# Patient Record
Sex: FEMALE | Race: WHITE | NOT HISPANIC OR LATINO | Employment: FULL TIME | URBAN - METROPOLITAN AREA
[De-identification: names, ages, dates, MRNs, and addresses within clinical notes are randomized per-mention and may not be internally consistent; named-entity substitution may affect disease eponyms.]

---

## 2017-01-19 ENCOUNTER — HOSPITAL ENCOUNTER (OUTPATIENT)
Dept: GENERAL RADIOLOGY | Facility: HOSPITAL | Age: 27
Discharge: HOME | End: 2017-01-19
Admitting: PHYSICIAN ASSISTANT

## 2017-01-19 ENCOUNTER — OFFICE VISIT (OUTPATIENT)
Dept: INTERNAL MEDICINE | Facility: CLINIC | Age: 27
End: 2017-01-19

## 2017-01-19 VITALS
WEIGHT: 293 LBS | HEIGHT: 66 IN | BODY MASS INDEX: 47.09 KG/M2 | SYSTOLIC BLOOD PRESSURE: 120 MMHG | DIASTOLIC BLOOD PRESSURE: 78 MMHG | HEART RATE: 81 BPM | OXYGEN SATURATION: 99 %

## 2017-01-19 DIAGNOSIS — M79.601 RIGHT ARM PAIN: ICD-10-CM

## 2017-01-19 DIAGNOSIS — S46.911A RIGHT SHOULDER STRAIN, INITIAL ENCOUNTER: ICD-10-CM
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
## 2017-01-19 DIAGNOSIS — S46.911A RIGHT SHOULDER STRAIN, INITIAL ENCOUNTER: Primary | ICD-10-CM

## 2017-01-19 PROCEDURE — 96372 THER/PROPH/DIAG INJ SC/IM: CPT | Performed by: PHYSICIAN ASSISTANT

## 2017-01-19 PROCEDURE — 99213 OFFICE O/P EST LOW 20 MIN: CPT | Performed by: PHYSICIAN ASSISTANT

## 2017-01-19 PROCEDURE — 73110 X-RAY EXAM OF WRIST: CPT

## 2017-01-19 PROCEDURE — 73090 X-RAY EXAM OF FOREARM: CPT

## 2017-01-19 PROCEDURE — 73060 X-RAY EXAM OF HUMERUS: CPT

## 2017-01-19 PROCEDURE — 73030 X-RAY EXAM OF SHOULDER: CPT

## 2017-01-19 PROCEDURE — 73070 X-RAY EXAM OF ELBOW: CPT

## 2017-01-19 PROCEDURE — 72050 X-RAY EXAM NECK SPINE 4/5VWS: CPT

## 2017-01-19 RX ORDER — KETOROLAC TROMETHAMINE 30 MG/ML
60 INJECTION, SOLUTION INTRAMUSCULAR; INTRAVENOUS ONCE
Status: COMPLETED | OUTPATIENT
Start: 2017-01-19 | End: 2017-01-19

## 2017-01-19 RX ORDER — CYCLOBENZAPRINE HCL 10 MG
10 TABLET ORAL 3 TIMES DAILY PRN
Qty: 30 TABLET | Refills: 0 | Status: SHIPPED | OUTPATIENT
Start: 2017-01-19 | End: 2017-02-08

## 2017-01-19 RX ADMIN — KETOROLAC TROMETHAMINE 60 MG: 30 INJECTION, SOLUTION INTRAMUSCULAR; INTRAVENOUS at 08:43
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
## 2017-02-08 ENCOUNTER — OFFICE VISIT (OUTPATIENT)
Dept: INTERNAL MEDICINE | Facility: CLINIC | Age: 27
End: 2017-02-08

## 2017-02-08 VITALS
BODY MASS INDEX: 47.09 KG/M2 | WEIGHT: 293 LBS | OXYGEN SATURATION: 100 % | DIASTOLIC BLOOD PRESSURE: 74 MMHG | HEIGHT: 66 IN | SYSTOLIC BLOOD PRESSURE: 124 MMHG | HEART RATE: 105 BPM

## 2017-02-08 DIAGNOSIS — A08.4 VIRAL GASTROENTERITIS: ICD-10-CM

## 2017-02-08 DIAGNOSIS — R11.0 NAUSEA: Primary | ICD-10-CM

## 2017-02-08 PROCEDURE — 99213 OFFICE O/P EST LOW 20 MIN: CPT | Performed by: HOSPITALIST

## 2017-02-08 PROCEDURE — 96372 THER/PROPH/DIAG INJ SC/IM: CPT | Performed by: HOSPITALIST

## 2017-02-08 RX ORDER — PROMETHAZINE HYDROCHLORIDE 25 MG/1
25 TABLET ORAL EVERY 6 HOURS PRN
Qty: 30 TABLET | Refills: 1 | Status: SHIPPED | OUTPATIENT
Start: 2017-02-08 | End: 2018-01-22 | Stop reason: SDUPTHER

## 2017-02-08 RX ORDER — LOPERAMIDE HYDROCHLORIDE 2 MG/1
2 CAPSULE ORAL 4 TIMES DAILY PRN
COMMUNITY
End: 2017-02-27

## 2017-02-08 RX ORDER — PROMETHAZINE HYDROCHLORIDE 25 MG/ML
12.5 INJECTION, SOLUTION INTRAMUSCULAR; INTRAVENOUS ONCE
Status: COMPLETED | OUTPATIENT
Start: 2017-02-08 | End: 2017-02-08

## 2017-02-08 RX ADMIN — PROMETHAZINE HYDROCHLORIDE 12.5 MG: 25 INJECTION, SOLUTION INTRAMUSCULAR; INTRAVENOUS at 12:11

## 2017-02-08 NOTE — PROGRESS NOTES
Subjective   Eliz Eisenberg is a 26 y.o. female. Diarrhea (started yesterday afternoon); Abdominal Pain; Cyanosis (lips); and Vomiting         HPI Comments: Has had 4 bm's this morning in 3 hours. She has lower abdominal pain. No blood. Has had no sick contacts.     Diarrhea    This is a new problem. The current episode started yesterday. The problem occurs 5 to 10 times per day. Associated symptoms include abdominal pain, chills, a fever and vomiting.   Abdominal Pain   Associated symptoms include diarrhea, a fever and vomiting.   Cyanosis   Associated symptoms include abdominal pain, chills, a fever and vomiting.   Vomiting    Associated symptoms include abdominal pain, chills, diarrhea and a fever.       The following portions of the patient's history were reviewed and updated as appropriate: allergies, current medications, past family history, past medical history, past social history, past surgical history and problem list.    Review of Systems   Constitutional: Positive for chills and fever.   Gastrointestinal: Positive for abdominal pain, diarrhea and vomiting.       Objective   Vitals:    02/08/17 1106   BP: 124/74   Pulse: 105   SpO2: 100%       Physical Exam   Constitutional: She is oriented to person, place, and time. She appears well-developed and well-nourished.   HENT:   Head: Normocephalic and atraumatic.   Eyes: EOM are normal. Pupils are equal, round, and reactive to light.   Neck: Normal range of motion. Neck supple.   Cardiovascular: Normal rate and regular rhythm.    Pulmonary/Chest: Effort normal and breath sounds normal.   Abdominal: Soft. Bowel sounds are normal. She exhibits no distension. There is tenderness.   Neurological: She is alert and oriented to person, place, and time.   Skin: She is diaphoretic.       Assessment/Plan   Eliz was seen today for diarrhea, abdominal pain, cyanosis and vomiting.    Diagnoses and all orders for this visit:    Nausea  -     promethazine (PHENERGAN)  25 MG tablet; Take 1 tablet by mouth Every 6 (Six) Hours As Needed for nausea or vomiting.  -     promethazine (PHENERGAN) injection 12.5 mg; Inject 0.5 mL into the shoulder, thigh, or buttocks 1 (One) Time.        Results for orders placed or performed in visit on 07/08/16   Hemoglobin A1c   Result Value Ref Range    Hemoglobin A1C 5.00 4.00 - 6.00 %   Insulin, random   Result Value Ref Range    Insulin 9.8 uIU/mL   Glucose, fasting   Result Value Ref Range    Glucose, Fasting 75 65 - 99 mg/dL

## 2017-02-08 NOTE — PROGRESS NOTES
Subjective   Eliz Eisenberg is a 26 y.o. female. Diarrhea (started yesterday afternoon); Abdominal Pain; Cyanosis (lips); and Vomiting         History of Present Illness    {Common H&P Review Areas:86411}    Review of Systems    Objective   Vitals:    02/08/17 1106   BP: 124/74   Pulse: 105   SpO2: 100%       Physical Exam    Assessment/Plan   {Assess/PlanSmartLinks:11521}    Results for orders placed or performed in visit on 07/08/16   Hemoglobin A1c   Result Value Ref Range    Hemoglobin A1C 5.00 4.00 - 6.00 %   Insulin, random   Result Value Ref Range    Insulin 9.8 uIU/mL   Glucose, fasting   Result Value Ref Range    Glucose, Fasting 75 65 - 99 mg/dL

## 2017-02-09 ENCOUNTER — TELEPHONE (OUTPATIENT)
Dept: INTERNAL MEDICINE | Facility: CLINIC | Age: 27
End: 2017-02-09

## 2017-02-09 ENCOUNTER — HOSPITAL ENCOUNTER (EMERGENCY)
Facility: HOSPITAL | Age: 27
Discharge: HOME | End: 2017-02-10
Attending: EMERGENCY MEDICINE | Admitting: EMERGENCY MEDICINE

## 2017-02-09 DIAGNOSIS — E86.0 DEHYDRATION: Primary | ICD-10-CM

## 2017-02-09 PROBLEM — A08.4 VIRAL GASTROENTERITIS: Status: ACTIVE | Noted: 2017-02-09

## 2017-02-09 PROBLEM — R11.0 NAUSEA: Status: ACTIVE | Noted: 2017-02-09

## 2017-02-09 LAB
ALBUMIN SERPL-MCNC: 4.2 G/DL (ref 3.2–4.8)
ALBUMIN/GLOB SERPL: 1.2 G/DL (ref 1.5–2.5)
ALP SERPL-CCNC: 84 U/L (ref 25–100)
ALT SERPL W P-5'-P-CCNC: 34 U/L (ref 7–40)
AMORPH URATE CRY URNS QL MICRO: ABNORMAL /HPF
ANION GAP SERPL CALCULATED.3IONS-SCNC: 6 MMOL/L (ref 3–11)
AST SERPL-CCNC: 31 U/L (ref 0–33)
B-HCG UR QL: NEGATIVE
BACTERIA UR QL AUTO: ABNORMAL /HPF
BASOPHILS # BLD AUTO: 0.01 10*3/MM3 (ref 0–0.2)
BASOPHILS NFR BLD AUTO: 0.1 % (ref 0–1)
BILIRUB SERPL-MCNC: 0.8 MG/DL (ref 0.3–1.2)
BILIRUB UR QL STRIP: ABNORMAL
BUN BLD-MCNC: 8 MG/DL (ref 9–23)
BUN/CREAT SERPL: 11.4 (ref 7–25)
CALCIUM SPEC-SCNC: 9.6 MG/DL (ref 8.7–10.4)
CHLORIDE SERPL-SCNC: 109 MMOL/L (ref 99–109)
CLARITY UR: ABNORMAL
CO2 SERPL-SCNC: 24 MMOL/L (ref 20–31)
COLOR UR: ABNORMAL
CREAT BLD-MCNC: 0.7 MG/DL (ref 0.6–1.3)
DEPRECATED RDW RBC AUTO: 43 FL (ref 37–54)
EOSINOPHIL # BLD AUTO: 0.19 10*3/MM3 (ref 0.1–0.3)
EOSINOPHIL NFR BLD AUTO: 1.8 % (ref 0–3)
ERYTHROCYTE [DISTWIDTH] IN BLOOD BY AUTOMATED COUNT: 14 % (ref 11.3–14.5)
GFR SERPL CREATININE-BSD FRML MDRD: 101 ML/MIN/1.73
GLOBULIN UR ELPH-MCNC: 3.5 GM/DL
GLUCOSE BLD-MCNC: 79 MG/DL (ref 70–100)
GLUCOSE UR STRIP-MCNC: NEGATIVE MG/DL
HCT VFR BLD AUTO: 38.8 % (ref 34.5–44)
HGB BLD-MCNC: 12.9 G/DL (ref 11.5–15.5)
HGB UR QL STRIP.AUTO: ABNORMAL
HYALINE CASTS UR QL AUTO: ABNORMAL /LPF
IMM GRANULOCYTES # BLD: 0.02 10*3/MM3 (ref 0–0.03)
IMM GRANULOCYTES NFR BLD: 0.2 % (ref 0–0.6)
INTERNAL NEGATIVE CONTROL: NEGATIVE
INTERNAL POSITIVE CONTROL: NORMAL
KETONES UR QL STRIP: ABNORMAL
LEUKOCYTE ESTERASE UR QL STRIP.AUTO: ABNORMAL
LIPASE SERPL-CCNC: 25 U/L (ref 6–51)
LYMPHOCYTES # BLD AUTO: 2.2 10*3/MM3 (ref 0.6–4.8)
LYMPHOCYTES NFR BLD AUTO: 20.7 % (ref 24–44)
Lab: NORMAL
MCH RBC QN AUTO: 28.1 PG (ref 27–31)
MCHC RBC AUTO-ENTMCNC: 33.2 G/DL (ref 32–36)
MCV RBC AUTO: 84.5 FL (ref 80–99)
MONOCYTES # BLD AUTO: 0.75 10*3/MM3 (ref 0–1)
MONOCYTES NFR BLD AUTO: 7 % (ref 0–12)
MUCOUS THREADS URNS QL MICRO: ABNORMAL /HPF
NEUTROPHILS # BLD AUTO: 7.48 10*3/MM3 (ref 1.5–8.3)
NEUTROPHILS NFR BLD AUTO: 70.2 % (ref 41–71)
NITRITE UR QL STRIP: NEGATIVE
PH UR STRIP.AUTO: 6 [PH] (ref 5–8)
PLATELET # BLD AUTO: 306 10*3/MM3 (ref 150–450)
PMV BLD AUTO: 9.6 FL (ref 6–12)
POTASSIUM BLD-SCNC: 3.8 MMOL/L (ref 3.5–5.5)
PROT SERPL-MCNC: 7.7 G/DL (ref 5.7–8.2)
PROT UR QL STRIP: ABNORMAL
RBC # BLD AUTO: 4.59 10*6/MM3 (ref 3.89–5.14)
RBC # UR: ABNORMAL /HPF
REF LAB TEST METHOD: ABNORMAL
SODIUM BLD-SCNC: 139 MMOL/L (ref 132–146)
SP GR UR STRIP: 1.03 (ref 1–1.03)
SQUAMOUS #/AREA URNS HPF: ABNORMAL /HPF
UROBILINOGEN UR QL STRIP: ABNORMAL
WBC NRBC COR # BLD: 10.65 10*3/MM3 (ref 3.5–10.8)
WBC UR QL AUTO: ABNORMAL /HPF

## 2017-02-09 PROCEDURE — 81001 URINALYSIS AUTO W/SCOPE: CPT | Performed by: EMERGENCY MEDICINE

## 2017-02-09 PROCEDURE — 87086 URINE CULTURE/COLONY COUNT: CPT | Performed by: EMERGENCY MEDICINE

## 2017-02-09 PROCEDURE — 99284 EMERGENCY DEPT VISIT MOD MDM: CPT

## 2017-02-09 PROCEDURE — 80053 COMPREHEN METABOLIC PANEL: CPT | Performed by: EMERGENCY MEDICINE

## 2017-02-09 PROCEDURE — 85025 COMPLETE CBC W/AUTO DIFF WBC: CPT | Performed by: EMERGENCY MEDICINE

## 2017-02-09 PROCEDURE — 83690 ASSAY OF LIPASE: CPT | Performed by: EMERGENCY MEDICINE

## 2017-02-09 RX ORDER — SODIUM CHLORIDE 0.9 % (FLUSH) 0.9 %
10 SYRINGE (ML) INJECTION AS NEEDED
Status: DISCONTINUED | OUTPATIENT
Start: 2017-02-09 | End: 2017-02-10 | Stop reason: HOSPADM

## 2017-02-09 RX ORDER — ONDANSETRON 2 MG/ML
4 INJECTION INTRAMUSCULAR; INTRAVENOUS ONCE
Status: COMPLETED | OUTPATIENT
Start: 2017-02-09 | End: 2017-02-10

## 2017-02-09 RX ORDER — MORPHINE SULFATE 4 MG/ML
4 INJECTION, SOLUTION INTRAMUSCULAR; INTRAVENOUS ONCE
Status: COMPLETED | OUTPATIENT
Start: 2017-02-09 | End: 2017-02-10

## 2017-02-09 NOTE — TELEPHONE ENCOUNTER
2/9/17    Called pt this morning to check to see how she was doing since her OV yesterday. Pt continues to c/o vomiting (cannot hold crackers down), nausea, fever (102.3 this morning), and chills.       After reviewing, Dr. Arguello called the pt and advised that she go to the ED today.

## 2017-02-10 VITALS
WEIGHT: 293 LBS | BODY MASS INDEX: 45.99 KG/M2 | DIASTOLIC BLOOD PRESSURE: 63 MMHG | HEART RATE: 93 BPM | HEIGHT: 67 IN | OXYGEN SATURATION: 99 % | TEMPERATURE: 98.2 F | RESPIRATION RATE: 16 BRPM | SYSTOLIC BLOOD PRESSURE: 110 MMHG

## 2017-02-10 LAB
BACTERIA UR QL AUTO: ABNORMAL /HPF
BILIRUB UR QL STRIP: ABNORMAL
CLARITY UR: CLEAR
COLOR UR: YELLOW
GLUCOSE UR STRIP-MCNC: NEGATIVE MG/DL
HGB UR QL STRIP.AUTO: ABNORMAL
HOLD SPECIMEN: NORMAL
HOLD SPECIMEN: NORMAL
KETONES UR QL STRIP: ABNORMAL
LEUKOCYTE ESTERASE UR QL STRIP.AUTO: NEGATIVE
NITRITE UR QL STRIP: NEGATIVE
PH UR STRIP.AUTO: 6 [PH] (ref 5–8)
PROT UR QL STRIP: ABNORMAL
RBC # UR: ABNORMAL /HPF
REF LAB TEST METHOD: ABNORMAL
SP GR UR STRIP: >=1.03 (ref 1–1.03)
SQUAMOUS #/AREA URNS HPF: ABNORMAL /HPF
UROBILINOGEN UR QL STRIP: ABNORMAL
WBC UR QL AUTO: ABNORMAL /HPF
WHOLE BLOOD HOLD SPECIMEN: NORMAL
WHOLE BLOOD HOLD SPECIMEN: NORMAL

## 2017-02-10 PROCEDURE — 25010000002 ONDANSETRON PER 1 MG: Performed by: EMERGENCY MEDICINE

## 2017-02-10 PROCEDURE — 96374 THER/PROPH/DIAG INJ IV PUSH: CPT

## 2017-02-10 PROCEDURE — 81001 URINALYSIS AUTO W/SCOPE: CPT | Performed by: EMERGENCY MEDICINE

## 2017-02-10 PROCEDURE — 96375 TX/PRO/DX INJ NEW DRUG ADDON: CPT

## 2017-02-10 PROCEDURE — 96361 HYDRATE IV INFUSION ADD-ON: CPT

## 2017-02-10 PROCEDURE — 25010000002 MORPHINE PER 10 MG: Performed by: EMERGENCY MEDICINE

## 2017-02-10 RX ORDER — ONDANSETRON 4 MG/1
4 TABLET, ORALLY DISINTEGRATING ORAL EVERY 6 HOURS PRN
Qty: 10 TABLET | Refills: 0 | Status: SHIPPED | OUTPATIENT
Start: 2017-02-10 | End: 2017-02-24

## 2017-02-10 RX ADMIN — ONDANSETRON 4 MG: 2 INJECTION INTRAMUSCULAR; INTRAVENOUS at 00:37

## 2017-02-10 RX ADMIN — MORPHINE SULFATE 4 MG: 4 INJECTION, SOLUTION INTRAMUSCULAR; INTRAVENOUS at 00:37

## 2017-02-10 RX ADMIN — SODIUM CHLORIDE 1000 ML: 9 INJECTION, SOLUTION INTRAVENOUS at 00:36

## 2017-02-10 NOTE — ED PROVIDER NOTES
Subjective   HPI Comments: Eliz Eisenberg is a 26 y.o.female who presents to the ED with c/o lower abdominal pain. The patient reports two days ago she began experiencing episodes of vomiting and diarrhea. She was seen by Dr. Arguello yesterday and treated with phenergan with no relief. Dr. Arguello referred her to the ED tonight for further evaluation. Additionally, she c/o nausea and unable to keep food or fluids down, but denies vaginal bleeding or any other complaints at this time.     Patient is a 26 y.o. female presenting with abdominal pain.   History provided by:  Patient  Abdominal Pain   Pain location:  Periumbilical  Pain radiates to:  Does not radiate  Pain severity:  Moderate  Onset quality:  Sudden  Timing:  Constant  Progression:  Unchanged  Chronicity:  New  Relieved by:  Nothing  Worsened by:  Nothing  Ineffective treatments: phenergen.  Associated symptoms: diarrhea, nausea and vomiting    Associated symptoms: no chest pain, no chills, no cough, no dysuria, no shortness of breath, no sore throat, no vaginal bleeding and no vaginal discharge        Review of Systems   Constitutional: Negative for chills.   HENT: Negative for sore throat.    Respiratory: Negative for cough and shortness of breath.    Cardiovascular: Negative for chest pain.   Gastrointestinal: Positive for abdominal pain, diarrhea, nausea and vomiting.   Genitourinary: Negative for dysuria, vaginal bleeding and vaginal discharge.   All other systems reviewed and are negative.      Past Medical History   Diagnosis Date   • Abdominal pain    • Acute sinusitis    • Ankle sprain    • Atopic dermatitis    • Cat bite       resolved   • Cough    • Dysfunctional uterine bleeding    • Foot pain    • Gout    • Influenza    • Metabolic syndrome    • Muscular aches    • Obesity    • Pharyngitis    • URI (upper respiratory infection)        No Known Allergies    History reviewed. No pertinent past surgical history.    Family History   Problem  Relation Age of Onset   • Hypertension Father    • Obesity Father    • Multiple sclerosis Mother    • Heart attack Paternal Grandfather    • Colon cancer Paternal Grandmother        Social History     Social History   • Marital status:      Spouse name: N/A   • Number of children: N/A   • Years of education: N/A     Social History Main Topics   • Smoking status: Former Smoker   • Smokeless tobacco: Never Used   • Alcohol use No   • Drug use: No   • Sexual activity: No     Other Topics Concern   • None     Social History Narrative         Objective   Physical Exam   Constitutional: She is oriented to person, place, and time. She appears well-developed and well-nourished.  Non-toxic appearance. No distress.   HENT:   Head: Normocephalic and atraumatic.   Right Ear: External ear normal.   Left Ear: External ear normal.   Nose: Nose normal.   Eyes: Conjunctivae, EOM and lids are normal. Pupils are equal, round, and reactive to light. No scleral icterus.   Neck: Normal range of motion. Neck supple. No tracheal deviation present.   Cardiovascular: Normal rate, regular rhythm and normal heart sounds.  Exam reveals no gallop, no friction rub and no decreased pulses.    No murmur heard.  Pulmonary/Chest: Effort normal and breath sounds normal. No respiratory distress. She has no decreased breath sounds. She has no wheezes. She has no rhonchi. She has no rales.   Abdominal: Soft. Normal appearance and bowel sounds are normal. There is tenderness. There is no rebound and no guarding.   Generalized abdominal tenderness.  No peritoneal signs.   Musculoskeletal: Normal range of motion. She exhibits no edema or deformity.   Lymphadenopathy:     She has no cervical adenopathy.   Neurological: She is alert and oriented to person, place, and time. She has normal strength. No cranial nerve deficit or sensory deficit.   Skin: Skin is warm and dry. No rash noted. She is not diaphoretic.   Psychiatric: She has a normal mood and  affect. Her speech is normal and behavior is normal. Judgment and thought content normal. Cognition and memory are normal.   Nursing note and vitals reviewed.      Procedures         ED Course  ED Course     Recent Results (from the past 24 hour(s))   Urinalysis With / Culture If Indicated    Collection Time: 02/09/17  6:39 PM   Result Value Ref Range    Color, UA Dark Yellow (A) Yellow, Straw    Appearance, UA Turbid (A) Clear    pH, UA 6.0 5.0 - 8.0    Specific Gravity, UA 1.027 1.001 - 1.030    Glucose, UA Negative Negative    Ketones, UA 40 mg/dL (2+) (A) Negative    Bilirubin, UA Small (1+) (A) Negative    Blood, UA Trace (A) Negative    Protein, UA 30 mg/dL (1+) (A) Negative    Leuk Esterase, UA Large (3+) (A) Negative    Nitrite, UA Negative Negative    Urobilinogen, UA 1.0 E.U./dL 0.2 - 1.0 E.U./dL   POCT pregnancy, urine    Collection Time: 02/09/17  6:39 PM   Result Value Ref Range    HCG, Urine, QL Negative Negative    Lot Number [id number]     Internal Positive Control Presumptive Positive     Internal Negative Control Negative    Urinalysis, Microscopic Only    Collection Time: 02/09/17  6:39 PM   Result Value Ref Range    RBC, UA 7-12 (A) None Seen, 0-2 /HPF    WBC, UA Too Numerous to Count (A) None Seen /HPF    Bacteria, UA 4+ (A) None Seen, Trace /HPF    Squamous Epithelial Cells, UA 13-20 (A) None Seen, 0-2 /HPF    Hyaline Casts, UA 0-6 0 - 6 /LPF    Amorphous Crystals, UA Large/3+ None Seen /HPF    Mucus, UA Moderate/2+ (A) None Seen, Trace /HPF    Methodology Manual Light Microscopy    Comprehensive Metabolic Panel    Collection Time: 02/09/17  7:34 PM   Result Value Ref Range    Glucose 79 70 - 100 mg/dL    BUN 8 (L) 9 - 23 mg/dL    Creatinine 0.70 0.60 - 1.30 mg/dL    Sodium 139 132 - 146 mmol/L    Potassium 3.8 3.5 - 5.5 mmol/L    Chloride 109 99 - 109 mmol/L    CO2 24.0 20.0 - 31.0 mmol/L    Calcium 9.6 8.7 - 10.4 mg/dL    Total Protein 7.7 5.7 - 8.2 g/dL    Albumin 4.20 3.20 - 4.80 g/dL     ALT (SGPT) 34 7 - 40 U/L    AST (SGOT) 31 0 - 33 U/L    Alkaline Phosphatase 84 25 - 100 U/L    Total Bilirubin 0.8 0.3 - 1.2 mg/dL    eGFR Non African Amer 101 >60 mL/min/1.73    Globulin 3.5 gm/dL    A/G Ratio 1.2 (L) 1.5 - 2.5 g/dL    BUN/Creatinine Ratio 11.4 7.0 - 25.0    Anion Gap 6.0 3.0 - 11.0 mmol/L   Lipase    Collection Time: 02/09/17  7:34 PM   Result Value Ref Range    Lipase 25 6 - 51 U/L   Light Blue Top    Collection Time: 02/09/17  7:34 PM   Result Value Ref Range    Extra Tube hold for add-on    Green Top (Gel)    Collection Time: 02/09/17  7:34 PM   Result Value Ref Range    Extra Tube Hold for add-ons.    Lavender Top    Collection Time: 02/09/17  7:34 PM   Result Value Ref Range    Extra Tube hold for add-on    Gold Top - SST    Collection Time: 02/09/17  7:34 PM   Result Value Ref Range    Extra Tube Hold for add-ons.    CBC Auto Differential    Collection Time: 02/09/17  7:34 PM   Result Value Ref Range    WBC 10.65 3.50 - 10.80 10*3/mm3    RBC 4.59 3.89 - 5.14 10*6/mm3    Hemoglobin 12.9 11.5 - 15.5 g/dL    Hematocrit 38.8 34.5 - 44.0 %    MCV 84.5 80.0 - 99.0 fL    MCH 28.1 27.0 - 31.0 pg    MCHC 33.2 32.0 - 36.0 g/dL    RDW 14.0 11.3 - 14.5 %    RDW-SD 43.0 37.0 - 54.0 fl    MPV 9.6 6.0 - 12.0 fL    Platelets 306 150 - 450 10*3/mm3    Neutrophil % 70.2 41.0 - 71.0 %    Lymphocyte % 20.7 (L) 24.0 - 44.0 %    Monocyte % 7.0 0.0 - 12.0 %    Eosinophil % 1.8 0.0 - 3.0 %    Basophil % 0.1 0.0 - 1.0 %    Immature Grans % 0.2 0.0 - 0.6 %    Neutrophils, Absolute 7.48 1.50 - 8.30 10*3/mm3    Lymphocytes, Absolute 2.20 0.60 - 4.80 10*3/mm3    Monocytes, Absolute 0.75 0.00 - 1.00 10*3/mm3    Eosinophils, Absolute 0.19 0.10 - 0.30 10*3/mm3    Basophils, Absolute 0.01 0.00 - 0.20 10*3/mm3    Immature Grans, Absolute 0.02 0.00 - 0.03 10*3/mm3   Urinalysis With / Culture If Indicated    Collection Time: 02/10/17 12:21 AM   Result Value Ref Range    Color, UA Yellow Yellow, Straw    Appearance, UA Clear  "Clear    pH, UA 6.0 5.0 - 8.0    Specific Gravity, UA >=1.030 1.005 - 1.030    Glucose, UA Negative Negative    Ketones, UA 40 mg/dL (2+) (A) Negative    Bilirubin, UA Moderate (2+) (A) Negative    Blood, UA Trace (A) Negative    Protein, UA 30 mg/dL (1+) (A) Negative    Leuk Esterase, UA Negative Negative    Nitrite, UA Negative Negative    Urobilinogen, UA 1.0 E.U./dL 0.2 - 1.0 E.U./dL   Urinalysis, Microscopic Only    Collection Time: 02/10/17 12:21 AM   Result Value Ref Range    RBC, UA 3-6 (A) None Seen, 0-2 /HPF    WBC, UA 3-5 (A) None Seen /HPF    Bacteria, UA Trace None Seen, Trace /HPF    Squamous Epithelial Cells, UA  None Seen, 0-2 /HPF    Methodology Automated Microscopy      Note: In addition to lab results from this visit, the labs listed above may include labs taken at another facility or during a different encounter within the last 24 hours. Please correlate lab times with ED admission and discharge times for further clarification of the services performed during this visit.    No orders to display     Vitals:    02/09/17 1820 02/09/17 2208   BP: 150/80 139/84   BP Location: Left arm Left arm   Patient Position: Sitting Sitting   Pulse: 96 93   Resp: 16 16   Temp: 98.2 °F (36.8 °C)    TempSrc: Oral    SpO2: 97% 97%   Weight: (!) 302 lb (137 kg)    Height: 67\" (170.2 cm)      Medications   sodium chloride 0.9 % flush 10 mL (not administered)   sodium chloride 0.9 % flush 10 mL (not administered)   sodium chloride 0.9 % bolus 1,000 mL (1,000 mL Intravenous New Bag 2/10/17 0036)   ondansetron (ZOFRAN) injection 4 mg (4 mg Intravenous Given 2/10/17 0037)   Morphine sulfate (PF) injection 4 mg (4 mg Intravenous Given 2/10/17 0037)     ECG/EMG Results (last 24 hours)     ** No results found for the last 24 hours. **                          MDM  Number of Diagnoses or Management Options  Dehydration: new and requires workup  Diagnosis management comments: Labs appear normal.     Exam consistent with viral " gastroenteritis, with dehydration.     Patient given IV fluids and nausea medication in form of zofran, and reports feeling much improved.     Will DC with zofran for nausea, and advise good oral fluid intake at home.        Amount and/or Complexity of Data Reviewed  Clinical lab tests: ordered and reviewed  Tests in the radiology section of CPT®: ordered and reviewed  Obtain history from someone other than the patient: yes  Independent visualization of images, tracings, or specimens: yes    Patient Progress  Patient progress: stable      Final diagnoses:   None       Documentation assistance provided by kosta Nunez.  Information recorded by the scribe was done at my direction and has been verified and validated by me.     Delta Nunez  02/10/17 0002       Delta Nunez  02/10/17 0043       Delta Nunez  02/10/17 0047       Delta Nunez  02/10/17 0116       Noel Cuellar MD  02/10/17 0220

## 2017-02-10 NOTE — DISCHARGE INSTRUCTIONS

## 2017-02-11 LAB — BACTERIA SPEC AEROBE CULT: NORMAL

## 2017-02-17 ENCOUNTER — TELEPHONE (OUTPATIENT)
Dept: ENDOCRINOLOGY | Facility: CLINIC | Age: 27
End: 2017-02-17

## 2017-02-17 ENCOUNTER — HOSPITAL ENCOUNTER (EMERGENCY)
Facility: HOSPITAL | Age: 27
Discharge: HOME | End: 2017-02-18
Attending: EMERGENCY MEDICINE | Admitting: EMERGENCY MEDICINE

## 2017-02-17 ENCOUNTER — APPOINTMENT (OUTPATIENT)
Dept: CT IMAGING | Facility: HOSPITAL | Age: 27
End: 2017-02-17

## 2017-02-17 DIAGNOSIS — R10.11 RIGHT UPPER QUADRANT ABDOMINAL PAIN: Primary | ICD-10-CM

## 2017-02-17 DIAGNOSIS — R31.9 HEMATURIA: ICD-10-CM

## 2017-02-17 DIAGNOSIS — N30.01 ACUTE CYSTITIS WITH HEMATURIA: ICD-10-CM

## 2017-02-17 DIAGNOSIS — R10.9 RIGHT FLANK PAIN: ICD-10-CM

## 2017-02-17 LAB
ALBUMIN SERPL-MCNC: 4 G/DL (ref 3.2–4.8)
ALBUMIN/GLOB SERPL: 1.2 G/DL (ref 1.5–2.5)
ALP SERPL-CCNC: 81 U/L (ref 25–100)
ALT SERPL W P-5'-P-CCNC: 36 U/L (ref 7–40)
ANION GAP SERPL CALCULATED.3IONS-SCNC: 8 MMOL/L (ref 3–11)
AST SERPL-CCNC: 29 U/L (ref 0–33)
B-HCG UR QL: NEGATIVE
BACTERIA UR QL AUTO: ABNORMAL /HPF
BASOPHILS # BLD AUTO: 0.01 10*3/MM3 (ref 0–0.2)
BASOPHILS NFR BLD AUTO: 0.1 % (ref 0–1)
BILIRUB SERPL-MCNC: 0.9 MG/DL (ref 0.3–1.2)
BILIRUB UR QL STRIP: ABNORMAL
BUN BLD-MCNC: 6 MG/DL (ref 9–23)
BUN/CREAT SERPL: 10 (ref 7–25)
CALCIUM SPEC-SCNC: 9.2 MG/DL (ref 8.7–10.4)
CHLORIDE SERPL-SCNC: 109 MMOL/L (ref 99–109)
CLARITY UR: ABNORMAL
CO2 SERPL-SCNC: 21 MMOL/L (ref 20–31)
COLOR UR: ABNORMAL
CREAT BLD-MCNC: 0.6 MG/DL (ref 0.6–1.3)
DEPRECATED RDW RBC AUTO: 43.8 FL (ref 37–54)
EOSINOPHIL # BLD AUTO: 0.13 10*3/MM3 (ref 0.1–0.3)
EOSINOPHIL NFR BLD AUTO: 1.2 % (ref 0–3)
ERYTHROCYTE [DISTWIDTH] IN BLOOD BY AUTOMATED COUNT: 14.1 % (ref 11.3–14.5)
GFR SERPL CREATININE-BSD FRML MDRD: 121 ML/MIN/1.73
GLOBULIN UR ELPH-MCNC: 3.3 GM/DL
GLUCOSE BLD-MCNC: 92 MG/DL (ref 70–100)
GLUCOSE UR STRIP-MCNC: NEGATIVE MG/DL
HCT VFR BLD AUTO: 39.3 % (ref 34.5–44)
HGB BLD-MCNC: 13.1 G/DL (ref 11.5–15.5)
HGB UR QL STRIP.AUTO: NEGATIVE
HYALINE CASTS UR QL AUTO: ABNORMAL /LPF
IMM GRANULOCYTES # BLD: 0.04 10*3/MM3 (ref 0–0.03)
IMM GRANULOCYTES NFR BLD: 0.4 % (ref 0–0.6)
INTERNAL NEGATIVE CONTROL: NEGATIVE
INTERNAL POSITIVE CONTROL: POSITIVE
KETONES UR QL STRIP: ABNORMAL
LEUKOCYTE ESTERASE UR QL STRIP.AUTO: ABNORMAL
LIPASE SERPL-CCNC: 24 U/L (ref 6–51)
LYMPHOCYTES # BLD AUTO: 1.89 10*3/MM3 (ref 0.6–4.8)
LYMPHOCYTES NFR BLD AUTO: 17.6 % (ref 24–44)
Lab: NORMAL
MCH RBC QN AUTO: 28.3 PG (ref 27–31)
MCHC RBC AUTO-ENTMCNC: 33.3 G/DL (ref 32–36)
MCV RBC AUTO: 84.9 FL (ref 80–99)
MONOCYTES # BLD AUTO: 0.77 10*3/MM3 (ref 0–1)
MONOCYTES NFR BLD AUTO: 7.2 % (ref 0–12)
NEUTROPHILS # BLD AUTO: 7.92 10*3/MM3 (ref 1.5–8.3)
NEUTROPHILS NFR BLD AUTO: 73.5 % (ref 41–71)
NITRITE UR QL STRIP: NEGATIVE
PH UR STRIP.AUTO: 6 [PH] (ref 5–8)
PLATELET # BLD AUTO: 288 10*3/MM3 (ref 150–450)
PMV BLD AUTO: 9.6 FL (ref 6–12)
POTASSIUM BLD-SCNC: 3.7 MMOL/L (ref 3.5–5.5)
PROT SERPL-MCNC: 7.3 G/DL (ref 5.7–8.2)
PROT UR QL STRIP: ABNORMAL
RBC # BLD AUTO: 4.63 10*6/MM3 (ref 3.89–5.14)
RBC # UR: ABNORMAL /HPF
REF LAB TEST METHOD: ABNORMAL
SODIUM BLD-SCNC: 138 MMOL/L (ref 132–146)
SP GR UR STRIP: 1.03 (ref 1–1.03)
SQUAMOUS #/AREA URNS HPF: ABNORMAL /HPF
UROBILINOGEN UR QL STRIP: ABNORMAL
WBC NRBC COR # BLD: 10.76 10*3/MM3 (ref 3.5–10.8)
WBC UR QL AUTO: ABNORMAL /HPF

## 2017-02-17 PROCEDURE — 36415 COLL VENOUS BLD VENIPUNCTURE: CPT

## 2017-02-17 PROCEDURE — 74177 CT ABD & PELVIS W/CONTRAST: CPT

## 2017-02-17 PROCEDURE — 80053 COMPREHEN METABOLIC PANEL: CPT | Performed by: EMERGENCY MEDICINE

## 2017-02-17 PROCEDURE — 85025 COMPLETE CBC W/AUTO DIFF WBC: CPT | Performed by: EMERGENCY MEDICINE

## 2017-02-17 PROCEDURE — 83690 ASSAY OF LIPASE: CPT | Performed by: EMERGENCY MEDICINE

## 2017-02-17 PROCEDURE — 81001 URINALYSIS AUTO W/SCOPE: CPT | Performed by: EMERGENCY MEDICINE

## 2017-02-17 PROCEDURE — 87086 URINE CULTURE/COLONY COUNT: CPT | Performed by: EMERGENCY MEDICINE

## 2017-02-17 PROCEDURE — 25510000001 DIATRIZOATE MEGLUMINE & SODIUM PER 1 ML: Performed by: EMERGENCY MEDICINE

## 2017-02-17 PROCEDURE — 0 IOPAMIDOL 61 % SOLUTION: Performed by: EMERGENCY MEDICINE

## 2017-02-17 PROCEDURE — 99283 EMERGENCY DEPT VISIT LOW MDM: CPT

## 2017-02-17 RX ORDER — SODIUM CHLORIDE 0.9 % (FLUSH) 0.9 %
10 SYRINGE (ML) INJECTION AS NEEDED
Status: DISCONTINUED | OUTPATIENT
Start: 2017-02-17 | End: 2017-02-18 | Stop reason: HOSPADM

## 2017-02-17 RX ADMIN — IOPAMIDOL 100 ML: 612 INJECTION, SOLUTION INTRAVENOUS at 23:20

## 2017-02-17 RX ADMIN — DIATRIZOATE MEGLUMINE AND DIATRIZOATE SODIUM 15 ML: 660; 100 LIQUID ORAL; RECTAL at 22:09

## 2017-02-18 VITALS
HEART RATE: 86 BPM | OXYGEN SATURATION: 98 % | WEIGHT: 293 LBS | TEMPERATURE: 98.1 F | RESPIRATION RATE: 20 BRPM | SYSTOLIC BLOOD PRESSURE: 103 MMHG | BODY MASS INDEX: 47.09 KG/M2 | HEIGHT: 66 IN | DIASTOLIC BLOOD PRESSURE: 68 MMHG

## 2017-02-18 LAB
HOLD SPECIMEN: NORMAL
HOLD SPECIMEN: NORMAL
WHOLE BLOOD HOLD SPECIMEN: NORMAL
WHOLE BLOOD HOLD SPECIMEN: NORMAL

## 2017-02-18 RX ORDER — CEFDINIR 300 MG/1
300 CAPSULE ORAL 2 TIMES DAILY
Qty: 14 CAPSULE | Refills: 0 | Status: SHIPPED | OUTPATIENT
Start: 2017-02-18 | End: 2017-02-25

## 2017-02-18 RX ORDER — PHENAZOPYRIDINE HYDROCHLORIDE 100 MG/1
100 TABLET, FILM COATED ORAL 3 TIMES DAILY PRN
Qty: 12 TABLET | Refills: 0 | Status: SHIPPED | OUTPATIENT
Start: 2017-02-18 | End: 2017-02-27

## 2017-02-18 NOTE — TELEPHONE ENCOUNTER
Patient has called and reported the onset of abdominal pain, diarrhea and nausea. She had similar sx last week and went to the ER. Labs were normal and she was discharged home. She improved and this morning sx reoccured.   She reported severe diarrhea and  Nausea with vomiting.   I advised her to go to the ER

## 2017-02-18 NOTE — ED PROVIDER NOTES
Subjective   HPI Comments: 26 y.o. female presents to the ED w/ c/o N/V/D starting 3 weeks ago. Pt has had several episodes of vomiting and diarrhea for the last 3 weeks. She has had pain to her right abdomen radiating to the flank and into the back. She was seen by her PCP last week and prescribed Phenergan, which did not help with the nausea. She was seen here in the ED 8 days ago and Rx'ed Zofran. Did not have any imaging done at that time. She states the Zofran helped with the nausea for a few days, but she is now having recurrent vomiting and feels dehydrated again.    Patient is a 26 y.o. female presenting with vomiting.   History provided by:  Patient  Vomiting   The primary symptoms include abdominal pain, nausea, vomiting and diarrhea. Primary symptoms do not include fever. Episode onset: 3 weeks. The onset was gradual.   Pain location: right abdomen.   The illness does not include chills. Associated medical issues do not include inflammatory bowel disease, gallstones, liver disease, alcohol abuse, gastric bypass, bowel resection, irritable bowel syndrome or diverticulitis.       Review of Systems   Constitutional: Negative for chills and fever.   Gastrointestinal: Positive for abdominal pain, diarrhea, nausea and vomiting.   All other systems reviewed and are negative.      Past Medical History   Diagnosis Date   • Abdominal pain    • Acute sinusitis    • Ankle sprain    • Atopic dermatitis    • Cat bite       resolved   • Cough    • Dysfunctional uterine bleeding    • Foot pain    • Gout    • Influenza    • Metabolic syndrome    • Muscular aches    • Obesity    • Pharyngitis    • URI (upper respiratory infection)        No Known Allergies    History reviewed. No pertinent past surgical history.    Family History   Problem Relation Age of Onset   • Hypertension Father    • Obesity Father    • Multiple sclerosis Mother    • Heart attack Paternal Grandfather    • Colon cancer Paternal Grandmother         Social History     Social History   • Marital status:      Spouse name: N/A   • Number of children: N/A   • Years of education: N/A     Social History Main Topics   • Smoking status: Former Smoker   • Smokeless tobacco: Never Used   • Alcohol use No   • Drug use: No   • Sexual activity: Defer     Other Topics Concern   • None     Social History Narrative   • None         Objective   Physical Exam   Constitutional: She is oriented to person, place, and time. She appears well-developed and well-nourished. No distress.   HENT:   Head: Normocephalic and atraumatic.   Eyes: Conjunctivae are normal. Pupils are equal, round, and reactive to light.   Neck: Neck supple.   Cardiovascular: Normal rate, regular rhythm and normal heart sounds.    Pulmonary/Chest: Effort normal and breath sounds normal. No respiratory distress. She exhibits no tenderness.   Abdominal: Soft. Bowel sounds are normal. There is tenderness (mild RUQ and RLQ tenderness). There is no rebound and no guarding.   Musculoskeletal: Normal range of motion. She exhibits no edema.   Neurological: She is alert and oriented to person, place, and time.   Skin: Skin is warm and dry.   Psychiatric: She has a normal mood and affect. Her behavior is normal.   Nursing note and vitals reviewed.      Procedures         ED Course  ED Course       Recent Results (from the past 24 hour(s))   Comprehensive Metabolic Panel    Collection Time: 02/17/17  8:40 PM   Result Value Ref Range    Glucose 92 70 - 100 mg/dL    BUN 6 (L) 9 - 23 mg/dL    Creatinine 0.60 0.60 - 1.30 mg/dL    Sodium 138 132 - 146 mmol/L    Potassium 3.7 3.5 - 5.5 mmol/L    Chloride 109 99 - 109 mmol/L    CO2 21.0 20.0 - 31.0 mmol/L    Calcium 9.2 8.7 - 10.4 mg/dL    Total Protein 7.3 5.7 - 8.2 g/dL    Albumin 4.00 3.20 - 4.80 g/dL    ALT (SGPT) 36 7 - 40 U/L    AST (SGOT) 29 0 - 33 U/L    Alkaline Phosphatase 81 25 - 100 U/L    Total Bilirubin 0.9 0.3 - 1.2 mg/dL    eGFR Non African Amer 121  >60 mL/min/1.73    Globulin 3.3 gm/dL    A/G Ratio 1.2 (L) 1.5 - 2.5 g/dL    BUN/Creatinine Ratio 10.0 7.0 - 25.0    Anion Gap 8.0 3.0 - 11.0 mmol/L   Lipase    Collection Time: 02/17/17  8:40 PM   Result Value Ref Range    Lipase 24 6 - 51 U/L   Light Blue Top    Collection Time: 02/17/17  8:40 PM   Result Value Ref Range    Extra Tube hold for add-on    Green Top (Gel)    Collection Time: 02/17/17  8:40 PM   Result Value Ref Range    Extra Tube Hold for add-ons.    Lavender Top    Collection Time: 02/17/17  8:40 PM   Result Value Ref Range    Extra Tube hold for add-on    Gold Top - SST    Collection Time: 02/17/17  8:40 PM   Result Value Ref Range    Extra Tube Hold for add-ons.    CBC Auto Differential    Collection Time: 02/17/17  8:40 PM   Result Value Ref Range    WBC 10.76 3.50 - 10.80 10*3/mm3    RBC 4.63 3.89 - 5.14 10*6/mm3    Hemoglobin 13.1 11.5 - 15.5 g/dL    Hematocrit 39.3 34.5 - 44.0 %    MCV 84.9 80.0 - 99.0 fL    MCH 28.3 27.0 - 31.0 pg    MCHC 33.3 32.0 - 36.0 g/dL    RDW 14.1 11.3 - 14.5 %    RDW-SD 43.8 37.0 - 54.0 fl    MPV 9.6 6.0 - 12.0 fL    Platelets 288 150 - 450 10*3/mm3    Neutrophil % 73.5 (H) 41.0 - 71.0 %    Lymphocyte % 17.6 (L) 24.0 - 44.0 %    Monocyte % 7.2 0.0 - 12.0 %    Eosinophil % 1.2 0.0 - 3.0 %    Basophil % 0.1 0.0 - 1.0 %    Immature Grans % 0.4 0.0 - 0.6 %    Neutrophils, Absolute 7.92 1.50 - 8.30 10*3/mm3    Lymphocytes, Absolute 1.89 0.60 - 4.80 10*3/mm3    Monocytes, Absolute 0.77 0.00 - 1.00 10*3/mm3    Eosinophils, Absolute 0.13 0.10 - 0.30 10*3/mm3    Basophils, Absolute 0.01 0.00 - 0.20 10*3/mm3    Immature Grans, Absolute 0.04 (H) 0.00 - 0.03 10*3/mm3   Urinalysis With / Culture If Indicated    Collection Time: 02/17/17 10:10 PM   Result Value Ref Range    Color, UA Dark Yellow (A) Yellow, Straw    Appearance, UA Cloudy (A) Clear    pH, UA 6.0 5.0 - 8.0    Specific Gravity, UA 1.029 1.001 - 1.030    Glucose, UA Negative Negative    Ketones, UA Trace (A) Negative  "   Bilirubin, UA Small (1+) (A) Negative    Blood, UA Negative Negative    Protein, UA 30 mg/dL (1+) (A) Negative    Leuk Esterase, UA Small (1+) (A) Negative    Nitrite, UA Negative Negative    Urobilinogen, UA 0.2 E.U./dL 0.2 - 1.0 E.U./dL   Urinalysis, Microscopic Only    Collection Time: 02/17/17 10:10 PM   Result Value Ref Range    RBC, UA Unable to determine due to loaded field (A) None Seen, 0-2 /HPF    WBC, UA 31-50 (A) None Seen /HPF    Bacteria, UA 3+ (A) None Seen, Trace /HPF    Squamous Epithelial Cells, UA 3-6 (A) None Seen, 0-2 /HPF    Hyaline Casts, UA Unable to determine due to loaded field 0 - 6 /LPF    Methodology Manual Light Microscopy    POCT pregnancy, urine    Collection Time: 02/17/17 10:21 PM   Result Value Ref Range    HCG, Urine, QL Negative Negative    Lot Number [id number]     Internal Positive Control Positive     Internal Negative Control Negative      Note: In addition to lab results from this visit, the labs listed above may include labs taken at another facility or during a different encounter within the last 24 hours. Please correlate lab times with ED admission and discharge times for further clarification of the services performed during this visit.    CT Abdomen Pelvis With Contrast   Final Result   Abnormal   1. No acute intra-abdominal/intrapelvic process identified.      THIS DOCUMENT HAS BEEN ELECTRONICALLY SIGNED BY EWELINA BARLOW MD        Vitals:    02/17/17 2034 02/18/17 0039   BP: 137/76 103/68   BP Location: Left arm Right arm   Patient Position: Sitting Lying   Pulse: 103 86   Resp: 20 20   Temp: 98.1 °F (36.7 °C)    TempSrc: Oral    SpO2: 99% 98%   Weight: (!) 302 lb (137 kg)    Height: 66\" (167.6 cm)      Medications   diatrizoate meglumine-sodium (GASTROGRAFIN) 66-10 % solution 15 mL (15 mL Oral Given 2/17/17 2209)   iopamidol (ISOVUE-300) 61 % injection 100 mL (100 mL Intravenous Given 2/17/17 2320)     ECG/EMG Results (last 24 hours)     ** No results found for the " last 24 hours. **                      [facility]    Final diagnoses:   Right upper quadrant abdominal pain   Right flank pain   Acute cystitis with hematuria   Hematuria       Documentation assistance provided by kosta Rios.  Information recorded by the scribe was done at my direction and has been verified and validated by me.     Sujata Rios  02/17/17 0854       Celestine Adames DO  02/18/17 2038

## 2017-02-20 DIAGNOSIS — N12 PYELONEPHRITIS: Primary | ICD-10-CM

## 2017-02-20 LAB — BACTERIA SPEC AEROBE CULT: NORMAL

## 2017-02-20 RX ORDER — LEVOFLOXACIN 750 MG/1
750 TABLET ORAL DAILY
Qty: 7 TABLET | Refills: 0 | Status: SHIPPED | OUTPATIENT
Start: 2017-02-20 | End: 2017-09-27

## 2017-02-20 NOTE — PROGRESS NOTES
Contacted patient today. She went back to the ER and was found to have a kidney infection. She was having flank pain, NV, Diarrhea, fever again. CT of the abdomen with contrast was not read as seeing acute pyelonephritis but her ua was extremely positive. Urine culture has not grown anything. She states she has not had a fever or thrown up in the last 12 hrs but all the other symptoms are still present. She denies any allergies.

## 2017-02-24 ENCOUNTER — HOSPITAL ENCOUNTER (EMERGENCY)
Facility: HOSPITAL | Age: 27
Discharge: HOME | End: 2017-02-24
Attending: EMERGENCY MEDICINE | Admitting: EMERGENCY MEDICINE

## 2017-02-24 ENCOUNTER — APPOINTMENT (OUTPATIENT)
Dept: ULTRASOUND IMAGING | Facility: HOSPITAL | Age: 27
End: 2017-02-24

## 2017-02-24 ENCOUNTER — TELEPHONE (OUTPATIENT)
Dept: INTERNAL MEDICINE | Facility: CLINIC | Age: 27
End: 2017-02-24

## 2017-02-24 VITALS
HEIGHT: 66 IN | TEMPERATURE: 98.1 F | SYSTOLIC BLOOD PRESSURE: 114 MMHG | RESPIRATION RATE: 18 BRPM | OXYGEN SATURATION: 99 % | HEART RATE: 84 BPM | DIASTOLIC BLOOD PRESSURE: 80 MMHG | BODY MASS INDEX: 47.09 KG/M2 | WEIGHT: 293 LBS

## 2017-02-24 DIAGNOSIS — R19.7 DIARRHEA, UNSPECIFIED TYPE: ICD-10-CM

## 2017-02-24 DIAGNOSIS — R10.84 GENERALIZED ABDOMINAL PAIN: Primary | ICD-10-CM

## 2017-02-24 DIAGNOSIS — R11.2 NON-INTRACTABLE VOMITING WITH NAUSEA, UNSPECIFIED VOMITING TYPE: ICD-10-CM

## 2017-02-24 LAB
ALBUMIN SERPL-MCNC: 4.2 G/DL (ref 3.2–4.8)
ALBUMIN/GLOB SERPL: 1.2 G/DL (ref 1.5–2.5)
ALP SERPL-CCNC: 90 U/L (ref 25–100)
ALT SERPL W P-5'-P-CCNC: 50 U/L (ref 7–40)
ANION GAP SERPL CALCULATED.3IONS-SCNC: 8 MMOL/L (ref 3–11)
AST SERPL-CCNC: 37 U/L (ref 0–33)
B-HCG UR QL: NEGATIVE
BACTERIA UR QL AUTO: ABNORMAL /HPF
BASOPHILS # BLD AUTO: 0.02 10*3/MM3 (ref 0–0.2)
BASOPHILS NFR BLD AUTO: 0.3 % (ref 0–1)
BILIRUB SERPL-MCNC: 0.8 MG/DL (ref 0.3–1.2)
BILIRUB UR QL STRIP: ABNORMAL
BUN BLD-MCNC: 8 MG/DL (ref 9–23)
BUN/CREAT SERPL: 13.3 (ref 7–25)
CALCIUM SPEC-SCNC: 9.6 MG/DL (ref 8.7–10.4)
CHLORIDE SERPL-SCNC: 110 MMOL/L (ref 99–109)
CLARITY UR: ABNORMAL
CO2 SERPL-SCNC: 23 MMOL/L (ref 20–31)
COLOR UR: ABNORMAL
CREAT BLD-MCNC: 0.6 MG/DL (ref 0.6–1.3)
DEPRECATED RDW RBC AUTO: 42.9 FL (ref 37–54)
EOSINOPHIL # BLD AUTO: 0.11 10*3/MM3 (ref 0.1–0.3)
EOSINOPHIL NFR BLD AUTO: 1.4 % (ref 0–3)
ERYTHROCYTE [DISTWIDTH] IN BLOOD BY AUTOMATED COUNT: 13.9 % (ref 11.3–14.5)
FLUAV AG NPH QL: NEGATIVE
FLUBV AG NPH QL IA: NEGATIVE
GFR SERPL CREATININE-BSD FRML MDRD: 121 ML/MIN/1.73
GLOBULIN UR ELPH-MCNC: 3.4 GM/DL
GLUCOSE BLD-MCNC: 93 MG/DL (ref 70–100)
GLUCOSE UR STRIP-MCNC: NEGATIVE MG/DL
HCT VFR BLD AUTO: 39.5 % (ref 34.5–44)
HGB BLD-MCNC: 13 G/DL (ref 11.5–15.5)
HGB UR QL STRIP.AUTO: NEGATIVE
HOLD SPECIMEN: NORMAL
HOLD SPECIMEN: NORMAL
HYALINE CASTS UR QL AUTO: ABNORMAL /LPF
IMM GRANULOCYTES # BLD: 0.01 10*3/MM3 (ref 0–0.03)
IMM GRANULOCYTES NFR BLD: 0.1 % (ref 0–0.6)
INTERNAL NEGATIVE CONTROL: NEGATIVE
INTERNAL POSITIVE CONTROL: POSITIVE
KETONES UR QL STRIP: ABNORMAL
LEUKOCYTE ESTERASE UR QL STRIP.AUTO: ABNORMAL
LIPASE SERPL-CCNC: 27 U/L (ref 6–51)
LYMPHOCYTES # BLD AUTO: 2.01 10*3/MM3 (ref 0.6–4.8)
LYMPHOCYTES NFR BLD AUTO: 25.2 % (ref 24–44)
Lab: NORMAL
MCH RBC QN AUTO: 27.7 PG (ref 27–31)
MCHC RBC AUTO-ENTMCNC: 32.9 G/DL (ref 32–36)
MCV RBC AUTO: 84 FL (ref 80–99)
MONOCYTES # BLD AUTO: 0.61 10*3/MM3 (ref 0–1)
MONOCYTES NFR BLD AUTO: 7.6 % (ref 0–12)
MUCOUS THREADS URNS QL MICRO: ABNORMAL /HPF
NEUTROPHILS # BLD AUTO: 5.23 10*3/MM3 (ref 1.5–8.3)
NEUTROPHILS NFR BLD AUTO: 65.4 % (ref 41–71)
NITRITE UR QL STRIP: NEGATIVE
PH UR STRIP.AUTO: 7 [PH] (ref 5–8)
PLATELET # BLD AUTO: 309 10*3/MM3 (ref 150–450)
PMV BLD AUTO: 9.7 FL (ref 6–12)
POTASSIUM BLD-SCNC: 4 MMOL/L (ref 3.5–5.5)
PROT SERPL-MCNC: 7.6 G/DL (ref 5.7–8.2)
PROT UR QL STRIP: ABNORMAL
RBC # BLD AUTO: 4.7 10*6/MM3 (ref 3.89–5.14)
RBC # UR: ABNORMAL /HPF
REF LAB TEST METHOD: ABNORMAL
SODIUM BLD-SCNC: 141 MMOL/L (ref 132–146)
SP GR UR STRIP: 1.03 (ref 1–1.03)
SQUAMOUS #/AREA URNS HPF: ABNORMAL /HPF
UROBILINOGEN UR QL STRIP: ABNORMAL
WBC NRBC COR # BLD: 7.99 10*3/MM3 (ref 3.5–10.8)
WBC UR QL AUTO: ABNORMAL /HPF
WHOLE BLOOD HOLD SPECIMEN: NORMAL
WHOLE BLOOD HOLD SPECIMEN: NORMAL

## 2017-02-24 PROCEDURE — 76705 ECHO EXAM OF ABDOMEN: CPT

## 2017-02-24 PROCEDURE — 25010000002 ONDANSETRON PER 1 MG: Performed by: PHYSICIAN ASSISTANT

## 2017-02-24 PROCEDURE — 36415 COLL VENOUS BLD VENIPUNCTURE: CPT

## 2017-02-24 PROCEDURE — 99284 EMERGENCY DEPT VISIT MOD MDM: CPT

## 2017-02-24 PROCEDURE — 85025 COMPLETE CBC W/AUTO DIFF WBC: CPT | Performed by: EMERGENCY MEDICINE

## 2017-02-24 PROCEDURE — 80053 COMPREHEN METABOLIC PANEL: CPT | Performed by: EMERGENCY MEDICINE

## 2017-02-24 PROCEDURE — 81001 URINALYSIS AUTO W/SCOPE: CPT | Performed by: PHYSICIAN ASSISTANT

## 2017-02-24 PROCEDURE — 83690 ASSAY OF LIPASE: CPT | Performed by: EMERGENCY MEDICINE

## 2017-02-24 PROCEDURE — 96375 TX/PRO/DX INJ NEW DRUG ADDON: CPT

## 2017-02-24 PROCEDURE — P9612 CATHETERIZE FOR URINE SPEC: HCPCS

## 2017-02-24 PROCEDURE — 25010000002 KETOROLAC TROMETHAMINE PER 15 MG: Performed by: PHYSICIAN ASSISTANT

## 2017-02-24 PROCEDURE — 87086 URINE CULTURE/COLONY COUNT: CPT | Performed by: PHYSICIAN ASSISTANT

## 2017-02-24 PROCEDURE — 87804 INFLUENZA ASSAY W/OPTIC: CPT | Performed by: PHYSICIAN ASSISTANT

## 2017-02-24 PROCEDURE — 96361 HYDRATE IV INFUSION ADD-ON: CPT

## 2017-02-24 PROCEDURE — 96374 THER/PROPH/DIAG INJ IV PUSH: CPT

## 2017-02-24 RX ORDER — DICYCLOMINE HCL 20 MG
20 TABLET ORAL EVERY 6 HOURS PRN
Qty: 20 TABLET | Refills: 0 | Status: SHIPPED | OUTPATIENT
Start: 2017-02-24 | End: 2017-02-27

## 2017-02-24 RX ORDER — SODIUM CHLORIDE 0.9 % (FLUSH) 0.9 %
10 SYRINGE (ML) INJECTION AS NEEDED
Status: DISCONTINUED | OUTPATIENT
Start: 2017-02-24 | End: 2017-02-24 | Stop reason: HOSPADM

## 2017-02-24 RX ORDER — ONDANSETRON 4 MG/1
4 TABLET, ORALLY DISINTEGRATING ORAL EVERY 6 HOURS PRN
Qty: 10 TABLET | Refills: 0 | Status: SHIPPED | OUTPATIENT
Start: 2017-02-24 | End: 2017-02-27

## 2017-02-24 RX ORDER — KETOROLAC TROMETHAMINE 30 MG/ML
30 INJECTION, SOLUTION INTRAMUSCULAR; INTRAVENOUS ONCE
Status: COMPLETED | OUTPATIENT
Start: 2017-02-24 | End: 2017-02-24

## 2017-02-24 RX ORDER — PANTOPRAZOLE SODIUM 40 MG/10ML
40 INJECTION, POWDER, LYOPHILIZED, FOR SOLUTION INTRAVENOUS ONCE
Status: COMPLETED | OUTPATIENT
Start: 2017-02-24 | End: 2017-02-24

## 2017-02-24 RX ORDER — ONDANSETRON 2 MG/ML
4 INJECTION INTRAMUSCULAR; INTRAVENOUS ONCE
Status: COMPLETED | OUTPATIENT
Start: 2017-02-24 | End: 2017-02-24

## 2017-02-24 RX ORDER — PANTOPRAZOLE SODIUM 20 MG/1
20 TABLET, DELAYED RELEASE ORAL DAILY
Qty: 14 TABLET | Refills: 0 | Status: SHIPPED | OUTPATIENT
Start: 2017-02-24 | End: 2017-02-27

## 2017-02-24 RX ADMIN — ONDANSETRON 4 MG: 2 INJECTION INTRAMUSCULAR; INTRAVENOUS at 15:24

## 2017-02-24 RX ADMIN — KETOROLAC TROMETHAMINE 30 MG: 30 INJECTION, SOLUTION INTRAMUSCULAR at 15:23

## 2017-02-24 RX ADMIN — PANTOPRAZOLE SODIUM 40 MG: 40 INJECTION, POWDER, FOR SOLUTION INTRAVENOUS at 15:23

## 2017-02-24 RX ADMIN — SODIUM CHLORIDE 1000 ML: 9 INJECTION, SOLUTION INTRAVENOUS at 14:59

## 2017-02-24 NOTE — TELEPHONE ENCOUNTER
----- Message from Anna Marie Rodriguez sent at 2/24/2017  9:46 AM EST -----  Contact: PATIENT   PATIENT CALLED TODAY STATING SHE DOES NOT FEEL ANY BETTER. I ADVISED PATIENT THAT DR LO IS NOT IN THE OFFICE TODAY AND THAT I WOULD CHECK WITH YOU TO FIND OUT WHAT WE SHOULD HAVE HER DO.

## 2017-02-24 NOTE — TELEPHONE ENCOUNTER
----- Message from Anna Marie Rodriguez sent at 2/23/2017  3:33 PM EST -----  Contact: [facility]   PATIENT WOULD LIKE TO SPEAK WITH DR LO. SHE STATES SHE CURRENTLY HAS A YEAST INFECTION AND 'NEEDS SOME RELIEF'. SHE ALSO STATES THAT SHE RAN A FEVER LAST NIGHT AND THIS MORNING. THIS MORNING, IT .7 BEFORE TAKING  TYLENOL.    CALL BACK #[phone]

## 2017-02-24 NOTE — TELEPHONE ENCOUNTER
2/24/17    Called pt back and left v/m asking to return my call. Wanting to check to see exactly want symptoms she is having right now.

## 2017-02-24 NOTE — TELEPHONE ENCOUNTER
2/24/17    Pt called back and stated that she has had a fever since yesterday, she is very shaky, she has not been able to hold anything down, she has pain going across her abdomen, bilateral flank pain, painful urination, and she believes she has a yeast infection.    I did inform the pt that her provider is not in the office today and if she is having this much pain, then she needs to go to the hospital to see if she can be admitted.    I also informed her that I will forward this msg to her provider so she will know what is going on, pt understood and said she is going to go to the hospital now.

## 2017-02-26 LAB — BACTERIA SPEC AEROBE CULT: NORMAL

## 2017-02-27 ENCOUNTER — OFFICE VISIT (OUTPATIENT)
Dept: INTERNAL MEDICINE | Facility: CLINIC | Age: 27
End: 2017-02-27

## 2017-02-27 VITALS
HEIGHT: 66 IN | DIASTOLIC BLOOD PRESSURE: 76 MMHG | OXYGEN SATURATION: 98 % | WEIGHT: 293 LBS | HEART RATE: 72 BPM | SYSTOLIC BLOOD PRESSURE: 120 MMHG | BODY MASS INDEX: 47.09 KG/M2

## 2017-02-27 DIAGNOSIS — R19.7 DIARRHEA, UNSPECIFIED TYPE: Primary | ICD-10-CM

## 2017-02-27 DIAGNOSIS — R10.84 GENERALIZED ABDOMINAL PAIN: ICD-10-CM

## 2017-02-27 PROCEDURE — 87046 STOOL CULTR AEROBIC BACT EA: CPT | Performed by: PHYSICIAN ASSISTANT

## 2017-02-27 PROCEDURE — 99213 OFFICE O/P EST LOW 20 MIN: CPT | Performed by: PHYSICIAN ASSISTANT

## 2017-02-27 PROCEDURE — 87209 SMEAR COMPLEX STAIN: CPT | Performed by: PHYSICIAN ASSISTANT

## 2017-02-27 PROCEDURE — 87045 FECES CULTURE AEROBIC BACT: CPT | Performed by: PHYSICIAN ASSISTANT

## 2017-02-27 PROCEDURE — 87493 C DIFF AMPLIFIED PROBE: CPT | Performed by: PHYSICIAN ASSISTANT

## 2017-02-27 PROCEDURE — 87177 OVA AND PARASITES SMEARS: CPT | Performed by: PHYSICIAN ASSISTANT

## 2017-02-27 RX ORDER — METRONIDAZOLE 500 MG/1
500 TABLET ORAL 3 TIMES DAILY
Qty: 21 TABLET | Refills: 0 | Status: SHIPPED | OUTPATIENT
Start: 2017-02-27 | End: 2017-09-27

## 2017-02-27 NOTE — PROGRESS NOTES
Chief Complaint   Patient presents with   • Nausea     C/o  constant nausea/vomiting/diarrhea x 5 weeks       Subjective   Eliz Eisenberg is a 26 y.o. female.       History of Present Illness     Pt started feeling bad about 5 weeks ago. Woke up in the middle of the night with nausea and vomiting. Went to work the next day and had syncopal episode- thought to be dehydrated. Came here and prescribed phenergan. Had generalized abdominal pain, nausea, vomiting and diarrhea that continued for the next week. Went to ER and had labs, switched to zofran. Returned to ER the next week with continued pain, nausea and diarrhea. Had normal CT of abdomen/pelvis and was told she had UTI. Started on ceftin and then switched to Levaquin, completed 7 day course of Levaquin. Last week progressed to fever, tmax 102.9. Went back to ER last Friday and had normal gallbladder US. Following ER visits, she does get better for about 12 hrs and then worsens again.    Currently she still has generalized lower abdominal pain with watery diarrhea, worse with eating. No bloody stools. No formed stools. No diarrhea when she does not eat. Has been doing bland, stool bulking diet without improvement. No other sick contacts.      Current Outpatient Prescriptions:   •  levoFLOXacin (LEVAQUIN) 750 MG tablet, Take 1 tablet by mouth Daily., Disp: 7 tablet, Rfl: 0  •  metFORMIN (GLUCOPHAGE) 500 MG tablet, Take 1 tablet by mouth 2 (two) times a day with meals., Disp: 60 tablet, Rfl: 12  •  metroNIDAZOLE (FLAGYL) 500 MG tablet, Take 1 tablet by mouth 3 (Three) Times a Day., Disp: 21 tablet, Rfl: 0  •  norgestrel-ethinyl estradiol (CRYSELLE-28) 0.3-30 MG-MCG per tablet, Take 1 tablet by mouth daily., Disp: 28 tablet, Rfl: 12  •  promethazine (PHENERGAN) 25 MG tablet, Take 1 tablet by mouth Every 6 (Six) Hours As Needed for nausea or vomiting., Disp: 30 tablet, Rfl: 1     PMFSH  The following portions of the patient's history were reviewed and updated as  "appropriate: allergies, current medications, past family history, past medical history, past social history, past surgical history and problem list.    Review of Systems   Constitutional: Positive for activity change, appetite change and fever. Negative for diaphoresis, fatigue and unexpected weight change.   HENT: Negative.    Respiratory: Negative for chest tightness, shortness of breath and wheezing.    Cardiovascular: Negative for chest pain, palpitations and leg swelling.   Gastrointestinal: Positive for abdominal pain, diarrhea, nausea and vomiting. Negative for anal bleeding, blood in stool, constipation and rectal pain.   Genitourinary: Negative for dysuria, flank pain, pelvic pain and urgency.   Musculoskeletal: Positive for back pain.   Neurological: Negative for dizziness, weakness and light-headedness.   Hematological: Negative.    Psychiatric/Behavioral: Negative.        Objective   Visit Vitals   • /76   • Pulse 72   • Ht 66\" (167.6 cm)   • Wt 299 lb 9.6 oz (136 kg)   • SpO2 98%   • BMI 48.36 kg/m2       Physical Exam   Constitutional: She appears well-developed and well-nourished.   HENT:   Head: Normocephalic.   Right Ear: Hearing, tympanic membrane, external ear and ear canal normal.   Left Ear: Hearing, tympanic membrane, external ear and ear canal normal.   Nose: Nose normal.   Mouth/Throat: Oropharynx is clear and moist.   Eyes: Conjunctivae are normal. Pupils are equal, round, and reactive to light.   Neck: Normal range of motion.   Cardiovascular: Normal rate, regular rhythm and normal heart sounds.    Pulmonary/Chest: Effort normal and breath sounds normal. She has no decreased breath sounds. She has no wheezes. She has no rhonchi. She has no rales.   Abdominal: Soft. Normal appearance and bowel sounds are normal. There is no hepatosplenomegaly. There is generalized tenderness. There is no rigidity, no rebound, no guarding, no CVA tenderness, no tenderness at McBurney's point and " negative Hackett's sign. No hernia.   Musculoskeletal: Normal range of motion.   Neurological: She is alert.   Skin: Skin is warm and dry.   Psychiatric: She has a normal mood and affect. Her behavior is normal.   Nursing note and vitals reviewed.      Results for orders placed or performed during the hospital encounter of 02/24/17   Influenza Antigen   Result Value Ref Range    Influenza A Ag, EIA Negative Negative    Influenza B Ag, EIA Negative Negative   Urine Culture   Result Value Ref Range    Urine Culture No growth at 2 days    Comprehensive Metabolic Panel   Result Value Ref Range    Glucose 93 70 - 100 mg/dL    BUN 8 (L) 9 - 23 mg/dL    Creatinine 0.60 0.60 - 1.30 mg/dL    Sodium 141 132 - 146 mmol/L    Potassium 4.0 3.5 - 5.5 mmol/L    Chloride 110 (H) 99 - 109 mmol/L    CO2 23.0 20.0 - 31.0 mmol/L    Calcium 9.6 8.7 - 10.4 mg/dL    Total Protein 7.6 5.7 - 8.2 g/dL    Albumin 4.20 3.20 - 4.80 g/dL    ALT (SGPT) 50 (H) 7 - 40 U/L    AST (SGOT) 37 (H) 0 - 33 U/L    Alkaline Phosphatase 90 25 - 100 U/L    Total Bilirubin 0.8 0.3 - 1.2 mg/dL    eGFR Non African Amer 121 >60 mL/min/1.73    Globulin 3.4 gm/dL    A/G Ratio 1.2 (L) 1.5 - 2.5 g/dL    BUN/Creatinine Ratio 13.3 7.0 - 25.0    Anion Gap 8.0 3.0 - 11.0 mmol/L   Lipase   Result Value Ref Range    Lipase 27 6 - 51 U/L   CBC Auto Differential   Result Value Ref Range    WBC 7.99 3.50 - 10.80 10*3/mm3    RBC 4.70 3.89 - 5.14 10*6/mm3    Hemoglobin 13.0 11.5 - 15.5 g/dL    Hematocrit 39.5 34.5 - 44.0 %    MCV 84.0 80.0 - 99.0 fL    MCH 27.7 27.0 - 31.0 pg    MCHC 32.9 32.0 - 36.0 g/dL    RDW 13.9 11.3 - 14.5 %    RDW-SD 42.9 37.0 - 54.0 fl    MPV 9.7 6.0 - 12.0 fL    Platelets 309 150 - 450 10*3/mm3    Neutrophil % 65.4 41.0 - 71.0 %    Lymphocyte % 25.2 24.0 - 44.0 %    Monocyte % 7.6 0.0 - 12.0 %    Eosinophil % 1.4 0.0 - 3.0 %    Basophil % 0.3 0.0 - 1.0 %    Immature Grans % 0.1 0.0 - 0.6 %    Neutrophils, Absolute 5.23 1.50 - 8.30 10*3/mm3     Lymphocytes, Absolute 2.01 0.60 - 4.80 10*3/mm3    Monocytes, Absolute 0.61 0.00 - 1.00 10*3/mm3    Eosinophils, Absolute 0.11 0.10 - 0.30 10*3/mm3    Basophils, Absolute 0.02 0.00 - 0.20 10*3/mm3    Immature Grans, Absolute 0.01 0.00 - 0.03 10*3/mm3   Urinalysis With / Culture If Indicated   Result Value Ref Range    Color, UA Dark Yellow (A) Yellow, Straw    Appearance, UA Turbid (A) Clear    pH, UA 7.0 5.0 - 8.0    Specific Gravity, UA 1.027 1.001 - 1.030    Glucose, UA Negative Negative    Ketones, UA 40 mg/dL (2+) (A) Negative    Bilirubin, UA Small (1+) (A) Negative    Blood, UA Negative Negative    Protein, UA 30 mg/dL (1+) (A) Negative    Leuk Esterase, UA Trace (A) Negative    Nitrite, UA Negative Negative    Urobilinogen, UA 1.0 E.U./dL 0.2 - 1.0 E.U./dL   Urinalysis, Microscopic Only   Result Value Ref Range    RBC, UA 3-6 (A) None Seen, 0-2 /HPF    WBC, UA 3-5 (A) None Seen /HPF    Bacteria, UA Trace None Seen, Trace /HPF    Squamous Epithelial Cells, UA 7-12 (A) None Seen, 0-2 /HPF    Hyaline Casts, UA 0-6 0 - 6 /LPF    Mucus, UA Moderate/2+ (A) None Seen, Trace /HPF    Methodology Manual Light Microscopy    POCT pregnancy, urine   Result Value Ref Range    HCG, Urine, QL Negative Negative    Lot Number [id number]     Internal Positive Control Positive     Internal Negative Control Negative    Light Blue Top   Result Value Ref Range    Extra Tube hold for add-on    Green Top (Gel)   Result Value Ref Range    Extra Tube Hold for add-ons.    Lavender Top   Result Value Ref Range    Extra Tube hold for add-on    Gold Top - SST   Result Value Ref Range    Extra Tube Hold for add-ons.         ASSESSMENT/PLAN    Problem List Items Addressed This Visit        Digestive    Diarrhea - Primary     Check stool studies. Discussed with Dr Arguello. Will treat empirically for CDiff with flagyl. If stool studies are negative, proceed with colonoscopy.         Relevant Medications    metroNIDAZOLE (FLAGYL) 500 MG tablet     Other Relevant Orders    Clostridium Difficile Toxin, PCR    Stool Culture With Yersinia    Ova & Parasite Examination      Other Visit Diagnoses     Generalized abdominal pain        Relevant Orders    Clostridium Difficile Toxin, PCR    Stool Culture With Yersinia    Ova & Parasite Examination               Return if symptoms worsen or fail to improve.

## 2017-02-27 NOTE — ASSESSMENT & PLAN NOTE
Check stool studies. Discussed with Dr Arguello. Will treat empirically for CDiff with flagyl. If stool studies are negative, proceed with colonoscopy.

## 2017-02-28 ENCOUNTER — TELEPHONE (OUTPATIENT)
Dept: INTERNAL MEDICINE | Facility: CLINIC | Age: 27
End: 2017-02-28

## 2017-02-28 ENCOUNTER — LAB REQUISITION (OUTPATIENT)
Dept: LAB | Facility: HOSPITAL | Age: 27
End: 2017-02-28

## 2017-02-28 DIAGNOSIS — R19.7 DIARRHEA, UNSPECIFIED TYPE: Primary | ICD-10-CM

## 2017-02-28 DIAGNOSIS — R19.7 DIARRHEA: ICD-10-CM

## 2017-02-28 LAB — C DIFF TOX GENS STL QL NAA+PROBE: NOT DETECTED

## 2017-02-28 NOTE — TELEPHONE ENCOUNTER
Results are not back yet. She can check in again tomorrow morning to see if they are back. Usually takes about 24 hrs if it is positive.

## 2017-02-28 NOTE — TELEPHONE ENCOUNTER
----- Message from Damaris OLMSTEAD MA sent at 2/28/2017  2:47 PM EST -----  Contact: PATIENT  Allison deena almazan, doesn't seem like we got results back just yet...    ----- Message -----     From: Leanna Crawford     Sent: 2/28/2017   2:40 PM       To: donovan [facility]    PATIENT HAS QUESTIONS ABOUT THE CDIFF TEST SHE TOOK AND DROPPED OFF THIS MORNING. HER BOSS WILL NOT LET PATIENT COME BACK TO WORK UNLESS THE CDIFF IS NEGATIVE. SHE WANTS TO KNOW IF WE HAVE THE RESULTS BACK AND IF YOU CAN CONTACT HER ABOUT HER CONCERNS. YOU CAN REACH PATIENT  BACK -[phone]

## 2017-03-01 NOTE — PROGRESS NOTES
Please let her know that her CDiff test was negative. She can stop the Flagyl that we started her on. The other stool studies are still pending but I am going to go ahead and put in the referral for the colonoscopy.

## 2017-03-02 LAB — BACTERIA STL CULT: NORMAL

## 2017-03-03 LAB
O+P SPEC MICRO: NORMAL
O+P STL TRI STN: NORMAL

## 2017-03-09 ENCOUNTER — LAB REQUISITION (OUTPATIENT)
Dept: LAB | Facility: HOSPITAL | Age: 27
End: 2017-03-09

## 2017-03-09 ENCOUNTER — OUTSIDE FACILITY SERVICE (OUTPATIENT)
Dept: GASTROENTEROLOGY | Facility: CLINIC | Age: 27
End: 2017-03-09

## 2017-03-09 DIAGNOSIS — R50.9 FEVER: ICD-10-CM

## 2017-03-09 DIAGNOSIS — R11.2 NAUSEA WITH VOMITING: ICD-10-CM

## 2017-03-09 DIAGNOSIS — R19.7 DIARRHEA: ICD-10-CM

## 2017-03-09 PROCEDURE — 88305 TISSUE EXAM BY PATHOLOGIST: CPT | Performed by: INTERNAL MEDICINE

## 2017-03-09 PROCEDURE — 45380 COLONOSCOPY AND BIOPSY: CPT | Performed by: INTERNAL MEDICINE

## 2017-03-10 LAB
CYTO UR: NORMAL
LAB AP CASE REPORT: NORMAL
LAB AP CLINICAL INFORMATION: NORMAL
Lab: NORMAL
PATH REPORT.FINAL DX SPEC: NORMAL
PATH REPORT.GROSS SPEC: NORMAL

## 2017-05-18 ENCOUNTER — TELEPHONE (OUTPATIENT)
Dept: OBSTETRICS AND GYNECOLOGY | Facility: CLINIC | Age: 27
End: 2017-05-18

## 2017-05-18 RX ORDER — FLUCONAZOLE 150 MG/1
150 TABLET ORAL EVERY OTHER DAY
Qty: 3 TABLET | Refills: 0 | Status: SHIPPED | OUTPATIENT
Start: 2017-05-18 | End: 2017-05-23

## 2017-07-13 ENCOUNTER — APPOINTMENT (OUTPATIENT)
Dept: LAB | Facility: HOSPITAL | Age: 27
End: 2017-07-13

## 2017-07-13 ENCOUNTER — OFFICE VISIT (OUTPATIENT)
Dept: OBSTETRICS AND GYNECOLOGY | Facility: CLINIC | Age: 27
End: 2017-07-13

## 2017-07-13 VITALS
BODY MASS INDEX: 47.09 KG/M2 | HEIGHT: 66 IN | WEIGHT: 293 LBS | DIASTOLIC BLOOD PRESSURE: 84 MMHG | SYSTOLIC BLOOD PRESSURE: 122 MMHG

## 2017-07-13 DIAGNOSIS — R10.11 RUQ ABDOMINAL PAIN: ICD-10-CM

## 2017-07-13 DIAGNOSIS — Z01.411 ENCOUNTER FOR GYNECOLOGICAL EXAMINATION WITH ABNORMAL FINDING: ICD-10-CM

## 2017-07-13 DIAGNOSIS — E66.09 NON MORBID OBESITY DUE TO EXCESS CALORIES: ICD-10-CM

## 2017-07-13 DIAGNOSIS — E88.81 METABOLIC SYNDROME: Primary | ICD-10-CM

## 2017-07-13 LAB — HBA1C MFR BLD: 5.4 % (ref 4.8–5.6)

## 2017-07-13 PROCEDURE — 99395 PREV VISIT EST AGE 18-39: CPT | Performed by: OBSTETRICS & GYNECOLOGY

## 2017-07-13 PROCEDURE — 83525 ASSAY OF INSULIN: CPT | Performed by: OBSTETRICS & GYNECOLOGY

## 2017-07-13 PROCEDURE — 36415 COLL VENOUS BLD VENIPUNCTURE: CPT | Performed by: OBSTETRICS & GYNECOLOGY

## 2017-07-13 PROCEDURE — 83036 HEMOGLOBIN GLYCOSYLATED A1C: CPT | Performed by: OBSTETRICS & GYNECOLOGY

## 2017-07-13 RX ORDER — ONDANSETRON HYDROCHLORIDE 8 MG/1
8 TABLET, FILM COATED ORAL EVERY 8 HOURS PRN
COMMUNITY
End: 2017-09-27

## 2017-07-13 NOTE — PROGRESS NOTES
"   Chief Complaint   Patient presents with   • Gynecologic Exam   • Nausea       Eliz Eisenberg is a 27 y.o. year old  presenting to be seen for her annual exam.This patient has a history of metabolic syndrome with obesity and initially oligomenorrhea.  She then developed menorrhagia and has been managed adequately with Cryselle oral contraceptives.  She has cyclic menses without intermenstrual bleeding.  Her metabolic syndrome is managed with metformin, 500 mg twice a day and she has no side effects.  In 2017 she developed right upper quadrant pain associated with nausea and diarrhea.  She has had 2 CT scans which were reportedly normal.  She has had a colonoscopy and an upper endoscopy which were normal.  He continues to be symptomatic.    SCREENING TESTS    Year 2012   Age                         PAP     Neg.                    HPV high risk                         Mammogram                         DENICE score                         Breast MRI                         Lipids                         Vitamin D                         Colonoscopy                         DEXA  Frax (hip/any)                         Ovarian Screen                           Enter the month test was performed.  If month not known, enter \"X'  · Black numbers = normal results  · Red numbers = abnormal results  · Black X = patient reported normal  · Red X - patient reported abnormal      Referred by:    Profession:    Other info:        She exercises regularly: no.  She wears her seat belt: yes.  She has concerns about domestic violence: no.  She has noticed changes in height: no    GYN screening history:  · Last pap: was done on approximately  and the result was: normal PAP..    No Additional Complaints Reported    The following portions of the patient's history were reviewed and updated as appropriate:vital signs " "and   She  does not have any pertinent problems on file.  She  has a past surgical history that includes [place] tooth extraction.  Her family history includes Colon cancer in her paternal grandmother; Heart attack in her paternal grandfather; Hypertension in her father; Multiple sclerosis in her mother; Obesity in her father.  She  reports that she has quit smoking. She has never used smokeless tobacco. She reports that she does not drink alcohol or use illicit drugs.  Current Outpatient Prescriptions   Medication Sig Dispense Refill   • metFORMIN (GLUCOPHAGE) 500 MG tablet Take 1 tablet by mouth 2 (Two) Times a Day With Meals. 60 tablet 12   • norgestrel-ethinyl estradiol (CRYSELLE-28) 0.3-30 MG-MCG per tablet Take 1 tablet by mouth Daily. 28 tablet 12   • ondansetron (ZOFRAN) 8 MG tablet Take 8 mg by mouth Every 8 (Eight) Hours As Needed for Nausea or Vomiting.     • promethazine (PHENERGAN) 25 MG tablet Take 1 tablet by mouth Every 6 (Six) Hours As Needed for nausea or vomiting. 30 tablet 1   • levoFLOXacin (LEVAQUIN) 750 MG tablet Take 1 tablet by mouth Daily. 7 tablet 0   • metroNIDAZOLE (FLAGYL) 500 MG tablet Take 1 tablet by mouth 3 (Three) Times a Day. 21 tablet 0     No current facility-administered medications for this visit.      She has No Known Allergies..    Review of Systems  A comprehensive review of systems was taken.  Constitutional: positive for nausea, vomiting, fatigue  Respiratory: negative  Cardiovascular: negative  Gastrointestinal: positive for abdominal pain  Genitourinary:negative  Musculoskeletal:negative  Behavioral/Psych: negative       /84  Ht 66\" (167.6 cm)  Wt (!) 314 lb 12.8 oz (143 kg)  LMP 06/16/2017 (Approximate)  BMI 50.81 kg/m2    Physical Exam    General:  alert; cooperative; well developed; well nourished  obese - Body mass index is 50.81 kg/(m^2).   Skin:  No suspicious lesions seen   Thyroid: normal to inspection and palpation   Lungs:  clear to auscultation " bilaterally   Heart:  regular rate and rhythm, S1, S2 normal, no murmur, click, rub or gallop   Breasts:  Examined in supine position  Symmetric without masses or skin dimpling  Nipples normal without inversion, lesions or discharge  There are no palpable axillary nodes   Abdomen: no umbilical or inginual hernias are present  no hepato-splenomegaly  tender in the right upper quadrant   Pelvis: Clinical staff was present for exam  External genitalia:  normal appearance of the external genitalia including Bartholin's and [place]'s glands.  Vaginal:  normal pink mucosa without prolapse or lesions.  Cervix:  normal appearance.  Uterus:  normal size, shape and consistency. anteverted;  Adnexa:  normal bimanual exam of the adnexa.  Rectal:  anus visually normal appearing. recto-vaginal exam unremarkable and confirms findings;     Lab Review   CBC results and CMP results    Imaging  CT of abdomen/pelvis results         ASSESSMENT  Problems Addressed this Visit        Digestive    Obesity       Other    Metabolic syndrome - Primary    Relevant Medications    metFORMIN (GLUCOPHAGE) 500 MG tablet    norgestrel-ethinyl estradiol (CRYSELLE-28) 0.3-30 MG-MCG per tablet    Other Relevant Orders    Hemoglobin A1c    Insulin, Random      Other Visit Diagnoses     Encounter for gynecological examination with abnormal finding        Relevant Orders    Liquid-based Pap Smear, Screening    RUQ abdominal pain              PLAN    Medications prescribed this encounter:    New Medications Ordered This Visit   Medications   • ondansetron (ZOFRAN) 8 MG tablet     Sig: Take 8 mg by mouth Every 8 (Eight) Hours As Needed for Nausea or Vomiting.   • metFORMIN (GLUCOPHAGE) 500 MG tablet     Sig: Take 1 tablet by mouth 2 (Two) Times a Day With Meals.     Dispense:  60 tablet     Refill:  12   • norgestrel-ethinyl estradiol (CRYSELLE-28) 0.3-30 MG-MCG per tablet     Sig: Take 1 tablet by mouth Daily.     Dispense:  28 tablet     Refill:  12   · I  have suggested that the patient see general surgery about the possibility of a dysfunctional gallbladder.  · Follow up: 12 month(s)  *Please note that portions of this documentation may have been completed with a voice recognition program.  Efforts were made to edit this dictation, but occasional words may have been mistranscribed.       This note was electronically signed.    SIMEON Ayers MD  July 13, 2017  2:54 PM

## 2017-07-15 LAB — INSULIN SERPL-ACNC: 65 UIU/ML

## 2017-07-17 NOTE — TELEPHONE ENCOUNTER
Patient's serum insulin level was elevated (65).  Per Dr. Ayers, need to increase metformin to 1000 mg am and 500 mg pm.

## 2017-09-27 ENCOUNTER — OFFICE VISIT (OUTPATIENT)
Dept: INTERNAL MEDICINE | Facility: CLINIC | Age: 27
End: 2017-09-27

## 2017-09-27 VITALS
DIASTOLIC BLOOD PRESSURE: 86 MMHG | WEIGHT: 293 LBS | BODY MASS INDEX: 50.02 KG/M2 | TEMPERATURE: 99.6 F | SYSTOLIC BLOOD PRESSURE: 118 MMHG | HEART RATE: 78 BPM | OXYGEN SATURATION: 98 %

## 2017-09-27 DIAGNOSIS — J45.21 MILD INTERMITTENT ASTHMA WITH ACUTE EXACERBATION: ICD-10-CM

## 2017-09-27 DIAGNOSIS — H66.001 ACUTE SUPPURATIVE OTITIS MEDIA OF RIGHT EAR WITHOUT SPONTANEOUS RUPTURE OF TYMPANIC MEMBRANE, RECURRENCE NOT SPECIFIED: ICD-10-CM

## 2017-09-27 DIAGNOSIS — J01.00 ACUTE NON-RECURRENT MAXILLARY SINUSITIS: Primary | ICD-10-CM

## 2017-09-27 PROCEDURE — 99214 OFFICE O/P EST MOD 30 MIN: CPT | Performed by: NURSE PRACTITIONER

## 2017-09-27 RX ORDER — AMOXICILLIN AND CLAVULANATE POTASSIUM 875; 125 MG/1; MG/1
1 TABLET, FILM COATED ORAL 2 TIMES DAILY
Qty: 20 TABLET | Refills: 7 | Status: SHIPPED | OUTPATIENT
Start: 2017-09-27 | End: 2018-01-22

## 2017-09-27 RX ORDER — FLUCONAZOLE 150 MG/1
TABLET ORAL
Qty: 2 TABLET | Refills: 0 | Status: SHIPPED | OUTPATIENT
Start: 2017-09-27 | End: 2018-01-22

## 2017-09-27 RX ORDER — MONTELUKAST SODIUM 10 MG/1
10 TABLET ORAL NIGHTLY
Qty: 30 TABLET | Refills: 0 | Status: SHIPPED | OUTPATIENT
Start: 2017-09-27 | End: 2018-01-22

## 2017-09-27 RX ORDER — ALBUTEROL SULFATE 90 UG/1
2 AEROSOL, METERED RESPIRATORY (INHALATION) EVERY 4 HOURS PRN
Qty: 1 INHALER | Refills: 5 | Status: SHIPPED | OUTPATIENT
Start: 2017-09-27 | End: 2018-01-22 | Stop reason: SDUPTHER

## 2017-09-27 RX ORDER — ALBUTEROL SULFATE 90 UG/1
2 AEROSOL, METERED RESPIRATORY (INHALATION) EVERY 4 HOURS PRN
COMMUNITY
End: 2017-09-27 | Stop reason: SDUPTHER

## 2017-09-27 RX ORDER — FLUTICASONE PROPIONATE 50 MCG
1 SPRAY, SUSPENSION (ML) NASAL 2 TIMES DAILY
Qty: 1 BOTTLE | Refills: 2 | Status: SHIPPED | OUTPATIENT
Start: 2017-09-27 | End: 2018-07-17

## 2017-09-27 RX ORDER — PREDNISONE 10 MG/1
TABLET ORAL
Qty: 21 TABLET | Refills: 0 | Status: SHIPPED | OUTPATIENT
Start: 2017-09-27 | End: 2018-01-22

## 2017-10-15 ENCOUNTER — HOSPITAL ENCOUNTER (EMERGENCY)
Facility: HOSPITAL | Age: 27
Discharge: HOME | End: 2017-10-15
Attending: EMERGENCY MEDICINE | Admitting: EMERGENCY MEDICINE

## 2017-10-15 ENCOUNTER — APPOINTMENT (OUTPATIENT)
Dept: CT IMAGING | Facility: HOSPITAL | Age: 27
End: 2017-10-15

## 2017-10-15 ENCOUNTER — APPOINTMENT (OUTPATIENT)
Dept: ULTRASOUND IMAGING | Facility: HOSPITAL | Age: 27
End: 2017-10-15

## 2017-10-15 VITALS
WEIGHT: 293 LBS | BODY MASS INDEX: 47.09 KG/M2 | RESPIRATION RATE: 16 BRPM | DIASTOLIC BLOOD PRESSURE: 68 MMHG | TEMPERATURE: 99 F | OXYGEN SATURATION: 99 % | HEIGHT: 66 IN | SYSTOLIC BLOOD PRESSURE: 114 MMHG | HEART RATE: 69 BPM

## 2017-10-15 DIAGNOSIS — K80.50 BILIARY COLIC: ICD-10-CM

## 2017-10-15 DIAGNOSIS — N30.01 ACUTE CYSTITIS WITH HEMATURIA: Primary | ICD-10-CM

## 2017-10-15 LAB
ALBUMIN SERPL-MCNC: 4.2 G/DL (ref 3.2–4.8)
ALBUMIN/GLOB SERPL: 1.3 G/DL (ref 1.5–2.5)
ALP SERPL-CCNC: 83 U/L (ref 25–100)
ALT SERPL W P-5'-P-CCNC: 25 U/L (ref 7–40)
ANION GAP SERPL CALCULATED.3IONS-SCNC: 4 MMOL/L (ref 3–11)
AST SERPL-CCNC: 37 U/L (ref 0–33)
B-HCG UR QL: NEGATIVE
BACTERIA UR QL AUTO: ABNORMAL /HPF
BASOPHILS # BLD AUTO: 0.01 10*3/MM3 (ref 0–0.2)
BASOPHILS NFR BLD AUTO: 0.1 % (ref 0–1)
BILIRUB SERPL-MCNC: 0.8 MG/DL (ref 0.3–1.2)
BILIRUB UR QL STRIP: ABNORMAL
BUN BLD-MCNC: 7 MG/DL (ref 9–23)
BUN/CREAT SERPL: 7.8 (ref 7–25)
CALCIUM SPEC-SCNC: 8.8 MG/DL (ref 8.7–10.4)
CHLORIDE SERPL-SCNC: 109 MMOL/L (ref 99–109)
CLARITY UR: ABNORMAL
CO2 SERPL-SCNC: 21 MMOL/L (ref 20–31)
COLOR UR: ABNORMAL
CREAT BLD-MCNC: 0.9 MG/DL (ref 0.6–1.3)
DEPRECATED RDW RBC AUTO: 43.6 FL (ref 37–54)
EOSINOPHIL # BLD AUTO: 0.08 10*3/MM3 (ref 0–0.3)
EOSINOPHIL NFR BLD AUTO: 0.6 % (ref 0–3)
ERYTHROCYTE [DISTWIDTH] IN BLOOD BY AUTOMATED COUNT: 14.1 % (ref 11.3–14.5)
GFR SERPL CREATININE-BSD FRML MDRD: 75 ML/MIN/1.73
GLOBULIN UR ELPH-MCNC: 3.2 GM/DL
GLUCOSE BLD-MCNC: 91 MG/DL (ref 70–100)
GLUCOSE UR STRIP-MCNC: NEGATIVE MG/DL
HCT VFR BLD AUTO: 41.2 % (ref 34.5–44)
HGB BLD-MCNC: 13 G/DL (ref 11.5–15.5)
HGB UR QL STRIP.AUTO: ABNORMAL
HOLD SPECIMEN: NORMAL
HOLD SPECIMEN: NORMAL
HYALINE CASTS UR QL AUTO: ABNORMAL /LPF
IMM GRANULOCYTES # BLD: 0.03 10*3/MM3 (ref 0–0.03)
IMM GRANULOCYTES NFR BLD: 0.2 % (ref 0–0.6)
INTERNAL NEGATIVE CONTROL: NORMAL
INTERNAL POSITIVE CONTROL: NORMAL
KETONES UR QL STRIP: NEGATIVE
LEUKOCYTE ESTERASE UR QL STRIP.AUTO: ABNORMAL
LIPASE SERPL-CCNC: 27 U/L (ref 6–51)
LYMPHOCYTES # BLD AUTO: 1.31 10*3/MM3 (ref 0.6–4.8)
LYMPHOCYTES NFR BLD AUTO: 10.1 % (ref 24–44)
Lab: NORMAL
MCH RBC QN AUTO: 26.8 PG (ref 27–31)
MCHC RBC AUTO-ENTMCNC: 31.6 G/DL (ref 32–36)
MCV RBC AUTO: 84.9 FL (ref 80–99)
MONOCYTES # BLD AUTO: 0.81 10*3/MM3 (ref 0–1)
MONOCYTES NFR BLD AUTO: 6.3 % (ref 0–12)
MUCOUS THREADS URNS QL MICRO: ABNORMAL /HPF
NEUTROPHILS # BLD AUTO: 10.71 10*3/MM3 (ref 1.5–8.3)
NEUTROPHILS NFR BLD AUTO: 82.7 % (ref 41–71)
NITRITE UR QL STRIP: NEGATIVE
PH UR STRIP.AUTO: 6 [PH] (ref 5–8)
PLATELET # BLD AUTO: 267 10*3/MM3 (ref 150–450)
PMV BLD AUTO: 9.8 FL (ref 6–12)
POTASSIUM BLD-SCNC: 5.2 MMOL/L (ref 3.5–5.5)
PROT SERPL-MCNC: 7.4 G/DL (ref 5.7–8.2)
PROT UR QL STRIP: ABNORMAL
RBC # BLD AUTO: 4.85 10*6/MM3 (ref 3.89–5.14)
RBC # UR: ABNORMAL /HPF
REF LAB TEST METHOD: ABNORMAL
SODIUM BLD-SCNC: 134 MMOL/L (ref 132–146)
SP GR UR STRIP: 1.03 (ref 1–1.03)
SQUAMOUS #/AREA URNS HPF: ABNORMAL /HPF
UROBILINOGEN UR QL STRIP: ABNORMAL
WBC NRBC COR # BLD: 12.95 10*3/MM3 (ref 3.5–10.8)
WBC UR QL AUTO: ABNORMAL /HPF
WHOLE BLOOD HOLD SPECIMEN: NORMAL
WHOLE BLOOD HOLD SPECIMEN: NORMAL

## 2017-10-15 PROCEDURE — 96374 THER/PROPH/DIAG INJ IV PUSH: CPT

## 2017-10-15 PROCEDURE — 80053 COMPREHEN METABOLIC PANEL: CPT | Performed by: EMERGENCY MEDICINE

## 2017-10-15 PROCEDURE — 81001 URINALYSIS AUTO W/SCOPE: CPT | Performed by: EMERGENCY MEDICINE

## 2017-10-15 PROCEDURE — 96376 TX/PRO/DX INJ SAME DRUG ADON: CPT

## 2017-10-15 PROCEDURE — 99283 EMERGENCY DEPT VISIT LOW MDM: CPT

## 2017-10-15 PROCEDURE — 85025 COMPLETE CBC W/AUTO DIFF WBC: CPT | Performed by: EMERGENCY MEDICINE

## 2017-10-15 PROCEDURE — 96375 TX/PRO/DX INJ NEW DRUG ADDON: CPT

## 2017-10-15 PROCEDURE — 83690 ASSAY OF LIPASE: CPT | Performed by: EMERGENCY MEDICINE

## 2017-10-15 PROCEDURE — 96361 HYDRATE IV INFUSION ADD-ON: CPT

## 2017-10-15 PROCEDURE — 87086 URINE CULTURE/COLONY COUNT: CPT | Performed by: EMERGENCY MEDICINE

## 2017-10-15 PROCEDURE — 25010000002 HYDROMORPHONE PER 4 MG: Performed by: EMERGENCY MEDICINE

## 2017-10-15 PROCEDURE — 74176 CT ABD & PELVIS W/O CONTRAST: CPT

## 2017-10-15 PROCEDURE — 76705 ECHO EXAM OF ABDOMEN: CPT

## 2017-10-15 PROCEDURE — 25010000002 ONDANSETRON PER 1 MG: Performed by: EMERGENCY MEDICINE

## 2017-10-15 RX ORDER — HYDROMORPHONE HYDROCHLORIDE 1 MG/ML
0.5 INJECTION, SOLUTION INTRAMUSCULAR; INTRAVENOUS; SUBCUTANEOUS ONCE
Status: COMPLETED | OUTPATIENT
Start: 2017-10-15 | End: 2017-10-15

## 2017-10-15 RX ORDER — SODIUM CHLORIDE 0.9 % (FLUSH) 0.9 %
10 SYRINGE (ML) INJECTION AS NEEDED
Status: DISCONTINUED | OUTPATIENT
Start: 2017-10-15 | End: 2017-10-15 | Stop reason: HOSPADM

## 2017-10-15 RX ORDER — ONDANSETRON 2 MG/ML
4 INJECTION INTRAMUSCULAR; INTRAVENOUS ONCE
Status: COMPLETED | OUTPATIENT
Start: 2017-10-15 | End: 2017-10-15

## 2017-10-15 RX ORDER — HYDROCODONE BITARTRATE AND ACETAMINOPHEN 7.5; 325 MG/1; MG/1
1 TABLET ORAL EVERY 6 HOURS PRN
Qty: 12 TABLET | Refills: 0 | Status: SHIPPED | OUTPATIENT
Start: 2017-10-15 | End: 2018-01-22

## 2017-10-15 RX ORDER — CEFDINIR 300 MG/1
300 CAPSULE ORAL 2 TIMES DAILY
Qty: 14 CAPSULE | Refills: 0 | Status: SHIPPED | OUTPATIENT
Start: 2017-10-15 | End: 2017-10-22

## 2017-10-15 RX ORDER — ONDANSETRON 8 MG/1
8 TABLET, ORALLY DISINTEGRATING ORAL EVERY 8 HOURS PRN
Qty: 10 TABLET | Refills: 0 | Status: SHIPPED | OUTPATIENT
Start: 2017-10-15 | End: 2018-07-17

## 2017-10-15 RX ADMIN — HYDROMORPHONE HYDROCHLORIDE 0.5 MG: 1 INJECTION, SOLUTION INTRAMUSCULAR; INTRAVENOUS; SUBCUTANEOUS at 20:43

## 2017-10-15 RX ADMIN — SODIUM CHLORIDE 1000 ML: 9 INJECTION, SOLUTION INTRAVENOUS at 16:00

## 2017-10-15 RX ADMIN — ONDANSETRON 4 MG: 2 INJECTION INTRAMUSCULAR; INTRAVENOUS at 16:20

## 2017-10-15 RX ADMIN — HYDROMORPHONE HYDROCHLORIDE 0.5 MG: 1 INJECTION, SOLUTION INTRAMUSCULAR; INTRAVENOUS; SUBCUTANEOUS at 16:20

## 2017-10-15 NOTE — ED PROVIDER NOTES
Subjective   HPI Comments: Patient is a 27 year old female who complains of new onset right upper and lower quadrant pain.     Patient states the pain began last night after she ate a hamburger and fried potatoes. Patient states she has 10/10 stabbing pain in her right upper and lower quadrants which radiates to her back. Patient states she had symptoms like this in February and at that time had a abdominal CT, ultrasound, and colonoscopy which were all negative. Patient states that she experienced some nausea without vomiting as well as numerous episodes of diarrhea, and a temperature of 99.8 last night as well. Patient has no other complaints a this time.         Patient is a 27 y.o. female presenting with abdominal pain.   History provided by:  Patient and significant other   used: No    Abdominal Pain   Pain location:  RUQ, RLQ and epigastric  Pain quality: sharp and stabbing    Pain radiates to:  Back  Pain severity:  Severe  Onset quality:  Sudden  Duration:  1 day  Timing:  Constant  Progression:  Unchanged  Chronicity:  Recurrent  Context: eating    Context: not sick contacts    Relieved by:  Nothing  Worsened by:  Nothing  Ineffective treatments:  None tried  Associated symptoms: diarrhea, fever and nausea    Risk factors: obesity        Review of Systems   Constitutional: Positive for fever.   Gastrointestinal: Positive for abdominal pain, diarrhea and nausea.   All other systems reviewed and are negative.      Past Medical History:   Diagnosis Date   • Abdominal pain    • Acute sinusitis    • Ankle sprain    • Atopic dermatitis    • Cat bite      resolved   • Cough    • Dysfunctional uterine bleeding    • Foot pain    • Gout    • Influenza    • Metabolic syndrome    • Muscular aches    • Obesity    • Pharyngitis    • URI (upper respiratory infection)        No Known Allergies    Past Surgical History:   Procedure Laterality Date   • WISDOM TOOTH EXTRACTION         Family History    Problem Relation Age of Onset   • Hypertension Father    • Obesity Father    • Multiple sclerosis Mother    • Heart attack Paternal Grandfather    • Colon cancer Paternal Grandmother        Social History     Social History   • Marital status:      Spouse name: N/A   • Number of children: N/A   • Years of education: N/A     Social History Main Topics   • Smoking status: Former Smoker   • Smokeless tobacco: Former User   • Alcohol use No   • Drug use: No   • Sexual activity: Yes     Partners: Male     Birth control/ protection: OCP     Other Topics Concern   • None     Social History Narrative           Objective   Physical Exam   Constitutional: She is oriented to person, place, and time. She appears well-developed and well-nourished.   HENT:   Head: Normocephalic and atraumatic.   Eyes: EOM are normal. Pupils are equal, round, and reactive to light.   Neck: Normal range of motion. Neck supple.   Cardiovascular: Normal rate, regular rhythm, normal heart sounds and intact distal pulses.    Pulmonary/Chest: Effort normal and breath sounds normal.   Abdominal: Soft. Bowel sounds are normal. She exhibits no distension. There is tenderness.   Musculoskeletal: Normal range of motion.   Neurological: She is alert and oriented to person, place, and time.   Skin: Skin is warm and dry.   Psychiatric: She has a normal mood and affect. Her behavior is normal. Judgment and thought content normal.       Procedures         ED Course  ED Course   Comment By Time   Pt with no additional complaints and likely has biliary colic. Pt needs hida scan. VINNIE Ho 10/15 2059          Recent Results (from the past 24 hour(s))   Urinalysis With / Culture If Indicated - Urine, Clean Catch    Collection Time: 10/15/17  2:34 PM   Result Value Ref Range    Color, UA Dark Yellow (A) Yellow, Straw    Appearance, UA Cloudy (A) Clear    pH, UA 6.0 5.0 - 8.0    Specific Gravity, UA 1.029 1.001 - 1.030    Glucose, UA Negative Negative     Ketones, UA Negative Negative    Bilirubin, UA Small (1+) (A) Negative    Blood, UA Large (3+) (A) Negative    Protein, UA Trace (A) Negative    Leuk Esterase, UA Trace (A) Negative    Nitrite, UA Negative Negative    Urobilinogen, UA 0.2 E.U./dL 0.2 - 1.0 E.U./dL   Urinalysis, Microscopic Only - Urine, Clean Catch    Collection Time: 10/15/17  2:34 PM   Result Value Ref Range    RBC, UA Too Numerous to Count (A) None Seen, 0-2 /HPF    WBC, UA 13-20 (A) None Seen /HPF    Bacteria, UA 3+ (A) None Seen, Trace /HPF    Squamous Epithelial Cells, UA 13-20 (A) None Seen, 0-2 /HPF    Hyaline Casts, UA 0-6 0 - 6 /LPF    Mucus, UA Large/3+ (A) None Seen, Trace /HPF    Methodology Manual Light Microscopy    POCT pregnancy, urine    Collection Time: 10/15/17  2:39 PM   Result Value Ref Range    HCG, Urine, QL Negative Negative    Lot Number [id number]     Internal Positive Control Presumptive Positive     Internal Negative Control Presumptive Negative    Comprehensive Metabolic Panel    Collection Time: 10/15/17  3:01 PM   Result Value Ref Range    Glucose 91 70 - 100 mg/dL    BUN 7 (L) 9 - 23 mg/dL    Creatinine 0.90 0.60 - 1.30 mg/dL    Sodium 134 132 - 146 mmol/L    Potassium 5.2 3.5 - 5.5 mmol/L    Chloride 109 99 - 109 mmol/L    CO2 21.0 20.0 - 31.0 mmol/L    Calcium 8.8 8.7 - 10.4 mg/dL    Total Protein 7.4 5.7 - 8.2 g/dL    Albumin 4.20 3.20 - 4.80 g/dL    ALT (SGPT) 25 7 - 40 U/L    AST (SGOT) 37 (H) 0 - 33 U/L    Alkaline Phosphatase 83 25 - 100 U/L    Total Bilirubin 0.8 0.3 - 1.2 mg/dL    eGFR Non African Amer 75 >60 mL/min/1.73    Globulin 3.2 gm/dL    A/G Ratio 1.3 (L) 1.5 - 2.5 g/dL    BUN/Creatinine Ratio 7.8 7.0 - 25.0    Anion Gap 4.0 3.0 - 11.0 mmol/L   Lipase    Collection Time: 10/15/17  3:01 PM   Result Value Ref Range    Lipase 27 6 - 51 U/L   Light Blue Top    Collection Time: 10/15/17  3:01 PM   Result Value Ref Range    Extra Tube hold for add-on    Green Top (Gel)    Collection Time: 10/15/17  3:01 PM    Result Value Ref Range    Extra Tube Hold for add-ons.    Lavender Top    Collection Time: 10/15/17  3:01 PM   Result Value Ref Range    Extra Tube hold for add-on    Gold Top - SST    Collection Time: 10/15/17  3:01 PM   Result Value Ref Range    Extra Tube Hold for add-ons.    CBC Auto Differential    Collection Time: 10/15/17  3:01 PM   Result Value Ref Range    WBC 12.95 (H) 3.50 - 10.80 10*3/mm3    RBC 4.85 3.89 - 5.14 10*6/mm3    Hemoglobin 13.0 11.5 - 15.5 g/dL    Hematocrit 41.2 34.5 - 44.0 %    MCV 84.9 80.0 - 99.0 fL    MCH 26.8 (L) 27.0 - 31.0 pg    MCHC 31.6 (L) 32.0 - 36.0 g/dL    RDW 14.1 11.3 - 14.5 %    RDW-SD 43.6 37.0 - 54.0 fl    MPV 9.8 6.0 - 12.0 fL    Platelets 267 150 - 450 10*3/mm3    Neutrophil % 82.7 (H) 41.0 - 71.0 %    Lymphocyte % 10.1 (L) 24.0 - 44.0 %    Monocyte % 6.3 0.0 - 12.0 %    Eosinophil % 0.6 0.0 - 3.0 %    Basophil % 0.1 0.0 - 1.0 %    Immature Grans % 0.2 0.0 - 0.6 %    Neutrophils, Absolute 10.71 (H) 1.50 - 8.30 10*3/mm3    Lymphocytes, Absolute 1.31 0.60 - 4.80 10*3/mm3    Monocytes, Absolute 0.81 0.00 - 1.00 10*3/mm3    Eosinophils, Absolute 0.08 0.00 - 0.30 10*3/mm3    Basophils, Absolute 0.01 0.00 - 0.20 10*3/mm3    Immature Grans, Absolute 0.03 0.00 - 0.03 10*3/mm3     Note: In addition to lab results from this visit, the labs listed above may include labs taken at another facility or during a different encounter within the last 24 hours. Please correlate lab times with ED admission and discharge times for further clarification of the services performed during this visit.    CT Abdomen Pelvis Without Contrast   Final Result      1. No acute findings.      2. Non-acute findings are described above.      THIS DOCUMENT HAS BEEN ELECTRONICALLY SIGNED BY MIR WOODS MD      US Gallbladder   Final Result       Negative upper abdominal and right upper quadrant ultrasound.       Acute focal abnormality is not identified and no gallstones are seen.       Findings are not  changed from the previous negative exam of 02/24/2017,   8 months ago.       DICTATED:     10/15/2017   EDITED:          10/15/2017           This report was finalized on 10/15/2017 5:56 PM by Dr. Reji Gilliam MD.            Vitals:    10/15/17 1530 10/15/17 1700 10/15/17 1701 10/15/17 2000   BP: 133/74 117/73  121/79   BP Location:       Patient Position:       Pulse:       Resp:       Temp:       TempSrc:       SpO2: 95%  98% 99%   Weight:       Height:         Medications   sodium chloride 0.9 % flush 10 mL (not administered)   sodium chloride 0.9 % bolus 1,000 mL (0 mL Intravenous Stopped 10/15/17 2003)   HYDROmorphone (DILAUDID) injection 0.5 mg (0.5 mg Intravenous Given 10/15/17 1620)   ondansetron (ZOFRAN) injection 4 mg (4 mg Intravenous Given 10/15/17 1620)   HYDROmorphone (DILAUDID) injection 0.5 mg (0.5 mg Intravenous Given 10/15/17 2043)     ECG/EMG Results (last 24 hours)     ** No results found for the last 24 hours. **            Recent Results (from the past 24 hour(s))   Urinalysis With / Culture If Indicated - Urine, Clean Catch    Collection Time: 10/15/17  2:34 PM   Result Value Ref Range    Color, UA Dark Yellow (A) Yellow, Straw    Appearance, UA Cloudy (A) Clear    pH, UA 6.0 5.0 - 8.0    Specific Gravity, UA 1.029 1.001 - 1.030    Glucose, UA Negative Negative    Ketones, UA Negative Negative    Bilirubin, UA Small (1+) (A) Negative    Blood, UA Large (3+) (A) Negative    Protein, UA Trace (A) Negative    Leuk Esterase, UA Trace (A) Negative    Nitrite, UA Negative Negative    Urobilinogen, UA 0.2 E.U./dL 0.2 - 1.0 E.U./dL   Urinalysis, Microscopic Only - Urine, Clean Catch    Collection Time: 10/15/17  2:34 PM   Result Value Ref Range    RBC, UA Too Numerous to Count (A) None Seen, 0-2 /HPF    WBC, UA 13-20 (A) None Seen /HPF    Bacteria, UA 3+ (A) None Seen, Trace /HPF    Squamous Epithelial Cells, UA 13-20 (A) None Seen, 0-2 /HPF    Hyaline Casts, UA 0-6 0 - 6 /LPF    Mucus, UA  Large/3+ (A) None Seen, Trace /HPF    Methodology Manual Light Microscopy    POCT pregnancy, urine    Collection Time: 10/15/17  2:39 PM   Result Value Ref Range    HCG, Urine, QL Negative Negative    Lot Number [id number]     Internal Positive Control Presumptive Positive     Internal Negative Control Presumptive Negative    Comprehensive Metabolic Panel    Collection Time: 10/15/17  3:01 PM   Result Value Ref Range    Glucose 91 70 - 100 mg/dL    BUN 7 (L) 9 - 23 mg/dL    Creatinine 0.90 0.60 - 1.30 mg/dL    Sodium 134 132 - 146 mmol/L    Potassium 5.2 3.5 - 5.5 mmol/L    Chloride 109 99 - 109 mmol/L    CO2 21.0 20.0 - 31.0 mmol/L    Calcium 8.8 8.7 - 10.4 mg/dL    Total Protein 7.4 5.7 - 8.2 g/dL    Albumin 4.20 3.20 - 4.80 g/dL    ALT (SGPT) 25 7 - 40 U/L    AST (SGOT) 37 (H) 0 - 33 U/L    Alkaline Phosphatase 83 25 - 100 U/L    Total Bilirubin 0.8 0.3 - 1.2 mg/dL    eGFR Non African Amer 75 >60 mL/min/1.73    Globulin 3.2 gm/dL    A/G Ratio 1.3 (L) 1.5 - 2.5 g/dL    BUN/Creatinine Ratio 7.8 7.0 - 25.0    Anion Gap 4.0 3.0 - 11.0 mmol/L   Lipase    Collection Time: 10/15/17  3:01 PM   Result Value Ref Range    Lipase 27 6 - 51 U/L   Light Blue Top    Collection Time: 10/15/17  3:01 PM   Result Value Ref Range    Extra Tube hold for add-on    Green Top (Gel)    Collection Time: 10/15/17  3:01 PM   Result Value Ref Range    Extra Tube Hold for add-ons.    Lavender Top    Collection Time: 10/15/17  3:01 PM   Result Value Ref Range    Extra Tube hold for add-on    Gold Top - SST    Collection Time: 10/15/17  3:01 PM   Result Value Ref Range    Extra Tube Hold for add-ons.    CBC Auto Differential    Collection Time: 10/15/17  3:01 PM   Result Value Ref Range    WBC 12.95 (H) 3.50 - 10.80 10*3/mm3    RBC 4.85 3.89 - 5.14 10*6/mm3    Hemoglobin 13.0 11.5 - 15.5 g/dL    Hematocrit 41.2 34.5 - 44.0 %    MCV 84.9 80.0 - 99.0 fL    MCH 26.8 (L) 27.0 - 31.0 pg    MCHC 31.6 (L) 32.0 - 36.0 g/dL    RDW 14.1 11.3 - 14.5 %     RDW-SD 43.6 37.0 - 54.0 fl    MPV 9.8 6.0 - 12.0 fL    Platelets 267 150 - 450 10*3/mm3    Neutrophil % 82.7 (H) 41.0 - 71.0 %    Lymphocyte % 10.1 (L) 24.0 - 44.0 %    Monocyte % 6.3 0.0 - 12.0 %    Eosinophil % 0.6 0.0 - 3.0 %    Basophil % 0.1 0.0 - 1.0 %    Immature Grans % 0.2 0.0 - 0.6 %    Neutrophils, Absolute 10.71 (H) 1.50 - 8.30 10*3/mm3    Lymphocytes, Absolute 1.31 0.60 - 4.80 10*3/mm3    Monocytes, Absolute 0.81 0.00 - 1.00 10*3/mm3    Eosinophils, Absolute 0.08 0.00 - 0.30 10*3/mm3    Basophils, Absolute 0.01 0.00 - 0.20 10*3/mm3    Immature Grans, Absolute 0.03 0.00 - 0.03 10*3/mm3     Note: In addition to lab results from this visit, the labs listed above may include labs taken at another facility or during a different encounter within the last 24 hours. Please correlate lab times with ED admission and discharge times for further clarification of the services performed during this visit.    CT Abdomen Pelvis Without Contrast   Final Result      1. No acute findings.      2. Non-acute findings are described above.      THIS DOCUMENT HAS BEEN ELECTRONICALLY SIGNED BY MIR WOODS MD      US Gallbladder   Final Result       Negative upper abdominal and right upper quadrant ultrasound.       Acute focal abnormality is not identified and no gallstones are seen.       Findings are not changed from the previous negative exam of 02/24/2017,   8 months ago.       DICTATED:     10/15/2017   EDITED:          10/15/2017           This report was finalized on 10/15/2017 5:56 PM by Dr. Reji Gilliam MD.            Vitals:    10/15/17 1530 10/15/17 1700 10/15/17 1701 10/15/17 2000   BP: 133/74 117/73  121/79   BP Location:       Patient Position:       Pulse:       Resp:       Temp:       TempSrc:       SpO2: 95%  98% 99%   Weight:       Height:         Medications   sodium chloride 0.9 % flush 10 mL (not administered)   sodium chloride 0.9 % bolus 1,000 mL (0 mL Intravenous Stopped 10/15/17 2003)    HYDROmorphone (DILAUDID) injection 0.5 mg (0.5 mg Intravenous Given 10/15/17 1620)   ondansetron (ZOFRAN) injection 4 mg (4 mg Intravenous Given 10/15/17 1620)   HYDROmorphone (DILAUDID) injection 0.5 mg (0.5 mg Intravenous Given 10/15/17 2043)     ECG/EMG Results (last 24 hours)     ** No results found for the last 24 hours. **        [facility]    Final diagnoses:   Acute cystitis with hematuria   Biliary colic            VINNIE Ho  10/15/17 2467

## 2017-10-17 LAB
BACTERIA SPEC AEROBE CULT: NORMAL
BACTERIA SPEC AEROBE CULT: NORMAL

## 2017-10-24 ENCOUNTER — TRANSCRIBE ORDERS (OUTPATIENT)
Dept: ADMINISTRATIVE | Facility: HOSPITAL | Age: 27
End: 2017-10-24

## 2017-10-24 DIAGNOSIS — R10.11 ABDOMINAL PAIN, RIGHT UPPER QUADRANT: Primary | ICD-10-CM

## 2017-11-08 ENCOUNTER — HOSPITAL ENCOUNTER (OUTPATIENT)
Dept: NUCLEAR MEDICINE | Facility: HOSPITAL | Age: 27
Discharge: HOME | End: 2017-11-08
Attending: SURGERY

## 2017-11-08 DIAGNOSIS — R10.11 ABDOMINAL PAIN, RIGHT UPPER QUADRANT: ICD-10-CM

## 2017-11-08 PROCEDURE — A9537 TC99M MEBROFENIN: HCPCS | Performed by: SURGERY

## 2017-11-08 PROCEDURE — 78227 HEPATOBIL SYST IMAGE W/DRUG: CPT

## 2017-11-08 PROCEDURE — 0 TECHNETIUM TC 99M MEBROFENIN KIT: Performed by: SURGERY

## 2017-11-08 PROCEDURE — 25010000002 SINCALIDE PER 5 MCG: Performed by: SURGERY

## 2017-11-08 RX ORDER — KIT FOR THE PREPARATION OF TECHNETIUM TC 99M MEBROFENIN 45 MG/10ML
1 INJECTION, POWDER, LYOPHILIZED, FOR SOLUTION INTRAVENOUS
Status: COMPLETED | OUTPATIENT
Start: 2017-11-08 | End: 2017-11-08

## 2017-11-08 RX ADMIN — SINCALIDE 2.8 MCG: 5 INJECTION, POWDER, LYOPHILIZED, FOR SOLUTION INTRAVENOUS at 11:51

## 2017-11-08 RX ADMIN — MEBROFENIN 1 DOSE: 45 INJECTION, POWDER, LYOPHILIZED, FOR SOLUTION INTRAVENOUS at 10:50

## 2017-12-07 ENCOUNTER — LAB REQUISITION (OUTPATIENT)
Dept: LAB | Facility: HOSPITAL | Age: 27
End: 2017-12-07

## 2017-12-07 DIAGNOSIS — R10.11 RIGHT UPPER QUADRANT PAIN: ICD-10-CM

## 2017-12-07 PROCEDURE — 88304 TISSUE EXAM BY PATHOLOGIST: CPT | Performed by: SURGERY

## 2018-01-22 ENCOUNTER — OFFICE VISIT (OUTPATIENT)
Dept: INTERNAL MEDICINE | Facility: CLINIC | Age: 28
End: 2018-01-22

## 2018-01-22 VITALS
BODY MASS INDEX: 48.92 KG/M2 | OXYGEN SATURATION: 100 % | TEMPERATURE: 98 F | SYSTOLIC BLOOD PRESSURE: 130 MMHG | DIASTOLIC BLOOD PRESSURE: 82 MMHG | HEART RATE: 102 BPM | WEIGHT: 293 LBS

## 2018-01-22 DIAGNOSIS — J01.00 ACUTE NON-RECURRENT MAXILLARY SINUSITIS: ICD-10-CM

## 2018-01-22 DIAGNOSIS — J45.21 MILD INTERMITTENT ASTHMA WITH ACUTE EXACERBATION: Primary | ICD-10-CM

## 2018-01-22 PROCEDURE — 99213 OFFICE O/P EST LOW 20 MIN: CPT | Performed by: NURSE PRACTITIONER

## 2018-01-22 RX ORDER — ALBUTEROL SULFATE 90 UG/1
2 AEROSOL, METERED RESPIRATORY (INHALATION) EVERY 4 HOURS PRN
Qty: 1 INHALER | Refills: 5 | Status: SHIPPED | OUTPATIENT
Start: 2018-01-22 | End: 2022-11-29 | Stop reason: SDUPTHER

## 2018-01-22 RX ORDER — MONTELUKAST SODIUM 10 MG/1
10 TABLET ORAL NIGHTLY
Qty: 30 TABLET | Refills: 0 | Status: SHIPPED | OUTPATIENT
Start: 2018-01-22 | End: 2018-02-18 | Stop reason: SDUPTHER

## 2018-01-22 RX ORDER — FLUCONAZOLE 150 MG/1
TABLET ORAL
Qty: 2 TABLET | Refills: 0 | Status: SHIPPED | OUTPATIENT
Start: 2018-01-22 | End: 2018-03-29

## 2018-01-22 RX ORDER — OSELTAMIVIR PHOSPHATE 75 MG/1
CAPSULE ORAL
Refills: 0 | COMMUNITY
Start: 2018-01-18 | End: 2018-01-22

## 2018-01-22 RX ORDER — AZITHROMYCIN 250 MG/1
TABLET, FILM COATED ORAL
Qty: 6 TABLET | Refills: 0 | Status: SHIPPED | OUTPATIENT
Start: 2018-01-22 | End: 2018-05-09

## 2018-01-22 RX ORDER — BROMPHENIRAMINE MALEATE, PSEUDOEPHEDRINE HYDROCHLORIDE, AND DEXTROMETHORPHAN HYDROBROMIDE 2; 30; 10 MG/5ML; MG/5ML; MG/5ML
SYRUP ORAL
Refills: 0 | COMMUNITY
Start: 2018-01-18 | End: 2018-05-09

## 2018-01-22 RX ORDER — PREDNISONE 20 MG/1
TABLET ORAL
Qty: 30 TABLET | Refills: 0 | Status: SHIPPED | OUTPATIENT
Start: 2018-01-22 | End: 2018-05-09

## 2018-01-22 RX ORDER — ALBUTEROL SULFATE 2.5 MG/3ML
2.5 SOLUTION RESPIRATORY (INHALATION)
Qty: 48 ML | Refills: 0 | Status: SHIPPED | OUTPATIENT
Start: 2018-01-22 | End: 2018-01-26

## 2018-01-25 NOTE — PROGRESS NOTES
Chief Complaint   Patient presents with   • Same Day     FLU last week not feeling any better, labored breathing       HPI  Eliz Eisenberg is a 27 y.o. female who presents with sudden onset flu sympotms of cough, shortness of breath,       PMSFH  The following portions of the patient's history were reviewed and updated as appropriate: allergies, current medications, past family history, past medical history, past social history, past surgical history and problem list.      Review of Systems        Objective   Blood pressure 130/82, pulse 102, temperature 98 °F (36.7 °C), weight (!) 137 kg (303 lb 1.6 oz), SpO2 100 %.  Body mass index is 48.92 kg/(m^2).      Physical Exam          ASSESSMENT/PLAN  Eliz was seen today for same day.    Diagnoses and all orders for this visit:    Mild intermittent asthma with acute exacerbation  -     montelukast (SINGULAIR) 10 MG tablet; Take 1 tablet by mouth Every Night.  -     albuterol (PROVENTIL HFA;VENTOLIN HFA) 108 (90 Base) MCG/ACT inhaler; Inhale 2 puffs Every 4 (Four) Hours As Needed for Wheezing.  -     predniSONE (DELTASONE) 20 MG tablet; Day 1-5 take 1 tab three times a day, day 6-10 take 1 tab two times a day, day 11-15 one tab by mouth.  -     albuterol (PROVENTIL) (2.5 MG/3ML) 0.083% nebulizer solution; Take 2.5 mg by nebulization 4 (Four) Times a Day for 4 days.  -     Fluticasone Furoate-Vilanterol 100-25 MCG/INH aerosol powder ; Inhale 1 puff Daily.    Acute non-recurrent maxillary sinusitis  -     fluconazole (DIFLUCAN) 150 MG tablet; Take 1st now, take 2nd in 72 hours  -     azithromycin (ZITHROMAX Z-KANIKA) 250 MG tablet; Take 2 tablets the first day, then 1 tablet daily for 4 days.               Plan of care reviewed with patient at the conclusion of today's visit. Education was provided in regards to diagnosis, management and any prescribed or recommended OTC medications.  Patient verbalizes Understanding of and agreement with management  plan.      FOLLOW-UP  No Follow-up on file.      Future Appointments  Date Time Provider Department Center   7/17/2018 2:00 PM Kg Ayers MD [facility] None         Electronically signed by:  COURTNEY Prater  01/22/2018

## 2018-01-25 NOTE — PROGRESS NOTES
Chief Complaint   Patient presents with   • Same Day     FLU last week not feeling any better, labored breathing       HPI  Eliz Eisenberg is a 27 y.o. female who presents with  Influenza   This is a new problem. The current episode started in the past 7 days. The problem has been gradually worsening. Associated symptoms include congestion, coughing (shorteness of breath) and headaches. Pertinent negatives include no arthralgias, chest pain, chills, fatigue, fever, myalgias, nausea, rash, sore throat, vomiting or weakness. The symptoms are aggravated by coughing. Treatments tried: asthma inhalers, she is out of her nebulizer.  The treatment provided mild relief.   Sinusitis   The current episode started in the past 7 days. The problem has been gradually worsening since onset. There has been no fever (since 3-4 days). Associated symptoms include congestion, coughing (shorteness of breath), headaches, shortness of breath and sinus pressure. Pertinent negatives include no chills, ear pain or sore throat. Past treatments include nothing.        [facility]  The following portions of the patient's history were reviewed and updated as appropriate: allergies, current medications, past family history, past medical history, past social history, past surgical history and problem list.      Review of Systems   Constitutional: Negative for chills, fatigue and fever.   HENT: Positive for congestion, sinus pain and sinus pressure. Negative for ear pain, rhinorrhea and sore throat.    Respiratory: Positive for cough (shorteness of breath), chest tightness and shortness of breath.    Cardiovascular: Negative for chest pain.   Gastrointestinal: Negative for diarrhea, nausea and vomiting.   Musculoskeletal: Negative for arthralgias and myalgias.   Skin: Negative for color change and rash.   Neurological: Positive for headaches. Negative for dizziness and weakness.   Hematological: Negative for adenopathy.   All other systems reviewed  and are negative.          Objective   Blood pressure 130/82, pulse 102, temperature 98 °F (36.7 °C), weight (!) 137 kg (303 lb 1.6 oz), SpO2 100 %.  Body mass index is 48.92 kg/(m^2).      Physical Exam   Constitutional: She is oriented to person, place, and time. She appears well-developed and well-nourished. No distress.   HENT:   Head: Normocephalic and atraumatic.   Right Ear: Hearing, tympanic membrane, external ear and ear canal normal.   Left Ear: Hearing, tympanic membrane, external ear and ear canal normal.   Nose: Nose normal.   Mouth/Throat: Uvula is midline, oropharynx is clear and moist and mucous membranes are normal.   Eyes: Conjunctivae and EOM are normal. Pupils are equal, round, and reactive to light.   Neck: Normal range of motion. Neck supple.   Without crepitus, pain, or contractures noted.   Cardiovascular: Normal rate, regular rhythm and normal heart sounds.    Pulmonary/Chest: Effort normal. No respiratory distress. She has no decreased breath sounds. She has wheezes in the right middle field and the left middle field. She exhibits tenderness.   Abdominal: There is no hepatosplenomegaly.   Lymphadenopathy:        Head (left side): No occipital adenopathy present.     She has no cervical adenopathy.        Right: No supraclavicular adenopathy present.        Left: No supraclavicular adenopathy present.   Neurological: She is alert and oriented to person, place, and time.   Skin: Skin is warm and dry.   Psychiatric: She has a normal mood and affect.   Nursing note and vitals reviewed.            ASSESSMENT/PLAN  Eliz was seen today for same day.    Diagnoses and all orders for this visit:    Mild intermittent asthma with acute exacerbation  -     montelukast (SINGULAIR) 10 MG tablet; Take 1 tablet by mouth Every Night.  -     albuterol (PROVENTIL HFA;VENTOLIN HFA) 108 (90 Base) MCG/ACT inhaler; Inhale 2 puffs Every 4 (Four) Hours As Needed for Wheezing.  -     predniSONE (DELTASONE) 20 MG  tablet; Day 1-5 take 1 tab three times a day, day 6-10 take 1 tab two times a day, day 11-15 one tab by mouth.  -     albuterol (PROVENTIL) (2.5 MG/3ML) 0.083% nebulizer solution; Take 2.5 mg by nebulization 4 (Four) Times a Day for 4 days.  -     Fluticasone Furoate-Vilanterol 100-25 MCG/INH aerosol powder ; Inhale 1 puff Daily.    Acute non-recurrent maxillary sinusitis  -     fluconazole (DIFLUCAN) 150 MG tablet; Take 1st now, take 2nd in 72 hours  -     azithromycin (ZITHROMAX Z-KANIKA) 250 MG tablet; Take 2 tablets the first day, then 1 tablet daily for 4 days.               Plan of care reviewed with patient at the conclusion of today's visit. Education was provided in regards to diagnosis, management and any prescribed or recommended OTC medications.  Patient verbalizes Understanding of and agreement with management plan.      FOLLOW-UP  Return if symptoms worsen or fail to improve.      Future Appointments  Date Time Provider Department Center   7/17/2018 2:00 PM Kg Aeyrs MD [facility] None         Electronically signed by:  COURTNEY Prater  01/22/2018

## 2018-02-18 DIAGNOSIS — J45.21 MILD INTERMITTENT ASTHMA WITH ACUTE EXACERBATION: ICD-10-CM

## 2018-02-18 RX ORDER — MONTELUKAST SODIUM 10 MG/1
TABLET ORAL
Qty: 30 TABLET | Refills: 1 | Status: SHIPPED | OUTPATIENT
Start: 2018-02-18 | End: 2018-05-09 | Stop reason: SDUPTHER

## 2018-03-29 ENCOUNTER — TELEPHONE (OUTPATIENT)
Dept: OBSTETRICS AND GYNECOLOGY | Facility: CLINIC | Age: 28
End: 2018-03-29

## 2018-03-29 RX ORDER — FLUCONAZOLE 150 MG/1
150 TABLET ORAL EVERY OTHER DAY
Qty: 3 TABLET | Refills: 0 | Status: SHIPPED | OUTPATIENT
Start: 2018-03-29 | End: 2018-05-09

## 2018-03-29 NOTE — TELEPHONE ENCOUNTER
Pt calling with complaints of yeast infection. Says she has noticed white vaginal discharge and vaginal burning and requests Diflucan. Says she was on antibiotics a few months ago and also took diflucan at that time. Explained that we can send in RX want her to call back for an appt if symptoms not relieved.

## 2018-04-07 DIAGNOSIS — E88.81 METABOLIC SYNDROME: ICD-10-CM

## 2018-04-09 RX ORDER — NORGESTREL-ETHINYL ESTRADIOL 0.3-0.03MG
TABLET ORAL
Qty: 28 TABLET | Refills: 3 | Status: SHIPPED | OUTPATIENT
Start: 2018-04-09 | End: 2018-07-17 | Stop reason: SDUPTHER

## 2018-04-24 DIAGNOSIS — J45.21 MILD INTERMITTENT ASTHMA WITH ACUTE EXACERBATION: ICD-10-CM

## 2018-04-24 RX ORDER — MONTELUKAST SODIUM 10 MG/1
TABLET ORAL
Qty: 30 TABLET | Refills: 1 | OUTPATIENT
Start: 2018-04-24

## 2018-04-25 DIAGNOSIS — J45.21 MILD INTERMITTENT ASTHMA WITH ACUTE EXACERBATION: ICD-10-CM

## 2018-04-25 RX ORDER — MONTELUKAST SODIUM 10 MG/1
TABLET ORAL
Qty: 30 TABLET | Refills: 1 | OUTPATIENT
Start: 2018-04-25

## 2018-04-28 DIAGNOSIS — J45.21 MILD INTERMITTENT ASTHMA WITH ACUTE EXACERBATION: ICD-10-CM

## 2018-04-28 RX ORDER — MONTELUKAST SODIUM 10 MG/1
TABLET ORAL
Qty: 30 TABLET | Refills: 1 | OUTPATIENT
Start: 2018-04-28

## 2018-05-09 ENCOUNTER — APPOINTMENT (OUTPATIENT)
Dept: LAB | Facility: HOSPITAL | Age: 28
End: 2018-05-09

## 2018-05-09 ENCOUNTER — OFFICE VISIT (OUTPATIENT)
Dept: GASTROENTEROLOGY | Facility: CLINIC | Age: 28
End: 2018-05-09

## 2018-05-09 VITALS
RESPIRATION RATE: 16 BRPM | HEIGHT: 66 IN | DIASTOLIC BLOOD PRESSURE: 80 MMHG | SYSTOLIC BLOOD PRESSURE: 112 MMHG | WEIGHT: 293 LBS | HEART RATE: 98 BPM | BODY MASS INDEX: 47.09 KG/M2 | TEMPERATURE: 97.6 F | OXYGEN SATURATION: 98 %

## 2018-05-09 DIAGNOSIS — J45.21 MILD INTERMITTENT ASTHMA WITH ACUTE EXACERBATION: ICD-10-CM

## 2018-05-09 DIAGNOSIS — R19.7 DIARRHEA, UNSPECIFIED TYPE: Primary | ICD-10-CM

## 2018-05-09 DIAGNOSIS — K90.89 BILE SALT-INDUCED DIARRHEA: ICD-10-CM

## 2018-05-09 LAB
ALBUMIN SERPL-MCNC: 4.1 G/DL (ref 3.2–4.8)
ALBUMIN/GLOB SERPL: 1.4 G/DL (ref 1.5–2.5)
ALP SERPL-CCNC: 117 U/L (ref 25–100)
ALT SERPL W P-5'-P-CCNC: 65 U/L (ref 7–40)
ANION GAP SERPL CALCULATED.3IONS-SCNC: 9 MMOL/L (ref 3–11)
AST SERPL-CCNC: 33 U/L (ref 0–33)
BASOPHILS # BLD AUTO: 0.04 10*3/MM3 (ref 0–0.2)
BASOPHILS NFR BLD AUTO: 0.3 % (ref 0–1)
BILIRUB SERPL-MCNC: 0.5 MG/DL (ref 0.3–1.2)
BUN BLD-MCNC: 13 MG/DL (ref 9–23)
BUN/CREAT SERPL: 21.7 (ref 7–25)
CALCIUM SPEC-SCNC: 9.1 MG/DL (ref 8.7–10.4)
CHLORIDE SERPL-SCNC: 102 MMOL/L (ref 99–109)
CO2 SERPL-SCNC: 27 MMOL/L (ref 20–31)
CREAT BLD-MCNC: 0.6 MG/DL (ref 0.6–1.3)
CRP SERPL-MCNC: 2.17 MG/DL (ref 0–1)
DEPRECATED RDW RBC AUTO: 43.3 FL (ref 37–54)
EOSINOPHIL # BLD AUTO: 0.13 10*3/MM3 (ref 0–0.3)
EOSINOPHIL NFR BLD AUTO: 1.1 % (ref 0–3)
ERYTHROCYTE [DISTWIDTH] IN BLOOD BY AUTOMATED COUNT: 14.1 % (ref 11.3–14.5)
GFR SERPL CREATININE-BSD FRML MDRD: 120 ML/MIN/1.73
GLOBULIN UR ELPH-MCNC: 2.9 GM/DL
GLUCOSE BLD-MCNC: 85 MG/DL (ref 70–100)
HCT VFR BLD AUTO: 39.3 % (ref 34.5–44)
HGB BLD-MCNC: 12.5 G/DL (ref 11.5–15.5)
IMM GRANULOCYTES # BLD: 0.04 10*3/MM3 (ref 0–0.03)
IMM GRANULOCYTES NFR BLD: 0.3 % (ref 0–0.6)
LYMPHOCYTES # BLD AUTO: 3.02 10*3/MM3 (ref 0.6–4.8)
LYMPHOCYTES NFR BLD AUTO: 24.4 % (ref 24–44)
MCH RBC QN AUTO: 26.7 PG (ref 27–31)
MCHC RBC AUTO-ENTMCNC: 31.8 G/DL (ref 32–36)
MCV RBC AUTO: 83.8 FL (ref 80–99)
MONOCYTES # BLD AUTO: 0.72 10*3/MM3 (ref 0–1)
MONOCYTES NFR BLD AUTO: 5.8 % (ref 0–12)
NEUTROPHILS # BLD AUTO: 8.47 10*3/MM3 (ref 1.5–8.3)
NEUTROPHILS NFR BLD AUTO: 68.4 % (ref 41–71)
PLATELET # BLD AUTO: 323 10*3/MM3 (ref 150–450)
PMV BLD AUTO: 10.1 FL (ref 6–12)
POTASSIUM BLD-SCNC: 3.9 MMOL/L (ref 3.5–5.5)
PROT SERPL-MCNC: 7 G/DL (ref 5.7–8.2)
RBC # BLD AUTO: 4.69 10*6/MM3 (ref 3.89–5.14)
SODIUM BLD-SCNC: 138 MMOL/L (ref 132–146)
WBC NRBC COR # BLD: 12.38 10*3/MM3 (ref 3.5–10.8)

## 2018-05-09 PROCEDURE — 85025 COMPLETE CBC W/AUTO DIFF WBC: CPT | Performed by: INTERNAL MEDICINE

## 2018-05-09 PROCEDURE — 80053 COMPREHEN METABOLIC PANEL: CPT | Performed by: INTERNAL MEDICINE

## 2018-05-09 PROCEDURE — 99214 OFFICE O/P EST MOD 30 MIN: CPT | Performed by: INTERNAL MEDICINE

## 2018-05-09 PROCEDURE — 36415 COLL VENOUS BLD VENIPUNCTURE: CPT | Performed by: INTERNAL MEDICINE

## 2018-05-09 PROCEDURE — 86140 C-REACTIVE PROTEIN: CPT | Performed by: INTERNAL MEDICINE

## 2018-05-09 RX ORDER — SUCRALFATE 1 G/1
1 TABLET ORAL 4 TIMES DAILY
Qty: 120 TABLET | Refills: 11 | Status: SHIPPED | OUTPATIENT
Start: 2018-05-09 | End: 2019-03-26

## 2018-05-09 NOTE — PROGRESS NOTES
[facility]   History & Physical    Chief Complaint   Patient presents with   • Follow-up     Cholecystectomy, symptoms have not subsided         Subjective CC: post-cibal diarrhea      HPI  Ever since her cholecystectomy in Dec 2017, she has had diarrhea of yellow liquid first thing in Am and with all meals.  Also, has some occasional sharp,crampy pain that is short -lived from time to time.  No severe pain in abdomen or nausea and vomiting like she had before GB removed.  No wt loss, no fever or chills, and no blood in stools. She is not taking a probiotic.         Past Medical History:   Diagnosis Date   • Abdominal pain    • Acute sinusitis    • Ankle sprain    • Atopic dermatitis    • Cat bite      resolved   • Cough    • Dysfunctional uterine bleeding    • Foot pain    • Gout    • Influenza    • Metabolic syndrome    • Muscular aches    • Obesity    • Pharyngitis    • URI (upper respiratory infection)          Family History   Problem Relation Age of Onset   • Hypertension Father    • Obesity Father    • Multiple sclerosis Mother    • Heart attack Paternal Grandfather    • Colon cancer Paternal Grandmother           reports that she has quit smoking. She has quit using smokeless tobacco. She reports that she does not drink alcohol or use drugs.        Current Outpatient Prescriptions:   •  albuterol (PROVENTIL HFA;VENTOLIN HFA) 108 (90 Base) MCG/ACT inhaler, Inhale 2 puffs Every 4 (Four) Hours As Needed for Wheezing., Disp: 1 inhaler, Rfl: 5  •  BREO ELLIPTA 100-25 MCG/INH aerosol powder , INHALE 1 PUFF ONCE DAILY, Disp: 60 each, Rfl: 1  •  CRYSELLE-28 0.3-30 MG-MCG per tablet, TAKE 1 TABLET BY MOUTH ONCE DAILY, Disp: 28 tablet, Rfl: 3  •  fluticasone (FLONASE ALLERGY RELIEF) 50 MCG/ACT nasal spray, 1 spray into each nostril 2 (Two) Times a Day., Disp: 1 bottle, Rfl: 2  •  metFORMIN (GLUCOPHAGE) 500 MG tablet, Take 2 tablets by oral route in the morning, and 1 tablet by oral  "route in the evening daily., Disp: 90 tablet, Rfl: 11  •  montelukast (SINGULAIR) 10 MG tablet, TAKE 1 TABLET BY MOUTH EVERY NIGHT., Disp: 30 tablet, Rfl: 1  •  ondansetron ODT (ZOFRAN-ODT) 8 MG disintegrating tablet, Take 1 tablet by mouth Every 8 (Eight) Hours As Needed for Vomiting., Disp: 10 tablet, Rfl: 0  •  promethazine (PHENERGAN) 25 MG tablet, Take 1 tablet by mouth Every 4 (Four) Hours As Needed for nausea., Disp: 10 tablet, Rfl: 0  •  sucralfate (CARAFATE) 1 g tablet, Take 1 tablet by mouth 4 (Four) Times a Day. Take an hour before meals and at bedtime, Disp: 120 tablet, Rfl: 11    Allergies:  Review of patient's allergies indicates no known allergies.    ROS:    ROS    Objective     Blood pressure 112/80, pulse 98, temperature 97.6 °F (36.4 °C), temperature source Temporal Artery , resp. rate 16, height 167.6 cm (65.98\"), weight (!) 141 kg (310 lb), SpO2 98 %.    Physical Exam   Constitutional: Pt is oriented to person, place, and time and well-developed, well-nourished, and in no distress.   HENT:   Mouth/Throat: Oropharynx is clear and moist.   Neck: Normal range of motion. Neck supple.   Cardiovascular: Normal rate, regular rhythm and normal heart sounds.    Pulmonary/Chest: Effort normal and breath sounds normal. No respiratory distress. No  wheezes.   Abdominal: Soft. Bowel sounds are normal.  Soft, non-tender, normal bowel sounds; no bruits, organomegaly or masses.  Skin: Skin is warm and dry.   Psychiatric: Mood, memory, affect and judgment normal.     Assessment/Plan I will check routine labs and have prescribed sucralfate 1 gm before meals and at bedtime for presumed bile salt diarrhea post cholecystectomy.  She will call in her response in 1 week and if has good response will reduce meds to 1 tablet before breakfast and at bedtime.      Diagnosis:  Eliz was seen today for follow-up.    Diagnoses and all orders for this visit:    Diarrhea, unspecified type  -     CBC & Differential  -     " Comprehensive Metabolic Panel  -     C-reactive Protein  -     sucralfate (CARAFATE) 1 g tablet; Take 1 tablet by mouth 4 (Four) Times a Day. Take an hour before meals and at bedtime    Bile salt-induced diarrhea        Anticipated Surgical Procedure:    The risks, benefits, and alternatives of this procedure have been discussed with the patient or the responsible party- the patient understands and agrees to proceed.

## 2018-05-09 NOTE — PROGRESS NOTES
History of Present Illness  Inflammatory Bowel Disease  Patient here for evaluation of {dx list:47821}. Diagnosis was made by {diagnostic test:12956}. Onset was {0-10:01480} {unit:11} ago. Disease has involved {location gi:42864}. The patient has had {ibd sur} for treatment of their IBD. The patient is currently having {0-10:13512} bowel movements per day which are {diarrhea characteristics:61104}.  {abd pain:35093}.  The patient {has/not:58529} fever.  The patient {has/not:67809} weight loss.  The patient {has/not:89775} extraintestinal symptoms ***.  Last colonoscopy was {time; misc:58997}        Eliz Eisenberg is a 27 y.o. y.o. female {Specialty - why patient here?:[id number]}      {Common H&P Review Areas:62969}    Review of Systems   Constitution: Positive for decreased appetite (Does not stay hungry, cannot eat much).   Eyes: Negative.    Cardiovascular: Negative.    Respiratory: Negative.    Endocrine: Negative.    Hematologic/Lymphatic: Negative.    Skin: Negative.    Musculoskeletal: Negative.    Gastrointestinal: Positive for bloating, abdominal pain (Starts as a cramp, very painful wraps all the way around to back), diarrhea, heartburn and nausea.        Frequency, Bowel movements directly after eating   Genitourinary: Negative.    Neurological: Positive for headaches.   Psychiatric/Behavioral: Negative.    Allergic/Immunologic: Negative.            Physical Exam          There are no diagnoses linked to this encounter.

## 2018-05-10 DIAGNOSIS — R19.7 DIARRHEA, UNSPECIFIED TYPE: ICD-10-CM

## 2018-05-10 DIAGNOSIS — R74.8 ABNORMAL LIVER ENZYMES: ICD-10-CM

## 2018-05-10 DIAGNOSIS — R10.10 PAIN OF UPPER ABDOMEN: Primary | ICD-10-CM

## 2018-05-10 RX ORDER — MONTELUKAST SODIUM 10 MG/1
TABLET ORAL
Qty: 30 TABLET | Refills: 1 | Status: SHIPPED | OUTPATIENT
Start: 2018-05-10 | End: 2018-08-12 | Stop reason: SDUPTHER

## 2018-05-14 ENCOUNTER — HOSPITAL ENCOUNTER (OUTPATIENT)
Dept: CT IMAGING | Facility: HOSPITAL | Age: 28
Discharge: HOME | End: 2018-05-14
Attending: INTERNAL MEDICINE | Admitting: INTERNAL MEDICINE

## 2018-05-14 DIAGNOSIS — R19.7 DIARRHEA, UNSPECIFIED TYPE: ICD-10-CM

## 2018-05-14 DIAGNOSIS — R10.10 PAIN OF UPPER ABDOMEN: ICD-10-CM

## 2018-05-14 DIAGNOSIS — R74.8 ABNORMAL LIVER ENZYMES: ICD-10-CM

## 2018-05-14 PROCEDURE — 25010000002 IOPAMIDOL 61 % SOLUTION: Performed by: INTERNAL MEDICINE

## 2018-05-14 PROCEDURE — 74178 CT ABD&PLV WO CNTR FLWD CNTR: CPT

## 2018-05-14 RX ADMIN — BARIUM SULFATE 450 ML: 21 SUSPENSION ORAL at 14:05

## 2018-05-14 RX ADMIN — IOPAMIDOL 99 ML: 612 INJECTION, SOLUTION INTRAVENOUS at 15:12

## 2018-05-21 RX ORDER — FLUCONAZOLE 150 MG/1
150 TABLET ORAL EVERY OTHER DAY
Qty: 3 TABLET | Refills: 0 | Status: SHIPPED | OUTPATIENT
Start: 2018-05-21 | End: 2018-07-17

## 2018-05-24 DIAGNOSIS — J45.21 MILD INTERMITTENT ASTHMA WITH ACUTE EXACERBATION: ICD-10-CM

## 2018-07-17 ENCOUNTER — TELEPHONE (OUTPATIENT)
Dept: INTERNAL MEDICINE | Facility: CLINIC | Age: 28
End: 2018-07-17

## 2018-07-17 ENCOUNTER — OFFICE VISIT (OUTPATIENT)
Dept: INTERNAL MEDICINE | Facility: CLINIC | Age: 28
End: 2018-07-17

## 2018-07-17 ENCOUNTER — OFFICE VISIT (OUTPATIENT)
Dept: OBSTETRICS AND GYNECOLOGY | Facility: CLINIC | Age: 28
End: 2018-07-17

## 2018-07-17 VITALS
HEART RATE: 94 BPM | HEIGHT: 66 IN | DIASTOLIC BLOOD PRESSURE: 76 MMHG | BODY MASS INDEX: 47.09 KG/M2 | SYSTOLIC BLOOD PRESSURE: 118 MMHG | OXYGEN SATURATION: 98 % | WEIGHT: 293 LBS

## 2018-07-17 VITALS
BODY MASS INDEX: 47.09 KG/M2 | SYSTOLIC BLOOD PRESSURE: 118 MMHG | DIASTOLIC BLOOD PRESSURE: 80 MMHG | HEIGHT: 66 IN | WEIGHT: 293 LBS

## 2018-07-17 DIAGNOSIS — E88.81 METABOLIC SYNDROME: Primary | ICD-10-CM

## 2018-07-17 DIAGNOSIS — Z01.419 ENCOUNTER FOR GYNECOLOGICAL EXAMINATION WITHOUT ABNORMAL FINDING: ICD-10-CM

## 2018-07-17 DIAGNOSIS — R59.9 ADENOPATHY: ICD-10-CM

## 2018-07-17 DIAGNOSIS — Z30.41 SURVEILLANCE OF PREVIOUSLY PRESCRIBED CONTRACEPTIVE PILL: ICD-10-CM

## 2018-07-17 DIAGNOSIS — H65.03 BILATERAL ACUTE SEROUS OTITIS MEDIA, RECURRENCE NOT SPECIFIED: Primary | ICD-10-CM

## 2018-07-17 PROCEDURE — 99213 OFFICE O/P EST LOW 20 MIN: CPT | Performed by: NURSE PRACTITIONER

## 2018-07-17 PROCEDURE — 99395 PREV VISIT EST AGE 18-39: CPT | Performed by: OBSTETRICS & GYNECOLOGY

## 2018-07-17 RX ORDER — TRIAMCINOLONE ACETONIDE 55 UG/1
2 SPRAY, METERED NASAL DAILY
Qty: 16.5 G | Refills: 0 | COMMUNITY
Start: 2018-07-17 | End: 2019-01-24

## 2018-07-17 RX ORDER — METHYLPREDNISOLONE 4 MG/1
TABLET ORAL
Qty: 21 TABLET | Refills: 0 | Status: SHIPPED | OUTPATIENT
Start: 2018-07-17 | End: 2018-11-16

## 2018-07-17 RX ORDER — CETIRIZINE HYDROCHLORIDE 5 MG/1
1 TABLET ORAL DAILY
COMMUNITY
Start: 2018-06-01

## 2018-07-17 NOTE — PROGRESS NOTES
Chief Complaint   Patient presents with   • Gynecologic Exam   • Med Refill       Eliz Eisenberg is a 28 y.o. year old  presenting to be seen for her annual exam.This patient has been diagnosed with metabolic syndrome with an abnormal glucose/insulin ratio.  She was treated with metformin however she discontinued that medication.  Of time.  She is currently taking metformin, 1000 mg in the morning and 500 mg in the evening.  She is attempting to lose weight.  She takes Cryselle oral contraceptives to decrease menorrhagia.  She has no side effects on the oral contraceptives.  She may desire to conceive in the future.  She denies bowel or urinary symptoms.    SCREENING TESTS    Year 2012   Age                         PAP      Neg.                   HPV high risk                         Mammogram                         DENICE score                         Breast MRI                         Lipids                         Vitamin D                         Colonoscopy                         DEXA  Frax (hip/any)                         Ovarian Screen                             She exercises regularly: no.  She wears her seat belt: yes.  She has concerns about domestic violence: no.  She has noticed changes in height: no    GYN screening history:  · Last pap: was done on approximately 2017 and the result was: normal PAP..    No Additional Complaints Reported    The following portions of the patient's history were reviewed and updated as appropriate:vital signs and   She  has a past medical history of Abdominal pain; Acute sinusitis; Ankle sprain; Atopic dermatitis; Biliary dyskinesia; Cat bite; Cough; Dysfunctional uterine bleeding; Foot pain; Gout; Influenza; Metabolic syndrome; Muscular aches; Obesity; Pharyngitis; and URI (upper respiratory infection).  She  does not have any pertinent problems on  file.  She  has a past surgical history that includes [place] tooth extraction; Cholecystectomy; and Hernia repair.  Her family history includes Colon cancer in her paternal grandmother; Heart attack in her paternal grandfather; Hypertension in her father; Multiple sclerosis in her mother; Obesity in her father.  She  reports that she has quit smoking. She has quit using smokeless tobacco. She reports that she does not drink alcohol or use drugs.  Current Outpatient Prescriptions   Medication Sig Dispense Refill   • cetirizine (zyrTEC) 5 MG tablet Take 1 tablet by mouth Daily.     • albuterol (PROVENTIL HFA;VENTOLIN HFA) 108 (90 Base) MCG/ACT inhaler Inhale 2 puffs Every 4 (Four) Hours As Needed for Wheezing. 1 inhaler 5   • BREO ELLIPTA 100-25 MCG/INH aerosol powder  INHALE 1 PUFF ONCE DAILY 60 each 0   • fluticasone (FLONASE ALLERGY RELIEF) 50 MCG/ACT nasal spray 1 spray into each nostril 2 (Two) Times a Day. 1 bottle 2   • metFORMIN (GLUCOPHAGE) 500 MG tablet Take 2 tablets by oral route in the morning, and 1 tablet by oral route in the evening daily. 270 tablet 3   • montelukast (SINGULAIR) 10 MG tablet TAKE 1 TABLET BY MOUTH EVERY NIGHT. 30 tablet 1   • norgestrel-ethinyl estradiol (CRYSELLE-28) 0.3-30 MG-MCG per tablet Take 1 tablet by mouth Daily. 84 tablet 3   • promethazine (PHENERGAN) 25 MG tablet Take 1 tablet by mouth Every 4 (Four) Hours As Needed for nausea. 10 tablet 0   • sucralfate (CARAFATE) 1 g tablet Take 1 tablet by mouth 4 (Four) Times a Day. Take an hour before meals and at bedtime 120 tablet 11     No current facility-administered medications for this visit.      She is allergic to latex..    Review of Systems  A comprehensive review of systems was taken.  Constitutional: negative for fever, chills, activity change, appetite change, fatigue and unexpected weight change.  Respiratory: negative  Cardiovascular: negative  Gastrointestinal:  "negative  Genitourinary:negative  Musculoskeletal:negative  Behavioral/Psych: negative       /80   Ht 167.6 cm (66\")   Wt (!) 143 kg (315 lb 6.4 oz)   LMP 07/11/2018 (Exact Date)   BMI 50.91 kg/m²     Physical Exam    General:  alert; cooperative; well developed; well nourished  obese - Body mass index is 50.91 kg/m².   Skin:  No suspicious lesions seen   Thyroid: normal to inspection and palpation   Lungs:  clear to auscultation bilaterally   Heart:  regular rate and rhythm, S1, S2 normal, no murmur, click, rub or gallop   Breasts:  Examined in supine position  Symmetric without masses or skin dimpling  Nipples normal without inversion, lesions or discharge  There are no palpable axillary nodes   Abdomen: soft, non-tender; no masses  no umbilical or inginual hernias are present  no hepato-splenomegaly   Pelvis: Clinical staff was present for exam  External genitalia:  normal appearance of the external genitalia including Bartholin's and [place]'s glands.  Vaginal:  normal pink mucosa without prolapse or lesions.  Cervix:  normal appearance.  Uterus:  normal size, shape and consistency. anteverted;  Adnexa:  normal bimanual exam of the adnexa.  Rectal:  anus visually normal appearing. recto-vaginal exam unremarkable and confirms findings;     Lab Review   No data reviewed    Imaging  No data reviewed         ASSESSMENT  Problems Addressed this Visit        Other    Metabolic syndrome - Primary    Relevant Medications    metFORMIN (GLUCOPHAGE) 500 MG tablet      Other Visit Diagnoses     Surveillance of previously prescribed contraceptive pill        Relevant Medications    norgestrel-ethinyl estradiol (CRYSELLE-28) 0.3-30 MG-MCG per tablet    Encounter for gynecological examination without abnormal finding        Relevant Orders    Liquid-based Pap Smear, Screening          PLAN    Medications prescribed this encounter:    New Medications Ordered This Visit   Medications   • norgestrel-ethinyl estradiol " (CRYSELLE-Deven) 0.3-30 MG-MCG per tablet     Sig: Take 1 tablet by mouth Daily.     Dispense:  84 tablet     Refill:  3   • metFORMIN (GLUCOPHAGE) 500 MG tablet     Sig: Take 2 tablets by oral route in the morning, and 1 tablet by oral route in the evening daily.     Dispense:  270 tablet     Refill:  3   · Monthly self breast assessment  · The patient will have a fasting glucose, insulin, and hemoglobin A1c drawn  ·  Regular weight-bearing exercise, and a low carbohydrate diet  · She may discontinue oral contraceptives in the near future  · Follow up: 12 month(s)  *Please note that portions of this documentation may have been completed with a voice recognition program.  Efforts were made to edit this dictation, but occasional words may have been mistranscribed.       This note was electronically signed.    SIMEON Ayers MD  July 17, 2018  2:53 PM

## 2018-07-18 ENCOUNTER — LAB (OUTPATIENT)
Dept: LAB | Facility: HOSPITAL | Age: 28
End: 2018-07-18

## 2018-07-18 DIAGNOSIS — E88.81 METABOLIC SYNDROME: ICD-10-CM

## 2018-07-18 LAB
GLUCOSE P FAST SERPL-MCNC: 88 MG/DL (ref 65–99)
HBA1C MFR BLD: 5.5 % (ref 4.8–5.6)

## 2018-07-18 PROCEDURE — 83525 ASSAY OF INSULIN: CPT

## 2018-07-18 PROCEDURE — 36415 COLL VENOUS BLD VENIPUNCTURE: CPT

## 2018-07-18 PROCEDURE — 82947 ASSAY GLUCOSE BLOOD QUANT: CPT

## 2018-07-18 PROCEDURE — 83036 HEMOGLOBIN GLYCOSYLATED A1C: CPT

## 2018-07-20 LAB — INSULIN SERPL-ACNC: 15 UIU/ML

## 2018-08-07 ENCOUNTER — TELEPHONE (OUTPATIENT)
Dept: INTERNAL MEDICINE | Facility: CLINIC | Age: 28
End: 2018-08-07

## 2018-08-07 NOTE — TELEPHONE ENCOUNTER
Spoke with patient personally; she has been off BC x 8 months, periods have been pretty regular since being off; neg preg test at home; has not missed period yet--due to start period next Tuesday--instructed her to wait until next week, if she does not start period when she thinks she should, ok to come in to see me and/or urine hcg.

## 2018-08-12 DIAGNOSIS — J45.21 MILD INTERMITTENT ASTHMA WITH ACUTE EXACERBATION: ICD-10-CM

## 2018-08-12 RX ORDER — MONTELUKAST SODIUM 10 MG/1
TABLET ORAL
Qty: 30 TABLET | Refills: 1 | Status: SHIPPED | OUTPATIENT
Start: 2018-08-12 | End: 2018-10-22 | Stop reason: SDUPTHER

## 2018-08-22 ENCOUNTER — LAB (OUTPATIENT)
Dept: INTERNAL MEDICINE | Facility: CLINIC | Age: 28
End: 2018-08-22

## 2018-08-22 DIAGNOSIS — Z32.02 URINE PREGNANCY TEST NEGATIVE: ICD-10-CM

## 2018-08-22 DIAGNOSIS — N91.2 AMENORRHEA: ICD-10-CM

## 2018-08-22 DIAGNOSIS — N91.2 AMENORRHEA: Primary | ICD-10-CM

## 2018-08-22 LAB — HCG INTACT+B SERPL-ACNC: <5 MIU/ML

## 2018-08-22 PROCEDURE — 84702 CHORIONIC GONADOTROPIN TEST: CPT | Performed by: NURSE PRACTITIONER

## 2018-08-27 ENCOUNTER — TELEPHONE (OUTPATIENT)
Dept: OBSTETRICS AND GYNECOLOGY | Facility: CLINIC | Age: 28
End: 2018-08-27

## 2018-08-27 NOTE — TELEPHONE ENCOUNTER
"Eliz called the office on Friday because she was 23 days late for a period.  She has been off of birth control pills for 2 months.  She is having some nausea and headaches, but states that she is having \"stomach issues\".  She does desire to conceive in the near future.  She has had 2 negative pregnancy tests.  She is taking metformin 1000 mg in the am and 500 mg in the pm.    She was instructed to start taking folic acid or an over the counter prenatal vitamin since she desires pregnancy.      Per Dr. Ayers, repeat HCG this week.  If negative, induce menses with Provera 5 mg BID X 5 days.  May also increase metformin to 1000 mg am and pm.  She will call for results and recommendations.  "

## 2018-08-28 ENCOUNTER — LAB (OUTPATIENT)
Dept: LAB | Facility: HOSPITAL | Age: 28
End: 2018-08-28

## 2018-08-28 DIAGNOSIS — N91.0 DELAYED MENSES: Primary | ICD-10-CM

## 2018-08-28 DIAGNOSIS — N91.0 DELAYED MENSES: ICD-10-CM

## 2018-08-28 LAB — HCG INTACT+B SERPL-ACNC: <5 MIU/ML

## 2018-08-28 PROCEDURE — 36415 COLL VENOUS BLD VENIPUNCTURE: CPT

## 2018-08-28 PROCEDURE — 84702 CHORIONIC GONADOTROPIN TEST: CPT

## 2018-08-29 ENCOUNTER — TELEPHONE (OUTPATIENT)
Dept: OBSTETRICS AND GYNECOLOGY | Facility: CLINIC | Age: 28
End: 2018-08-29

## 2018-08-29 RX ORDER — MEDROXYPROGESTERONE ACETATE 5 MG/1
5 TABLET ORAL 2 TIMES DAILY
Qty: 10 TABLET | Refills: 0 | Status: SHIPPED | OUTPATIENT
Start: 2018-08-29 | End: 2018-09-03

## 2018-08-29 NOTE — TELEPHONE ENCOUNTER
Patient's pregnancy test was negative.  Her menses will be stimulated with Provera 5 mg BID X 5 days.  She will also increase her metformin dose to 1000 mg BID per Dr. Ayers.

## 2018-09-12 ENCOUNTER — TELEPHONE (OUTPATIENT)
Dept: OBSTETRICS AND GYNECOLOGY | Facility: CLINIC | Age: 28
End: 2018-09-12

## 2018-09-12 DIAGNOSIS — N91.2 AMENORRHEA: Primary | ICD-10-CM

## 2018-09-12 NOTE — TELEPHONE ENCOUNTER
Patient called to let Dr. Ayers know that she did not have withdrawal bleeding after taking Provera.  Today is day 10 after the last dose of Provera.    Per Dr. Ayers, order HCG and FSH.  If HCG is negative she will be given estradiol 2 mg X 28 days, and Provera 10 mg days 16-25 in an attempt to induce menses.

## 2018-09-13 ENCOUNTER — LAB (OUTPATIENT)
Dept: LAB | Facility: HOSPITAL | Age: 28
End: 2018-09-13

## 2018-09-13 DIAGNOSIS — N91.2 AMENORRHEA: ICD-10-CM

## 2018-09-13 LAB
FSH SERPL-ACNC: 5.2 MIU/ML
HCG INTACT+B SERPL-ACNC: <5 MIU/ML

## 2018-09-13 PROCEDURE — 84702 CHORIONIC GONADOTROPIN TEST: CPT

## 2018-09-13 PROCEDURE — 83001 ASSAY OF GONADOTROPIN (FSH): CPT

## 2018-09-13 PROCEDURE — 36415 COLL VENOUS BLD VENIPUNCTURE: CPT

## 2018-09-26 RX ORDER — FLUCONAZOLE 150 MG/1
150 TABLET ORAL EVERY OTHER DAY
Qty: 3 TABLET | Refills: 0 | Status: SHIPPED | OUTPATIENT
Start: 2018-09-26 | End: 2018-11-16 | Stop reason: SDUPTHER

## 2018-10-22 DIAGNOSIS — J45.21 MILD INTERMITTENT ASTHMA WITH ACUTE EXACERBATION: ICD-10-CM

## 2018-10-22 RX ORDER — MONTELUKAST SODIUM 10 MG/1
TABLET ORAL
Qty: 30 TABLET | Refills: 1 | Status: SHIPPED | OUTPATIENT
Start: 2018-10-22 | End: 2018-12-13 | Stop reason: SDUPTHER

## 2018-11-16 ENCOUNTER — OFFICE VISIT (OUTPATIENT)
Dept: INTERNAL MEDICINE | Facility: CLINIC | Age: 28
End: 2018-11-16

## 2018-11-16 VITALS
DIASTOLIC BLOOD PRESSURE: 72 MMHG | HEIGHT: 66 IN | SYSTOLIC BLOOD PRESSURE: 118 MMHG | BODY MASS INDEX: 47.09 KG/M2 | OXYGEN SATURATION: 98 % | HEART RATE: 77 BPM | WEIGHT: 293 LBS

## 2018-11-16 DIAGNOSIS — M25.512 ACUTE PAIN OF LEFT SHOULDER: Primary | ICD-10-CM

## 2018-11-16 PROCEDURE — 96372 THER/PROPH/DIAG INJ SC/IM: CPT | Performed by: PHYSICIAN ASSISTANT

## 2018-11-16 PROCEDURE — 99213 OFFICE O/P EST LOW 20 MIN: CPT | Performed by: PHYSICIAN ASSISTANT

## 2018-11-16 RX ORDER — FLUCONAZOLE 150 MG/1
150 TABLET ORAL EVERY OTHER DAY
Qty: 3 TABLET | Refills: 0 | Status: SHIPPED | OUTPATIENT
Start: 2018-11-16 | End: 2019-01-07

## 2018-11-16 RX ORDER — PREDNISONE 10 MG/1
TABLET ORAL
Qty: 30 TABLET | Refills: 0 | Status: SHIPPED | OUTPATIENT
Start: 2018-11-16 | End: 2019-01-24

## 2018-11-16 RX ORDER — METHYLPREDNISOLONE ACETATE 40 MG/ML
40 INJECTION, SUSPENSION INTRA-ARTICULAR; INTRALESIONAL; INTRAMUSCULAR; SOFT TISSUE ONCE
Status: COMPLETED | OUTPATIENT
Start: 2018-11-16 | End: 2018-11-16

## 2018-11-16 RX ADMIN — METHYLPREDNISOLONE ACETATE 40 MG: 40 INJECTION, SUSPENSION INTRA-ARTICULAR; INTRALESIONAL; INTRAMUSCULAR; SOFT TISSUE at 08:33

## 2018-11-16 NOTE — PROGRESS NOTES
Chief Complaint   Patient presents with   • Shoulder Pain   • Arm Pain       Subjective   Eliz Eisenberg is a 28 y.o. female.       History of Present Illness     Pt has had left arm and shoulder pain for the past 2 weeks. Does not remember whether she woke up with the pain or started later in the day. Starts in the back of her shoulder and goes down her arm. Hurts to movement. 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 She had tried ibuprofen and tried some muscle relaxer, neither seemed to help.        Current Outpatient Medications:   •  albuterol (PROVENTIL HFA;VENTOLIN HFA) 108 (90 Base) MCG/ACT inhaler, Inhale 2 puffs Every 4 (Four) Hours As Needed for Wheezing., Disp: 1 inhaler, Rfl: 5  •  BREO ELLIPTA 100-25 MCG/INH aerosol powder , INHALE 1 PUFF ONCE DAILY, Disp: 60 each, Rfl: 0  •  cetirizine (zyrTEC) 5 MG tablet, Take 1 tablet by mouth Daily., Disp: , Rfl:   •  fluconazole (DIFLUCAN) 150 MG tablet, Take 1 tablet by mouth Every Other Day., Disp: 3 tablet, Rfl: 0  •  metFORMIN (GLUCOPHAGE) 500 MG tablet, Take 2 tablets by mouth 2 (Two) Times a Day With Meals., Disp: 360 tablet, Rfl: 2  •  montelukast (SINGULAIR) 10 MG tablet, TAKE 1 TABLET BY MOUTH EVERY DAY IN THE EVENING, Disp: 30 tablet, Rfl: 1  •  promethazine (PHENERGAN) 25 MG tablet, Take 1 tablet by mouth Every 4 (Four) Hours As Needed for nausea., Disp: 10 tablet, Rfl: 0  •  sucralfate (CARAFATE) 1 g tablet, Take 1 tablet by mouth 4 (Four) Times a Day. Take an hour before meals and at bedtime, Disp: 120 tablet, Rfl: 11  •  Triamcinolone Acetonide (NASACORT ALLERGY 24HR) 55 MCG/ACT nasal inhaler, 2 sprays into each nostril Daily., Disp: 16.5 g, Rfl: 0  •  predniSONE (DELTASONE) 10 MG tablet, Take 4 tablets for 3 days, then 3 tablets for 3 days, then 2 tablets for 3 days, then 1 tablet for 3 days, Disp: 30 tablet, Rfl: 0     PMFSH  The following portions of the patient's history were reviewed and updated as  "appropriate: allergies, current medications, past family history, past medical history, past social history, past surgical history and problem list.    Review of Systems   Constitutional: Negative for fever and unexpected weight change.   HENT: Negative.    Eyes: Negative.    Respiratory: Negative for shortness of breath and wheezing.    Cardiovascular: Negative for chest pain and palpitations.   Gastrointestinal: Negative for abdominal pain and blood in stool.   Endocrine: Negative.    Genitourinary: Negative.    Musculoskeletal: Negative for joint swelling.   Skin: Negative.    Allergic/Immunologic: Negative.    Neurological: Negative for seizures and syncope.   Hematological: Negative for adenopathy.   Psychiatric/Behavioral: Negative for agitation and confusion.       Objective   /72   Pulse 77   Ht 167.6 cm (66\")   Wt (!) 144 kg (318 lb)   SpO2 98%   BMI 51.33 kg/m²     Physical Exam   Constitutional: She is oriented to person, place, and time. She appears well-developed and well-nourished.   HENT:   Head: Normocephalic and atraumatic.   Right Ear: External ear normal.   Left Ear: External ear normal.   Eyes: Pupils are equal, round, and reactive to light.   Cardiovascular: Normal rate and regular rhythm.   Pulmonary/Chest: Effort normal.   Musculoskeletal:        Left shoulder: She exhibits decreased range of motion, tenderness and pain. She exhibits no bony tenderness, no swelling and normal strength.   Neurological: She is alert and oriented to person, place, and time.         ASSESSMENT/PLAN    Problem List Items Addressed This Visit     None      Visit Diagnoses     Acute pain of left shoulder    -  Primary    Depomedrol 40 mg IM in office. Start prednisone taper as directed and refer for PT. Check XR of shoulder.     Relevant Medications    methylPREDNISolone acetate (DEPO-medrol) injection 40 mg (Completed)    predniSONE (DELTASONE) 10 MG tablet    fluconazole (DIFLUCAN) 150 MG tablet    Other " Relevant Orders    Ambulatory Referral to Physical Therapy    XR Shoulder 2+ View Right               Return if symptoms worsen or fail to improve.

## 2018-12-13 DIAGNOSIS — J45.21 MILD INTERMITTENT ASTHMA WITH ACUTE EXACERBATION: ICD-10-CM

## 2018-12-13 RX ORDER — MONTELUKAST SODIUM 10 MG/1
TABLET ORAL
Qty: 30 TABLET | Refills: 1 | Status: SHIPPED | OUTPATIENT
Start: 2018-12-13 | End: 2019-03-11 | Stop reason: SDUPTHER

## 2018-12-17 ENCOUNTER — HOSPITAL ENCOUNTER (OUTPATIENT)
Dept: PHYSICAL THERAPY | Facility: HOSPITAL | Age: 28
Setting detail: THERAPIES SERIES
Discharge: HOME | End: 2018-12-17

## 2018-12-17 DIAGNOSIS — M25.512 LEFT SHOULDER PAIN, UNSPECIFIED CHRONICITY: Primary | ICD-10-CM

## 2018-12-17 PROCEDURE — 97161 PT EVAL LOW COMPLEX 20 MIN: CPT | Performed by: PHYSICAL THERAPIST

## 2018-12-17 PROCEDURE — 97110 THERAPEUTIC EXERCISES: CPT | Performed by: PHYSICAL THERAPIST

## 2018-12-17 NOTE — THERAPY EVALUATION
Outpatient Physical Therapy Ortho Initial Evaluation  [facility]     Patient Name: Eliz Eisenberg  : 1990  MRN: [id number]  Today's Date: 2018      Visit Date: 2018    Patient Active Problem List   Diagnosis   • Obesity   • Metabolic syndrome   • Gout   • Viral gastroenteritis   • Nausea   • Diarrhea   • Biliary dyskinesia   • Amenorrhea        Past Medical History:   Diagnosis Date   • Abdominal pain    • Acute sinusitis    • Ankle sprain    • Atopic dermatitis    • Biliary dyskinesia    • Cat bite      resolved   • Cough    • Dysfunctional uterine bleeding    • Foot pain    • Gout    • Influenza    • Metabolic syndrome    • Muscular aches    • Obesity    • Pharyngitis    • URI (upper respiratory infection)         Past Surgical History:   Procedure Laterality Date   • CHOLECYSTECTOMY     • HERNIA REPAIR     • WISDOM TOOTH EXTRACTION         Visit Dx:     ICD-10-CM ICD-9-CM   1. Left shoulder pain, unspecified chronicity M25.512 719.41       Patient History     Row Name 18 0700             History    Chief Complaint  Pain  -ESTEFANIA      Type of Pain  Shoulder pain  -ESTEFANIA      Date Current Problem(s) Began  11/15/18 about a week before   -ESTEFANIA      Brief Description of Current Complaint  Patient reports the week before  she had insidious onset of left shoulder pain, pain radiates from the posterior elft neck and into the trap and posterior shoulder.  No radiation distal to the GHJ.  Increased pain with genrealized left shoulder use, raising the arm, lifting.  No cervical motion exacerbation of symptoms.    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  hobbies: sewing/quilting/cooking.  -ESTEFANIA      Results of Clinical Tests  no imaging  -ESTEFANIA         Pain     Pain Location  Shoulder;Neck  -ESTEFANIA      Pain at Present  5  -ESTEFANIA       Pain at Best  5  -ESTEFANIA      Pain at Worst  8  -ESTEFANIA      Pain Frequency  Constant/continuous  -ESTEFANIA      Pain Description  Shooting;Sharp;Tingling  -ESTEFANIA      What Performance Factors Make the Current Problem(s) WORSE?  shoudler elevation, generalized LUE use.  -ESTEFANIA      What Performance Factors Make the Current Problem(s) BETTER?  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  UE dressing, heavier chores  -ESTEFANIA      Difficulties with recreational activities?  LUE with quilting  -ESTEFANIA         Fall Risk Assessment    Any falls in the past year:  No  -ESTEFANIA         Daily Activities    Primary Language  English  -ESTEFANIA      Barriers to learning  Visual  -ESTEFANIA      Action taken for identified issues  wears glasses  -ESTEFANIA      Pt Participated in POC and Goals  Yes  -ESTEFANIA         Safety    Are you being hurt, hit, or frightened by anyone at home or in your life?  No  -ESTEFANIA        User Key  (r) = Recorded By, (t) = Taken By, (c) = Cosigned By    Initials Name Provider Type    ESTEFANIA Erasto Lopez, PT Physical Therapist          PT Ortho     Row Name 12/17/18 0700       Posture/Observations    Posture/Observations Comments  OW female with resting forward shoulder posture, mildly kyphotic.  Generalized unwillingness to move LUE and guarding is noted.  -ESTEFANIA       Special Tests/Palpation    Special Tests/Palpation  Shoulder  -ESTEFANIA       Shoulder Impingement/Rotator Cuff Special Tests    Zaman-Narinder Test (RC Lesion vs. Bursitis)  Left:;Positive  -ESTEFANIA    Full Can Test (RC Lesion)  Left: strong and painful  -ESTEFANIA       Shoulder Girdle Palpation    Shoulder Girdle Palpation?  Yes  -ESTEFANIA    Subacromial Space  Bilateral:;Tender mild  -ESTEFANIA    AC Joint  Bilateral:;Tender;Crepitus  -ESTEFANIA    Long Head of Biceps  Bilateral:;Guarded/taut;Right:;Tender mild  -ESTEFANIA    Deltoid  Bilateral:;Guarded/taut  -ESTEFANIA    Upper Trap  Right:;Guarded/taut;Tender  -ESTEFANIA    Levator Scapula   Right:;Guarded/taut;Tender  -ESTEFANIA       General ROM    GENERAL ROM COMMENTS  shoulder ROM is grossly = and functional, but left UE motion is generally guarded and slow  -ESTEFANIA       MMT (Manual Muscle Testing)    General MMT Comments  grossly 5/5 strength LUE but pain is noted with nearly all resistance along the left upper trap  -ESTEFANIA       Gait/Stairs Assessment/Training    Comment (Gait/Stairs)  decreased LUE arm swing, rests along abdomen  -ESTEFANIA      User Key  (r) = Recorded By, (t) = Taken By, (c) = Cosigned By    Initials Name Provider Type    Erasto Saul, PT Physical Therapist                      Therapy Education  Education Details: initiated HEP per exercise flowsheet  Given: HEP  Program: New  How Provided: Verbal, Demonstration, Written  Provided to: Patient  Level of Understanding: Verbalized, Demonstrated     PT OP Goals     Row Name 12/17/18 0700          PT Short Term Goals    STG Date to Achieve  12/31/18  -ESTEFANIA     STG 1  Patient to be compliant with initial HEP  -ESTEFANIA     STG 1 Progress  New  -ESTEFANIA     STG 2  Patient to report resting pain </= 1/10  -ESTEFANIA     STG 2 Progress  New  -ESTEFANIA        Long Term Goals    LTG Date to Achieve  01/14/19  -ESTEFANIA     LTG 1  Patient to tolerate resisted testing without an increase in pain or symptoms  -ESTEFANIA     LTG 1 Progress  New  -ESTEFANIA     LTG 2  Patient to improve QD score to at least 40%  -ESTEFANIA     LTG 2 Progress  New  -ESTEFANIA     LTG 3  Patient to be independent with final HEP  -ESTEFANIA     LTG 3 Progress  New  -ESTEFANIA     LTG 4  Patient to demonstrate minimal tenderness  -ESTEFANIA     LTG 4 Progress  New  -ESTEFANIA        Time Calculation    PT Goal Re-Cert Due Date  03/17/19  -ESTEFANIA       User Key  (r) = Recorded By, (t) = Taken By, (c) = Cosigned By    Initials Name Provider Type    Earsto Saul, PT Physical Therapist          PT Assessment/Plan     Row Name 12/17/18 1100          PT Assessment    Functional Limitations  Performance in work activities;Performance in self-care ADL;Performance in  leisure activities;Limitation in home management  -ESTEFANIA     Impairments  Range of motion;Posture;Poor body mechanics;Pain  -ESTEFANIA     Assessment Comments  Patient presents with signs of a left upper trap strain.  Pain is recreated along the superior shoulder with resistance to the LUE all planes.  She is generally tender along the trap with increased guarding and the pain does not refer distal to the GHJ.  No signs of RTC dysfunction is noted at this time.  Patient should be a good candidate for PT to improve her symptoms  -ESTEFANIA     Please refer to paper survey for additional self-reported information  Yes  -ESTEFANIA     Rehab Potential  Good  -ESTEFANIA     Patient/caregiver participated in establishment of treatment plan and goals  Yes  -ESTEFANIA     Patient would benefit from skilled therapy intervention  Yes  -ESTEFANIA        PT Plan    PT Frequency  1x/week;2x/week  -ESTEFANIA     Predicted Duration of Therapy Intervention (Therapy Eval)  up to 8 visits  -ESTEFANIA     Planned CPT's?  PT EVAL LOW COMPLEXITY: 75891;PT THER PROC EA 15 MIN: 92399;PT MANUAL THERAPY EA 15 MIN: 43253;PT ELECTRICAL STIM UNATTEND: ;PT ULTRASOUND EA 15 MIN: 47724;PT HOT/COLD PACK WC NONMCARE: 06353  -ESTEFANIA     PT Plan Comments  postural exercises, shoulder girdle stretching, manual and modalities as indicated based on pain including possible DN  -ESTEFANIA       User Key  (r) = Recorded By, (t) = Taken By, (c) = Cosigned By    Initials Name Provider Type    Erasto Saul, PT Physical Therapist            Exercises     Row Name 12/17/18 0700             Total Minutes    67953 - PT Therapeutic Exercise Minutes  8  -ESTEFANIA         Exercise 2    Exercise Name 2  upper trap stretch  -ESTEFANIA      Sets 2  3  -ESTEFANIA      Time 2  30 s  -ESTEFANIA         Exercise 3    Exercise Name 3  levator stretch  -ESTEFANIA      Sets 3  3  -ESTEFANIA      Time 3  30 s  -ESTEFANIA        User Key  (r) = Recorded By, (t) = Taken By, (c) = Cosigned By    Initials Name Provider Type    Erasto Saul, PT Physical Therapist                         Outcome Measure Options: Quick DASH  Quick DASH  Open a tight or new jar.: Moderate Difficulty  Do heavy household chores (e.g., wash walls, wash floors): Severe Difficulty  Carry a shopping bag or briefcase: Severe Difficulty  Wash your back: Severe Difficulty  Use a knife to cut food: Mild Difficulty  Recreational activities in which you take some force or impact through your arm, should or hand (e.g. golf, hammering, tennis, etc.): Severe Difficulty  During the past week, to what extent has your arm, shoulder, or hand problem interfered with your normal social activites with family, friends, neighbors or groups?: Quite a bit  During the past week, were you limited in your work or other regular daily activities as a result of your arm, shoulder or hand problem?: Very limited  Arm, Shoulder, or hand pain: Severe  Tingling (pins and needles) in your arm, shoulder, or hand: Severe  During the past week, how much difficulty have you had sleeping because of the pain in your arm, shoulder or hand?: Severe Difficulty  Number of Questions Answered: 11  Quick DASH Score: 68.18         Time Calculation:     Therapy Suggested Charges     Code   Minutes Charges    76650 (CPT®) Hc Pt Neuromusc Re Education Ea 15 Min      09073 (CPT®) Hc Pt Ther Proc Ea 15 Min 8 1    47170 (CPT®) Hc Gait Training Ea 15 Min      39447 (CPT®) Hc Pt Therapeutic Act Ea 15 Min      77099 (CPT®) Hc Pt Manual Therapy Ea 15 Min      14177 (CPT®) Hc Pt Ther Massage- Per 15 Min      92441 (CPT®) Hc Pt Iontophoresis Ea 15 Min      04242 (CPT®) Hc Pt Elec Stim Ea-Per 15 Min      85825 (CPT®) Hc Pt Ultrasound Ea 15 Min      21053 (CPT®) Hc Pt Self Care/Mgmt/Train Ea 15 Min      67965 (CPT®) Hc Pt Prosthetic (S) Train Initial Encounter, Each 15 Min      93632 (CPT®) Hc Orthotic(S) Mgmt/Train Initial Encounter, Each 15min      17820 (CPT®) Hc Pt Aquatic Therapy Ea 15 Min      35075 (CPT®) Hc Pt Orthotic(S)/Prosthetic(S) Encounter, Each 15 Min        (CPT®) Hc Pt Electrical Stim Unattended      Total  8 1          Start Time: 0740     Therapy Charges for Today     Code Description Service Date Service Provider Modifiers Qty    75419753018 HC PT THER PROC EA 15 MIN 12/17/2018 Erasto Lopez, PT GP 1    49069383484 HC PT EVAL LOW COMPLEXITY 3 12/17/2018 Erasto Lopez, PT GP 1          PT G-Codes  Outcome Measure Options: Quick DASH  Quick DASH Score: 68.18         Erasto Lopez, PT  12/17/2018

## 2018-12-21 ENCOUNTER — APPOINTMENT (OUTPATIENT)
Dept: PHYSICAL THERAPY | Facility: HOSPITAL | Age: 28
End: 2018-12-21

## 2019-01-03 ENCOUNTER — DOCUMENTATION (OUTPATIENT)
Dept: PHYSICAL THERAPY | Facility: HOSPITAL | Age: 29
End: 2019-01-03

## 2019-01-03 DIAGNOSIS — M25.512 LEFT SHOULDER PAIN, UNSPECIFIED CHRONICITY: Primary | ICD-10-CM

## 2019-01-03 NOTE — THERAPY DISCHARGE NOTE
Outpatient Physical Therapy Discharge Summary         Patient Name: Eliz Eisenberg  : 1990  MRN: [id number]    Today's Date: 1/3/2019    Visit Dx:    ICD-10-CM ICD-9-CM   1. Left shoulder pain, unspecified chronicity M25.512 719.41       PT OP Goals     Row Name 19 0900          PT Short Term Goals    STG Date to Achieve  18  -ESTEFANIA     STG 1  Patient to be compliant with initial HEP  -ESTEFANIA     STG 1 Progress  Not Met  -ESTEFANIA     STG 2  Patient to report resting pain </= 1/10  -ESTEFANIA     STG 2 Progress  Not Met  -ESTEFANIA        Long Term Goals    LTG Date to Achieve  19  -ESTEFANIA     LTG 1  Patient to tolerate resisted testing without an increase in pain or symptoms  -ESTEFANIA     LTG 1 Progress  Not Met  -ESTEFANIA     LTG 2  Patient to improve QD score to at least 40%  -ESTEFANIA     LTG 2 Progress  Not Met  -ESTEFANIA     LTG 3  Patient to be independent with final HEP  -ESTEFANIA     LTG 3 Progress  Not Met  -ESTEFANIA     LTG 4  Patient to demonstrate minimal tenderness  -ESTEFANIA     LTG 4 Progress  Not Met  -ESTEFANIA       User Key  (r) = Recorded By, (t) = Taken By, (c) = Cosigned By    Initials Name Provider Type    Erasto Saul, PT Physical Therapist          OP PT Discharge Summary  Date of Discharge: 19  Reason for Discharge: Non-compliant, Patient/Caregiver request  Discharge Destination: Home with home program, Unknown  Discharge Instructions/Additional Comments: Patient called and cancelled all future appointments without attending a visit following evaluation      Time Calculation:        Therapy Suggested Charges     Code   Minutes Charges    None                       Erasto Lopez, PT  1/3/2019

## 2019-01-07 ENCOUNTER — OFFICE VISIT (OUTPATIENT)
Dept: INTERNAL MEDICINE | Facility: CLINIC | Age: 29
End: 2019-01-07

## 2019-01-07 VITALS
WEIGHT: 293 LBS | BODY MASS INDEX: 51.36 KG/M2 | OXYGEN SATURATION: 100 % | HEART RATE: 93 BPM | SYSTOLIC BLOOD PRESSURE: 124 MMHG | DIASTOLIC BLOOD PRESSURE: 78 MMHG

## 2019-01-07 DIAGNOSIS — J01.00 ACUTE NON-RECURRENT MAXILLARY SINUSITIS: Primary | ICD-10-CM

## 2019-01-07 LAB
EXPIRATION DATE: NORMAL
FLUAV AG NPH QL: NEGATIVE
FLUBV AG NPH QL: NEGATIVE
INTERNAL CONTROL: NORMAL
Lab: NORMAL

## 2019-01-07 PROCEDURE — 99213 OFFICE O/P EST LOW 20 MIN: CPT | Performed by: PHYSICIAN ASSISTANT

## 2019-01-07 PROCEDURE — 87804 INFLUENZA ASSAY W/OPTIC: CPT | Performed by: PHYSICIAN ASSISTANT

## 2019-01-07 PROCEDURE — 96372 THER/PROPH/DIAG INJ SC/IM: CPT | Performed by: PHYSICIAN ASSISTANT

## 2019-01-07 RX ORDER — ONDANSETRON 4 MG/1
TABLET, FILM COATED ORAL
COMMUNITY
Start: 2018-12-05 | End: 2021-01-28

## 2019-01-07 RX ORDER — METHYLPREDNISOLONE ACETATE 40 MG/ML
40 INJECTION, SUSPENSION INTRA-ARTICULAR; INTRALESIONAL; INTRAMUSCULAR; SOFT TISSUE ONCE
Status: COMPLETED | OUTPATIENT
Start: 2019-01-07 | End: 2019-01-07

## 2019-01-07 RX ORDER — HEPATITIS A VACCINE 1440 [IU]/ML
INJECTION, SUSPENSION INTRAMUSCULAR
COMMUNITY
Start: 2018-11-23 | End: 2019-03-26

## 2019-01-07 RX ORDER — BENZONATATE 100 MG/1
CAPSULE ORAL
Refills: 0 | COMMUNITY
Start: 2018-12-26 | End: 2019-01-24

## 2019-01-07 RX ORDER — DOXYCYCLINE 100 MG/1
100 CAPSULE ORAL 2 TIMES DAILY
Qty: 20 CAPSULE | Refills: 0 | Status: SHIPPED | OUTPATIENT
Start: 2019-01-07 | End: 2019-01-24

## 2019-01-07 RX ORDER — OSELTAMIVIR PHOSPHATE 75 MG/1
CAPSULE ORAL
Refills: 0 | COMMUNITY
Start: 2018-12-26 | End: 2019-01-24

## 2019-01-07 RX ORDER — INFLUENZA A VIRUS A/MICHIGAN/45/2015 X-275 (H1N1) ANTIGEN (FORMALDEHYDE INACTIVATED), INFLUENZA A VIRUS A/SINGAPORE/INFIMH-16-0019/2016 IVR-186 (H3N2) ANTIGEN (FORMALDEHYDE INACTIVATED), INFLUENZA B VIRUS B/PHUKET/3073/2013 ANTIGEN (FORMALDEHYDE INACTIVATED), AND INFLUENZA B VIRUS B/MARYLAND/15/2016 BX-69A ANTIGEN (FORMALDEHYDE INACTIVATED) 15; 15; 15; 15 UG/.5ML; UG/.5ML; UG/.5ML; UG/.5ML
INJECTION, SUSPENSION INTRAMUSCULAR
COMMUNITY
Start: 2018-11-23 | End: 2019-03-26

## 2019-01-07 RX ADMIN — METHYLPREDNISOLONE ACETATE 40 MG: 40 INJECTION, SUSPENSION INTRA-ARTICULAR; INTRALESIONAL; INTRAMUSCULAR; SOFT TISSUE at 14:12

## 2019-01-07 NOTE — PROGRESS NOTES
Chief Complaint   Patient presents with   • Influenza     Tested poistive for the flu boom weekend and took tamiflu but is still having the same sx and not getting any better        Subjective   Eliz Eisenberg is a 28 y.o. female.       History of Present Illness     Pt developed flu symptoms on 12/23 and went to [facility] and diagnosed with influenza A. She was treated with tamiflu and tessalon perles. Used tylenol and ibuprofen for fever. Fever resolved but nasal and sinus congestion has remained. Has had itching and draining eyes, used some eye drops she had remaining from infection last year. Slight pain in her sinuses. Cough is less often.         Current Outpatient Medications:   •  Omeprazole 20 MG Tablet Delayed Release Dispersible, , Disp: , Rfl:   •  albuterol (PROVENTIL HFA;VENTOLIN HFA) 108 (90 Base) MCG/ACT inhaler, Inhale 2 puffs Every 4 (Four) Hours As Needed for Wheezing., Disp: 1 inhaler, Rfl: 5  •  benzonatate (TESSALON) 100 MG capsule, TAKE 1 CAPSULE BY MOUTH EVERY 8 HOURS AS NEEDED FOR COUGH FOR 7 DAYS, Disp: , Rfl: 0  •  BREO ELLIPTA 100-25 MCG/INH aerosol powder , INHALE 1 PUFF ONCE DAILY, Disp: 60 each, Rfl: 0  •  cetirizine (zyrTEC) 5 MG tablet, Take 1 tablet by mouth Daily., Disp: , Rfl:   •  doxycycline (MONODOX) 100 MG capsule, Take 1 capsule by mouth 2 (Two) Times a Day., Disp: 20 capsule, Rfl: 0  •  FLUZONE QUADRIVALENT 0.5 ML suspension prefilled syringe injection, , Disp: , Rfl:   •  HAVRIX 1440 EL U/ML vaccine, , Disp: , Rfl:   •  metFORMIN (GLUCOPHAGE) 500 MG tablet, Take 2 tablets by mouth 2 (Two) Times a Day With Meals., Disp: 360 tablet, Rfl: 2  •  montelukast (SINGULAIR) 10 MG tablet, TAKE 1 TABLET BY MOUTH EVERY DAY IN THE EVENING, Disp: 30 tablet, Rfl: 1  •  ondansetron (ZOFRAN) 4 MG tablet, , Disp: , Rfl:   •  oseltamivir (TAMIFLU) 75 MG capsule, TAKE 1 CAPSULE BY MOUTH EVERY 12 HOURS FOR 5 DAYS, Disp: , Rfl: 0  •  predniSONE (DELTASONE) 10 MG tablet, Take 4 tablets for 3  days, then 3 tablets for 3 days, then 2 tablets for 3 days, then 1 tablet for 3 days, Disp: 30 tablet, Rfl: 0  •  promethazine (PHENERGAN) 25 MG tablet, Take 1 tablet by mouth Every 4 (Four) Hours As Needed for nausea., Disp: 10 tablet, Rfl: 0  •  sucralfate (CARAFATE) 1 g tablet, Take 1 tablet by mouth 4 (Four) Times a Day. Take an hour before meals and at bedtime, Disp: 120 tablet, Rfl: 11  •  Triamcinolone Acetonide (NASACORT ALLERGY 24HR) 55 MCG/ACT nasal inhaler, 2 sprays into each nostril Daily., Disp: 16.5 g, Rfl: 0     PMFSH  The following portions of the patient's history were reviewed and updated as appropriate: allergies, current medications, past family history, past medical history, past social history, past surgical history and problem list.    Review of Systems   Constitutional: Positive for fatigue. Negative for activity change and unexpected weight change.   HENT: Positive for congestion, postnasal drip, sinus pressure and sore throat. Negative for ear pain.    Eyes: Negative for pain and discharge.   Respiratory: Positive for cough. Negative for chest tightness, shortness of breath and wheezing.    Cardiovascular: Negative for chest pain and palpitations.   Gastrointestinal: Negative for abdominal pain, diarrhea and vomiting.   Endocrine: Negative.    Genitourinary: Negative.    Musculoskeletal: Negative for joint swelling.   Skin: Negative for color change, rash and wound.   Allergic/Immunologic: Negative.    Neurological: Negative for seizures and syncope.   Psychiatric/Behavioral: Negative.        Objective   /78   Pulse 93   Wt (!) 144 kg (318 lb 3.2 oz)   SpO2 100%   BMI 51.36 kg/m²     Physical Exam   Constitutional: She is oriented to person, place, and time. She appears well-developed and well-nourished.  Non-toxic appearance. No distress.   HENT:   Head: Normocephalic and atraumatic. Hair is normal.   Right Ear: External ear normal. No drainage, swelling or tenderness. Tympanic  membrane is retracted.   Left Ear: External ear normal. No drainage, swelling or tenderness. Tympanic membrane is retracted.   Nose: Mucosal edema present. No epistaxis.   Mouth/Throat: Uvula is midline and mucous membranes are normal. No oral lesions. No uvula swelling. Posterior oropharyngeal erythema present. No oropharyngeal exudate.   Eyes: Conjunctivae and EOM are normal. Pupils are equal, round, and reactive to light. Right eye exhibits no discharge. Left eye exhibits no discharge. No scleral icterus.   Neck: Normal range of motion. Neck supple.   Cardiovascular: Normal rate, regular rhythm and normal heart sounds. Exam reveals no gallop.   No murmur heard.  Pulmonary/Chest: Breath sounds normal. No stridor. No respiratory distress. She has no wheezes. She has no rales. She exhibits no tenderness.   Abdominal: Soft. There is no tenderness.   Lymphadenopathy:     She has cervical adenopathy.   Neurological: She is alert and oriented to person, place, and time. She exhibits normal muscle tone.   Skin: Skin is warm and dry. No rash noted. She is not diaphoretic.   Psychiatric: She has a normal mood and affect. Her behavior is normal. Judgment and thought content normal.   Nursing note and vitals reviewed.      Results for orders placed or performed in visit on 01/07/19   POCT Influenza A/B   Result Value Ref Range    Rapid Influenza A Ag Negative Negative    Rapid Influenza B Ag Negative Negative    Internal Control Passed Passed    Lot Number [id number]     Expiration Date 04/12/2021         ASSESSMENT/PLAN    Problem List Items Addressed This Visit     None      Visit Diagnoses     Acute non-recurrent maxillary sinusitis    -  Primary    Depomedrol 40 mg IM in office. Start doxycyline as directed. Use symptomatic care prn, increase rest and fluids.    Relevant Medications    doxycycline (MONODOX) 100 MG capsule    methylPREDNISolone acetate (DEPO-medrol) injection 40 mg (Completed)    Other Relevant Orders     POCT Influenza A/B (Completed)               Return if symptoms worsen or fail to improve.

## 2019-01-24 ENCOUNTER — OFFICE VISIT (OUTPATIENT)
Dept: INTERNAL MEDICINE | Facility: CLINIC | Age: 29
End: 2019-01-24

## 2019-01-24 VITALS
HEIGHT: 66 IN | BODY MASS INDEX: 47.09 KG/M2 | OXYGEN SATURATION: 98 % | SYSTOLIC BLOOD PRESSURE: 138 MMHG | DIASTOLIC BLOOD PRESSURE: 74 MMHG | WEIGHT: 293 LBS | HEART RATE: 96 BPM

## 2019-01-24 DIAGNOSIS — R53.83 OTHER FATIGUE: ICD-10-CM

## 2019-01-24 DIAGNOSIS — R19.7 DIARRHEA, UNSPECIFIED TYPE: Primary | ICD-10-CM

## 2019-01-24 DIAGNOSIS — Z00.00 HEALTH CARE MAINTENANCE: ICD-10-CM

## 2019-01-24 DIAGNOSIS — N92.6 LATE PERIOD: ICD-10-CM

## 2019-01-24 LAB
25(OH)D3 SERPL-MCNC: 16.2 NG/ML
ALBUMIN SERPL-MCNC: 4.35 G/DL (ref 3.2–4.8)
ALBUMIN/GLOB SERPL: 1.6 G/DL (ref 1.5–2.5)
ALP SERPL-CCNC: 126 U/L (ref 25–100)
ALT SERPL W P-5'-P-CCNC: 37 U/L (ref 7–40)
ANION GAP SERPL CALCULATED.3IONS-SCNC: 7 MMOL/L (ref 3–11)
ARTICHOKE IGE QN: 154 MG/DL (ref 0–130)
AST SERPL-CCNC: 24 U/L (ref 0–33)
BASOPHILS # BLD AUTO: 0.05 10*3/MM3 (ref 0–0.2)
BASOPHILS NFR BLD AUTO: 0.4 % (ref 0–1)
BILIRUB SERPL-MCNC: 0.7 MG/DL (ref 0.3–1.2)
BUN BLD-MCNC: 12 MG/DL (ref 9–23)
BUN/CREAT SERPL: 19.7 (ref 7–25)
CALCIUM SPEC-SCNC: 9.2 MG/DL (ref 8.7–10.4)
CHLORIDE SERPL-SCNC: 102 MMOL/L (ref 99–109)
CHOLEST SERPL-MCNC: 186 MG/DL (ref 0–200)
CO2 SERPL-SCNC: 28 MMOL/L (ref 20–31)
CREAT BLD-MCNC: 0.61 MG/DL (ref 0.6–1.3)
DEPRECATED RDW RBC AUTO: 48.7 FL (ref 37–54)
EOSINOPHIL # BLD AUTO: 0.21 10*3/MM3 (ref 0–0.3)
EOSINOPHIL NFR BLD AUTO: 1.7 % (ref 0–3)
ERYTHROCYTE [DISTWIDTH] IN BLOOD BY AUTOMATED COUNT: 15.2 % (ref 11.3–14.5)
GFR SERPL CREATININE-BSD FRML MDRD: 117 ML/MIN/1.73
GLOBULIN UR ELPH-MCNC: 2.7 GM/DL
GLUCOSE BLD-MCNC: 84 MG/DL (ref 70–100)
HCG INTACT+B SERPL-ACNC: <5 MIU/ML
HCT VFR BLD AUTO: 39.8 % (ref 34.5–44)
HDLC SERPL-MCNC: 40 MG/DL (ref 40–60)
HGB BLD-MCNC: 12.6 G/DL (ref 11.5–15.5)
IMM GRANULOCYTES # BLD AUTO: 0.05 10*3/MM3 (ref 0–0.03)
IMM GRANULOCYTES NFR BLD AUTO: 0.4 % (ref 0–0.6)
LYMPHOCYTES # BLD AUTO: 2.73 10*3/MM3 (ref 0.6–4.8)
LYMPHOCYTES NFR BLD AUTO: 21.7 % (ref 24–44)
MCH RBC QN AUTO: 27.8 PG (ref 27–31)
MCHC RBC AUTO-ENTMCNC: 31.7 G/DL (ref 32–36)
MCV RBC AUTO: 87.9 FL (ref 80–99)
MONOCYTES # BLD AUTO: 0.93 10*3/MM3 (ref 0–1)
MONOCYTES NFR BLD AUTO: 7.4 % (ref 0–12)
NEUTROPHILS # BLD AUTO: 8.65 10*3/MM3 (ref 1.5–8.3)
NEUTROPHILS NFR BLD AUTO: 68.8 % (ref 41–71)
PLATELET # BLD AUTO: 348 10*3/MM3 (ref 150–450)
PMV BLD AUTO: 10.1 FL (ref 6–12)
POTASSIUM BLD-SCNC: 4.3 MMOL/L (ref 3.5–5.5)
PROT SERPL-MCNC: 7 G/DL (ref 5.7–8.2)
RBC # BLD AUTO: 4.53 10*6/MM3 (ref 3.89–5.14)
SODIUM BLD-SCNC: 137 MMOL/L (ref 132–146)
TRIGL SERPL-MCNC: 122 MG/DL (ref 0–150)
TSH SERPL DL<=0.05 MIU/L-ACNC: 2.58 MIU/ML (ref 0.35–5.35)
WBC NRBC COR # BLD: 12.57 10*3/MM3 (ref 3.5–10.8)

## 2019-01-24 PROCEDURE — 80053 COMPREHEN METABOLIC PANEL: CPT | Performed by: PHYSICIAN ASSISTANT

## 2019-01-24 PROCEDURE — 90715 TDAP VACCINE 7 YRS/> IM: CPT | Performed by: PHYSICIAN ASSISTANT

## 2019-01-24 PROCEDURE — 84443 ASSAY THYROID STIM HORMONE: CPT | Performed by: PHYSICIAN ASSISTANT

## 2019-01-24 PROCEDURE — 82306 VITAMIN D 25 HYDROXY: CPT | Performed by: PHYSICIAN ASSISTANT

## 2019-01-24 PROCEDURE — 99395 PREV VISIT EST AGE 18-39: CPT | Performed by: PHYSICIAN ASSISTANT

## 2019-01-24 PROCEDURE — 84702 CHORIONIC GONADOTROPIN TEST: CPT | Performed by: PHYSICIAN ASSISTANT

## 2019-01-24 PROCEDURE — 80061 LIPID PANEL: CPT | Performed by: PHYSICIAN ASSISTANT

## 2019-01-24 PROCEDURE — 90471 IMMUNIZATION ADMIN: CPT | Performed by: PHYSICIAN ASSISTANT

## 2019-01-24 PROCEDURE — 85025 COMPLETE CBC W/AUTO DIFF WBC: CPT | Performed by: PHYSICIAN ASSISTANT

## 2019-01-24 RX ORDER — MONTELUKAST SODIUM 4 MG/1
1 TABLET, CHEWABLE ORAL 2 TIMES DAILY
Qty: 60 TABLET | Refills: 1 | Status: SHIPPED | OUTPATIENT
Start: 2019-01-24 | End: 2019-09-16

## 2019-01-24 NOTE — PROGRESS NOTES
"Chief Complaint   Patient presents with   • Annual Exam       Subjective   Eliz Eisenberg is a 28 y.o. female.       History of Present Illness     The patient is being seen for a health maintenance evaluation.  The last health maintenance was 1 year(s) ago.    Social History  Eliz  does not smoke cigarettes.   She drinks no alcohol.  She does not use illicit drugs.    General History  Eliz  does have regular dental visits.  She does complain of vision problems. Wears glasses. Last eye exam was 11/2018.  Immunizations are not up to date. The patient needs the following immunizations: TDaP needed; initial hepatitis A done 11/2018    Lifestyle  Eliz  consumes in general, a \"healthy\" diet  .  She exercises weekly.    Reproductive Health  Eliz  is premenopausal.  She reports periods are irregular.  She is sexually active. Her contraceptive plan is no method. Currently trying to get pregnant.    Screening  Last pap was 7/2018 by Dr Ayers. History of abnormal pap smear or family history of gyn cancer: no abnormal paps  Last mammogram was  never. Personal or family history of abnormal mammograms or breast cancer: maternal GM with breast cancer in her 70s.  Last colonoscopy was 2017 by Jose A, normal, repeat at normal age range. Family history of colon cancer: Paternal GM with colon cancer at age 63.  Last DEXA was never.       Pt is 5 days late for her period, trying to get pregnant. Does have irregular periods.    She notes that she gets adequate sleep but wakes up fatigued. Takes melatonin to help her sleep and it works to get her to sleep.               Current Outpatient Medications:   •  albuterol (PROVENTIL HFA;VENTOLIN HFA) 108 (90 Base) MCG/ACT inhaler, Inhale 2 puffs Every 4 (Four) Hours As Needed for Wheezing., Disp: 1 inhaler, Rfl: 5  •  BREO ELLIPTA 100-25 MCG/INH aerosol powder , INHALE 1 PUFF ONCE DAILY, Disp: 60 each, Rfl: 0  •  cetirizine (zyrTEC) 5 MG tablet, Take 1 tablet by mouth Daily., Disp: " , Rfl:   •  FLUZONE QUADRIVALENT 0.5 ML suspension prefilled syringe injection, , Disp: , Rfl:   •  HAVRIX 1440 EL U/ML vaccine, , Disp: , Rfl:   •  metFORMIN (GLUCOPHAGE) 500 MG tablet, Take 2 tablets by mouth 2 (Two) Times a Day With Meals., Disp: 360 tablet, Rfl: 2  •  montelukast (SINGULAIR) 10 MG tablet, TAKE 1 TABLET BY MOUTH EVERY DAY IN THE EVENING, Disp: 30 tablet, Rfl: 1  •  Multiple Vitamins-Minerals (MULTIVITAMIN PO), Take  by mouth., Disp: , Rfl:   •  Omeprazole 20 MG Tablet Delayed Release Dispersible, , Disp: , Rfl:   •  ondansetron (ZOFRAN) 4 MG tablet, , Disp: , Rfl:   •  Oxymetazoline HCl (NASAL SPRAY NA), into the nostril(s) as directed by provider., Disp: , Rfl:   •  Prenatal Vit-Fe Fumarate-FA (PRENATAL PO), Take  by mouth., Disp: , Rfl:   •  promethazine (PHENERGAN) 25 MG tablet, Take 1 tablet by mouth Every 4 (Four) Hours As Needed for nausea., Disp: 10 tablet, Rfl: 0  •  sucralfate (CARAFATE) 1 g tablet, Take 1 tablet by mouth 4 (Four) Times a Day. Take an hour before meals and at bedtime, Disp: 120 tablet, Rfl: 11  •  colestipol (COLESTID) 1 g tablet, Take 1 tablet by mouth 2 (Two) Times a Day., Disp: 60 tablet, Rfl: 1     PMFSH  The following portions of the patient's history were reviewed and updated as appropriate: allergies, current medications, past family history, past medical history, past social history, past surgical history and problem list.    Review of Systems   Constitutional: Negative for appetite change, fever and unexpected weight change.   HENT: Negative for ear pain, facial swelling and sore throat.    Eyes: Negative for pain and visual disturbance.   Respiratory: Negative for chest tightness, shortness of breath and wheezing.    Cardiovascular: Negative for chest pain and palpitations.   Gastrointestinal: Negative for abdominal pain and blood in stool.   Endocrine: Negative.    Genitourinary: Negative for difficulty urinating and hematuria.   Musculoskeletal: Negative for  "joint swelling.   Neurological: Negative for tremors, seizures and syncope.   Hematological: Negative for adenopathy.   Psychiatric/Behavioral: Negative.        Objective   /74   Pulse 96   Ht 167.6 cm (66\")   Wt (!) 144 kg (317 lb)   SpO2 98%   BMI 51.17 kg/m²     Physical Exam   Constitutional: She is oriented to person, place, and time. She appears well-developed and well-nourished. No distress.   HENT:   Head: Normocephalic and atraumatic. Hair is normal.   Right Ear: Hearing, tympanic membrane, external ear and ear canal normal. No drainage. No decreased hearing is noted.   Left Ear: Hearing, tympanic membrane, external ear and ear canal normal. No decreased hearing is noted.   Nose: No nasal deformity.   Mouth/Throat: Oropharynx is clear and moist.   Eyes: Conjunctivae, EOM and lids are normal. Pupils are equal, round, and reactive to light. Lids are everted and swept, no foreign bodies found. Right eye exhibits no discharge. Left eye exhibits no discharge.   Fundoscopic exam:       The right eye shows red reflex.        The left eye shows red reflex.   Neck: Normal range of motion. Neck supple. No JVD present. No tracheal deviation present. No thyromegaly present.   Cardiovascular: Normal rate, regular rhythm, normal heart sounds, intact distal pulses and normal pulses. Exam reveals no gallop and no friction rub.   No murmur heard.  Pulmonary/Chest: Effort normal and breath sounds normal. No respiratory distress. She has no wheezes. She has no rales. She exhibits no tenderness.   Abdominal: Soft. Bowel sounds are normal. She exhibits no distension and no mass. There is no tenderness. There is no rebound and no guarding. No hernia.   Musculoskeletal: Normal range of motion. She exhibits no edema, tenderness or deformity.   Lymphadenopathy:     She has no cervical adenopathy.        Right: No inguinal adenopathy present.        Left: No inguinal adenopathy present.   Neurological: She is alert and " oriented to person, place, and time. She has normal reflexes. She displays normal reflexes. No cranial nerve deficit. She exhibits normal muscle tone. Coordination normal.   Skin: Skin is warm and dry. No rash noted. She is not diaphoretic. No erythema.   Psychiatric: She has a normal mood and affect. Her behavior is normal. Judgment and thought content normal.   Nursing note and vitals reviewed.           ASSESSMENT/PLAN    Problem List Items Addressed This Visit        Digestive    Diarrhea - Primary     Trial of colestipol.         Relevant Medications    colestipol (COLESTID) 1 g tablet       Other    Health care maintenance     Immunizations: TDaP done today; others up to date  Eye exam: done 2017  Pap Smear: done 2018, Dr Ayers  Mammogram: due age 40  Dexa: due post menopausal  Colonoscopy: done 2017, repeat age 50  Labs: fasting labs ordered    Counseled patient regarding multimodal approach with healthy nutrition, healthy sleep, regular physical activity, social activities, counseling, safety measures and medications.          Relevant Orders    Comprehensive Metabolic Panel (Completed)    CBC & Differential (Completed)    Lipid Panel (Completed)    TSH (Completed)    Vitamin D 25 Hydroxy (Completed)    CBC Auto Differential (Completed)      Other Visit Diagnoses     Late period        Relevant Orders    hCG, Quantitative, Pregnancy (Completed)    Other fatigue        Relevant Orders    Ambulatory Referral to Sleep Medicine               Return in about 1 year (around 1/24/2020) for Annual.

## 2019-01-25 NOTE — ASSESSMENT & PLAN NOTE
Immunizations: TDaP done today; others up to date  Eye exam: done 2017  Pap Smear: done 2018, Dr Ayers  Mammogram: due age 40  Dexa: due post menopausal  Colonoscopy: done 2017, repeat age 50  Labs: fasting labs ordered    Counseled patient regarding multimodal approach with healthy nutrition, healthy sleep, regular physical activity, social activities, counseling, safety measures and medications.

## 2019-01-27 RX ORDER — ERGOCALCIFEROL 1.25 MG/1
50000 CAPSULE ORAL WEEKLY
Qty: 12 CAPSULE | Refills: 3 | OUTPATIENT
Start: 2019-01-27 | End: 2020-07-06

## 2019-01-28 NOTE — PROGRESS NOTES
Your labs show that your HCG was negative and you were not pregnant when we checked.     It also showed that your bad cholesterol (LDL) is elevated. Since you are trying to get pregnant, this is not something we would treat. Cholesterol medications are contraindicated in pregnancy.    Your vitamin D is extremely low. I will send in a high dose of vitamin D for you to start taking once a week to boost your level.    Your white count is elevated but stable since last year. The alkaline phosphatase level is also a little high but can fluctuate.     Everything else looks fine!

## 2019-02-07 ENCOUNTER — HOSPITAL ENCOUNTER (EMERGENCY)
Facility: HOSPITAL | Age: 29
Discharge: HOME | End: 2019-02-08
Attending: EMERGENCY MEDICINE | Admitting: EMERGENCY MEDICINE

## 2019-02-07 ENCOUNTER — OFFICE VISIT (OUTPATIENT)
Dept: INTERNAL MEDICINE | Facility: CLINIC | Age: 29
End: 2019-02-07

## 2019-02-07 ENCOUNTER — APPOINTMENT (OUTPATIENT)
Dept: CT IMAGING | Facility: HOSPITAL | Age: 29
End: 2019-02-07

## 2019-02-07 VITALS
DIASTOLIC BLOOD PRESSURE: 78 MMHG | TEMPERATURE: 98.7 F | HEIGHT: 66 IN | OXYGEN SATURATION: 99 % | WEIGHT: 293 LBS | SYSTOLIC BLOOD PRESSURE: 126 MMHG | BODY MASS INDEX: 47.09 KG/M2 | HEART RATE: 116 BPM

## 2019-02-07 DIAGNOSIS — K52.9 GASTROENTERITIS: Primary | ICD-10-CM

## 2019-02-07 DIAGNOSIS — N39.0 URINARY TRACT INFECTION IN FEMALE: Primary | ICD-10-CM

## 2019-02-07 DIAGNOSIS — R19.7 NAUSEA VOMITING AND DIARRHEA: ICD-10-CM

## 2019-02-07 DIAGNOSIS — R11.2 NAUSEA VOMITING AND DIARRHEA: ICD-10-CM

## 2019-02-07 LAB
ALBUMIN SERPL-MCNC: 4.54 G/DL (ref 3.2–4.8)
ALBUMIN SERPL-MCNC: 4.78 G/DL (ref 3.2–4.8)
ALBUMIN/GLOB SERPL: 1.5 G/DL (ref 1.5–2.5)
ALBUMIN/GLOB SERPL: 1.6 G/DL (ref 1.5–2.5)
ALP SERPL-CCNC: 133 U/L (ref 25–100)
ALP SERPL-CCNC: 133 U/L (ref 25–100)
ALT SERPL W P-5'-P-CCNC: 25 U/L (ref 7–40)
ALT SERPL W P-5'-P-CCNC: 27 U/L (ref 7–40)
ANION GAP SERPL CALCULATED.3IONS-SCNC: 6 MMOL/L (ref 3–11)
ANION GAP SERPL CALCULATED.3IONS-SCNC: 7 MMOL/L (ref 3–11)
AST SERPL-CCNC: 19 U/L (ref 0–33)
AST SERPL-CCNC: 20 U/L (ref 0–33)
B-HCG UR QL: NEGATIVE
BACTERIA UR QL AUTO: ABNORMAL /HPF
BASOPHILS # BLD AUTO: 0.02 10*3/MM3 (ref 0–0.2)
BASOPHILS # BLD AUTO: 0.03 10*3/MM3 (ref 0–0.2)
BASOPHILS NFR BLD AUTO: 0.2 % (ref 0–1)
BASOPHILS NFR BLD AUTO: 0.2 % (ref 0–1)
BILIRUB SERPL-MCNC: 0.8 MG/DL (ref 0.3–1.2)
BILIRUB SERPL-MCNC: 0.9 MG/DL (ref 0.3–1.2)
BILIRUB UR QL STRIP: NEGATIVE
BUN BLD-MCNC: 10 MG/DL (ref 9–23)
BUN BLD-MCNC: 12 MG/DL (ref 9–23)
BUN/CREAT SERPL: 13.7 (ref 7–25)
BUN/CREAT SERPL: 16.4 (ref 7–25)
CALCIUM SPEC-SCNC: 9.1 MG/DL (ref 8.7–10.4)
CALCIUM SPEC-SCNC: 9.6 MG/DL (ref 8.7–10.4)
CHLORIDE SERPL-SCNC: 102 MMOL/L (ref 99–109)
CHLORIDE SERPL-SCNC: 103 MMOL/L (ref 99–109)
CLARITY UR: ABNORMAL
CO2 SERPL-SCNC: 26 MMOL/L (ref 20–31)
CO2 SERPL-SCNC: 26 MMOL/L (ref 20–31)
COLOR UR: YELLOW
CREAT BLD-MCNC: 0.73 MG/DL (ref 0.6–1.3)
CREAT BLD-MCNC: 0.73 MG/DL (ref 0.6–1.3)
D-LACTATE SERPL-SCNC: 1.1 MMOL/L (ref 0.5–2)
DEPRECATED RDW RBC AUTO: 46.5 FL (ref 37–54)
DEPRECATED RDW RBC AUTO: 47 FL (ref 37–54)
EOSINOPHIL # BLD AUTO: 0.01 10*3/MM3 (ref 0–0.3)
EOSINOPHIL # BLD AUTO: 0.01 10*3/MM3 (ref 0–0.3)
EOSINOPHIL NFR BLD AUTO: 0.1 % (ref 0–3)
EOSINOPHIL NFR BLD AUTO: 0.1 % (ref 0–3)
ERYTHROCYTE [DISTWIDTH] IN BLOOD BY AUTOMATED COUNT: 14.7 % (ref 11.3–14.5)
ERYTHROCYTE [DISTWIDTH] IN BLOOD BY AUTOMATED COUNT: 14.9 % (ref 11.3–14.5)
EXPIRATION DATE: NORMAL
FLUAV AG NPH QL: NEGATIVE
FLUBV AG NPH QL: NEGATIVE
GFR SERPL CREATININE-BSD FRML MDRD: 95 ML/MIN/1.73
GFR SERPL CREATININE-BSD FRML MDRD: 95 ML/MIN/1.73
GLOBULIN UR ELPH-MCNC: 2.8 GM/DL
GLOBULIN UR ELPH-MCNC: 3.2 GM/DL
GLUCOSE BLD-MCNC: 77 MG/DL (ref 70–100)
GLUCOSE BLD-MCNC: 86 MG/DL (ref 70–100)
GLUCOSE UR STRIP-MCNC: NEGATIVE MG/DL
HCT VFR BLD AUTO: 41.2 % (ref 34.5–44)
HCT VFR BLD AUTO: 43 % (ref 34.5–44)
HGB BLD-MCNC: 12.9 G/DL (ref 11.5–15.5)
HGB BLD-MCNC: 13.6 G/DL (ref 11.5–15.5)
HGB UR QL STRIP.AUTO: NEGATIVE
HOLD SPECIMEN: NORMAL
HOLD SPECIMEN: NORMAL
HYALINE CASTS UR QL AUTO: ABNORMAL /LPF
IMM GRANULOCYTES # BLD AUTO: 0.04 10*3/MM3 (ref 0–0.03)
IMM GRANULOCYTES # BLD AUTO: 0.05 10*3/MM3 (ref 0–0.03)
IMM GRANULOCYTES NFR BLD AUTO: 0.3 % (ref 0–0.6)
IMM GRANULOCYTES NFR BLD AUTO: 0.3 % (ref 0–0.6)
INTERNAL CONTROL: NORMAL
INTERNAL NEGATIVE CONTROL: NEGATIVE
INTERNAL POSITIVE CONTROL: POSITIVE
KETONES UR QL STRIP: NEGATIVE
LEUKOCYTE ESTERASE UR QL STRIP.AUTO: ABNORMAL
LIPASE SERPL-CCNC: 31 U/L (ref 6–51)
LYMPHOCYTES # BLD AUTO: 1.43 10*3/MM3 (ref 0.6–4.8)
LYMPHOCYTES # BLD AUTO: 1.68 10*3/MM3 (ref 0.6–4.8)
LYMPHOCYTES NFR BLD AUTO: 10.8 % (ref 24–44)
LYMPHOCYTES NFR BLD AUTO: 10.9 % (ref 24–44)
Lab: NORMAL
Lab: NORMAL
MCH RBC QN AUTO: 27.3 PG (ref 27–31)
MCH RBC QN AUTO: 27.6 PG (ref 27–31)
MCHC RBC AUTO-ENTMCNC: 31.3 G/DL (ref 32–36)
MCHC RBC AUTO-ENTMCNC: 31.6 G/DL (ref 32–36)
MCV RBC AUTO: 87.1 FL (ref 80–99)
MCV RBC AUTO: 87.2 FL (ref 80–99)
MONOCYTES # BLD AUTO: 0.92 10*3/MM3 (ref 0–1)
MONOCYTES # BLD AUTO: 1.37 10*3/MM3 (ref 0–1)
MONOCYTES NFR BLD AUTO: 7 % (ref 0–12)
MONOCYTES NFR BLD AUTO: 8.9 % (ref 0–12)
MUCOUS THREADS URNS QL MICRO: ABNORMAL /HPF
NEUTROPHILS # BLD AUTO: 10.83 10*3/MM3 (ref 1.5–8.3)
NEUTROPHILS # BLD AUTO: 12.32 10*3/MM3 (ref 1.5–8.3)
NEUTROPHILS NFR BLD AUTO: 79.9 % (ref 41–71)
NEUTROPHILS NFR BLD AUTO: 81.9 % (ref 41–71)
NITRITE UR QL STRIP: NEGATIVE
PH UR STRIP.AUTO: 5.5 [PH] (ref 5–8)
PLATELET # BLD AUTO: 295 10*3/MM3 (ref 150–450)
PLATELET # BLD AUTO: 327 10*3/MM3 (ref 150–450)
PMV BLD AUTO: 10.3 FL (ref 6–12)
PMV BLD AUTO: 9.9 FL (ref 6–12)
POTASSIUM BLD-SCNC: 4.1 MMOL/L (ref 3.5–5.5)
POTASSIUM BLD-SCNC: 4.6 MMOL/L (ref 3.5–5.5)
PROT SERPL-MCNC: 7.3 G/DL (ref 5.7–8.2)
PROT SERPL-MCNC: 8 G/DL (ref 5.7–8.2)
PROT UR QL STRIP: NEGATIVE
RBC # BLD AUTO: 4.73 10*6/MM3 (ref 3.89–5.14)
RBC # BLD AUTO: 4.93 10*6/MM3 (ref 3.89–5.14)
RBC # UR: ABNORMAL /HPF
REF LAB TEST METHOD: ABNORMAL
SODIUM BLD-SCNC: 135 MMOL/L (ref 132–146)
SODIUM BLD-SCNC: 135 MMOL/L (ref 132–146)
SP GR UR STRIP: 1.02 (ref 1–1.03)
SQUAMOUS #/AREA URNS HPF: ABNORMAL /HPF
UROBILINOGEN UR QL STRIP: ABNORMAL
WBC NRBC COR # BLD: 13.21 10*3/MM3 (ref 3.5–10.8)
WBC NRBC COR # BLD: 15.41 10*3/MM3 (ref 3.5–10.8)
WBC UR QL AUTO: ABNORMAL /HPF
WHOLE BLOOD HOLD SPECIMEN: NORMAL
WHOLE BLOOD HOLD SPECIMEN: NORMAL

## 2019-02-07 PROCEDURE — 87804 INFLUENZA ASSAY W/OPTIC: CPT | Performed by: PHYSICIAN ASSISTANT

## 2019-02-07 PROCEDURE — 83690 ASSAY OF LIPASE: CPT

## 2019-02-07 PROCEDURE — 85025 COMPLETE CBC W/AUTO DIFF WBC: CPT

## 2019-02-07 PROCEDURE — 85025 COMPLETE CBC W/AUTO DIFF WBC: CPT | Performed by: PHYSICIAN ASSISTANT

## 2019-02-07 PROCEDURE — 96361 HYDRATE IV INFUSION ADD-ON: CPT

## 2019-02-07 PROCEDURE — 80053 COMPREHEN METABOLIC PANEL: CPT

## 2019-02-07 PROCEDURE — 81001 URINALYSIS AUTO W/SCOPE: CPT | Performed by: EMERGENCY MEDICINE

## 2019-02-07 PROCEDURE — 99213 OFFICE O/P EST LOW 20 MIN: CPT | Performed by: PHYSICIAN ASSISTANT

## 2019-02-07 PROCEDURE — 99285 EMERGENCY DEPT VISIT HI MDM: CPT

## 2019-02-07 PROCEDURE — 25010000002 IOPAMIDOL 61 % SOLUTION: Performed by: EMERGENCY MEDICINE

## 2019-02-07 PROCEDURE — 83605 ASSAY OF LACTIC ACID: CPT

## 2019-02-07 PROCEDURE — 74177 CT ABD & PELVIS W/CONTRAST: CPT

## 2019-02-07 PROCEDURE — 25010000002 ONDANSETRON PER 1 MG: Performed by: NURSE PRACTITIONER

## 2019-02-07 PROCEDURE — 87040 BLOOD CULTURE FOR BACTERIA: CPT | Performed by: EMERGENCY MEDICINE

## 2019-02-07 PROCEDURE — 81025 URINE PREGNANCY TEST: CPT | Performed by: EMERGENCY MEDICINE

## 2019-02-07 PROCEDURE — 80053 COMPREHEN METABOLIC PANEL: CPT | Performed by: PHYSICIAN ASSISTANT

## 2019-02-07 PROCEDURE — 96375 TX/PRO/DX INJ NEW DRUG ADDON: CPT

## 2019-02-07 RX ORDER — PROMETHAZINE HYDROCHLORIDE 25 MG/1
25 TABLET ORAL EVERY 4 HOURS PRN
Qty: 10 TABLET | Refills: 0 | Status: SHIPPED | OUTPATIENT
Start: 2019-02-07 | End: 2020-03-31 | Stop reason: SDUPTHER

## 2019-02-07 RX ORDER — SODIUM CHLORIDE 0.9 % (FLUSH) 0.9 %
10 SYRINGE (ML) INJECTION AS NEEDED
Status: DISCONTINUED | OUTPATIENT
Start: 2019-02-07 | End: 2019-02-08 | Stop reason: HOSPADM

## 2019-02-07 RX ORDER — ONDANSETRON 2 MG/ML
4 INJECTION INTRAMUSCULAR; INTRAVENOUS ONCE
Status: COMPLETED | OUTPATIENT
Start: 2019-02-07 | End: 2019-02-07

## 2019-02-07 RX ORDER — ACETAMINOPHEN 500 MG
1000 TABLET ORAL ONCE
Status: COMPLETED | OUTPATIENT
Start: 2019-02-07 | End: 2019-02-07

## 2019-02-07 RX ORDER — CEFTRIAXONE SODIUM 1 G/50ML
1 INJECTION, SOLUTION INTRAVENOUS ONCE
Status: COMPLETED | OUTPATIENT
Start: 2019-02-07 | End: 2019-02-08

## 2019-02-07 RX ADMIN — ONDANSETRON 4 MG: 2 INJECTION INTRAMUSCULAR; INTRAVENOUS at 22:51

## 2019-02-07 RX ADMIN — SODIUM CHLORIDE 1000 ML: 9 INJECTION, SOLUTION INTRAVENOUS at 22:53

## 2019-02-07 RX ADMIN — ACETAMINOPHEN 1000 MG: 500 TABLET, FILM COATED ORAL at 22:52

## 2019-02-07 RX ADMIN — IOPAMIDOL 100 ML: 612 INJECTION, SOLUTION INTRAVENOUS at 22:34

## 2019-02-07 NOTE — PROGRESS NOTES
Chief Complaint   Patient presents with   • Diarrhea   • Vomiting       Subjective   Eliz Eisenberg is a 28 y.o. female.       History of Present Illness     Pt started feeling bad 4 nights ago with vomiting and diarrhea, took some phenergan and zofran without much improvement. Developed fever, temp this morning was 101. No other sick contacts. Last vomited last night at 6:30 pm. Diarrhea is about every 15-20 minutes. Prior to onset of symptoms had eaten pulled pork prepared at home. No one else who ate it is sick.      No current facility-administered medications for this visit.     Current Outpatient Medications:   •  albuterol (PROVENTIL HFA;VENTOLIN HFA) 108 (90 Base) MCG/ACT inhaler, Inhale 2 puffs Every 4 (Four) Hours As Needed for Wheezing., Disp: 1 inhaler, Rfl: 5  •  BREO ELLIPTA 100-25 MCG/INH aerosol powder , INHALE 1 PUFF ONCE DAILY, Disp: 60 each, Rfl: 0  •  cetirizine (zyrTEC) 5 MG tablet, Take 1 tablet by mouth Daily., Disp: , Rfl:   •  colestipol (COLESTID) 1 g tablet, Take 1 tablet by mouth 2 (Two) Times a Day., Disp: 60 tablet, Rfl: 1  •  FLUZONE QUADRIVALENT 0.5 ML suspension prefilled syringe injection, , Disp: , Rfl:   •  HAVRIX 1440 EL U/ML vaccine, , Disp: , Rfl:   •  metFORMIN (GLUCOPHAGE) 500 MG tablet, Take 2 tablets by mouth 2 (Two) Times a Day With Meals., Disp: 360 tablet, Rfl: 2  •  montelukast (SINGULAIR) 10 MG tablet, TAKE 1 TABLET BY MOUTH EVERY DAY IN THE EVENING, Disp: 30 tablet, Rfl: 1  •  Multiple Vitamins-Minerals (MULTIVITAMIN PO), Take  by mouth., Disp: , Rfl:   •  Omeprazole 20 MG Tablet Delayed Release Dispersible, , Disp: , Rfl:   •  ondansetron (ZOFRAN) 4 MG tablet, , Disp: , Rfl:   •  Oxymetazoline HCl (NASAL SPRAY NA), into the nostril(s) as directed by provider., Disp: , Rfl:   •  Prenatal Vit-Fe Fumarate-FA (PRENATAL PO), Take  by mouth., Disp: , Rfl:   •  promethazine (PHENERGAN) 25 MG tablet, Take 1 tablet by mouth Every 4 (Four) Hours As Needed for nausea., Disp:  "10 tablet, Rfl: 0  •  sucralfate (CARAFATE) 1 g tablet, Take 1 tablet by mouth 4 (Four) Times a Day. Take an hour before meals and at bedtime, Disp: 120 tablet, Rfl: 11  •  vitamin D (ERGOCALCIFEROL) 84694 units capsule capsule, Take 1 capsule by mouth 1 (One) Time Per Week., Disp: 12 capsule, Rfl: 3    Facility-Administered Medications Ordered in Other Visits:   •  acetaminophen (TYLENOL) tablet 1,000 mg, 1,000 mg, Oral, Once, Jeanne Zaman, APRN  •  ondansetron (ZOFRAN) injection 4 mg, 4 mg, Intravenous, Once, Jeanne Zaman APRN  •  sodium chloride 0.9 % bolus 1,000 mL, 1,000 mL, Intravenous, Once, Jeanne Zaman, APRN  •  sodium chloride 0.9 % flush 10 mL, 10 mL, Intravenous, PRN, Mike Garcia MD  •  Insert peripheral IV, , , Once **AND** sodium chloride 0.9 % flush 10 mL, 10 mL, Intravenous, PRN, Jeanne Zaman, APRN     PMFSH  The following portions of the patient's history were reviewed and updated as appropriate: allergies, current medications, past family history, past medical history, past social history, past surgical history and problem list.    Review of Systems   Constitutional: Negative for activity change, appetite change and fatigue.   HENT: Negative for congestion and rhinorrhea.    Respiratory: Negative for chest tightness and shortness of breath.    Cardiovascular: Negative for chest pain and palpitations.   Gastrointestinal: Positive for diarrhea, nausea and vomiting. Negative for abdominal pain.   Genitourinary: Negative for dysuria.   Musculoskeletal: Negative for arthralgias and myalgias.   Neurological: Negative for dizziness, weakness, light-headedness and headaches.   Psychiatric/Behavioral: Negative for dysphoric mood. The patient is not nervous/anxious.        Objective   /78   Pulse 116   Temp 98.7 °F (37.1 °C)   Ht 167.6 cm (66\")   Wt (!) 142 kg (313 lb)   SpO2 99%   BMI 50.52 kg/m²     Physical Exam   Constitutional: She appears well-developed and " well-nourished.   HENT:   Head: Normocephalic.   Right Ear: Hearing, tympanic membrane, external ear and ear canal normal.   Left Ear: Hearing, tympanic membrane, external ear and ear canal normal.   Nose: Nose normal.   Mouth/Throat: Oropharynx is clear and moist.   Eyes: Conjunctivae are normal. Pupils are equal, round, and reactive to light.   Neck: Normal range of motion.   Cardiovascular: Normal rate, regular rhythm and normal heart sounds.   Pulmonary/Chest: Effort normal and breath sounds normal. She has no decreased breath sounds. She has no wheezes. She has no rhonchi. She has no rales.   Abdominal: Soft. Normal appearance and bowel sounds are normal. There is no hepatosplenomegaly. There is tenderness in the right lower quadrant. There is no rigidity, no rebound, no guarding, no CVA tenderness and negative Hackett's sign.   Musculoskeletal: Normal range of motion.   Neurological: She is alert.   Skin: Skin is warm and dry.   Psychiatric: She has a normal mood and affect. Her behavior is normal.   Nursing note and vitals reviewed.      Results for orders placed or performed in visit on 02/07/19   Comprehensive Metabolic Panel   Result Value Ref Range    Glucose 77 70 - 100 mg/dL    BUN 12 9 - 23 mg/dL    Creatinine 0.73 0.60 - 1.30 mg/dL    Sodium 135 132 - 146 mmol/L    Potassium 4.6 3.5 - 5.5 mmol/L    Chloride 102 99 - 109 mmol/L    CO2 26.0 20.0 - 31.0 mmol/L    Calcium 9.1 8.7 - 10.4 mg/dL    Total Protein 7.3 5.7 - 8.2 g/dL    Albumin 4.54 3.20 - 4.80 g/dL    ALT (SGPT) 27 7 - 40 U/L    AST (SGOT) 20 0 - 33 U/L    Alkaline Phosphatase 133 (H) 25 - 100 U/L    Total Bilirubin 0.8 0.3 - 1.2 mg/dL    eGFR Non African Amer 95 >60 mL/min/1.73    Globulin 2.8 gm/dL    A/G Ratio 1.6 1.5 - 2.5 g/dL    BUN/Creatinine Ratio 16.4 7.0 - 25.0    Anion Gap 7.0 3.0 - 11.0 mmol/L   CBC Auto Differential   Result Value Ref Range    WBC 13.21 (H) 3.50 - 10.80 10*3/mm3    RBC 4.73 3.89 - 5.14 10*6/mm3    Hemoglobin 12.9  11.5 - 15.5 g/dL    Hematocrit 41.2 34.5 - 44.0 %    MCV 87.1 80.0 - 99.0 fL    MCH 27.3 27.0 - 31.0 pg    MCHC 31.3 (L) 32.0 - 36.0 g/dL    RDW 14.7 (H) 11.3 - 14.5 %    RDW-SD 46.5 37.0 - 54.0 fl    MPV 10.3 6.0 - 12.0 fL    Platelets 327 150 - 450 10*3/mm3    Neutrophil % 81.9 (H) 41.0 - 71.0 %    Lymphocyte % 10.8 (L) 24.0 - 44.0 %    Monocyte % 7.0 0.0 - 12.0 %    Eosinophil % 0.1 0.0 - 3.0 %    Basophil % 0.2 0.0 - 1.0 %    Immature Grans % 0.3 0.0 - 0.6 %    Neutrophils, Absolute 10.83 (H) 1.50 - 8.30 10*3/mm3    Lymphocytes, Absolute 1.43 0.60 - 4.80 10*3/mm3    Monocytes, Absolute 0.92 0.00 - 1.00 10*3/mm3    Eosinophils, Absolute 0.01 0.00 - 0.30 10*3/mm3    Basophils, Absolute 0.02 0.00 - 0.20 10*3/mm3    Immature Grans, Absolute 0.04 (H) 0.00 - 0.03 10*3/mm3   POCT Influenza A/B   Result Value Ref Range    Rapid Influenza A Ag Negative Negative    Rapid Influenza B Ag Negative Negative    Internal Control Passed Passed    Lot Number [id number]     Expiration Date 11/08/2020         ASSESSMENT/PLAN    Problem List Items Addressed This Visit     None      Visit Diagnoses     Gastroenteritis    -  Primary    Continue to maintain oral fluids, if unable to do so and feeling near syncopal- to ER for IV fluids. Check labs as ordered.    Relevant Medications    promethazine (PHENERGAN) 25 MG tablet    Other Relevant Orders    POCT Influenza A/B (Completed)    Comprehensive Metabolic Panel (Completed)    CBC & Differential (Completed)    CBC Auto Differential (Completed)               Return if symptoms worsen or fail to improve.

## 2019-02-08 VITALS
HEART RATE: 104 BPM | TEMPERATURE: 98.7 F | OXYGEN SATURATION: 92 % | DIASTOLIC BLOOD PRESSURE: 78 MMHG | WEIGHT: 112 LBS | SYSTOLIC BLOOD PRESSURE: 124 MMHG | RESPIRATION RATE: 16 BRPM | HEIGHT: 66 IN | BODY MASS INDEX: 18 KG/M2

## 2019-02-08 PROCEDURE — 25010000002 KETOROLAC TROMETHAMINE PER 15 MG: Performed by: NURSE PRACTITIONER

## 2019-02-08 PROCEDURE — 25010000002 PROMETHAZINE PER 50 MG: Performed by: NURSE PRACTITIONER

## 2019-02-08 PROCEDURE — 96365 THER/PROPH/DIAG IV INF INIT: CPT

## 2019-02-08 PROCEDURE — 25010000002 CEFTRIAXONE PER 250 MG: Performed by: NURSE PRACTITIONER

## 2019-02-08 PROCEDURE — 96375 TX/PRO/DX INJ NEW DRUG ADDON: CPT

## 2019-02-08 RX ORDER — PROMETHAZINE HYDROCHLORIDE 25 MG/ML
12.5 INJECTION, SOLUTION INTRAMUSCULAR; INTRAVENOUS ONCE
Status: COMPLETED | OUTPATIENT
Start: 2019-02-08 | End: 2019-02-08

## 2019-02-08 RX ORDER — ONDANSETRON 4 MG/1
4 TABLET, ORALLY DISINTEGRATING ORAL 4 TIMES DAILY PRN
Qty: 16 TABLET | Refills: 0 | Status: SHIPPED | OUTPATIENT
Start: 2019-02-08 | End: 2021-07-16

## 2019-02-08 RX ORDER — KETOROLAC TROMETHAMINE 30 MG/ML
30 INJECTION, SOLUTION INTRAMUSCULAR; INTRAVENOUS ONCE
Status: COMPLETED | OUTPATIENT
Start: 2019-02-08 | End: 2019-02-08

## 2019-02-08 RX ORDER — CEFDINIR 300 MG/1
300 CAPSULE ORAL 2 TIMES DAILY
Qty: 20 CAPSULE | Refills: 0 | Status: SHIPPED | OUTPATIENT
Start: 2019-02-08 | End: 2019-03-26

## 2019-02-08 RX ADMIN — PROMETHAZINE HYDROCHLORIDE 12.5 MG: 25 INJECTION INTRAMUSCULAR; INTRAVENOUS at 00:32

## 2019-02-08 RX ADMIN — CEFTRIAXONE SODIUM 1 G: 1 INJECTION, SOLUTION INTRAVENOUS at 00:35

## 2019-02-08 RX ADMIN — KETOROLAC TROMETHAMINE 30 MG: 30 INJECTION INTRAMUSCULAR; INTRAVENOUS at 01:02

## 2019-02-08 RX ADMIN — SODIUM CHLORIDE 1000 ML: 9 INJECTION, SOLUTION INTRAVENOUS at 01:01

## 2019-02-08 NOTE — ED PROVIDER NOTES
Subjective   Eliz Eisenberg is a 28 y.o. female who presents to the ED with c/o nausea, vomiting, and diarrhea. The patient reports that her symptoms first began about 4 days and seemed to be improving, but then worsened 2 days ago where she also notes sudden onset of fever. She was told by her primary care provider to see her in office if her symptoms persisted which prompted her visit today. The patient was then advised to report to the ED for her ongoing illness as well as signs of dehydration after lab work. She also complains of generalized weakness, abdominal pain, and chills, but denies cough, or any other acute complaints at this time.        History provided by:  Patient  Illness   Onset quality:  Sudden  Duration:  4 days  Timing:  Constant  Progression:  Worsening  Chronicity:  New  Relieved by:  None tried  Worsened by:  Nothing  Ineffective treatments:  None tried  Associated symptoms: abdominal pain, diarrhea, fever, nausea and vomiting    Associated symptoms: no chest pain, no cough and no shortness of breath        Review of Systems   Constitutional: Positive for chills and fever.   Respiratory: Negative for cough and shortness of breath.    Cardiovascular: Negative for chest pain.   Gastrointestinal: Positive for abdominal pain, diarrhea, nausea and vomiting.   Genitourinary: Positive for dysuria and frequency.   Neurological: Positive for weakness (generalized).   All other systems reviewed and are negative.      Past Medical History:   Diagnosis Date   • Abdominal pain    • Acute sinusitis    • Ankle sprain    • Atopic dermatitis    • Biliary dyskinesia    • Cat bite      resolved   • Cough    • Dysfunctional uterine bleeding    • Foot pain    • Gout    • Influenza    • Metabolic syndrome    • Muscular aches    • Obesity    • Pharyngitis    • URI (upper respiratory infection)        Allergies   Allergen Reactions   • Latex Hives, Itching, Rash and Swelling       Past Surgical History:    Procedure Laterality Date   • CHOLECYSTECTOMY     • HERNIA REPAIR     • WISDOM TOOTH EXTRACTION         Family History   Problem Relation Age of Onset   • Hypertension Father    • Obesity Father    • Multiple sclerosis Mother    • Heart attack Paternal Grandfather    • Colon cancer Paternal Grandmother        Social History     Socioeconomic History   • Marital status:      Spouse name: Not on file   • Number of children: Not on file   • Years of education: Not on file   • Highest education level: Not on file   Tobacco Use   • Smoking status: Former Smoker   • Smokeless tobacco: Former User   Substance and Sexual Activity   • Alcohol use: No   • Drug use: No   • Sexual activity: Yes     Partners: Male     Birth control/protection: OCP         Objective   Physical Exam   Constitutional: She is oriented to person, place, and time. She appears well-developed and well-nourished. No distress.   HENT:   Head: Normocephalic and atraumatic.   Right Ear: Tympanic membrane and external ear normal.   Left Ear: Tympanic membrane and external ear normal.   Nose: Nose normal.   Mouth/Throat: Oropharynx is clear and moist.   Eyes: Conjunctivae and EOM are normal. Pupils are equal, round, and reactive to light. No scleral icterus.   Neck: Normal range of motion. Neck supple.   Cardiovascular: Regular rhythm, normal heart sounds and intact distal pulses. Tachycardia present.   Pulmonary/Chest: Effort normal and breath sounds normal. No respiratory distress.   Abdominal: Soft. Bowel sounds are normal. She exhibits no distension and no mass. There is tenderness (diffuse abd pain). There is no guarding.   Musculoskeletal: Normal range of motion. She exhibits no edema.   Neurological: She is alert and oriented to person, place, and time. No cranial nerve deficit.   Skin: Skin is warm and dry.   Psychiatric: She has a normal mood and affect. Her behavior is normal.   Nursing note and vitals reviewed.      Procedures         ED  Course  ED Course as of Feb 08 0447   u Feb 07, 2019   2342 WBC, UA: (!) 13-20 [KG]   2342 Bacteria, UA: (!) 3+ [KG]   2342 Leukocytes, UA: (!) Small (1+) [KG]   2342 WBC: (!) 15.41 [KG]   Fri Feb 08, 2019   0130 She does advise her results at this time.  Patient was administered a gram or Rocephin via IV.  The patient will be discharged home.  The patient to take Omnicef as ordered.  Patient to follow-up with her PCP.  Patient agrees and verbalizes understanding.  [KG]      ED Course User Index  [KG] Jeanne Zaman, COURTNEY     Recent Results (from the past 24 hour(s))   POCT Influenza A/B    Collection Time: 02/07/19  1:54 PM   Result Value Ref Range    Rapid Influenza A Ag Negative Negative    Rapid Influenza B Ag Negative Negative    Internal Control Passed Passed    Lot Number [id number]     Expiration Date 11/08/2020    Comprehensive Metabolic Panel    Collection Time: 02/07/19  2:10 PM   Result Value Ref Range    Glucose 77 70 - 100 mg/dL    BUN 12 9 - 23 mg/dL    Creatinine 0.73 0.60 - 1.30 mg/dL    Sodium 135 132 - 146 mmol/L    Potassium 4.6 3.5 - 5.5 mmol/L    Chloride 102 99 - 109 mmol/L    CO2 26.0 20.0 - 31.0 mmol/L    Calcium 9.1 8.7 - 10.4 mg/dL    Total Protein 7.3 5.7 - 8.2 g/dL    Albumin 4.54 3.20 - 4.80 g/dL    ALT (SGPT) 27 7 - 40 U/L    AST (SGOT) 20 0 - 33 U/L    Alkaline Phosphatase 133 (H) 25 - 100 U/L    Total Bilirubin 0.8 0.3 - 1.2 mg/dL    eGFR Non African Amer 95 >60 mL/min/1.73    Globulin 2.8 gm/dL    A/G Ratio 1.6 1.5 - 2.5 g/dL    BUN/Creatinine Ratio 16.4 7.0 - 25.0    Anion Gap 7.0 3.0 - 11.0 mmol/L   CBC Auto Differential    Collection Time: 02/07/19  2:11 PM   Result Value Ref Range    WBC 13.21 (H) 3.50 - 10.80 10*3/mm3    RBC 4.73 3.89 - 5.14 10*6/mm3    Hemoglobin 12.9 11.5 - 15.5 g/dL    Hematocrit 41.2 34.5 - 44.0 %    MCV 87.1 80.0 - 99.0 fL    MCH 27.3 27.0 - 31.0 pg    MCHC 31.3 (L) 32.0 - 36.0 g/dL    RDW 14.7 (H) 11.3 - 14.5 %    RDW-SD 46.5 37.0 - 54.0 fl    MPV  10.3 6.0 - 12.0 fL    Platelets 327 150 - 450 10*3/mm3    Neutrophil % 81.9 (H) 41.0 - 71.0 %    Lymphocyte % 10.8 (L) 24.0 - 44.0 %    Monocyte % 7.0 0.0 - 12.0 %    Eosinophil % 0.1 0.0 - 3.0 %    Basophil % 0.2 0.0 - 1.0 %    Immature Grans % 0.3 0.0 - 0.6 %    Neutrophils, Absolute 10.83 (H) 1.50 - 8.30 10*3/mm3    Lymphocytes, Absolute 1.43 0.60 - 4.80 10*3/mm3    Monocytes, Absolute 0.92 0.00 - 1.00 10*3/mm3    Eosinophils, Absolute 0.01 0.00 - 0.30 10*3/mm3    Basophils, Absolute 0.02 0.00 - 0.20 10*3/mm3    Immature Grans, Absolute 0.04 (H) 0.00 - 0.03 10*3/mm3   Comprehensive Metabolic Panel    Collection Time: 02/07/19  7:36 PM   Result Value Ref Range    Glucose 86 70 - 100 mg/dL    BUN 10 9 - 23 mg/dL    Creatinine 0.73 0.60 - 1.30 mg/dL    Sodium 135 132 - 146 mmol/L    Potassium 4.1 3.5 - 5.5 mmol/L    Chloride 103 99 - 109 mmol/L    CO2 26.0 20.0 - 31.0 mmol/L    Calcium 9.6 8.7 - 10.4 mg/dL    Total Protein 8.0 5.7 - 8.2 g/dL    Albumin 4.78 3.20 - 4.80 g/dL    ALT (SGPT) 25 7 - 40 U/L    AST (SGOT) 19 0 - 33 U/L    Alkaline Phosphatase 133 (H) 25 - 100 U/L    Total Bilirubin 0.9 0.3 - 1.2 mg/dL    eGFR Non African Amer 95 >60 mL/min/1.73    Globulin 3.2 gm/dL    A/G Ratio 1.5 1.5 - 2.5 g/dL    BUN/Creatinine Ratio 13.7 7.0 - 25.0    Anion Gap 6.0 3.0 - 11.0 mmol/L   Lipase    Collection Time: 02/07/19  7:36 PM   Result Value Ref Range    Lipase 31 6 - 51 U/L   Green Top (Gel)    Collection Time: 02/07/19  7:36 PM   Result Value Ref Range    Extra Tube Hold for add-ons.    Lavender Top    Collection Time: 02/07/19  7:36 PM   Result Value Ref Range    Extra Tube hold for add-on    Gold Top - SST    Collection Time: 02/07/19  7:36 PM   Result Value Ref Range    Extra Tube Hold for add-ons.    CBC Auto Differential    Collection Time: 02/07/19  7:36 PM   Result Value Ref Range    WBC 15.41 (H) 3.50 - 10.80 10*3/mm3    RBC 4.93 3.89 - 5.14 10*6/mm3    Hemoglobin 13.6 11.5 - 15.5 g/dL    Hematocrit 43.0  34.5 - 44.0 %    MCV 87.2 80.0 - 99.0 fL    MCH 27.6 27.0 - 31.0 pg    MCHC 31.6 (L) 32.0 - 36.0 g/dL    RDW 14.9 (H) 11.3 - 14.5 %    RDW-SD 47.0 37.0 - 54.0 fl    MPV 9.9 6.0 - 12.0 fL    Platelets 295 150 - 450 10*3/mm3    Neutrophil % 79.9 (H) 41.0 - 71.0 %    Lymphocyte % 10.9 (L) 24.0 - 44.0 %    Monocyte % 8.9 0.0 - 12.0 %    Eosinophil % 0.1 0.0 - 3.0 %    Basophil % 0.2 0.0 - 1.0 %    Immature Grans % 0.3 0.0 - 0.6 %    Neutrophils, Absolute 12.32 (H) 1.50 - 8.30 10*3/mm3    Lymphocytes, Absolute 1.68 0.60 - 4.80 10*3/mm3    Monocytes, Absolute 1.37 (H) 0.00 - 1.00 10*3/mm3    Eosinophils, Absolute 0.01 0.00 - 0.30 10*3/mm3    Basophils, Absolute 0.03 0.00 - 0.20 10*3/mm3    Immature Grans, Absolute 0.05 (H) 0.00 - 0.03 10*3/mm3   Lactic Acid, Plasma    Collection Time: 02/07/19  7:36 PM   Result Value Ref Range    Lactate 1.1 0.5 - 2.0 mmol/L   Urinalysis With Microscopic If Indicated (No Culture) - Urine, Clean Catch    Collection Time: 02/07/19  9:26 PM   Result Value Ref Range    Color, UA Yellow Yellow, Straw    Appearance, UA Cloudy (A) Clear    pH, UA 5.5 5.0 - 8.0    Specific Gravity, UA 1.024 1.001 - 1.030    Glucose, UA Negative Negative    Ketones, UA Negative Negative    Bilirubin, UA Negative Negative    Blood, UA Negative Negative    Protein, UA Negative Negative    Leuk Esterase, UA Small (1+) (A) Negative    Nitrite, UA Negative Negative    Urobilinogen, UA 0.2 E.U./dL 0.2 - 1.0 E.U./dL   Urinalysis, Microscopic Only - Urine, Clean Catch    Collection Time: 02/07/19  9:26 PM   Result Value Ref Range    RBC, UA 3-6 (A) None Seen, 0-2 /HPF    WBC, UA 13-20 (A) None Seen, 0-2 /HPF    Bacteria, UA 3+ (A) None Seen, Trace /HPF    Squamous Epithelial Cells, UA 3-6 (A) None Seen, 0-2 /HPF    Hyaline Casts, UA 7-12 0 - 6 /LPF    Mucus, UA Moderate/2+ (A) None Seen, Trace /HPF    Methodology Manual Light Microscopy    POCT pregnancy, urine    Collection Time: 02/07/19  9:27 PM   Result Value Ref  Range    HCG, Urine, QL Negative Negative    Lot Number [id number]     Internal Positive Control Positive     Internal Negative Control Negative    Light Blue Top    Collection Time: 02/07/19 10:27 PM   Result Value Ref Range    Extra Tube hold for add-on      Note: In addition to lab results from this visit, the labs listed above may include labs taken at another facility or during a different encounter within the last 24 hours. Please correlate lab times with ED admission and discharge times for further clarification of the services performed during this visit.    CT Abdomen Pelvis With Contrast   Final Result   1. Normal appendix.    2. Moderate stool burden in the right colon. Negative for bowel obstruction.    3. No acute abnormality in the abdomen or pelvis.    4. Chronic findings as described.       THIS DOCUMENT HAS BEEN ELECTRONICALLY SIGNED BY MARY BETH GRANDE MD        Vitals:    02/08/19 0132 02/08/19 0158 02/08/19 0200 02/08/19 0211   BP:   124/78    Pulse: 102 99 104    Resp:       Temp:    98.7 °F (37.1 °C)   TempSrc:    Oral   SpO2: 95% (!) 85% 92%    Weight:       Height:         Medications   sodium chloride 0.9 % bolus 1,000 mL (0 mL Intravenous Stopped 2/7/19 2338)   acetaminophen (TYLENOL) tablet 1,000 mg (1,000 mg Oral Given 2/7/19 2252)   ondansetron (ZOFRAN) injection 4 mg (4 mg Intravenous Given 2/7/19 2251)   iopamidol (ISOVUE-300) 61 % injection 100 mL (100 mL Intravenous Given 2/7/19 2234)   cefTRIAXone (ROCEPHIN) IVPB 1 g (0 g Intravenous Stopped 2/8/19 0058)   promethazine (PHENERGAN) injection 12.5 mg (12.5 mg Intravenous Given 2/8/19 0032)   ketorolac (TORADOL) injection 30 mg (30 mg Intravenous Given 2/8/19 0102)   sodium chloride 0.9 % bolus 1,000 mL (0 mL Intravenous Stopped 2/8/19 0138)     ECG/EMG Results (last 24 hours)     ** No results found for the last 24 hours. **        No orders to display                       MDM    Final diagnoses:   Urinary tract infection in female    Nausea vomiting and diarrhea       Documentation assistance provided by kosta Perkins.  Information recorded by the [email or web address] was done at my direction and has been verified and validated by me.     Nick Perkins  02/07/19 2144       Nick Perkins  02/08/19 0036       Jeanne Zaman APRN  02/08/19 9008

## 2019-02-10 ENCOUNTER — PATIENT MESSAGE (OUTPATIENT)
Dept: INTERNAL MEDICINE | Facility: CLINIC | Age: 29
End: 2019-02-10

## 2019-02-12 NOTE — TELEPHONE ENCOUNTER
From: Eliz Eisenberg  To: Allison Pham PA  Sent: 2/10/2019 10:45 PM EST  Subject: Non-Urgent Medical Question    I have a question about COMPREHENSIVE METABOLIC PANEL resulted on 2/7/19, 5:28 PM.    Sorry I meant to ask about the alkaline phosphatase?     Thanks!

## 2019-02-13 LAB
BACTERIA SPEC AEROBE CULT: NORMAL
BACTERIA SPEC AEROBE CULT: NORMAL

## 2019-02-18 DIAGNOSIS — N91.2 AMENORRHEA: Primary | ICD-10-CM

## 2019-02-19 ENCOUNTER — LAB (OUTPATIENT)
Dept: LAB | Facility: HOSPITAL | Age: 29
End: 2019-02-19

## 2019-02-19 ENCOUNTER — TELEPHONE (OUTPATIENT)
Dept: OBSTETRICS AND GYNECOLOGY | Facility: CLINIC | Age: 29
End: 2019-02-19

## 2019-02-19 DIAGNOSIS — N91.2 AMENORRHEA: ICD-10-CM

## 2019-02-19 DIAGNOSIS — N91.1 SECONDARY AMENORRHEA: Primary | ICD-10-CM

## 2019-02-19 LAB
FSH SERPL-ACNC: 8.4 MIU/ML
HCG INTACT+B SERPL-ACNC: <5 MIU/ML
PROLACTIN SERPL-MCNC: 7.8 NG/ML (ref 4.79–23.3)

## 2019-02-19 PROCEDURE — 84702 CHORIONIC GONADOTROPIN TEST: CPT

## 2019-02-19 PROCEDURE — 36415 COLL VENOUS BLD VENIPUNCTURE: CPT

## 2019-02-19 PROCEDURE — 83001 ASSAY OF GONADOTROPIN (FSH): CPT

## 2019-02-19 PROCEDURE — 84146 ASSAY OF PROLACTIN: CPT

## 2019-02-19 RX ORDER — MEDROXYPROGESTERONE ACETATE 5 MG/1
5 TABLET ORAL 2 TIMES DAILY
Qty: 10 TABLET | Refills: 0 | Status: SHIPPED | OUTPATIENT
Start: 2019-02-19 | End: 2019-02-24

## 2019-02-19 NOTE — TELEPHONE ENCOUNTER
Lab results were discussed with patient by telephone and Dr. Ayers sent in a prescription for Provera 5 mg BID X 5 days to induce menses.  She was instructed to call back if she does not start a period within 10 days after completing medication.

## 2019-03-11 DIAGNOSIS — J45.21 MILD INTERMITTENT ASTHMA WITH ACUTE EXACERBATION: ICD-10-CM

## 2019-03-11 RX ORDER — MONTELUKAST SODIUM 10 MG/1
TABLET ORAL
Qty: 30 TABLET | Refills: 1 | Status: SHIPPED | OUTPATIENT
Start: 2019-03-11 | End: 2019-04-10 | Stop reason: SDUPTHER

## 2019-03-26 ENCOUNTER — OFFICE VISIT (OUTPATIENT)
Dept: INTERNAL MEDICINE | Facility: CLINIC | Age: 29
End: 2019-03-26

## 2019-03-26 VITALS
WEIGHT: 293 LBS | HEIGHT: 66 IN | HEART RATE: 101 BPM | TEMPERATURE: 98.3 F | OXYGEN SATURATION: 99 % | DIASTOLIC BLOOD PRESSURE: 70 MMHG | BODY MASS INDEX: 47.09 KG/M2 | SYSTOLIC BLOOD PRESSURE: 124 MMHG

## 2019-03-26 DIAGNOSIS — B37.2 CANDIDAL INTERTRIGO: Primary | ICD-10-CM

## 2019-03-26 DIAGNOSIS — M25.512 ACUTE PAIN OF LEFT SHOULDER: ICD-10-CM

## 2019-03-26 DIAGNOSIS — E55.9 VITAMIN D DEFICIENCY: ICD-10-CM

## 2019-03-26 LAB — 25(OH)D3 SERPL-MCNC: 31.5 NG/ML

## 2019-03-26 PROCEDURE — 99213 OFFICE O/P EST LOW 20 MIN: CPT | Performed by: PHYSICIAN ASSISTANT

## 2019-03-26 PROCEDURE — 82306 VITAMIN D 25 HYDROXY: CPT | Performed by: PHYSICIAN ASSISTANT

## 2019-03-26 RX ORDER — NYSTATIN AND TRIAMCINOLONE ACETONIDE 100000; 1 [USP'U]/G; MG/G
OINTMENT TOPICAL 2 TIMES DAILY
Qty: 30 G | Refills: 0 | OUTPATIENT
Start: 2019-03-26 | End: 2020-07-06

## 2019-03-26 NOTE — PROGRESS NOTES
Chief Complaint   Patient presents with   • Rash   • Shoulder Pain     left       Subjective   Eliz Eisenberg is a 28 y.o. female.       History of Present Illness     Pt completed the cefdinir for the UTI and then developed a rash on the right side of her umbilicus and developed what looked like a infection in her belly button. She has tried hydrocortisone cream, alcohol, peroxide and neosporin. The rash has improved but still has an odor in her belly button.    Pt continues to have left shoulder pain, started in November. Went to PT and did not get much result from the exercises. Occasionally has some numbness and tingling, 2-3 times a week. Does not seem to be related to increased movement. Sometimes moves her fingers.        Current Outpatient Medications:   •  albuterol (PROVENTIL HFA;VENTOLIN HFA) 108 (90 Base) MCG/ACT inhaler, Inhale 2 puffs Every 4 (Four) Hours As Needed for Wheezing., Disp: 1 inhaler, Rfl: 5  •  BREO ELLIPTA 100-25 MCG/INH aerosol powder , INHALE 1 PUFF ONCE DAILY, Disp: 60 each, Rfl: 0  •  cetirizine (zyrTEC) 5 MG tablet, Take 1 tablet by mouth Daily., Disp: , Rfl:   •  colestipol (COLESTID) 1 g tablet, Take 1 tablet by mouth 2 (Two) Times a Day., Disp: 60 tablet, Rfl: 1  •  metFORMIN (GLUCOPHAGE) 500 MG tablet, Take 2 tablets by mouth 2 (Two) Times a Day With Meals., Disp: 360 tablet, Rfl: 2  •  montelukast (SINGULAIR) 10 MG tablet, TAKE 1 TABLET BY MOUTH EVERY DAY IN THE EVENING, Disp: 30 tablet, Rfl: 1  •  Multiple Vitamins-Minerals (MULTIVITAMIN PO), Take  by mouth., Disp: , Rfl:   •  ondansetron (ZOFRAN) 4 MG tablet, , Disp: , Rfl:   •  ondansetron ODT (ZOFRAN-ODT) 4 MG disintegrating tablet, Take 1 tablet by mouth 4 (Four) Times a Day As Needed for Nausea., Disp: 16 tablet, Rfl: 0  •  Oxymetazoline HCl (NASAL SPRAY NA), into the nostril(s) as directed by provider., Disp: , Rfl:   •  Prenatal Vit-Fe Fumarate-FA (PRENATAL PO), Take  by mouth., Disp: , Rfl:   •  promethazine  "(PHENERGAN) 25 MG tablet, Take 1 tablet by mouth Every 4 (Four) Hours As Needed for nausea., Disp: 10 tablet, Rfl: 0  •  raNITIdine HCl (ZANTAC PO), Take  by mouth., Disp: , Rfl:   •  vitamin D (ERGOCALCIFEROL) 08896 units capsule capsule, Take 1 capsule by mouth 1 (One) Time Per Week., Disp: 12 capsule, Rfl: 3  •  nystatin-triamcinolone (MYCOLOG) 236512-7.1 UNIT/GM-% ointment, Apply  topically to the appropriate area as directed 2 (Two) Times a Day., Disp: 30 g, Rfl: 0     PMFSH  The following portions of the patient's history were reviewed and updated as appropriate: allergies, current medications, past family history, past medical history, past social history, past surgical history and problem list.    Review of Systems   Constitutional: Negative for activity change, appetite change, fatigue, fever and unexpected weight change.   HENT: Negative for facial swelling, mouth sores and trouble swallowing.    Eyes: Negative for pain, discharge, itching and visual disturbance.   Respiratory: Negative for chest tightness, shortness of breath and wheezing.    Cardiovascular: Negative for chest pain and palpitations.   Gastrointestinal: Negative for abdominal pain, diarrhea, nausea and vomiting.   Endocrine: Negative.    Genitourinary: Negative.    Musculoskeletal: Positive for arthralgias. Negative for joint swelling.   Skin: Positive for rash.   Allergic/Immunologic: Negative.    Neurological: Negative for seizures and syncope.   Hematological: Does not bruise/bleed easily.   Psychiatric/Behavioral: Negative.        Objective   /70   Pulse 101   Temp 98.3 °F (36.8 °C)   Ht 167.6 cm (66\")   Wt (!) 145 kg (320 lb)   SpO2 99%   BMI 51.65 kg/m²     Physical Exam   Constitutional: She appears well-developed and well-nourished.   HENT:   Head: Normocephalic.   Right Ear: Hearing, tympanic membrane, external ear and ear canal normal.   Left Ear: Hearing, tympanic membrane, external ear and ear canal normal.   Nose: " Nose normal.   Mouth/Throat: Oropharynx is clear and moist.   Eyes: Conjunctivae are normal. Pupils are equal, round, and reactive to light.   Neck: Normal range of motion.   Cardiovascular: Normal rate, regular rhythm and normal heart sounds.   Pulmonary/Chest: Effort normal and breath sounds normal. She has no decreased breath sounds. She has no wheezes. She has no rhonchi. She has no rales.   Musculoskeletal: Normal range of motion.        Left shoulder: She exhibits tenderness and pain. She exhibits normal range of motion, no bony tenderness, no swelling and normal strength.   Neurological: She is alert.   Skin: Skin is warm and dry. Erythema: in umbilicus, irritated and flaky.   Psychiatric: She has a normal mood and affect. Her behavior is normal.   Nursing note and vitals reviewed.      Results for orders placed or performed in visit on 03/26/19   Vitamin D 25 Hydroxy   Result Value Ref Range    25 Hydroxy, Vitamin D 31.5 ng/ml        ASSESSMENT/PLAN    Problem List Items Addressed This Visit     None      Visit Diagnoses     Candidal intertrigo    -  Primary    Use nystatin/triamcinolone ointment as directed. Keep area clean and dry.    Relevant Medications    nystatin-triamcinolone (MYCOLOG) 711402-2.1 UNIT/GM-% ointment    Acute pain of left shoulder        Discussed referral to ortho vs shoulder MRI. Due to cost of MRI, pt would prefer to see ortho first.    Relevant Orders    Ambulatory Referral to Orthopedic Surgery    Vitamin D deficiency        Check vitamin D level. Further recs based on results.    Relevant Orders    Vitamin D 25 Hydroxy (Completed)               Return for Next scheduled follow up.

## 2019-03-27 NOTE — PROGRESS NOTES
Your vitamin D level is just at the low end of normal. Please continue your current dose of weekly vitamin D and you can restart your prenatal vitamin as well.

## 2019-04-10 DIAGNOSIS — J45.21 MILD INTERMITTENT ASTHMA WITH ACUTE EXACERBATION: ICD-10-CM

## 2019-04-10 RX ORDER — MONTELUKAST SODIUM 10 MG/1
TABLET ORAL
Qty: 30 TABLET | Refills: 5 | Status: SHIPPED | OUTPATIENT
Start: 2019-04-10 | End: 2019-11-26 | Stop reason: SDUPTHER

## 2019-04-16 ENCOUNTER — OFFICE VISIT (OUTPATIENT)
Dept: ORTHOPEDIC SURGERY | Facility: CLINIC | Age: 29
End: 2019-04-16

## 2019-04-16 VITALS — HEART RATE: 82 BPM | OXYGEN SATURATION: 98 % | WEIGHT: 293 LBS | HEIGHT: 66 IN | BODY MASS INDEX: 47.09 KG/M2

## 2019-04-16 DIAGNOSIS — M75.82 ROTATOR CUFF TENDONITIS, LEFT: ICD-10-CM

## 2019-04-16 DIAGNOSIS — M25.512 LEFT SHOULDER PAIN, UNSPECIFIED CHRONICITY: Primary | ICD-10-CM

## 2019-04-16 PROCEDURE — 99214 OFFICE O/P EST MOD 30 MIN: CPT | Performed by: PHYSICIAN ASSISTANT

## 2019-04-16 PROCEDURE — 20610 DRAIN/INJ JOINT/BURSA W/O US: CPT | Performed by: PHYSICIAN ASSISTANT

## 2019-04-16 RX ADMIN — TRIAMCINOLONE ACETONIDE 40 MG: 40 INJECTION, SUSPENSION INTRA-ARTICULAR; INTRAMUSCULAR at 14:53

## 2019-04-16 NOTE — PROGRESS NOTES
Procedure   Large Joint Arthrocentesis: L subacromial bursa  Date/Time: 4/16/2019 2:53 PM  Consent given by: patient  Site marked: site marked  Timeout: Immediately prior to procedure a time out was called to verify the correct patient, procedure, equipment, support staff and site/side marked as required   Supporting Documentation  Indications: pain   Procedure Details  Location: shoulder - L subacromial bursa  Preparation: Patient was prepped and draped in the usual sterile fashion  Needle size: 22 G  Approach: posterior  Medications administered: 40 mg triamcinolone acetonide 40 MG/ML (4cc   1% Lidocaine  NDC [id number] Lot [id number]  [id number])  Patient tolerance: patient tolerated the procedure well with no immediate complications

## 2019-04-16 NOTE — PROGRESS NOTES
[facility] – [place] [facility] Note    Subjective     Chief Complaint   Patient presents with   • Left Shoulder - Pain        HPI      Eliz Eisenberg is a 28 y.o. female.  Right-hand-dominant.  Patient presents orthopedic clinic for evaluation of left shoulder pain.  She states started in October 2018.  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  She just woke up one day with shoulder pain.  At this time she states the pain is along the superior posterior aspect of the shoulder into the lateral aspect of the shoulder.  Mild discomfort is also noted anteriorly.  Pain scale maximum 8/10.  Severity the pain moderate.  Quality the pain throbbing, shooting.  Associated symptoms popping, grinding, stiffness and giving way.  She also notes intermittent numbness throughout the whole arm.  Patient did try a course of physical therapy but only attended couple of sessions and then discontinued secondary to personality issues with the therapist.  At this point she states any type of reaching, lifting, overhead activity to include dressing as well as driving can aggravate and cause increased pain to the shoulder.  She also notes difficulty with sleeping secondary to the pain.  No reported fever, chills, night sweats or other constitutional symptoms.  She is utilizing ibuprofen for pain control.        Past Medical History:   Diagnosis Date   • Abdominal pain    • Acute sinusitis    • Ankle sprain    • Atopic dermatitis    • Biliary dyskinesia    • Cat bite      resolved   • Cough    • Dysfunctional uterine bleeding    • Foot pain    • Gout    • Influenza    • Metabolic syndrome    • Muscular aches    • Obesity    • Pharyngitis    • URI (upper respiratory infection)       Past Surgical History:   Procedure Laterality Date   • CHOLECYSTECTOMY     • HERNIA  REPAIR     • WISDOM TOOTH EXTRACTION        Family History   Problem Relation Age of Onset   • Hypertension Father    • Obesity Father    • Cancer Father    • Diabetes Father    • Heart attack Father    • Multiple sclerosis Mother    • Cancer Mother    • Diabetes Mother    • Heart attack Paternal Grandfather    • Colon cancer Paternal Grandmother      Social History     Socioeconomic History   • Marital status:      Spouse name: Not on file   • Number of children: Not on file   • Years of education: Not on file   • Highest education level: Not on file   Tobacco Use   • Smoking status: Never Smoker   • Smokeless tobacco: Never Used   Substance and Sexual Activity   • Alcohol use: No   • Drug use: No   • Sexual activity: Yes     Partners: Male     Birth control/protection: OCP      Current Outpatient Medications on File Prior to Visit   Medication Sig Dispense Refill   • albuterol (PROVENTIL HFA;VENTOLIN HFA) 108 (90 Base) MCG/ACT inhaler Inhale 2 puffs Every 4 (Four) Hours As Needed for Wheezing. 1 inhaler 5   • BREO ELLIPTA 100-25 MCG/INH aerosol powder  INHALE 1 PUFF ONCE DAILY 60 each 0   • cetirizine (zyrTEC) 5 MG tablet Take 1 tablet by mouth Daily.     • metFORMIN (GLUCOPHAGE) 500 MG tablet Take 2 tablets by mouth 2 (Two) Times a Day With Meals. 360 tablet 2   • montelukast (SINGULAIR) 10 MG tablet TAKE 1 TABLET BY MOUTH ONCE DAILY IN THE EVENING 30 tablet 5   • Multiple Vitamins-Minerals (MULTIVITAMIN PO) Take  by mouth.     • ondansetron ODT (ZOFRAN-ODT) 4 MG disintegrating tablet Take 1 tablet by mouth 4 (Four) Times a Day As Needed for Nausea. 16 tablet 0   • Prenatal Vit-Fe Fumarate-FA (PRENATAL PO) Take  by mouth.     • vitamin D (ERGOCALCIFEROL) 50327 units capsule capsule Take 1 capsule by mouth 1 (One) Time Per Week. 12 capsule 3   • colestipol (COLESTID) 1 g tablet Take 1 tablet by mouth 2 (Two) Times a Day. 60 tablet 1   • nystatin-triamcinolone (MYCOLOG) 620592-3.1 UNIT/GM-% ointment Apply   "topically to the appropriate area as directed 2 (Two) Times a Day. 30 g 0   • ondansetron (ZOFRAN) 4 MG tablet      • Oxymetazoline HCl (NASAL SPRAY NA) into the nostril(s) as directed by provider.     • promethazine (PHENERGAN) 25 MG tablet Take 1 tablet by mouth Every 4 (Four) Hours As Needed for nausea. 10 tablet 0   • raNITIdine HCl (ZANTAC PO) Take  by mouth.       No current facility-administered medications on file prior to visit.       Allergies   Allergen Reactions   • Latex Hives, Itching, Rash and Swelling        The following portions of the patient's history were reviewed and updated as appropriate: allergies, current medications, past family history, past medical history, past social history, past surgical history and problem list.    Review of Systems   Constitutional: Positive for chills and fatigue.   HENT: Negative.    Eyes: Negative.    Respiratory: Negative.    Cardiovascular: Negative.    Gastrointestinal: Negative.    Endocrine: Negative.    Genitourinary: Negative.    Musculoskeletal: Positive for arthralgias, back pain and neck pain.   Skin: Negative.    Allergic/Immunologic: Negative.    Neurological: Positive for numbness.   Hematological: Negative.    Psychiatric/Behavioral: Positive for sleep disturbance.        Objective      Physical Exam  Pulse 82   Ht 167.6 cm (65.98\")   Wt (!) 143 kg (315 lb 11.2 oz)   SpO2 98%   Breastfeeding? No   BMI 50.98 kg/m²     Body mass index is 50.98 kg/m².        GENERAL APPEARANCE: awake, alert & oriented x 3, in no acute distress and well developed, well nourished  PSYCH: normal mood and affect  LUNGS:  breathing nonlabored, no wheezing  EYES: sclera anicteric, pupils equal  CARDIOVASCULAR: palpable pulses dorsalis pedis, palpable posterior tibial bilaterally. Capillary refill less than 2 seconds  INTEGUMENTARY: skin intact, no clubbing, cyanosis  NEUROLOGIC:  Normal Sensation and reflexes             Ortho Exam  Musculoskeletal   Upper " Extremity   Left Shoulder     Inspection and Palpation: Sitting on table with head forward and shoulders rounded.  Positive pendulous breast tissue.    Tenderness -positive superior, posterior lateral, lateral and mild discomfort anteriorly    Crepitus - none    Sensation is normal    Examination reveals no ecchymosis.      Strength and Tone:    Supraspinatus - 4+/5    External Rotators-5/5    Infraspinatus - 5/5    Subscapularis - 5/5    Deltoid - 5/5     Range of Motion   Right shoulder:    Internal Rotation: ROM - T10    External Rotation: AROM - 80 degrees    Elevation through flexion: AROM - 180 degrees    Left Shoulder:    Internal Rotation: ROM - T10    External Rotation: AROM - 80 degrees    Elevation through flexion: AROM - 180 degrees      Instability   Left shoulder    Sulcus sign negative    Apprehension test negative    Trevor relocation test negative    Jerk test negative     Impingement   Left shoulder    Zaman-Narinder impingement test positive    Neer impingement test positive     Functional Testing   Left shoulder    AC crossover adduction test mild discomfort    Abdominal compression test negative    Lift-off sign negative    Speed's test negative    North's test mild discomfort    Hornblower's sign negative       Imaging/Studies  Imaging Results (last 7 days)     Procedure Component Value Units Date/Time    XR Shoulder 2+ View Left [[id number]] Resulted:  04/16/19 1530     Updated:  04/16/19 1533    Narrative:       Left Shoulder X-Ray    Indication: Pain    Study:  AP, scapular Y, and axillary lateral views     Comparison: None    Findings:  No acute fractures are visualized.  No bony lesions are visualized.  Normal soft tissue appearance  AC joint: Mild joint space narrowing with hypertrophy of the distal   clavicle  Glenohumeral joint: Minimal joint space narrowing  Acromion morphology:  Type 2      Impression: No acute bony abnormality left shoulder.        Ordered plain film imaging of the  left shoulder today.  Images reviewed by Dr. Foss.  No acute bony abnormality, fracture or dislocation.  She has mild joint space narrowing noted to the ACJ.  Type II acromion is noted.  See chart for official report.      Assessment/Plan        ICD-10-CM ICD-9-CM   1. Left shoulder pain, unspecified chronicity M25.512 719.41   2. Rotator cuff tendonitis, left M75.82 726.10   3. BMI 50.0-59.9, adult (CMS/[facility]) Z68.43 V85.43       Orders Placed This Encounter   Procedures   • Large Joint Arthrocentesis: L subacromial bursa   • XR Shoulder 2+ View Left   • Ambulatory Referral to Weight Management Program   • Ambulatory Referral to Physical Therapy Evaluate and treat, Ortho        -Patient with left shoulder pain and rotator cuff tendinitis.  -Offered and accepted a one-time injection to the subacromial space.  Corticosteroid injection was given today.  -Recommend she continue with over-the-counter anti-inflammatories as needed to assist with inflammation/pain control.  -Referred to a different PT to help work on range of motion, stretching, strengthening utilizing modalities of choice.  -Patient has a BMI of 51 which needs to be watched.  Briefly discussed weight loss and nutrition.  Offered weight loss, diet materials.  Patient accepted a referral to Weight Management.  -Follow-up in 6 weeks for repeat evaluation, sooner if issues arise or symptoms worsen/change.  -Questions and concerns answered.      After discussing the risks, benefits, indications of injection, the patient gave consent to proceed.  Her left shoulder subacromial space was confirmed as the correct site to be injected with a timeout.  It was then prepped using Hibiclens and injected with a mixture of 4 cc of 1% plain lidocaine and 1 cc of Kenalog (40 mg per mL) without any resistance through the post lateral approach, patient in seated position.  Area was cleaned, hemostasis was achieved and a Band-Aid was applied over the injection site.  The  patient tolerated procedure well.  I instructed the patient on signs and symptoms of infection.  They should report to the emergency department or return to clinic if any of these develop, for further evaluation and treatment.  Recommended modifying activity for the next 48 hours to include rest, ice, elevation and oral pain medication as needed.  Patient verbalized some improvement in pain within 5 minutes following the injection.      Medical Decision Making  Management Options : over-the-counter medicine, prescription/IM medicine and physical/occupational therapy  Data/Risk: radiology tests       Flavia Durán PA-C  04/18/19  9:50 AM         EMR Dragon/Transcription disclaimer:  Much of this encounter note is an electronic transcription of spoken language to printed text. Electronic transcription of spoken language may permit erroneous, or at times, nonsensical words or phrases to be inadvertently transcribed. Although I have reviewed the note for such errors, some may still exist.

## 2019-04-18 RX ORDER — TRIAMCINOLONE ACETONIDE 40 MG/ML
40 INJECTION, SUSPENSION INTRA-ARTICULAR; INTRAMUSCULAR
Status: COMPLETED | OUTPATIENT
Start: 2019-04-16 | End: 2019-04-16

## 2019-06-17 DIAGNOSIS — B37.31 VAGINAL CANDIDIASIS: Primary | ICD-10-CM

## 2019-06-17 RX ORDER — FLUCONAZOLE 150 MG/1
150 TABLET ORAL EVERY OTHER DAY
Qty: 3 TABLET | Refills: 0 | Status: SHIPPED | OUTPATIENT
Start: 2019-06-17 | End: 2019-06-22

## 2019-07-18 ENCOUNTER — LAB (OUTPATIENT)
Dept: LAB | Facility: HOSPITAL | Age: 29
End: 2019-07-18

## 2019-07-18 ENCOUNTER — OFFICE VISIT (OUTPATIENT)
Dept: OBSTETRICS AND GYNECOLOGY | Facility: CLINIC | Age: 29
End: 2019-07-18

## 2019-07-18 VITALS
DIASTOLIC BLOOD PRESSURE: 70 MMHG | BODY MASS INDEX: 47.09 KG/M2 | WEIGHT: 293 LBS | SYSTOLIC BLOOD PRESSURE: 116 MMHG | HEIGHT: 66 IN

## 2019-07-18 DIAGNOSIS — E88.81 METABOLIC SYNDROME: ICD-10-CM

## 2019-07-18 DIAGNOSIS — Z01.419 ENCOUNTER FOR GYNECOLOGICAL EXAMINATION WITHOUT ABNORMAL FINDING: Primary | ICD-10-CM

## 2019-07-18 LAB
GLUCOSE P FAST SERPL-MCNC: 73 MG/DL (ref 72–112)
HBA1C MFR BLD: 5.24 % (ref 4.8–5.6)

## 2019-07-18 PROCEDURE — 99395 PREV VISIT EST AGE 18-39: CPT | Performed by: OBSTETRICS & GYNECOLOGY

## 2019-07-18 PROCEDURE — 83036 HEMOGLOBIN GLYCOSYLATED A1C: CPT

## 2019-07-18 PROCEDURE — 36415 COLL VENOUS BLD VENIPUNCTURE: CPT

## 2019-07-18 PROCEDURE — 83525 ASSAY OF INSULIN: CPT

## 2019-07-18 PROCEDURE — 82947 ASSAY GLUCOSE BLOOD QUANT: CPT

## 2019-07-18 NOTE — PROGRESS NOTES
Chief Complaint   Patient presents with   • Gynecologic Exam     attempting to conceive X 1 year   • Med Refill       Eliz Eisenberg is a 29 y.o. year old  presenting to be seen for her annual exam.  Has a diagnosis of metabolic syndrome with an abnormal glucose/insulin ratio.  She is treated with Metformin, 1000 mg in the morning and 500 mg in the evening with meals.  Her last glucose insulin ratio had improved to 88/15 (5.8/1.0).  She has no side effects on metformin.  Her  has been evaluated and by history has a normal sperm count.  The patient is taking a prenatal vitamin daily.  She takes a baby aspirin every other day.  She denies bowel or urinary symptoms.  She has had a normal FSH and a normal serum prolactin.    SCREENING TESTS    Year 2012   Age                         PAP       Neg.                  HPV high risk                         Mammogram                         DENICE score                         Breast MRI                         Lipids                         Vitamin D                         Colonoscopy                         DEXA  Frax (hip/any)                         Ovarian Screen                             She exercises regularly: yes.  She wears her seat belt: yes.  She has concerns about domestic violence: no.  She has noticed changes in height: no    GYN screening history:  · Last pap: was done on approximately 2018 and the result was: normal PAP..    No Additional Complaints Reported    The following portions of the patient's history were reviewed and updated as appropriate:vital signs and   She  has a past medical history of Abdominal pain, Acute sinusitis, Ankle sprain, Atopic dermatitis, Biliary dyskinesia, Cat bite, Cough, Dysfunctional uterine bleeding, Foot pain, Gout, Influenza, Metabolic syndrome, Muscular aches, Obesity, Pharyngitis, and URI (upper  respiratory infection).  She does not have any pertinent problems on file.  She  has a past surgical history that includes [place] tooth extraction; Cholecystectomy; and Hernia repair.  Her family history includes Cancer in her father and mother; Colon cancer in her paternal grandmother; Diabetes in her father and mother; Heart attack in her father and paternal grandfather; Hypertension in her father; Multiple sclerosis in her mother; Obesity in her father.  She  reports that she has never smoked. She has never used smokeless tobacco. She reports that she does not drink alcohol or use drugs.  Current Outpatient Medications   Medication Sig Dispense Refill   • albuterol (PROVENTIL HFA;VENTOLIN HFA) 108 (90 Base) MCG/ACT inhaler Inhale 2 puffs Every 4 (Four) Hours As Needed for Wheezing. 1 inhaler 5   • BREO ELLIPTA 100-25 MCG/INH aerosol powder  INHALE 1 PUFF ONCE DAILY 60 each 0   • cetirizine (zyrTEC) 5 MG tablet Take 1 tablet by mouth Daily.     • colestipol (COLESTID) 1 g tablet Take 1 tablet by mouth 2 (Two) Times a Day. 60 tablet 1   • metFORMIN (GLUCOPHAGE) 500 MG tablet Take 2 tablets by mouth in the morning and 1 in the evening with meals 270 tablet 3   • montelukast (SINGULAIR) 10 MG tablet TAKE 1 TABLET BY MOUTH ONCE DAILY IN THE EVENING 30 tablet 5   • Multiple Vitamins-Minerals (MULTIVITAMIN PO) Take  by mouth.     • nystatin-triamcinolone (MYCOLOG) 780720-7.1 UNIT/GM-% ointment Apply  topically to the appropriate area as directed 2 (Two) Times a Day. 30 g 0   • ondansetron (ZOFRAN) 4 MG tablet      • ondansetron ODT (ZOFRAN-ODT) 4 MG disintegrating tablet Take 1 tablet by mouth 4 (Four) Times a Day As Needed for Nausea. 16 tablet 0   • Oxymetazoline HCl (NASAL SPRAY NA) into the nostril(s) as directed by provider.     • Prenatal Vit-Fe Fumarate-FA (PRENATAL PO) Take  by mouth.     • promethazine (PHENERGAN) 25 MG tablet Take 1 tablet by mouth Every 4 (Four) Hours As Needed for nausea. 10 tablet 0   •  "raNITIdine HCl (ZANTAC PO) Take  by mouth.     • vitamin D (ERGOCALCIFEROL) 96249 units capsule capsule Take 1 capsule by mouth 1 (One) Time Per Week. 12 capsule 3     No current facility-administered medications for this visit.      She is allergic to latex..    Review of Systems  A comprehensive review of systems was taken.  Constitutional: negative for fever, chills, activity change, appetite change, fatigue and unexpected weight change.  Respiratory: negative  Cardiovascular: negative  Gastrointestinal: negative  Genitourinary:negative  Musculoskeletal:negative  Behavioral/Psych: negative       /70   Ht 167.6 cm (66\")   Wt (!) 141 kg (311 lb 6.4 oz)   LMP 06/23/2019 (Exact Date)   Breastfeeding? No   BMI 50.26 kg/m²     Physical Exam    General:  alert; cooperative; well developed; well nourished   Skin:  erythema beneath both breasts   Thyroid: normal to inspection and palpation   Lungs:  clear to auscultation bilaterally   Heart:  regular rate and rhythm, S1, S2 normal, no murmur, click, rub or gallop   Breasts:  Examined in supine position  Symmetric without masses or skin dimpling  Nipples normal without inversion, lesions or discharge  There are no palpable axillary nodes   Abdomen: soft, non-tender; no masses  no umbilical or inguinal hernias are present  no hepato-splenomegaly  obese   Pelvis: Clinical staff was present for exam  External genitalia:  normal appearance of the external genitalia including Bartholin's and Starke's glands.  Vaginal:  normal pink mucosa without prolapse or lesions.  Cervix:  normal appearance.  Uterus:  normal size, shape and consistency. anteverted;  Adnexa:  non palpable bilaterally.  Rectal:  anus visually normal appearing. recto-vaginal exam unremarkable and confirms findings;     Lab Review   FSH results and PRL results    Imaging  CT of abdomen/pelvis results         ASSESSMENT  Problems Addressed this Visit        Other    Metabolic syndrome    Relevant " Medications    metFORMIN (GLUCOPHAGE) 500 MG tablet    Other Relevant Orders    Glucose, Fasting    Insulin, Random    Hemoglobin A1c      Other Visit Diagnoses     Encounter for gynecological examination without abnormal finding    -  Primary    Relevant Orders    Liquid-based Pap Smear, Screening          PLAN    Medications prescribed this encounter:    New Medications Ordered This Visit   Medications   • metFORMIN (GLUCOPHAGE) 500 MG tablet     Sig: Take 2 tablets by mouth in the morning and 1 in the evening with meals     Dispense:  270 tablet     Refill:  3   · Monthly self breast assessment  · Fasting glucose, random insulin, hemoglobin A1c ordered  · Use ovulation indicator kit.  If the patient is anovulatory she will return for treatment with Clomid which will be preceded by a pelvic ultrasound.  · Low carbohydrate diet and regular weight-bearing exercise  · Follow up: 12 month(s)  *Please note that portions of this documentation may have been completed with a voice recognition program.  Efforts were made to edit this dictation, but occasional words may have been mistranscribed.       This note was electronically signed.    SIMEON Ayers MD  July 18, 2019  2:29 PM

## 2019-07-23 LAB — INSULIN SERPL-ACNC: 12 UIU/ML

## 2019-09-16 ENCOUNTER — OFFICE VISIT (OUTPATIENT)
Dept: INTERNAL MEDICINE | Facility: CLINIC | Age: 29
End: 2019-09-16

## 2019-09-16 VITALS
SYSTOLIC BLOOD PRESSURE: 130 MMHG | BODY MASS INDEX: 50.62 KG/M2 | TEMPERATURE: 98.3 F | WEIGHT: 293 LBS | DIASTOLIC BLOOD PRESSURE: 90 MMHG | HEART RATE: 78 BPM | OXYGEN SATURATION: 99 %

## 2019-09-16 DIAGNOSIS — J06.9 ACUTE URI: Primary | ICD-10-CM

## 2019-09-16 DIAGNOSIS — B37.31 VAGINAL YEAST INFECTION: ICD-10-CM

## 2019-09-16 PROCEDURE — 87804 INFLUENZA ASSAY W/OPTIC: CPT | Performed by: PHYSICIAN ASSISTANT

## 2019-09-16 PROCEDURE — 99213 OFFICE O/P EST LOW 20 MIN: CPT | Performed by: PHYSICIAN ASSISTANT

## 2019-09-16 RX ORDER — FLUCONAZOLE 150 MG/1
150 TABLET ORAL
Qty: 2 TABLET | Refills: 0 | Status: SHIPPED | OUTPATIENT
Start: 2019-09-16 | End: 2019-11-26 | Stop reason: SDUPTHER

## 2019-11-26 ENCOUNTER — OFFICE VISIT (OUTPATIENT)
Dept: INTERNAL MEDICINE | Facility: CLINIC | Age: 29
End: 2019-11-26

## 2019-11-26 VITALS
WEIGHT: 293 LBS | OXYGEN SATURATION: 100 % | HEART RATE: 86 BPM | TEMPERATURE: 97.8 F | DIASTOLIC BLOOD PRESSURE: 70 MMHG | SYSTOLIC BLOOD PRESSURE: 110 MMHG | BODY MASS INDEX: 51.58 KG/M2

## 2019-11-26 DIAGNOSIS — J45.21 MILD INTERMITTENT ASTHMA WITH ACUTE EXACERBATION: ICD-10-CM

## 2019-11-26 DIAGNOSIS — J01.00 ACUTE NON-RECURRENT MAXILLARY SINUSITIS: Primary | ICD-10-CM

## 2019-11-26 DIAGNOSIS — B37.31 VAGINAL YEAST INFECTION: ICD-10-CM

## 2019-11-26 PROCEDURE — 96372 THER/PROPH/DIAG INJ SC/IM: CPT | Performed by: PHYSICIAN ASSISTANT

## 2019-11-26 PROCEDURE — 99213 OFFICE O/P EST LOW 20 MIN: CPT | Performed by: PHYSICIAN ASSISTANT

## 2019-11-26 RX ORDER — METHYLPREDNISOLONE ACETATE 40 MG/ML
40 INJECTION, SUSPENSION INTRA-ARTICULAR; INTRALESIONAL; INTRAMUSCULAR; SOFT TISSUE ONCE
Status: COMPLETED | OUTPATIENT
Start: 2019-11-26 | End: 2019-11-26

## 2019-11-26 RX ORDER — FLUCONAZOLE 150 MG/1
150 TABLET ORAL
Qty: 2 TABLET | Refills: 0 | Status: SHIPPED | OUTPATIENT
Start: 2019-11-26 | End: 2020-04-14

## 2019-11-26 RX ORDER — MONTELUKAST SODIUM 10 MG/1
10 TABLET ORAL EVERY EVENING
Qty: 90 TABLET | Refills: 1 | Status: SHIPPED | OUTPATIENT
Start: 2019-11-26 | End: 2020-07-21

## 2019-11-26 RX ORDER — AMOXICILLIN AND CLAVULANATE POTASSIUM 875; 125 MG/1; MG/1
1 TABLET, FILM COATED ORAL 2 TIMES DAILY
Qty: 20 TABLET | Refills: 0 | OUTPATIENT
Start: 2019-11-26 | End: 2020-07-06

## 2019-11-26 RX ADMIN — METHYLPREDNISOLONE ACETATE 40 MG: 40 INJECTION, SUSPENSION INTRA-ARTICULAR; INTRALESIONAL; INTRAMUSCULAR; SOFT TISSUE at 14:35

## 2019-11-26 NOTE — PROGRESS NOTES
Chief Complaint   Patient presents with   • Headache     Acute   • URI   • Cough       Subjective   Eliz Eisenberg is a 29 y.o. female.       History of Present Illness     Pt started feeling bad over a week ago with head congestion. Has progressed to headache with left ear pain and chest congestion. Tried gerardo-seltzer cold and sinus without relief. No fever. No sick contacts.      Current Outpatient Medications:   •  albuterol (PROVENTIL HFA;VENTOLIN HFA) 108 (90 Base) MCG/ACT inhaler, Inhale 2 puffs Every 4 (Four) Hours As Needed for Wheezing., Disp: 1 inhaler, Rfl: 5  •  BREO ELLIPTA 100-25 MCG/INH aerosol powder , INHALE 1 PUFF ONCE DAILY, Disp: 60 each, Rfl: 0  •  cetirizine (zyrTEC) 5 MG tablet, Take 1 tablet by mouth Daily., Disp: , Rfl:   •  fluconazole (DIFLUCAN) 150 MG tablet, Take 1 tablet by mouth Every 3 (Three) Days., Disp: 2 tablet, Rfl: 0  •  metFORMIN (GLUCOPHAGE) 500 MG tablet, Take 2 tablets by mouth in the morning and 1 in the evening with meals, Disp: 270 tablet, Rfl: 3  •  montelukast (SINGULAIR) 10 MG tablet, Take 1 tablet by mouth Every Evening., Disp: 90 tablet, Rfl: 1  •  Multiple Vitamins-Minerals (MULTIVITAMIN PO), Take  by mouth., Disp: , Rfl:   •  nystatin-triamcinolone (MYCOLOG) 994374-3.1 UNIT/GM-% ointment, Apply  topically to the appropriate area as directed 2 (Two) Times a Day., Disp: 30 g, Rfl: 0  •  ondansetron (ZOFRAN) 4 MG tablet, , Disp: , Rfl:   •  ondansetron ODT (ZOFRAN-ODT) 4 MG disintegrating tablet, Take 1 tablet by mouth 4 (Four) Times a Day As Needed for Nausea., Disp: 16 tablet, Rfl: 0  •  Oxymetazoline HCl (NASAL SPRAY NA), into the nostril(s) as directed by provider., Disp: , Rfl:   •  Prenatal Vit-Fe Fumarate-FA (PRENATAL PO), Take  by mouth., Disp: , Rfl:   •  promethazine (PHENERGAN) 25 MG tablet, Take 1 tablet by mouth Every 4 (Four) Hours As Needed for nausea., Disp: 10 tablet, Rfl: 0  •  raNITIdine HCl (ZANTAC PO), Take  by mouth., Disp: , Rfl:   •  vitamin  D (ERGOCALCIFEROL) 65713 units capsule capsule, Take 1 capsule by mouth 1 (One) Time Per Week., Disp: 12 capsule, Rfl: 3  •  amoxicillin-clavulanate (AUGMENTIN) 875-125 MG per tablet, Take 1 tablet by mouth 2 (Two) Times a Day., Disp: 20 tablet, Rfl: 0     PMFSH  The following portions of the patient's history were reviewed and updated as appropriate: allergies, current medications, past family history, past medical history, past social history, past surgical history and problem list.    Review of Systems   Constitutional: Positive for fatigue. Negative for activity change and unexpected weight change.   HENT: Positive for congestion, postnasal drip and sore throat. Negative for ear pain.    Eyes: Negative for pain and discharge.   Respiratory: Positive for cough. Negative for chest tightness, shortness of breath and wheezing.    Cardiovascular: Negative for chest pain and palpitations.   Gastrointestinal: Negative for abdominal pain, diarrhea and vomiting.   Endocrine: Negative.    Genitourinary: Negative.    Musculoskeletal: Negative for joint swelling.   Skin: Negative for color change, rash and wound.   Allergic/Immunologic: Negative.    Neurological: Negative for seizures and syncope.   Psychiatric/Behavioral: Negative.        Objective   /70   Pulse 86   Temp 97.8 °F (36.6 °C)   Wt (!) 145 kg (319 lb 9.6 oz)   SpO2 100%   BMI 51.58 kg/m²     Physical Exam   Constitutional: She is oriented to person, place, and time. She appears well-developed and well-nourished.  Non-toxic appearance. No distress.   HENT:   Head: Normocephalic and atraumatic. Hair is normal.   Right Ear: External ear normal. No drainage, swelling or tenderness. Tympanic membrane is retracted.   Left Ear: External ear normal. No drainage, swelling or tenderness. Tympanic membrane is retracted.   Nose: Mucosal edema present. No epistaxis.   Mouth/Throat: Uvula is midline and mucous membranes are normal. No oral lesions. No uvula  swelling. Posterior oropharyngeal erythema present. No oropharyngeal exudate.   Eyes: Conjunctivae and EOM are normal. Pupils are equal, round, and reactive to light. Right eye exhibits no discharge. Left eye exhibits no discharge. No scleral icterus.   Neck: Normal range of motion. Neck supple.   Cardiovascular: Normal rate, regular rhythm and normal heart sounds. Exam reveals no gallop.   No murmur heard.  Pulmonary/Chest: Breath sounds normal. No stridor. No respiratory distress. She has no wheezes. She has no rales. She exhibits no tenderness.   Abdominal: Soft. There is no tenderness.   Lymphadenopathy:     She has cervical adenopathy.   Neurological: She is alert and oriented to person, place, and time. She exhibits normal muscle tone.   Skin: Skin is warm and dry. No rash noted. She is not diaphoretic.   Psychiatric: She has a normal mood and affect. Her behavior is normal. Judgment and thought content normal.   Nursing note and vitals reviewed.           ASSESSMENT/PLAN    Problem List Items Addressed This Visit     None      Visit Diagnoses     Acute non-recurrent maxillary sinusitis    -  Primary    Start augmentin as directed. Continue otc symptomatic care. Increase rest and fluids.    Relevant Medications    methylPREDNISolone acetate (DEPO-medrol) injection 40 mg (Completed)    amoxicillin-clavulanate (AUGMENTIN) 875-125 MG per tablet    Mild intermittent asthma with acute exacerbation        Relevant Medications    montelukast (SINGULAIR) 10 MG tablet    Vaginal yeast infection        Take diflucan as directed.    Relevant Medications    fluconazole (DIFLUCAN) 150 MG tablet               Return if symptoms worsen or fail to improve.

## 2020-03-02 ENCOUNTER — TRANSCRIBE ORDERS (OUTPATIENT)
Dept: ADMINISTRATIVE | Facility: HOSPITAL | Age: 30
End: 2020-03-02

## 2020-03-02 ENCOUNTER — HOSPITAL ENCOUNTER (OUTPATIENT)
Dept: GENERAL RADIOLOGY | Facility: HOSPITAL | Age: 30
Discharge: HOME | End: 2020-03-02
Admitting: NURSE PRACTITIONER

## 2020-03-02 ENCOUNTER — LAB (OUTPATIENT)
Dept: LAB | Facility: HOSPITAL | Age: 30
End: 2020-03-02

## 2020-03-02 ENCOUNTER — TRANSCRIBE ORDERS (OUTPATIENT)
Dept: LAB | Facility: HOSPITAL | Age: 30
End: 2020-03-02

## 2020-03-02 DIAGNOSIS — L40.0 PSORIASIS VULGARIS: Primary | ICD-10-CM

## 2020-03-02 DIAGNOSIS — L40.0 PSORIASIS VULGARIS: ICD-10-CM

## 2020-03-02 LAB
ALBUMIN SERPL-MCNC: 4.4 G/DL (ref 3.5–5.2)
ALBUMIN/GLOB SERPL: 1.3 G/DL
ALP SERPL-CCNC: 113 U/L (ref 39–117)
ALT SERPL W P-5'-P-CCNC: 20 U/L (ref 1–33)
ANION GAP SERPL CALCULATED.3IONS-SCNC: 16.2 MMOL/L (ref 5–15)
AST SERPL-CCNC: 17 U/L (ref 1–32)
BASOPHILS # BLD AUTO: 0.08 10*3/MM3 (ref 0–0.2)
BASOPHILS NFR BLD AUTO: 0.5 % (ref 0–1.5)
BILIRUB SERPL-MCNC: 0.6 MG/DL (ref 0.2–1.2)
BILIRUB UR QL STRIP: NEGATIVE
BUN BLD-MCNC: 12 MG/DL (ref 6–20)
BUN/CREAT SERPL: 17.4 (ref 7–25)
CALCIUM SPEC-SCNC: 9.7 MG/DL (ref 8.6–10.5)
CHLORIDE SERPL-SCNC: 99 MMOL/L (ref 98–107)
CLARITY UR: CLEAR
CO2 SERPL-SCNC: 21.8 MMOL/L (ref 22–29)
COLOR UR: YELLOW
CREAT BLD-MCNC: 0.69 MG/DL (ref 0.57–1)
DEPRECATED RDW RBC AUTO: 42.4 FL (ref 37–54)
EOSINOPHIL # BLD AUTO: 0.18 10*3/MM3 (ref 0–0.4)
EOSINOPHIL NFR BLD AUTO: 1.2 % (ref 0.3–6.2)
ERYTHROCYTE [DISTWIDTH] IN BLOOD BY AUTOMATED COUNT: 14.5 % (ref 12.3–15.4)
GFR SERPL CREATININE-BSD FRML MDRD: 101 ML/MIN/1.73
GLOBULIN UR ELPH-MCNC: 3.4 GM/DL
GLUCOSE BLD-MCNC: 81 MG/DL (ref 65–99)
GLUCOSE UR STRIP-MCNC: NEGATIVE MG/DL
HCT VFR BLD AUTO: 38.7 % (ref 34–46.6)
HGB BLD-MCNC: 12.8 G/DL (ref 12–15.9)
HGB UR QL STRIP.AUTO: NEGATIVE
IMM GRANULOCYTES # BLD AUTO: 0.08 10*3/MM3 (ref 0–0.05)
IMM GRANULOCYTES NFR BLD AUTO: 0.5 % (ref 0–0.5)
KETONES UR QL STRIP: NEGATIVE
LEUKOCYTE ESTERASE UR QL STRIP.AUTO: NEGATIVE
LYMPHOCYTES # BLD AUTO: 3.68 10*3/MM3 (ref 0.7–3.1)
LYMPHOCYTES NFR BLD AUTO: 24.1 % (ref 19.6–45.3)
MCH RBC QN AUTO: 26.9 PG (ref 26.6–33)
MCHC RBC AUTO-ENTMCNC: 33.1 G/DL (ref 31.5–35.7)
MCV RBC AUTO: 81.5 FL (ref 79–97)
MONOCYTES # BLD AUTO: 0.86 10*3/MM3 (ref 0.1–0.9)
MONOCYTES NFR BLD AUTO: 5.6 % (ref 5–12)
NEUTROPHILS # BLD AUTO: 10.42 10*3/MM3 (ref 1.7–7)
NEUTROPHILS NFR BLD AUTO: 68.1 % (ref 42.7–76)
NITRITE UR QL STRIP: NEGATIVE
NRBC BLD AUTO-RTO: 0 /100 WBC (ref 0–0.2)
PH UR STRIP.AUTO: 6 [PH] (ref 5–8)
PLATELET # BLD AUTO: 425 10*3/MM3 (ref 140–450)
PMV BLD AUTO: 10.6 FL (ref 6–12)
POTASSIUM BLD-SCNC: 4.1 MMOL/L (ref 3.5–5.2)
PROT SERPL-MCNC: 7.8 G/DL (ref 6–8.5)
PROT UR QL STRIP: NEGATIVE
RBC # BLD AUTO: 4.75 10*6/MM3 (ref 3.77–5.28)
SODIUM BLD-SCNC: 137 MMOL/L (ref 136–145)
SP GR UR STRIP: 1.02 (ref 1–1.03)
UROBILINOGEN UR QL STRIP: NORMAL
WBC NRBC COR # BLD: 15.3 10*3/MM3 (ref 3.4–10.8)

## 2020-03-02 PROCEDURE — 36415 COLL VENOUS BLD VENIPUNCTURE: CPT | Performed by: NURSE PRACTITIONER

## 2020-03-02 PROCEDURE — G0432 EIA HIV-1/HIV-2 SCREEN: HCPCS | Performed by: NURSE PRACTITIONER

## 2020-03-02 PROCEDURE — 71046 X-RAY EXAM CHEST 2 VIEWS: CPT

## 2020-03-02 PROCEDURE — 80053 COMPREHEN METABOLIC PANEL: CPT

## 2020-03-02 PROCEDURE — 80074 ACUTE HEPATITIS PANEL: CPT

## 2020-03-02 PROCEDURE — 81003 URINALYSIS AUTO W/O SCOPE: CPT | Performed by: NURSE PRACTITIONER

## 2020-03-02 PROCEDURE — 85025 COMPLETE CBC W/AUTO DIFF WBC: CPT | Performed by: NURSE PRACTITIONER

## 2020-03-02 PROCEDURE — 86480 TB TEST CELL IMMUN MEASURE: CPT

## 2020-03-03 LAB
HAV IGM SERPL QL IA: NORMAL
HBV CORE IGM SERPL QL IA: NORMAL
HBV SURFACE AG SERPL QL IA: NORMAL
HCV AB SER DONR QL: NORMAL
HIV1+2 AB SER QL: NORMAL
HOLD SPECIMEN: NORMAL

## 2020-03-06 LAB
QUANTIFERON CRITERIA: NORMAL
QUANTIFERON MITOGEN VALUE: >10 IU/ML
QUANTIFERON NIL VALUE: 0.02 IU/ML
QUANTIFERON TB1 AG VALUE: 0.02 IU/ML
QUANTIFERON TB2 AG VALUE: 0.02 IU/ML
QUANTIFERON-TB GOLD PLUS: NEGATIVE

## 2020-03-31 ENCOUNTER — PATIENT MESSAGE (OUTPATIENT)
Dept: INTERNAL MEDICINE | Facility: CLINIC | Age: 30
End: 2020-03-31

## 2020-03-31 DIAGNOSIS — K52.9 GASTROENTERITIS: ICD-10-CM

## 2020-03-31 RX ORDER — PROMETHAZINE HYDROCHLORIDE 25 MG/1
25 TABLET ORAL EVERY 4 HOURS PRN
Qty: 10 TABLET | Refills: 0 | Status: SHIPPED | OUTPATIENT
Start: 2020-03-31 | End: 2020-11-24

## 2020-04-14 DIAGNOSIS — B37.31 VAGINAL YEAST INFECTION: ICD-10-CM

## 2020-04-14 RX ORDER — FLUCONAZOLE 150 MG/1
TABLET ORAL
Qty: 2 TABLET | Refills: 0 | OUTPATIENT
Start: 2020-04-14 | End: 2020-07-06

## 2020-07-19 DIAGNOSIS — E88.81 METABOLIC SYNDROME: ICD-10-CM

## 2020-07-19 DIAGNOSIS — J45.21 MILD INTERMITTENT ASTHMA WITH ACUTE EXACERBATION: ICD-10-CM

## 2020-07-21 RX ORDER — MONTELUKAST SODIUM 10 MG/1
10 TABLET ORAL EVERY EVENING
Qty: 30 TABLET | Refills: 0 | Status: SHIPPED | OUTPATIENT
Start: 2020-07-21 | End: 2020-11-24

## 2020-07-21 NOTE — TELEPHONE ENCOUNTER
Called pt, let her know she needs to schedule Annual physical with Allison Pham. Pt states she will need to check schedule and call back. Let know I will send in 30 day rf to cover until appt is made. Pt verbalized understanding.

## 2020-11-23 DIAGNOSIS — K52.9 GASTROENTERITIS: ICD-10-CM

## 2020-11-23 DIAGNOSIS — J45.21 MILD INTERMITTENT ASTHMA WITH ACUTE EXACERBATION: ICD-10-CM

## 2020-11-23 NOTE — TELEPHONE ENCOUNTER
LOV 11/26/19, LAST REFILL MONTELUKAST 7/21/20 #30 0 RF, PROMETHAZINE 3/31/20 #10 0 RF, most of her visits this past year have all been acute visits, has last physical was January 2019, lvm for her to return call and set up physical

## 2020-11-24 RX ORDER — MONTELUKAST SODIUM 10 MG/1
TABLET ORAL
Qty: 30 TABLET | Refills: 0 | Status: SHIPPED | OUTPATIENT
Start: 2020-11-24 | End: 2021-01-22 | Stop reason: SDUPTHER

## 2020-11-24 RX ORDER — PROMETHAZINE HYDROCHLORIDE 25 MG/1
TABLET ORAL
Qty: 10 TABLET | Refills: 0 | Status: SHIPPED | OUTPATIENT
Start: 2020-11-24

## 2021-01-22 ENCOUNTER — LAB (OUTPATIENT)
Dept: LAB | Facility: HOSPITAL | Age: 31
End: 2021-01-22

## 2021-01-22 ENCOUNTER — OFFICE VISIT (OUTPATIENT)
Dept: INTERNAL MEDICINE | Facility: CLINIC | Age: 31
End: 2021-01-22

## 2021-01-22 VITALS
HEART RATE: 75 BPM | DIASTOLIC BLOOD PRESSURE: 90 MMHG | SYSTOLIC BLOOD PRESSURE: 124 MMHG | BODY MASS INDEX: 52.88 KG/M2 | TEMPERATURE: 97.7 F | OXYGEN SATURATION: 98 % | WEIGHT: 293 LBS

## 2021-01-22 DIAGNOSIS — G47.00 INSOMNIA, UNSPECIFIED TYPE: Primary | ICD-10-CM

## 2021-01-22 DIAGNOSIS — R53.83 FATIGUE, UNSPECIFIED TYPE: ICD-10-CM

## 2021-01-22 DIAGNOSIS — J45.21 MILD INTERMITTENT ASTHMA WITH ACUTE EXACERBATION: ICD-10-CM

## 2021-01-22 DIAGNOSIS — F41.9 ANXIETY: ICD-10-CM

## 2021-01-22 LAB — 25(OH)D3 SERPL-MCNC: 21.7 NG/ML (ref 30–100)

## 2021-01-22 PROCEDURE — 82306 VITAMIN D 25 HYDROXY: CPT

## 2021-01-22 PROCEDURE — 99214 OFFICE O/P EST MOD 30 MIN: CPT | Performed by: PHYSICIAN ASSISTANT

## 2021-01-22 PROCEDURE — 36415 COLL VENOUS BLD VENIPUNCTURE: CPT

## 2021-01-22 RX ORDER — MONTELUKAST SODIUM 10 MG/1
10 TABLET ORAL NIGHTLY
Qty: 90 TABLET | Refills: 1 | Status: SHIPPED | OUTPATIENT
Start: 2021-01-22 | End: 2021-08-02

## 2021-01-22 RX ORDER — HYDROXYZINE HYDROCHLORIDE 25 MG/1
25 TABLET, FILM COATED ORAL 3 TIMES DAILY PRN
Qty: 20 TABLET | Refills: 0 | Status: SHIPPED | OUTPATIENT
Start: 2021-01-22 | End: 2021-07-09 | Stop reason: HOSPADM

## 2021-01-22 RX ORDER — QUETIAPINE FUMARATE 25 MG/1
TABLET, FILM COATED ORAL
Qty: 60 TABLET | Refills: 2 | Status: SHIPPED | OUTPATIENT
Start: 2021-01-22 | End: 2022-08-11

## 2021-01-22 NOTE — PROGRESS NOTES
Chief Complaint   Patient presents with   • Panic attacks     Acute        Subjective     Eliz Eisenberg is a 30 y.o. female.        History of Present Illness     Pt is struggling with sleep. She started having nightmares that woke her up, woke up screaming. Now feels like she is having panic attacks at night. Wakes up feeling like she has pressure on her chest, takes deep breaths and is able to calm down. Has had 4 over the holidays. 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    She has started humira for her psoriasis.      Current Outpatient Medications:   •  albuterol (PROVENTIL HFA;VENTOLIN HFA) 108 (90 Base) MCG/ACT inhaler, Inhale 2 puffs Every 4 (Four) Hours As Needed for Wheezing., Disp: 1 inhaler, Rfl: 5  •  aspirin 81 MG chewable tablet, Chew 81 mg Daily., Disp: , Rfl:   •  BREO ELLIPTA 100-25 MCG/INH aerosol powder , INHALE 1 PUFF ONCE DAILY, Disp: 60 each, Rfl: 0  •  cetirizine (zyrTEC) 5 MG tablet, Take 1 tablet by mouth Daily., Disp: , Rfl:   •  metFORMIN (GLUCOPHAGE) 500 MG tablet, TAKE TWO TABLETS BY MOUTH EVERY MORNING AND TAKE ONE TABLET BY MOUTH EVERY EVENING WITH MEALS, Disp: 270 tablet, Rfl: 0  •  montelukast (SINGULAIR) 10 MG tablet, Take 1 tablet by mouth Every Night., Disp: 90 tablet, Rfl: 1  •  Multiple Vitamins-Minerals (MULTIVITAMIN PO), Take  by mouth., Disp: , Rfl:   •  omeprazole (priLOSEC) 20 MG capsule, Take 20 mg by mouth Daily., Disp: , Rfl:   •  ondansetron (ZOFRAN) 4 MG tablet, , Disp: , Rfl:   •  ondansetron ODT (ZOFRAN-ODT) 4 MG disintegrating tablet, Take 1 tablet by mouth 4 (Four) Times a Day As Needed for Nausea., Disp: 16 tablet, Rfl: 0  •  Oxymetazoline HCl (NASAL SPRAY NA), into the nostril(s) as directed by provider., Disp: , Rfl:   •  promethazine (PHENERGAN) 25 MG tablet, TAKE ONE TABLET BY MOUTH EVERY 4 HOURS AS NEEDED FOR NAUSEA, Disp: 10 tablet, Rfl: 0  •  hydrOXYzine (ATARAX) 25 MG tablet, Take 1 tablet by mouth 3 (Three) Times a Day As Needed for  Anxiety., Disp: 20 tablet, Rfl: 0  •  QUEtiapine (SEROquel) 25 MG tablet, Take 1-2 tablets at night, Disp: 60 tablet, Rfl: 2     PMFSH  The following portions of the patient's history were reviewed and updated as appropriate: allergies, current medications, past family history, past medical history, past social history, past surgical history and problem list.    Review of Systems   Constitutional: Positive for fatigue. Negative for fever.   HENT: Negative for mouth sores and trouble swallowing.    Eyes: Negative for pain and visual disturbance.   Respiratory: Negative for shortness of breath and wheezing.    Cardiovascular: Negative for chest pain and palpitations.   Gastrointestinal: Negative for abdominal pain and blood in stool.   Endocrine: Negative.    Genitourinary: Negative.    Musculoskeletal: Negative for joint swelling.   Skin: Negative.    Allergic/Immunologic: Negative.    Neurological: Negative for seizures and syncope.   Hematological: Negative for adenopathy.   Psychiatric/Behavioral: Positive for decreased concentration and sleep disturbance.       Objective   /90   Pulse 75   Temp 97.7 °F (36.5 °C)   Wt (!) 149 kg (327 lb 9.6 oz)   SpO2 98%   BMI 52.88 kg/m²     Physical Exam  Vitals signs and nursing note reviewed.   Constitutional:       Appearance: She is well-developed.   HENT:      Head: Normocephalic.      Right Ear: Hearing, tympanic membrane, ear canal and external ear normal.      Left Ear: Hearing, tympanic membrane, ear canal and external ear normal.      Nose: Nose normal.   Eyes:      Conjunctiva/sclera: Conjunctivae normal.      Pupils: Pupils are equal, round, and reactive to light.   Neck:      Musculoskeletal: Normal range of motion.   Cardiovascular:      Rate and Rhythm: Normal rate and regular rhythm.      Heart sounds: Normal heart sounds.   Pulmonary:      Effort: Pulmonary effort is normal.      Breath sounds: Normal breath sounds. No decreased breath sounds,  wheezing, rhonchi or rales.   Musculoskeletal: Normal range of motion.   Skin:     General: Skin is warm and dry.   Neurological:      Mental Status: She is alert.   Psychiatric:         Behavior: Behavior normal.         ASSESSMENT/PLAN    Diagnoses and all orders for this visit:    1. Insomnia, unspecified type (Primary)  Comments:  Trial of seroquel to help with both insomnia and anxiety. Refer for sleep evaluation due to concern for sleep apnea.  Orders:  -     QUEtiapine (SEROquel) 25 MG tablet; Take 1-2 tablets at night  Dispense: 60 tablet; Refill: 2    2. Mild intermittent asthma with acute exacerbation  -     montelukast (SINGULAIR) 10 MG tablet; Take 1 tablet by mouth Every Night.  Dispense: 90 tablet; Refill: 1    3. Fatigue, unspecified type  -     Vitamin D 25 Hydroxy; Future  -     Ambulatory Referral to Sleep Medicine    4. Anxiety  Comments:  Trial of seroquel qhs prn. Use hydroxyzine prn for panic attacks.  Orders:  -     hydrOXYzine (ATARAX) 25 MG tablet; Take 1 tablet by mouth 3 (Three) Times a Day As Needed for Anxiety.  Dispense: 20 tablet; Refill: 0             Return in about 4 weeks (around 2/19/2021) for Follow up.

## 2021-01-28 ENCOUNTER — CONSULT (OUTPATIENT)
Dept: SLEEP MEDICINE | Facility: HOSPITAL | Age: 31
End: 2021-01-28

## 2021-01-28 VITALS
SYSTOLIC BLOOD PRESSURE: 136 MMHG | DIASTOLIC BLOOD PRESSURE: 89 MMHG | HEART RATE: 94 BPM | HEIGHT: 66 IN | WEIGHT: 293 LBS | BODY MASS INDEX: 47.09 KG/M2 | OXYGEN SATURATION: 97 %

## 2021-01-28 DIAGNOSIS — G47.19 EXCESSIVE DAYTIME SLEEPINESS: ICD-10-CM

## 2021-01-28 DIAGNOSIS — E66.01 OBESITY, MORBID, BMI 50 OR HIGHER (HCC): ICD-10-CM

## 2021-01-28 DIAGNOSIS — G47.33 OSA (OBSTRUCTIVE SLEEP APNEA): Primary | ICD-10-CM

## 2021-01-28 PROCEDURE — 99244 OFF/OP CNSLTJ NEW/EST MOD 40: CPT | Performed by: INTERNAL MEDICINE

## 2021-01-28 RX ORDER — BUDESONIDE AND FORMOTEROL FUMARATE DIHYDRATE 80; 4.5 UG/1; UG/1
2 AEROSOL RESPIRATORY (INHALATION)
COMMUNITY
End: 2021-09-28 | Stop reason: SDUPTHER

## 2021-01-28 NOTE — ASSESSMENT & PLAN NOTE
1. This is likely present based upon her nocturnal awakenings, daytime somnolence, and at risk airway and body habitus  2. A home sleep apnea test is appropriate in her case  3. I discussed the possible diagnosis of obstructive sleep apnea as well as treatment options and long-term risks if left untreated

## 2021-01-28 NOTE — PROGRESS NOTES
"  Eliz Eisenberg is a 30 y.o. female.   Chief Complaint   Patient presents with   • Sleeping Problem       HPI     30 y.o. female seen in consultation at the request of Allison Pham PA for evaluation of the above.     She has had difficulty initiating sleep for about a year.  Currently this can take up to 1 hour or even 2 hours to get to sleep.    Since October she has begun awakening with what she describes as \"panic attacks\".  She will suddenly awaken with her heart racing and it is often related to a nightmare.    She wakens several times per night, usually 2 or 3 times and then often has trouble getting back to sleep.  As result her total sleep time is diminished since she does not nap during the day and has to get up for work.  She is overly somnolent during the day with an elevated [place] sleepiness scale of 15.    Her  has not noted excessive snoring or apneas.    She does occasionally wake up with morning headaches.  She does have heartburn for which she takes Prilosec.  She does have intermittent nasal allergies and congestion.    [place] Scale is: 15/24    The patient's relevant past medical, surgical, family, and social history reviewed and updated in Epic as appropriate.    Current medications are:   Current Outpatient Medications:   •  Adalimumab (HUMIRA PEN SC), Inject  under the skin into the appropriate area as directed., Disp: , Rfl:   •  albuterol (PROVENTIL HFA;VENTOLIN HFA) 108 (90 Base) MCG/ACT inhaler, Inhale 2 puffs Every 4 (Four) Hours As Needed for Wheezing., Disp: 1 inhaler, Rfl: 5  •  aspirin 81 MG chewable tablet, Chew 81 mg Daily., Disp: , Rfl:   •  budesonide-formoterol (SYMBICORT) 80-4.5 MCG/ACT inhaler, Inhale 2 puffs 2 (Two) Times a Day., Disp: , Rfl:   •  cetirizine (zyrTEC) 5 MG tablet, Take 1 tablet by mouth Daily., Disp: , Rfl:   •  hydrOXYzine (ATARAX) 25 MG tablet, Take 1 tablet by mouth 3 (Three) Times a Day As Needed for Anxiety., Disp: 20 tablet, Rfl: 0  •  " "metFORMIN (GLUCOPHAGE) 500 MG tablet, TAKE TWO TABLETS BY MOUTH EVERY MORNING AND TAKE ONE TABLET BY MOUTH EVERY EVENING WITH MEALS, Disp: 270 tablet, Rfl: 0  •  montelukast (SINGULAIR) 10 MG tablet, Take 1 tablet by mouth Every Night., Disp: 90 tablet, Rfl: 1  •  omeprazole (priLOSEC) 20 MG capsule, Take 20 mg by mouth Daily., Disp: , Rfl:   •  ondansetron ODT (ZOFRAN-ODT) 4 MG disintegrating tablet, Take 1 tablet by mouth 4 (Four) Times a Day As Needed for Nausea., Disp: 16 tablet, Rfl: 0  •  promethazine (PHENERGAN) 25 MG tablet, TAKE ONE TABLET BY MOUTH EVERY 4 HOURS AS NEEDED FOR NAUSEA, Disp: 10 tablet, Rfl: 0  •  QUEtiapine (SEROquel) 25 MG tablet, Take 1-2 tablets at night, Disp: 60 tablet, Rfl: 2.    Review of Systems    Review of Systems  ROS documented in patient questionnaire ×14 systems.  Reviewed with patient.  Otherwise negative except as noted in HPI.    Physical Exam    Blood pressure 136/89, pulse 94, height 167.6 cm (66\"), weight (!) 150 kg (331 lb), SpO2 97 %, not currently breastfeeding. Body mass index is 53.42 kg/m².    Physical Exam  Vitals signs and nursing note reviewed.   Constitutional:       Appearance: Normal appearance. She is well-developed.   HENT:      Head: Normocephalic and atraumatic.      Nose: Nose normal.      Mouth/Throat:      Mouth: Mucous membranes are moist.      Pharynx: Oropharynx is clear. No oropharyngeal exudate.      Comments: Class IV airway  Eyes:      General: No scleral icterus.     Conjunctiva/sclera: Conjunctivae normal.   Neck:      Thyroid: No thyromegaly.      Trachea: No tracheal deviation.   Cardiovascular:      Rate and Rhythm: Normal rate and regular rhythm.      Heart sounds: No murmur. No friction rub. No gallop.    Pulmonary:      Effort: Pulmonary effort is normal. No respiratory distress.      Breath sounds: No wheezing or rales.   Musculoskeletal: Normal range of motion.         General: No deformity.   Skin:     General: Skin is warm and dry.     "  Findings: No rash.   Neurological:      Mental Status: She is alert and oriented to person, place, and time.   Psychiatric:         Behavior: Behavior normal.         Thought Content: Thought content normal.         DATA:    Reviewed VINNIE Jang's note from 1/22/2021.    Reviewed bed partner questionnaire.    ASSESSMENT:    Problem List Items Addressed This Visit        Pulmonary Problems    JENN (obstructive sleep apnea) - possible - Primary    Current Assessment & Plan     1. This is likely present based upon her nocturnal awakenings, daytime somnolence, and at risk airway and body habitus  2. A home sleep apnea test is appropriate in her case  3. I discussed the possible diagnosis of obstructive sleep apnea as well as treatment options and long-term risks if left untreated         Relevant Orders    Home Sleep Study       Other    Obesity, morbid, BMI 50 or higher (CMS/HCC)    Current Assessment & Plan     Current BMI 53.4.  Discussed the possible relationship between obesity and obstructive sleep apnea.         Excessive daytime sleepiness    Current Assessment & Plan     Elevated [place] Sleepiness Scale of 15.  Likely related to undiagnosed and untreated obstructive sleep apnea.         Relevant Orders    Home Sleep Study            PLAN:    1. Home sleep apnea testing  2. I discussed the possible diagnosis of obstructive sleep apnea and the diagnostic process  3. We discussed the long-term risks of untreated obstructive sleep apnea  4. I went over the benefits of long-term weight loss as it relates to obstructive sleep apnea and sleep.  5. Close sleep center follow-up    I have reviewed the results of my evaluation and impression and discussed my recommendations in detail with the patient.    Level of Risk Moderate due to: undiagnosed new problem    Signed by  Fahad Moe MD    January 28, 2021      CC: Allison Pham PA Grimm, Sarah E, PA

## 2021-01-28 NOTE — ASSESSMENT & PLAN NOTE
Current BMI 53.4.  Discussed the possible relationship between obesity and obstructive sleep apnea.

## 2021-01-28 NOTE — ASSESSMENT & PLAN NOTE
Elevated [place] Sleepiness Scale of 15.  Likely related to undiagnosed and untreated obstructive sleep apnea.

## 2021-02-15 ENCOUNTER — APPOINTMENT (OUTPATIENT)
Dept: SLEEP MEDICINE | Facility: HOSPITAL | Age: 31
End: 2021-02-15

## 2021-02-17 ENCOUNTER — OFFICE VISIT (OUTPATIENT)
Dept: OBSTETRICS AND GYNECOLOGY | Facility: CLINIC | Age: 31
End: 2021-02-17

## 2021-02-17 VITALS
BODY MASS INDEX: 47.09 KG/M2 | DIASTOLIC BLOOD PRESSURE: 76 MMHG | SYSTOLIC BLOOD PRESSURE: 128 MMHG | WEIGHT: 293 LBS | HEIGHT: 66 IN

## 2021-02-17 DIAGNOSIS — Z01.411 ENCOUNTER FOR GYNECOLOGICAL EXAMINATION WITH ABNORMAL FINDING: ICD-10-CM

## 2021-02-17 DIAGNOSIS — E66.01 OBESITY, MORBID, BMI 50 OR HIGHER (HCC): ICD-10-CM

## 2021-02-17 DIAGNOSIS — E88.81 METABOLIC SYNDROME: Primary | ICD-10-CM

## 2021-02-17 PROBLEM — N91.2 AMENORRHEA: Status: RESOLVED | Noted: 2018-08-22 | Resolved: 2021-02-17

## 2021-02-17 PROCEDURE — 99395 PREV VISIT EST AGE 18-39: CPT | Performed by: OBSTETRICS & GYNECOLOGY

## 2021-02-17 NOTE — PROGRESS NOTES
Chief Complaint   Patient presents with   • Annual Exam   • Med Refill       Eliz Eisenberg is a 30 y.o. year old  presenting to be seen for her annual exam.  This patient has been seen in the past with a history of irregular menses and an altered glucose/insulin ratio.  She has a diagnosis of metabolic syndrome.  A CT scan on 2019 revealed normal pelvic structures.  She currently takes Metformin, 1000 mg in the morning and 500 mg in the evening.  Her glucose insulin ratio has improved to 6/1.  She has no side effects on Metformin.  She does not have diarrhea.  She takes a baby aspirin, 81 mg every other day.  She is on a multivitamin with folic acid daily.  She has intermittent ovulation according to an ovulation indicator kit.  A prior FSH and prolactin were normal.  She has chronic elevation of her white blood count.  She denies bowel or urinary symptoms.    SCREENING TESTS    Year 2012   Age                         PAP        Neg.                 HPV high risk                         Mammogram                         DENICE score                         Breast MRI                         Lipids                         Vitamin D                         Colonoscopy                         DEXA  Frax (hip/any)                         Ovarian Screen        nl                     She exercises regularly: no.  She wears her seat belt: yes.  She has concerns about domestic violence: no.  She has noticed changes in height: no    GYN screening history:  · Last pap: was done on approximately 2019 and the result was: normal PAP..    No Additional Complaints Reported    The following portions of the patient's history were reviewed and updated as appropriate:vital signs and   She  has a past medical history of Abdominal pain, Acute sinusitis, Ankle sprain, Asthma, Atopic dermatitis, Biliary dyskinesia,  Cat bite, Cough, Dysfunctional uterine bleeding, Foot pain, Gout, Influenza, Metabolic syndrome, Muscular aches, Obesity, Pharyngitis, Psoriasis, and URI (upper respiratory infection).  She does not have any pertinent problems on file.  She  has a past surgical history that includes [place] tooth extraction; Cholecystectomy; and Hernia repair.  Her family history includes Asthma in her maternal grandfather; Breast cancer in her maternal grandmother; Cancer in her maternal grandmother and paternal grandmother; Colon cancer in her paternal grandmother; Diabetes in her father and mother; Heart attack in her father and paternal grandfather; Hypertension in her father; Multiple sclerosis in her mother; Obesity in her father; Sleep apnea in her father and paternal grandfather.  She  reports that she has never smoked. She has never used smokeless tobacco. She reports current alcohol use. She reports that she does not use drugs.  Current Outpatient Medications   Medication Sig Dispense Refill   • Adalimumab (HUMIRA PEN SC) Inject  under the skin into the appropriate area as directed.     • albuterol (PROVENTIL HFA;VENTOLIN HFA) 108 (90 Base) MCG/ACT inhaler Inhale 2 puffs Every 4 (Four) Hours As Needed for Wheezing. 1 inhaler 5   • aspirin 81 MG chewable tablet Chew 81 mg Daily.     • budesonide-formoterol (SYMBICORT) 80-4.5 MCG/ACT inhaler Inhale 2 puffs 2 (Two) Times a Day.     • cetirizine (zyrTEC) 5 MG tablet Take 1 tablet by mouth Daily.     • clomiPHENE (CLOMID) 50 MG tablet Take one tablet daily for 5 days beginning 3rd day of cycle 5 tablet 2   • hydrOXYzine (ATARAX) 25 MG tablet Take 1 tablet by mouth 3 (Three) Times a Day As Needed for Anxiety. 20 tablet 0   • metFORMIN (GLUCOPHAGE) 500 MG tablet TAKE TWO TABLETS BY MOUTH EVERY MORNING AND TAKE ONE TABLET BY MOUTH EVERY EVENING WITH MEALS 270 tablet 3   • montelukast (SINGULAIR) 10 MG tablet Take 1 tablet by mouth Every Night. 90 tablet 1   • omeprazole (priLOSEC)  "20 MG capsule Take 20 mg by mouth Daily.     • ondansetron ODT (ZOFRAN-ODT) 4 MG disintegrating tablet Take 1 tablet by mouth 4 (Four) Times a Day As Needed for Nausea. 16 tablet 0   • promethazine (PHENERGAN) 25 MG tablet TAKE ONE TABLET BY MOUTH EVERY 4 HOURS AS NEEDED FOR NAUSEA 10 tablet 0   • QUEtiapine (SEROquel) 25 MG tablet Take 1-2 tablets at night 60 tablet 2     No current facility-administered medications for this visit.      She is allergic to tree extract; latex; and pollen extract..    Review of Systems  A review of systems was taken.  She denies cough, fever, shortness of breath, and loss of her sense of taste or smell  Constitutional: negative for fever, chills, activity change, appetite change, fatigue and unexpected weight change.  Respiratory: negative  Cardiovascular: negative  Gastrointestinal: negative  Genitourinary:negative  Musculoskeletal:negative  Behavioral/Psych: negative     Counseling/Anticipatory Guidance Discussed: nutrition, family planning/contraception, physical activity, healthy weight, immunizations and breast cancer and self breast exams      /76   Ht 167.6 cm (66\")   Wt (!) 150 kg (331 lb 3.2 oz)   LMP 01/21/2021 (Exact Date)   Breastfeeding No   BMI 53.46 kg/m²     MEDICALLY INDICATED   Physical Exam    General:  alert; cooperative; well developed; well nourished  obese - Body mass index is 53.46 kg/m².   Skin:  No suspicious lesions seen   Thyroid: normal to inspection and palpation   Lungs:  breathing is unlabored  clear to auscultation bilaterally   Heart:  regular rate and rhythm, S1, S2 normal, no murmur, click, rub or gallop  normal apical impulse   Breasts:  Examined in supine position  Symmetric without masses or skin dimpling  Nipples normal without inversion, lesions or discharge  There are no palpable axillary nodes   Abdomen: soft, non-tender; no masses  no umbilical or inguinal hernias are present  no hepato-splenomegaly  obese   Pelvis: Clinical " staff was present for exam  External genitalia:  normal appearance of the external genitalia including Bartholin's and [place]'s glands.  Vaginal:  normal pink mucosa without prolapse or lesions.  Cervix:  normal appearance.  Uterus:  normal size, shape and consistency. anteverted;  Adnexa:  non palpable bilaterally.  Rectal:  anus visually normal appearing. recto-vaginal exam unremarkable and confirms findings;     Lab Review   Glucose/insulin  PAP results    Imaging  No data reviewed        Advance directives- NO (age)      ASSESSMENT  Problems Addressed this Visit        Endocrine and Metabolic    Obesity, morbid, BMI 50 or higher (CMS/[facility])    Metabolic syndrome - Primary    Relevant Medications    metFORMIN (GLUCOPHAGE) 500 MG tablet    clomiPHENE (CLOMID) 50 MG tablet      Other Visit Diagnoses     Encounter for gynecological examination with abnormal finding          Diagnoses       Codes Comments    Metabolic syndrome    -  Primary ICD-10-CM: E88.81  ICD-9-CM: 277.7     Obesity, morbid, BMI 50 or higher (CMS/[facility])     ICD-10-CM: E66.01  ICD-9-CM: 278.01     Encounter for gynecological examination with abnormal finding     ICD-10-CM: Z01.411  ICD-9-CM: V72.31               Substance History:   reports that she has never smoked. She has never used smokeless tobacco.   reports current alcohol use.   reports no history of drug use.    Substance use counseling is not indicated based on patient history.  The patient indicates that her alcohol intake is social, and controlled.      PLAN    Medications prescribed this encounter:    New Medications Ordered This Visit   Medications   • metFORMIN (GLUCOPHAGE) 500 MG tablet     Sig: TAKE TWO TABLETS BY MOUTH EVERY MORNING AND TAKE ONE TABLET BY MOUTH EVERY EVENING WITH MEALS     Dispense:  270 tablet     Refill:  3   • clomiPHENE (CLOMID) 50 MG tablet     Sig: Take one tablet daily for 5 days beginning 3rd day of cycle     Dispense:  5 tablet     Refill:  2   · The patient  will use an ovulation indicator kit.  She has been instructed about how to take her clomiphene citrate.  · I have asked the patient to follow-up with her primary care physician regarding her leukocytosis.  · Pap test done  · PNV daily; regular weight-bearing exercise for weight reduction  · Follow up: 4 month(s)  *Please note that portions of this documentation may have been completed with a voice recognition program.  Efforts were made to edit this dictation, but occasional words may have been mistranscribed.       This note was electronically signed.    SIMEON Ayers MD  February 17, 2021  13:49 EST

## 2021-04-06 ENCOUNTER — LAB (OUTPATIENT)
Dept: LAB | Facility: HOSPITAL | Age: 31
End: 2021-04-06

## 2021-04-06 ENCOUNTER — OFFICE VISIT (OUTPATIENT)
Dept: INTERNAL MEDICINE | Facility: CLINIC | Age: 31
End: 2021-04-06

## 2021-04-06 VITALS
SYSTOLIC BLOOD PRESSURE: 128 MMHG | BODY MASS INDEX: 47.09 KG/M2 | DIASTOLIC BLOOD PRESSURE: 90 MMHG | HEART RATE: 97 BPM | WEIGHT: 293 LBS | OXYGEN SATURATION: 96 % | HEIGHT: 66 IN

## 2021-04-06 DIAGNOSIS — H69.83 DYSFUNCTION OF BOTH EUSTACHIAN TUBES: ICD-10-CM

## 2021-04-06 DIAGNOSIS — D72.829 LEUKOCYTOSIS, UNSPECIFIED TYPE: ICD-10-CM

## 2021-04-06 DIAGNOSIS — E66.01 OBESITY, MORBID, BMI 50 OR HIGHER (HCC): ICD-10-CM

## 2021-04-06 DIAGNOSIS — Z00.00 HEALTH CARE MAINTENANCE: Primary | ICD-10-CM

## 2021-04-06 DIAGNOSIS — Z00.00 HEALTH CARE MAINTENANCE: ICD-10-CM

## 2021-04-06 LAB
BASOPHILS # BLD AUTO: 0.04 10*3/MM3 (ref 0–0.2)
BASOPHILS NFR BLD AUTO: 0.3 % (ref 0–1.5)
DEPRECATED RDW RBC AUTO: 41.5 FL (ref 37–54)
EOSINOPHIL # BLD AUTO: 0.15 10*3/MM3 (ref 0–0.4)
EOSINOPHIL NFR BLD AUTO: 1.3 % (ref 0.3–6.2)
ERYTHROCYTE [DISTWIDTH] IN BLOOD BY AUTOMATED COUNT: 14.4 % (ref 12.3–15.4)
HCT VFR BLD AUTO: 36.9 % (ref 34–46.6)
HGB BLD-MCNC: 11.9 G/DL (ref 12–15.9)
IMM GRANULOCYTES # BLD AUTO: 0.06 10*3/MM3 (ref 0–0.05)
IMM GRANULOCYTES NFR BLD AUTO: 0.5 % (ref 0–0.5)
LYMPHOCYTES # BLD AUTO: 2.8 10*3/MM3 (ref 0.7–3.1)
LYMPHOCYTES NFR BLD AUTO: 23.9 % (ref 19.6–45.3)
MCH RBC QN AUTO: 26 PG (ref 26.6–33)
MCHC RBC AUTO-ENTMCNC: 32.2 G/DL (ref 31.5–35.7)
MCV RBC AUTO: 80.6 FL (ref 79–97)
MONOCYTES # BLD AUTO: 0.73 10*3/MM3 (ref 0.1–0.9)
MONOCYTES NFR BLD AUTO: 6.2 % (ref 5–12)
NEUTROPHILS NFR BLD AUTO: 67.8 % (ref 42.7–76)
NEUTROPHILS NFR BLD AUTO: 7.93 10*3/MM3 (ref 1.7–7)
NRBC BLD AUTO-RTO: 0 /100 WBC (ref 0–0.2)
PLATELET # BLD AUTO: 401 10*3/MM3 (ref 140–450)
PMV BLD AUTO: 10.4 FL (ref 6–12)
RBC # BLD AUTO: 4.58 10*6/MM3 (ref 3.77–5.28)
WBC # BLD AUTO: 11.71 10*3/MM3 (ref 3.4–10.8)

## 2021-04-06 PROCEDURE — 80053 COMPREHEN METABOLIC PANEL: CPT

## 2021-04-06 PROCEDURE — 99213 OFFICE O/P EST LOW 20 MIN: CPT | Performed by: PHYSICIAN ASSISTANT

## 2021-04-06 PROCEDURE — 80061 LIPID PANEL: CPT

## 2021-04-06 PROCEDURE — 90732 PPSV23 VACC 2 YRS+ SUBQ/IM: CPT | Performed by: PHYSICIAN ASSISTANT

## 2021-04-06 PROCEDURE — 99395 PREV VISIT EST AGE 18-39: CPT | Performed by: PHYSICIAN ASSISTANT

## 2021-04-06 PROCEDURE — 90471 IMMUNIZATION ADMIN: CPT | Performed by: PHYSICIAN ASSISTANT

## 2021-04-06 PROCEDURE — 84443 ASSAY THYROID STIM HORMONE: CPT

## 2021-04-06 PROCEDURE — 85025 COMPLETE CBC W/AUTO DIFF WBC: CPT

## 2021-04-06 NOTE — PROGRESS NOTES
"Chief Complaint   Patient presents with   • Annual Exam       Subjective     History of Present Illness    Eliz Eisenberg is a 30 y.o. female.     The patient is being seen for a health maintenance evaluation.  The last health maintenance was 2 year(s) ago.    Social History  Eliz  does not smoke cigarettes.   She drinks no alcohol.  She does not use illicit drugs.    General History  Eliz  does have regular dental visits.  She does complain of vision problems. Wears glasses. Last eye exam was 10/2019.  Immunizations are not up to date. The patient needs the following immunizations: pneumovax 23 indicated    Lifestyle  Eliz  consumes in general, a \"healthy\" diet  .  She exercises three times a week.    Reproductive Health  Eliz  is premenopausal.  She reports periods are irregular.  She is sexually active. Her contraceptive plan is no method.    Screening  Last pap was 2/17/2021 by Dr Ayers. History of abnormal pap smear or family history of gyn cancer: none  Last mammogram was  never. Personal or family history of abnormal mammograms or breast cancer: maternal GM  Last colonoscopy was 2016 prior to gallbladder removal. Family history of colon cancer: paternal GM with colon cancer  Last DEXA was never.     After her last visit pt tried the seroquel and used for a few nights. Then stopped it and has continued to sleep well.     Pt also saw ENT due to fluid in her ears. Her hearing was tested and was extremely abnormal. She had brain MRI that was normal. Thought the fluid would go away on its own. Scheduled 1 year follow up. Pt continues to have feeling of fluid in her ears, would like to see another ENT for 2nd opinion.                Current Outpatient Medications:   •  Adalimumab (HUMIRA PEN SC), Inject  under the skin into the appropriate area as directed., Disp: , Rfl:   •  albuterol (PROVENTIL HFA;VENTOLIN HFA) 108 (90 Base) MCG/ACT inhaler, Inhale 2 puffs Every 4 (Four) Hours As Needed for " Wheezing., Disp: 1 inhaler, Rfl: 5  •  aspirin 81 MG chewable tablet, Chew 81 mg Daily., Disp: , Rfl:   •  budesonide-formoterol (SYMBICORT) 80-4.5 MCG/ACT inhaler, Inhale 2 puffs 2 (Two) Times a Day., Disp: , Rfl:   •  cetirizine (zyrTEC) 5 MG tablet, Take 1 tablet by mouth Daily., Disp: , Rfl:   •  clomiPHENE (CLOMID) 50 MG tablet, Take one tablet daily for 5 days beginning 3rd day of cycle, Disp: 5 tablet, Rfl: 2  •  hydrOXYzine (ATARAX) 25 MG tablet, Take 1 tablet by mouth 3 (Three) Times a Day As Needed for Anxiety., Disp: 20 tablet, Rfl: 0  •  metFORMIN (GLUCOPHAGE) 500 MG tablet, TAKE TWO TABLETS BY MOUTH EVERY MORNING AND TAKE ONE TABLET BY MOUTH EVERY EVENING WITH MEALS, Disp: 270 tablet, Rfl: 3  •  montelukast (SINGULAIR) 10 MG tablet, Take 1 tablet by mouth Every Night., Disp: 90 tablet, Rfl: 1  •  omeprazole (priLOSEC) 20 MG capsule, Take 20 mg by mouth Daily., Disp: , Rfl:   •  ondansetron ODT (ZOFRAN-ODT) 4 MG disintegrating tablet, Take 1 tablet by mouth 4 (Four) Times a Day As Needed for Nausea., Disp: 16 tablet, Rfl: 0  •  promethazine (PHENERGAN) 25 MG tablet, TAKE ONE TABLET BY MOUTH EVERY 4 HOURS AS NEEDED FOR NAUSEA, Disp: 10 tablet, Rfl: 0  •  QUEtiapine (SEROquel) 25 MG tablet, Take 1-2 tablets at night, Disp: 60 tablet, Rfl: 2     PMFSH  The following portions of the patient's history were reviewed and updated as appropriate: allergies, current medications, past family history, past medical history, past social history, past surgical history and problem list.    Review of Systems   Constitutional: Negative for appetite change, fever and unexpected weight change.   HENT: Positive for hearing loss. Negative for ear pain, facial swelling, sinus pressure, sinus pain and sore throat.    Eyes: Negative for pain and visual disturbance.   Respiratory: Negative for chest tightness, shortness of breath and wheezing.    Cardiovascular: Negative for chest pain and palpitations.   Gastrointestinal:  "Negative for abdominal pain and blood in stool.   Endocrine: Negative.    Genitourinary: Negative for difficulty urinating and hematuria.   Musculoskeletal: Negative for joint swelling.   Neurological: Negative for tremors, seizures and syncope.   Hematological: Negative for adenopathy.   Psychiatric/Behavioral: Negative.        Objective   /90   Pulse 97   Ht 167.6 cm (66\")   Wt (!) 152 kg (334 lb)   SpO2 96%   BMI 53.91 kg/m²     Physical Exam  Vitals and nursing note reviewed.   Constitutional:       General: She is not in acute distress.     Appearance: She is well-developed. She is not diaphoretic.   HENT:      Head: Normocephalic and atraumatic. Hair is normal.      Right Ear: Hearing, tympanic membrane, ear canal and external ear normal. No decreased hearing noted. No drainage.      Left Ear: Hearing, tympanic membrane, ear canal and external ear normal. No decreased hearing noted.      Nose: No nasal deformity.   Eyes:      General: Lids are normal. Lids are everted, no foreign bodies appreciated.         Right eye: No discharge.         Left eye: No discharge.      Conjunctiva/sclera: Conjunctivae normal.      Pupils: Pupils are equal, round, and reactive to light.   Neck:      Thyroid: No thyromegaly.      Vascular: No JVD.      Trachea: No tracheal deviation.   Cardiovascular:      Rate and Rhythm: Normal rate and regular rhythm.      Pulses: Normal pulses.      Heart sounds: Normal heart sounds. No murmur heard.   No friction rub. No gallop.    Pulmonary:      Effort: Pulmonary effort is normal. No respiratory distress.      Breath sounds: Normal breath sounds. No wheezing or rales.   Chest:      Chest wall: No tenderness.   Abdominal:      General: Bowel sounds are normal. There is no distension.      Palpations: Abdomen is soft. There is no mass.      Tenderness: There is no abdominal tenderness. There is no guarding or rebound.      Hernia: No hernia is present.   Musculoskeletal:         " General: No tenderness or deformity. Normal range of motion.      Cervical back: Normal range of motion and neck supple.   Lymphadenopathy:      Cervical: No cervical adenopathy.   Skin:     General: Skin is warm and dry.      Findings: No erythema or rash.   Neurological:      Mental Status: She is alert and oriented to person, place, and time.      Cranial Nerves: No cranial nerve deficit.      Motor: No abnormal muscle tone.      Coordination: Coordination normal.      Deep Tendon Reflexes: Reflexes are normal and symmetric. Reflexes normal.   Psychiatric:         Behavior: Behavior normal.         Thought Content: Thought content normal.         Judgment: Judgment normal.         Results for orders placed or performed in visit on 01/22/21   Vitamin D 25 Hydroxy    Specimen: Blood   Result Value Ref Range    25 Hydroxy, Vitamin D 21.7 (L) 30.0 - 100.0 ng/ml        ASSESSMENT/PLAN    Diagnoses and all orders for this visit:    1. Health care maintenance (Primary)  Assessment & Plan:  Immunizations: pneumovax 23 done today  Eye exam: done 10/2019  Pap Smear: done 2/2021 by Dr Ayers  Mammogram: due age 40  Dexa: due postmenopausal  Colonoscopy: due age 45  Labs: fasting labs ordered    Counseled patient regarding multimodal approach with healthy nutrition, healthy sleep, regular physical activity, social activities, counseling, safety measures and medications.     Orders:  -     CBC & Differential; Future  -     Comprehensive Metabolic Panel; Future  -     Lipid Panel; Future  -     TSH; Future    2. Dysfunction of both eustachian tubes  Comments:  Refer to ENT for second opinion regarding hearing loss and ear fullness.  Orders:  -     Ambulatory Referral to ENT (Otolaryngology)    3. Leukocytosis, unspecified type  Comments:  Recheck CBC. Discussed referral to Hematology if WBC continues to remain elevated.    4. Obesity, morbid, BMI 50 or higher (CMS/[facility])  Assessment & Plan:  Patient's (Body mass index is 53.91  kg/m².) indicates that they are obese (BMI >30) with obesity-related health conditions that include urinary stress insontinence . Obesity is worsening. BMI is is above average; BMI management plan is completed. We discussed portion control, increasing exercise, Weight Watchers or other Commercial based weight reduction program and an alva-based approach such as Offermobi Pal or Lose It.       Other orders  -     Pneumococcal Polysaccharide Vaccine 23-Valent (PPSV23) Greater Than or Equal To 1yo Subcutaneous / IM           Return in about 1 year (around 4/6/2022) for Annual with fasting labs.

## 2021-04-07 LAB
ALBUMIN SERPL-MCNC: 4 G/DL (ref 3.5–5.2)
ALBUMIN/GLOB SERPL: 1 G/DL
ALP SERPL-CCNC: 114 U/L (ref 39–117)
ALT SERPL W P-5'-P-CCNC: 24 U/L (ref 1–33)
ANION GAP SERPL CALCULATED.3IONS-SCNC: 9.9 MMOL/L (ref 5–15)
AST SERPL-CCNC: 17 U/L (ref 1–32)
BILIRUB SERPL-MCNC: 0.4 MG/DL (ref 0–1.2)
BUN SERPL-MCNC: 9 MG/DL (ref 6–20)
BUN/CREAT SERPL: 15.8 (ref 7–25)
CALCIUM SPEC-SCNC: 8.9 MG/DL (ref 8.6–10.5)
CHLORIDE SERPL-SCNC: 103 MMOL/L (ref 98–107)
CHOLEST SERPL-MCNC: 189 MG/DL (ref 0–200)
CO2 SERPL-SCNC: 26.1 MMOL/L (ref 22–29)
CREAT SERPL-MCNC: 0.57 MG/DL (ref 0.57–1)
GFR SERPL CREATININE-BSD FRML MDRD: 125 ML/MIN/1.73
GLOBULIN UR ELPH-MCNC: 3.9 GM/DL
GLUCOSE SERPL-MCNC: 74 MG/DL (ref 65–99)
HDLC SERPL-MCNC: 35 MG/DL (ref 40–60)
LDLC SERPL CALC-MCNC: 133 MG/DL (ref 0–100)
LDLC/HDLC SERPL: 3.74 {RATIO}
POTASSIUM SERPL-SCNC: 4.3 MMOL/L (ref 3.5–5.2)
PROT SERPL-MCNC: 7.9 G/DL (ref 6–8.5)
SODIUM SERPL-SCNC: 139 MMOL/L (ref 136–145)
TRIGL SERPL-MCNC: 115 MG/DL (ref 0–150)
TSH SERPL DL<=0.05 MIU/L-ACNC: 2.97 UIU/ML (ref 0.27–4.2)
VLDLC SERPL-MCNC: 21 MG/DL (ref 5–40)

## 2021-04-09 NOTE — ASSESSMENT & PLAN NOTE
Patient's (Body mass index is 53.91 kg/m².) indicates that they are obese (BMI >30) with obesity-related health conditions that include urinary stress insontinence . Obesity is worsening. BMI is is above average; BMI management plan is completed. We discussed portion control, increasing exercise, Weight Watchers or other Commercial based weight reduction program and an alva-based approach such as Symplified Pal or Lose It.

## 2021-04-09 NOTE — ASSESSMENT & PLAN NOTE
Immunizations: pneumovax 23 done today  Eye exam: done 10/2019  Pap Smear: done 2/2021 by Dr Ayers  Mammogram: due age 40  Dexa: due postmenopausal  Colonoscopy: due age 45  Labs: fasting labs ordered    Counseled patient regarding multimodal approach with healthy nutrition, healthy sleep, regular physical activity, social activities, counseling, safety measures and medications.

## 2021-04-19 NOTE — PROGRESS NOTES
Your labs show that your cholesterol has improved. Keep working to maintain a healthy diet with low carbohydrates and health diet.    Your white count has also improved.    Everything else looks fine!

## 2021-07-07 ENCOUNTER — APPOINTMENT (OUTPATIENT)
Dept: PREADMISSION TESTING | Facility: HOSPITAL | Age: 31
End: 2021-07-07

## 2021-07-07 ENCOUNTER — PRE-ADMISSION TESTING (OUTPATIENT)
Dept: PREADMISSION TESTING | Facility: HOSPITAL | Age: 31
End: 2021-07-07

## 2021-07-07 VITALS — BODY MASS INDEX: 47.09 KG/M2 | HEIGHT: 66 IN | WEIGHT: 293 LBS

## 2021-07-07 LAB
ALBUMIN SERPL-MCNC: 4 G/DL (ref 3.5–5.2)
ALBUMIN/GLOB SERPL: 1.3 G/DL
ALP SERPL-CCNC: 122 U/L (ref 39–117)
ALT SERPL W P-5'-P-CCNC: 21 U/L (ref 1–33)
ANION GAP SERPL CALCULATED.3IONS-SCNC: 12 MMOL/L (ref 5–15)
AST SERPL-CCNC: 16 U/L (ref 1–32)
BILIRUB SERPL-MCNC: 0.4 MG/DL (ref 0–1.2)
BUN SERPL-MCNC: 13 MG/DL (ref 6–20)
BUN/CREAT SERPL: 19.1 (ref 7–25)
CALCIUM SPEC-SCNC: 8.9 MG/DL (ref 8.6–10.5)
CHLORIDE SERPL-SCNC: 103 MMOL/L (ref 98–107)
CO2 SERPL-SCNC: 23 MMOL/L (ref 22–29)
CREAT SERPL-MCNC: 0.68 MG/DL (ref 0.57–1)
DEPRECATED RDW RBC AUTO: 45.8 FL (ref 37–54)
ERYTHROCYTE [DISTWIDTH] IN BLOOD BY AUTOMATED COUNT: 15 % (ref 12.3–15.4)
GFR SERPL CREATININE-BSD FRML MDRD: 101 ML/MIN/1.73
GLOBULIN UR ELPH-MCNC: 3.1 GM/DL
GLUCOSE SERPL-MCNC: 116 MG/DL (ref 65–99)
HCT VFR BLD AUTO: 38.4 % (ref 34–46.6)
HGB BLD-MCNC: 11.7 G/DL (ref 12–15.9)
MCH RBC QN AUTO: 25.7 PG (ref 26.6–33)
MCHC RBC AUTO-ENTMCNC: 30.5 G/DL (ref 31.5–35.7)
MCV RBC AUTO: 84.4 FL (ref 79–97)
PLATELET # BLD AUTO: 332 10*3/MM3 (ref 140–450)
PMV BLD AUTO: 9.8 FL (ref 6–12)
POTASSIUM SERPL-SCNC: 4.3 MMOL/L (ref 3.5–5.2)
PROT SERPL-MCNC: 7.1 G/DL (ref 6–8.5)
QT INTERVAL: 368 MS
QTC INTERVAL: 460 MS
RBC # BLD AUTO: 4.55 10*6/MM3 (ref 3.77–5.28)
SARS-COV-2 RNA PNL SPEC NAA+PROBE: NOT DETECTED
SODIUM SERPL-SCNC: 138 MMOL/L (ref 136–145)
WBC # BLD AUTO: 9.37 10*3/MM3 (ref 3.4–10.8)

## 2021-07-07 PROCEDURE — 80053 COMPREHEN METABOLIC PANEL: CPT

## 2021-07-07 PROCEDURE — 85027 COMPLETE CBC AUTOMATED: CPT

## 2021-07-07 PROCEDURE — 93010 ELECTROCARDIOGRAM REPORT: CPT | Performed by: INTERNAL MEDICINE

## 2021-07-07 PROCEDURE — 93005 ELECTROCARDIOGRAM TRACING: CPT

## 2021-07-07 PROCEDURE — C9803 HOPD COVID-19 SPEC COLLECT: HCPCS

## 2021-07-07 PROCEDURE — U0004 COV-19 TEST NON-CDC HGH THRU: HCPCS

## 2021-07-07 PROCEDURE — 36415 COLL VENOUS BLD VENIPUNCTURE: CPT

## 2021-07-07 RX ORDER — CHOLECALCIFEROL (VITAMIN D3) 125 MCG
2000 CAPSULE ORAL DAILY
COMMUNITY
End: 2022-08-11

## 2021-07-07 NOTE — PAT
It was noted during Pre Admission Testing that patient was wearing some form of fingernail polish (gel/regular) and/or acrylic/artificial nails.  Patient was told that polish and/or artificial nails must be removed for surgery.  If a patient had recent manicure, and would rather not remove polish or artificial nails. Then the minimum requirement is that the polish/artificial nails must be removed from the middle finger on each hand.  Patient verbalized understanding.    If patient was having surgery on an upper extremity, then the patient was instructed that fingernail polish/artificial fingernails must be removed for surgery.  NO EXCEPTIONS.  Patient verbalized understanding.    If patient was having surgery on a lower extremity, then the patient was instructed that toenail polish on both extremities must be removed for surgery.  NO EXCEPTIONS. Patient verbalized understanding.    SPOKE WITH MEREDITH AT DR MEJIA'S OFFICE. PATIENT STATES SHE SHOULD BE HAVING T&A BUT CONSENT SAYS TONS ONLY. MEREDITH WILL CONFIRM WITH SURGEON AND PATIENT WILL SIGN CONSENT AM OF PROCEDURE IN PREOP.

## 2021-07-08 ENCOUNTER — ANESTHESIA EVENT (OUTPATIENT)
Dept: PERIOP | Facility: HOSPITAL | Age: 31
End: 2021-07-08

## 2021-07-08 RX ORDER — FAMOTIDINE 10 MG/ML
20 INJECTION, SOLUTION INTRAVENOUS ONCE
Status: CANCELLED | OUTPATIENT
Start: 2021-07-08 | End: 2021-07-08

## 2021-07-08 RX ORDER — SODIUM CHLORIDE 0.9 % (FLUSH) 0.9 %
10 SYRINGE (ML) INJECTION EVERY 12 HOURS SCHEDULED
Status: CANCELLED | OUTPATIENT
Start: 2021-07-08

## 2021-07-08 RX ORDER — SODIUM CHLORIDE 0.9 % (FLUSH) 0.9 %
10 SYRINGE (ML) INJECTION AS NEEDED
Status: CANCELLED | OUTPATIENT
Start: 2021-07-08

## 2021-07-09 ENCOUNTER — ANESTHESIA (OUTPATIENT)
Dept: PERIOP | Facility: HOSPITAL | Age: 31
End: 2021-07-09

## 2021-07-09 ENCOUNTER — HOSPITAL ENCOUNTER (OUTPATIENT)
Facility: HOSPITAL | Age: 31
Setting detail: HOSPITAL OUTPATIENT SURGERY
Discharge: HOME | End: 2021-07-09
Attending: OTOLARYNGOLOGY | Admitting: OTOLARYNGOLOGY

## 2021-07-09 VITALS
TEMPERATURE: 98 F | DIASTOLIC BLOOD PRESSURE: 73 MMHG | RESPIRATION RATE: 22 BRPM | OXYGEN SATURATION: 92 % | WEIGHT: 293 LBS | BODY MASS INDEX: 47.09 KG/M2 | HEART RATE: 80 BPM | SYSTOLIC BLOOD PRESSURE: 130 MMHG | HEIGHT: 66 IN

## 2021-07-09 DIAGNOSIS — J03.90 TONSILLITIS: ICD-10-CM

## 2021-07-09 LAB
B-HCG UR QL: NEGATIVE
INTERNAL NEGATIVE CONTROL: NEGATIVE
INTERNAL POSITIVE CONTROL: POSITIVE
Lab: NORMAL

## 2021-07-09 PROCEDURE — 81025 URINE PREGNANCY TEST: CPT | Performed by: OTOLARYNGOLOGY

## 2021-07-09 PROCEDURE — 25010000002 FENTANYL CITRATE (PF) 50 MCG/ML SOLUTION: Performed by: NURSE ANESTHETIST, CERTIFIED REGISTERED

## 2021-07-09 PROCEDURE — 25010000002 NEOSTIGMINE 10 MG/10ML SOLUTION: Performed by: NURSE ANESTHETIST, CERTIFIED REGISTERED

## 2021-07-09 PROCEDURE — 25010000002 DEXAMETHASONE PER 1 MG: Performed by: NURSE ANESTHETIST, CERTIFIED REGISTERED

## 2021-07-09 PROCEDURE — 25010000002 ONDANSETRON PER 1 MG: Performed by: NURSE ANESTHETIST, CERTIFIED REGISTERED

## 2021-07-09 PROCEDURE — 88304 TISSUE EXAM BY PATHOLOGIST: CPT | Performed by: OTOLARYNGOLOGY

## 2021-07-09 PROCEDURE — 25010000002 DROPERIDOL PER 5 MG: Performed by: NURSE ANESTHETIST, CERTIFIED REGISTERED

## 2021-07-09 PROCEDURE — 25010000002 PROPOFOL 10 MG/ML EMULSION: Performed by: NURSE ANESTHETIST, CERTIFIED REGISTERED

## 2021-07-09 PROCEDURE — 25010000002 MORPHINE PER 10 MG: Performed by: NURSE ANESTHETIST, CERTIFIED REGISTERED

## 2021-07-09 RX ORDER — ROCURONIUM BROMIDE 10 MG/ML
INJECTION, SOLUTION INTRAVENOUS AS NEEDED
Status: DISCONTINUED | OUTPATIENT
Start: 2021-07-09 | End: 2021-07-09 | Stop reason: SURG

## 2021-07-09 RX ORDER — MEPERIDINE HYDROCHLORIDE 25 MG/ML
12.5 INJECTION INTRAMUSCULAR; INTRAVENOUS; SUBCUTANEOUS
Status: DISCONTINUED | OUTPATIENT
Start: 2021-07-09 | End: 2021-07-09 | Stop reason: HOSPADM

## 2021-07-09 RX ORDER — PROMETHAZINE HYDROCHLORIDE 25 MG/1
25 TABLET ORAL ONCE AS NEEDED
Status: DISCONTINUED | OUTPATIENT
Start: 2021-07-09 | End: 2021-07-09 | Stop reason: HOSPADM

## 2021-07-09 RX ORDER — NALOXONE HCL 0.4 MG/ML
0.4 VIAL (ML) INJECTION AS NEEDED
Status: DISCONTINUED | OUTPATIENT
Start: 2021-07-09 | End: 2021-07-09 | Stop reason: HOSPADM

## 2021-07-09 RX ORDER — SODIUM CHLORIDE 0.9 % (FLUSH) 0.9 %
3-10 SYRINGE (ML) INJECTION AS NEEDED
Status: DISCONTINUED | OUTPATIENT
Start: 2021-07-09 | End: 2021-07-09 | Stop reason: HOSPADM

## 2021-07-09 RX ORDER — LABETALOL HYDROCHLORIDE 5 MG/ML
5 INJECTION, SOLUTION INTRAVENOUS
Status: DISCONTINUED | OUTPATIENT
Start: 2021-07-09 | End: 2021-07-09 | Stop reason: HOSPADM

## 2021-07-09 RX ORDER — SODIUM CHLORIDE 9 MG/ML
INJECTION, SOLUTION INTRAVENOUS AS NEEDED
Status: DISCONTINUED | OUTPATIENT
Start: 2021-07-09 | End: 2021-07-09 | Stop reason: HOSPADM

## 2021-07-09 RX ORDER — FENTANYL CITRATE 50 UG/ML
50 INJECTION, SOLUTION INTRAMUSCULAR; INTRAVENOUS
Status: DISCONTINUED | OUTPATIENT
Start: 2021-07-09 | End: 2021-07-09 | Stop reason: HOSPADM

## 2021-07-09 RX ORDER — LIDOCAINE HYDROCHLORIDE 10 MG/ML
0.5 INJECTION, SOLUTION EPIDURAL; INFILTRATION; INTRACAUDAL; PERINEURAL ONCE AS NEEDED
Status: COMPLETED | OUTPATIENT
Start: 2021-07-09 | End: 2021-07-09

## 2021-07-09 RX ORDER — MAGNESIUM HYDROXIDE 1200 MG/15ML
LIQUID ORAL AS NEEDED
Status: DISCONTINUED | OUTPATIENT
Start: 2021-07-09 | End: 2021-07-09 | Stop reason: HOSPADM

## 2021-07-09 RX ORDER — BUPIVACAINE HYDROCHLORIDE AND EPINEPHRINE 5; 5 MG/ML; UG/ML
INJECTION, SOLUTION EPIDURAL; INTRACAUDAL; PERINEURAL AS NEEDED
Status: DISCONTINUED | OUTPATIENT
Start: 2021-07-09 | End: 2021-07-09 | Stop reason: HOSPADM

## 2021-07-09 RX ORDER — FAMOTIDINE 20 MG/1
20 TABLET, FILM COATED ORAL ONCE
Status: COMPLETED | OUTPATIENT
Start: 2021-07-09 | End: 2021-07-09

## 2021-07-09 RX ORDER — GLYCOPYRROLATE 0.2 MG/ML
INJECTION INTRAMUSCULAR; INTRAVENOUS AS NEEDED
Status: DISCONTINUED | OUTPATIENT
Start: 2021-07-09 | End: 2021-07-09 | Stop reason: SURG

## 2021-07-09 RX ORDER — ALBUTEROL SULFATE 90 UG/1
AEROSOL, METERED RESPIRATORY (INHALATION) AS NEEDED
Status: DISCONTINUED | OUTPATIENT
Start: 2021-07-09 | End: 2021-07-09 | Stop reason: SURG

## 2021-07-09 RX ORDER — DROPERIDOL 2.5 MG/ML
0.62 INJECTION, SOLUTION INTRAMUSCULAR; INTRAVENOUS AS NEEDED
Status: DISCONTINUED | OUTPATIENT
Start: 2021-07-09 | End: 2021-07-09 | Stop reason: HOSPADM

## 2021-07-09 RX ORDER — HYDROCODONE BITARTRATE AND ACETAMINOPHEN 5; 325 MG/1; MG/1
1 TABLET ORAL ONCE AS NEEDED
Status: DISCONTINUED | OUTPATIENT
Start: 2021-07-09 | End: 2021-07-09 | Stop reason: HOSPADM

## 2021-07-09 RX ORDER — NEOSTIGMINE METHYLSULFATE 1 MG/ML
INJECTION, SOLUTION INTRAVENOUS AS NEEDED
Status: DISCONTINUED | OUTPATIENT
Start: 2021-07-09 | End: 2021-07-09 | Stop reason: SURG

## 2021-07-09 RX ORDER — MORPHINE SULFATE 4 MG/ML
INJECTION, SOLUTION INTRAMUSCULAR; INTRAVENOUS AS NEEDED
Status: DISCONTINUED | OUTPATIENT
Start: 2021-07-09 | End: 2021-07-09 | Stop reason: SURG

## 2021-07-09 RX ORDER — MIDAZOLAM HYDROCHLORIDE 1 MG/ML
1 INJECTION INTRAMUSCULAR; INTRAVENOUS
Status: DISCONTINUED | OUTPATIENT
Start: 2021-07-09 | End: 2021-07-09 | Stop reason: HOSPADM

## 2021-07-09 RX ORDER — ACETAMINOPHEN 160 MG/5ML
500 SOLUTION ORAL EVERY 6 HOURS PRN
Status: COMPLETED | OUTPATIENT
Start: 2021-07-09 | End: 2021-07-09

## 2021-07-09 RX ORDER — SODIUM CHLORIDE 0.9 % (FLUSH) 0.9 %
3 SYRINGE (ML) INJECTION EVERY 12 HOURS SCHEDULED
Status: DISCONTINUED | OUTPATIENT
Start: 2021-07-09 | End: 2021-07-09 | Stop reason: HOSPADM

## 2021-07-09 RX ORDER — IPRATROPIUM BROMIDE AND ALBUTEROL SULFATE 2.5; .5 MG/3ML; MG/3ML
3 SOLUTION RESPIRATORY (INHALATION) ONCE AS NEEDED
Status: DISCONTINUED | OUTPATIENT
Start: 2021-07-09 | End: 2021-07-09 | Stop reason: HOSPADM

## 2021-07-09 RX ORDER — DEXAMETHASONE SODIUM PHOSPHATE 4 MG/ML
INJECTION, SOLUTION INTRA-ARTICULAR; INTRALESIONAL; INTRAMUSCULAR; INTRAVENOUS; SOFT TISSUE AS NEEDED
Status: DISCONTINUED | OUTPATIENT
Start: 2021-07-09 | End: 2021-07-09 | Stop reason: SURG

## 2021-07-09 RX ORDER — ONDANSETRON 2 MG/ML
4 INJECTION INTRAMUSCULAR; INTRAVENOUS ONCE AS NEEDED
Status: DISCONTINUED | OUTPATIENT
Start: 2021-07-09 | End: 2021-07-09 | Stop reason: HOSPADM

## 2021-07-09 RX ORDER — SODIUM CHLORIDE, SODIUM LACTATE, POTASSIUM CHLORIDE, CALCIUM CHLORIDE 600; 310; 30; 20 MG/100ML; MG/100ML; MG/100ML; MG/100ML
9 INJECTION, SOLUTION INTRAVENOUS CONTINUOUS
Status: DISCONTINUED | OUTPATIENT
Start: 2021-07-09 | End: 2021-07-09 | Stop reason: HOSPADM

## 2021-07-09 RX ORDER — PROMETHAZINE HYDROCHLORIDE 25 MG/1
25 SUPPOSITORY RECTAL ONCE AS NEEDED
Status: DISCONTINUED | OUTPATIENT
Start: 2021-07-09 | End: 2021-07-09 | Stop reason: HOSPADM

## 2021-07-09 RX ORDER — HYDROMORPHONE HYDROCHLORIDE 1 MG/ML
0.5 INJECTION, SOLUTION INTRAMUSCULAR; INTRAVENOUS; SUBCUTANEOUS
Status: DISCONTINUED | OUTPATIENT
Start: 2021-07-09 | End: 2021-07-09 | Stop reason: HOSPADM

## 2021-07-09 RX ORDER — LIDOCAINE HYDROCHLORIDE 10 MG/ML
INJECTION, SOLUTION EPIDURAL; INFILTRATION; INTRACAUDAL; PERINEURAL AS NEEDED
Status: DISCONTINUED | OUTPATIENT
Start: 2021-07-09 | End: 2021-07-09 | Stop reason: SURG

## 2021-07-09 RX ORDER — HYDRALAZINE HYDROCHLORIDE 20 MG/ML
5 INJECTION INTRAMUSCULAR; INTRAVENOUS
Status: DISCONTINUED | OUTPATIENT
Start: 2021-07-09 | End: 2021-07-09 | Stop reason: HOSPADM

## 2021-07-09 RX ORDER — ONDANSETRON 2 MG/ML
INJECTION INTRAMUSCULAR; INTRAVENOUS AS NEEDED
Status: DISCONTINUED | OUTPATIENT
Start: 2021-07-09 | End: 2021-07-09 | Stop reason: SURG

## 2021-07-09 RX ORDER — PROPOFOL 10 MG/ML
VIAL (ML) INTRAVENOUS AS NEEDED
Status: DISCONTINUED | OUTPATIENT
Start: 2021-07-09 | End: 2021-07-09 | Stop reason: SURG

## 2021-07-09 RX ORDER — DROPERIDOL 2.5 MG/ML
0.62 INJECTION, SOLUTION INTRAMUSCULAR; INTRAVENOUS ONCE AS NEEDED
Status: DISCONTINUED | OUTPATIENT
Start: 2021-07-09 | End: 2021-07-09 | Stop reason: HOSPADM

## 2021-07-09 RX ADMIN — SODIUM CHLORIDE, POTASSIUM CHLORIDE, SODIUM LACTATE AND CALCIUM CHLORIDE 9 ML/HR: 600; 310; 30; 20 INJECTION, SOLUTION INTRAVENOUS at 06:30

## 2021-07-09 RX ADMIN — ROCURONIUM BROMIDE 10 MG: 10 INJECTION, SOLUTION INTRAVENOUS at 07:44

## 2021-07-09 RX ADMIN — ONDANSETRON 4 MG: 2 INJECTION INTRAMUSCULAR; INTRAVENOUS at 08:31

## 2021-07-09 RX ADMIN — DEXAMETHASONE SODIUM PHOSPHATE 12 MG: 4 INJECTION, SOLUTION INTRA-ARTICULAR; INTRALESIONAL; INTRAMUSCULAR; INTRAVENOUS; SOFT TISSUE at 07:44

## 2021-07-09 RX ADMIN — ALBUTEROL SULFATE 4 PUFF: 90 AEROSOL, METERED RESPIRATORY (INHALATION) at 08:10

## 2021-07-09 RX ADMIN — FAMOTIDINE 20 MG: 20 TABLET, FILM COATED ORAL at 06:56

## 2021-07-09 RX ADMIN — DROPERIDOL 0.62 MG: 2.5 INJECTION, SOLUTION INTRAMUSCULAR; INTRAVENOUS at 08:47

## 2021-07-09 RX ADMIN — LIDOCAINE HYDROCHLORIDE 0.2 ML: 10 INJECTION, SOLUTION EPIDURAL; INFILTRATION; INTRACAUDAL; PERINEURAL at 06:30

## 2021-07-09 RX ADMIN — PROPOFOL 200 MG: 10 INJECTION, EMULSION INTRAVENOUS at 07:39

## 2021-07-09 RX ADMIN — PROPOFOL 200 MG: 10 INJECTION, EMULSION INTRAVENOUS at 07:44

## 2021-07-09 RX ADMIN — MORPHINE SULFATE 4 MG: 4 INJECTION INTRAVENOUS at 07:46

## 2021-07-09 RX ADMIN — NEOSTIGMINE 2.5 MG: 1 INJECTION INTRAVENOUS at 08:16

## 2021-07-09 RX ADMIN — GLYCOPYRROLATE 0.4 MG: 0.4 INJECTION INTRAMUSCULAR; INTRAVENOUS at 08:16

## 2021-07-09 RX ADMIN — ACETAMINOPHEN ORAL SOLUTION 500 MG: 160 SOLUTION ORAL at 10:18

## 2021-07-09 RX ADMIN — LIDOCAINE HYDROCHLORIDE 50 MG: 10 INJECTION, SOLUTION EPIDURAL; INFILTRATION; INTRACAUDAL; PERINEURAL at 07:39

## 2021-07-09 RX ADMIN — ALBUTEROL SULFATE 4 PUFF: 90 AEROSOL, METERED RESPIRATORY (INHALATION) at 08:16

## 2021-07-09 RX ADMIN — ROCURONIUM BROMIDE 10 MG: 10 INJECTION, SOLUTION INTRAVENOUS at 07:39

## 2021-07-09 RX ADMIN — FENTANYL CITRATE 50 MCG: 50 INJECTION INTRAMUSCULAR; INTRAVENOUS at 10:17

## 2021-07-09 NOTE — OP NOTE
Preoperative diagnosis: Chronic tonsillitis and tonsil hypertrophy    Postoperative diagnosis: Same    Procedure: Tonsillectomy    Surgeon: Ion    Anesthesia: General    Complications: None    Estimated blood loss: Minimal    Operative indications: 31-year-old with moderately severe chronic cryptic tonsillitis with frequent acute exacerbations.  The above is recommended and informed consent obtained    Operative findings: 2+ enlarged cryptic tonsils bilaterally, normal soft palate, no significant adenoid hypertrophy in the nasopharynx    Procedure: Patient was brought to the operating room and after adequate general anesthesia the mouth was draped in the usual sterile fashion and a McIvor mouthgag placed.  Tonsillectomy was then performed in the plane defined by the tonsillar capsule and superior constrictor muscle and this was done with a PEAK PlasmaBlade to simultaneously dissect and cauterize.  This was done bilaterally.  The tonsillar fossa's were then infiltrated with half percent Marcaine with epinephrine and the procedure concluded.  All counts correct.  Blood loss minimal.  Patient was sent to recovery in stable condition.

## 2021-07-09 NOTE — ANESTHESIA PREPROCEDURE EVALUATION
Anesthesia Evaluation                  Airway   Mallampati: II  Dental      Pulmonary    (+) asthma,sleep apnea,   Cardiovascular         Neuro/Psych  GI/Hepatic/Renal/Endo    (+) obesity,       Musculoskeletal     Abdominal    Substance History      OB/GYN          Other                        Anesthesia Plan    ASA 3     general     intravenous induction     Anesthetic plan, all risks, benefits, and alternatives have been provided, discussed and informed consent has been obtained with: patient.

## 2021-07-09 NOTE — H&P
Patient Care Team:      Chief complaint  Sleep apnea and frequent tonsil infections    Subjective:    Patient is a 31 y.o.female presents with a history of sleep apnea and tonsillitis, she presents today for tonsillectomy.  She states she had multiple episodes of tonsillitis last year and several infections already this year   Review of Systems:  General ROS: negative  Cardiovascular ROS: no chest pain or dyspnea on exertion  Respiratory ROS: positive for - asthma      Allergies:   Allergies   Allergen Reactions   • Latex Hives, Itching, Swelling and Rash   • Pollen Extract Rash   • Tree Extract Other (See Comments)     CONGESTION          Latex: YES  Contrast Dye neg    Home Meds    Medications Prior to Admission   Medication Sig Dispense Refill Last Dose   • cetirizine (zyrTEC) 5 MG tablet Take 1 tablet by mouth Daily.   7/8/2021 at Unknown time   • Cholecalciferol (Vitamin D3) 50 MCG (2000 UT) tablet Take 2,000 Units by mouth Daily.   7/8/2021 at Unknown time   • metFORMIN (GLUCOPHAGE) 500 MG tablet TAKE TWO TABLETS BY MOUTH EVERY MORNING AND TAKE ONE TABLET BY MOUTH EVERY EVENING WITH MEALS 270 tablet 3 7/8/2021 at Unknown time   • montelukast (SINGULAIR) 10 MG tablet Take 1 tablet by mouth Every Night. 90 tablet 1 7/8/2021 at Unknown time   • omeprazole (priLOSEC) 20 MG capsule Take 20 mg by mouth Daily.   7/8/2021 at Unknown time   • promethazine (PHENERGAN) 25 MG tablet TAKE ONE TABLET BY MOUTH EVERY 4 HOURS AS NEEDED FOR NAUSEA (Patient taking differently: Take 25 mg by mouth Every 6 (Six) Hours As Needed for Nausea.) 10 tablet 0 Past Month at Unknown time   • Adalimumab (Humira Pen) 40 MG/0.4ML Pen-injector Kit Inject  under the skin into the appropriate area as directed Every 14 (Fourteen) Days. 6/25/2021 LAST INJECTION   6/25/2021   • albuterol (PROVENTIL HFA;VENTOLIN HFA) 108 (90 Base) MCG/ACT inhaler Inhale 2 puffs Every 4 (Four) Hours As Needed for Wheezing. 1 inhaler 5 More than a month at Unknown  "time   • aspirin 81 MG chewable tablet Chew 81 mg Daily. WILL STOP FOR UPCOMING PROCEDURE.   7/5/2021   • budesonide-formoterol (SYMBICORT) 80-4.5 MCG/ACT inhaler Inhale 2 puffs 2 (Two) Times a Day.   More than a month at Unknown time   • clomiPHENE (CLOMID) 50 MG tablet Take one tablet daily for 5 days beginning 3rd day of cycle 5 tablet 2 More than a month at Unknown time   • hydrOXYzine (ATARAX) 25 MG tablet Take 1 tablet by mouth 3 (Three) Times a Day As Needed for Anxiety. 20 tablet 0 More than a month at Unknown time   • ondansetron ODT (ZOFRAN-ODT) 4 MG disintegrating tablet Take 1 tablet by mouth 4 (Four) Times a Day As Needed for Nausea. 16 tablet 0 More than a month at Unknown time   • QUEtiapine (SEROquel) 25 MG tablet Take 1-2 tablets at night 60 tablet 2 More than a month at Unknown time     PMH:   Past Medical History:   Diagnosis Date   • Abdominal pain    • Acute sinusitis    • Ankle sprain    • Anxiety    • Asthma    • Atopic dermatitis    • Biliary dyskinesia    • Cat bite      resolved   • Dysfunctional uterine bleeding    • Foot pain    • GERD (gastroesophageal reflux disease)    • Gout    • Hearing loss, bilateral     R/T PSORASIS   • IBS (irritable bowel syndrome)    • Influenza    • Metabolic syndrome    • Muscular aches    • Obesity    • Panic attack    • PCOS (polycystic ovarian syndrome)    • Pharyngitis    • Psoriasis    • URI (upper respiratory infection)    • Wears glasses      PSH:    Past Surgical History:   Procedure Laterality Date   • CHOLECYSTECTOMY     • COLONOSCOPY     • UMBILICAL HERNIA REPAIR     • WISDOM TOOTH EXTRACTION       Immunization History: pneumo 2021   Flu  20/21  Tetanus 2021 COVID neg  Social History:   Tobacco neg   Alcohol rarely      Physical Exam:/95 (BP Location: Right arm, Patient Position: Sitting)   Pulse 94   Temp 98.7 °F (37.1 °C) (Temporal)   Resp 18   Ht 167.6 cm (66\")   Wt (!) 151 kg (332 lb)   LMP 06/30/2021   SpO2 98%   Breastfeeding No "   BMI 53.59 kg/m²       General Appearance:    Alert, cooperative, no distress, appears stated age   Head:    Normocephalic, without obvious abnormality, atraumatic   Lungs:     Clear to auscultation bilaterally, respirations unlabored    Heart: Regular rate and rhythm, S1 and S2 normal, no murmur, rub    or gallop    Abdomen:    Soft without tenderness   Breast Exam:    deferred   Genitalia:    deferred   Extremities:   Extremities normal, atraumatic, no cyanosis or edema   Skin:   Skin color, texture, turgor normal, no rashes or lesions   Neurologic:   Grossly intact     Results Review:   LABS:  Lab Results   Component Value Date    WBC 9.37 07/07/2021    HGB 11.7 (L) 07/07/2021    HCT 38.4 07/07/2021    MCV 84.4 07/07/2021     07/07/2021    NEUTROABS 7.93 (H) 04/06/2021    GLUCOSE 116 (H) 07/07/2021    BUN 13 07/07/2021    CREATININE 0.68 07/07/2021    EGFRIFNONA 101 07/07/2021     07/07/2021    K 4.3 07/07/2021     07/07/2021    CO2 23.0 07/07/2021    CALCIUM 8.9 07/07/2021    ALBUMIN 4.00 07/07/2021    AST 16 07/07/2021    ALT 21 07/07/2021    BILITOT 0.4 07/07/2021       RADIOLOGY:  Imaging Results (Last 72 Hours)     ** No results found for the last 72 hours. **               Cancer Patient: __ yes __no __unknown; If yes, clinical stage T:__ N:__M:__, stage group    Impression: sleep apnea, tonsillitis    Plan: tonsillectomy  Lauryn Ruiz PA-C 7/9/2021 06:55 EDT

## 2021-07-09 NOTE — ANESTHESIA POSTPROCEDURE EVALUATION
Patient: Eliz Dailey Goosey    Procedure Summary     Date: 07/09/21 Room / Location:  [facility] [address] [place] [address] [facility]    Anesthesia Start: 0735 Anesthesia Stop: 0835    Procedure: TONSILLECTOMY (N/A Throat) Diagnosis:     Surgeons: Ran Lemon MD Provider: Hernan Osorio MD    Anesthesia Type: general ASA Status: 3          Anesthesia Type: general    Vitals  Vitals Value Taken Time   /72 07/09/21 0845   Temp 97.7 °F (36.5 °C) 07/09/21 0834   Pulse 85 07/09/21 0848   Resp 20 07/09/21 0834   SpO2 95 % 07/09/21 0848   Vitals shown include unvalidated device data.        Post Anesthesia Care and Evaluation    Patient location during evaluation: PACU  Patient participation: complete - patient participated  Level of consciousness: awake and alert  Pain management: adequate  Airway patency: patent  Anesthetic complications: No anesthetic complications  PONV Status: none  Cardiovascular status: hemodynamically stable and acceptable  Respiratory status: nonlabored ventilation, acceptable and nasal cannula  Hydration status: acceptable

## 2021-07-09 NOTE — ANESTHESIA PROCEDURE NOTES
Airway  Urgency: elective    Date/Time: 7/9/2021 7:41 AM  Airway not difficult    General Information and Staff    Patient location during procedure: OR  CRNA: [place] CRNA    Indications and Patient Condition  Indications for airway management: airway protection    Preoxygenated: yes  MILS not maintained throughout  Mask difficulty assessment: 1 - vent by mask    Final Airway Details  Final airway type: endotracheal airway      Successful airway: ETT  Cuffed: yes   Successful intubation technique: direct laryngoscopy  Endotracheal tube insertion site: oral  Blade: Fermin  Blade size: 3  ETT size (mm): 7.0  Cormack-Lehane Classification: grade I - full view of glottis (narrow palate, small mouth)  Placement verified by: chest auscultation and capnometry   Measured from: lips  ETT/EBT  to lips (cm): 20  Number of attempts at approach: 1  Assessment: lips, teeth, and gum same as pre-op and atraumatic intubation    Additional Comments  Negative epigastric sounds, Breath sound equal bilaterally with symmetric chest rise and fall

## 2021-07-12 LAB
CYTO UR: NORMAL
LAB AP CASE REPORT: NORMAL
LAB AP CLINICAL INFORMATION: NORMAL
PATH REPORT.FINAL DX SPEC: NORMAL
PATH REPORT.GROSS SPEC: NORMAL

## 2021-07-15 ENCOUNTER — HOSPITAL ENCOUNTER (EMERGENCY)
Facility: HOSPITAL | Age: 31
Discharge: HOME | End: 2021-07-16
Attending: EMERGENCY MEDICINE | Admitting: EMERGENCY MEDICINE

## 2021-07-15 ENCOUNTER — TRANSCRIBE ORDERS (OUTPATIENT)
Dept: LAB | Facility: HOSPITAL | Age: 31
End: 2021-07-15

## 2021-07-15 ENCOUNTER — LAB (OUTPATIENT)
Dept: LAB | Facility: HOSPITAL | Age: 31
End: 2021-07-15

## 2021-07-15 DIAGNOSIS — L40.0 PSORIASIS VULGARIS: Primary | ICD-10-CM

## 2021-07-15 DIAGNOSIS — R19.7 DIARRHEA, UNSPECIFIED TYPE: Primary | ICD-10-CM

## 2021-07-15 DIAGNOSIS — N39.0 ACUTE UTI: ICD-10-CM

## 2021-07-15 DIAGNOSIS — E86.0 DEHYDRATION: ICD-10-CM

## 2021-07-15 DIAGNOSIS — Z90.89 POST-TONSILLECTOMY PAIN: ICD-10-CM

## 2021-07-15 DIAGNOSIS — G89.18 POST-TONSILLECTOMY PAIN: ICD-10-CM

## 2021-07-15 LAB
ALBUMIN SERPL-MCNC: 3.8 G/DL (ref 3.5–5.2)
ALBUMIN/GLOB SERPL: 1.1 G/DL
ALP SERPL-CCNC: 119 U/L (ref 39–117)
ALT SERPL W P-5'-P-CCNC: 21 U/L (ref 1–33)
ANION GAP SERPL CALCULATED.3IONS-SCNC: 13 MMOL/L (ref 5–15)
AST SERPL-CCNC: 17 U/L (ref 1–32)
B-HCG UR QL: NEGATIVE
BACTERIA UR QL AUTO: ABNORMAL /HPF
BASOPHILS # BLD AUTO: 0.06 10*3/MM3 (ref 0–0.2)
BASOPHILS # BLD AUTO: 0.07 10*3/MM3 (ref 0–0.2)
BASOPHILS NFR BLD AUTO: 0.4 % (ref 0–1.5)
BASOPHILS NFR BLD AUTO: 0.5 % (ref 0–1.5)
BILIRUB SERPL-MCNC: 0.8 MG/DL (ref 0–1.2)
BILIRUB UR QL STRIP: NEGATIVE
BUN SERPL-MCNC: 14 MG/DL (ref 6–20)
BUN/CREAT SERPL: 19.2 (ref 7–25)
CALCIUM SPEC-SCNC: 9.2 MG/DL (ref 8.6–10.5)
CHLORIDE SERPL-SCNC: 102 MMOL/L (ref 98–107)
CLARITY UR: ABNORMAL
CO2 SERPL-SCNC: 23 MMOL/L (ref 22–29)
COLOR UR: YELLOW
CREAT SERPL-MCNC: 0.73 MG/DL (ref 0.57–1)
DEPRECATED RDW RBC AUTO: 44.3 FL (ref 37–54)
DEPRECATED RDW RBC AUTO: 45.5 FL (ref 37–54)
EOSINOPHIL # BLD AUTO: 0.08 10*3/MM3 (ref 0–0.4)
EOSINOPHIL # BLD AUTO: 0.16 10*3/MM3 (ref 0–0.4)
EOSINOPHIL NFR BLD AUTO: 0.6 % (ref 0.3–6.2)
EOSINOPHIL NFR BLD AUTO: 1 % (ref 0.3–6.2)
ERYTHROCYTE [DISTWIDTH] IN BLOOD BY AUTOMATED COUNT: 14.7 % (ref 12.3–15.4)
ERYTHROCYTE [DISTWIDTH] IN BLOOD BY AUTOMATED COUNT: 15.1 % (ref 12.3–15.4)
GFR SERPL CREATININE-BSD FRML MDRD: 93 ML/MIN/1.73
GLOBULIN UR ELPH-MCNC: 3.5 GM/DL
GLUCOSE SERPL-MCNC: 101 MG/DL (ref 65–99)
GLUCOSE UR STRIP-MCNC: NEGATIVE MG/DL
HCT VFR BLD AUTO: 40.4 % (ref 34–46.6)
HCT VFR BLD AUTO: 41 % (ref 34–46.6)
HGB BLD-MCNC: 12.5 G/DL (ref 12–15.9)
HGB BLD-MCNC: 12.9 G/DL (ref 12–15.9)
HGB UR QL STRIP.AUTO: NEGATIVE
HOLD SPECIMEN: NORMAL
HOLD SPECIMEN: NORMAL
HYALINE CASTS UR QL AUTO: ABNORMAL /LPF
IMM GRANULOCYTES # BLD AUTO: 0.06 10*3/MM3 (ref 0–0.05)
IMM GRANULOCYTES # BLD AUTO: 0.06 10*3/MM3 (ref 0–0.05)
IMM GRANULOCYTES NFR BLD AUTO: 0.4 % (ref 0–0.5)
IMM GRANULOCYTES NFR BLD AUTO: 0.5 % (ref 0–0.5)
INTERNAL NEGATIVE CONTROL: NEGATIVE
INTERNAL POSITIVE CONTROL: NORMAL
KETONES UR QL STRIP: ABNORMAL
LEUKOCYTE ESTERASE UR QL STRIP.AUTO: ABNORMAL
LIPASE SERPL-CCNC: 20 U/L (ref 13–60)
LYMPHOCYTES # BLD AUTO: 3.35 10*3/MM3 (ref 0.7–3.1)
LYMPHOCYTES # BLD AUTO: 4.65 10*3/MM3 (ref 0.7–3.1)
LYMPHOCYTES NFR BLD AUTO: 25.3 % (ref 19.6–45.3)
LYMPHOCYTES NFR BLD AUTO: 29.4 % (ref 19.6–45.3)
Lab: NORMAL
MCH RBC QN AUTO: 25.7 PG (ref 26.6–33)
MCH RBC QN AUTO: 26.1 PG (ref 26.6–33)
MCHC RBC AUTO-ENTMCNC: 30.9 G/DL (ref 31.5–35.7)
MCHC RBC AUTO-ENTMCNC: 31.5 G/DL (ref 31.5–35.7)
MCV RBC AUTO: 82.8 FL (ref 79–97)
MCV RBC AUTO: 83 FL (ref 79–97)
MONOCYTES # BLD AUTO: 0.77 10*3/MM3 (ref 0.1–0.9)
MONOCYTES # BLD AUTO: 1.07 10*3/MM3 (ref 0.1–0.9)
MONOCYTES NFR BLD AUTO: 5.8 % (ref 5–12)
MONOCYTES NFR BLD AUTO: 6.8 % (ref 5–12)
NEUTROPHILS NFR BLD AUTO: 62 % (ref 42.7–76)
NEUTROPHILS NFR BLD AUTO: 67.3 % (ref 42.7–76)
NEUTROPHILS NFR BLD AUTO: 8.94 10*3/MM3 (ref 1.7–7)
NEUTROPHILS NFR BLD AUTO: 9.79 10*3/MM3 (ref 1.7–7)
NITRITE UR QL STRIP: NEGATIVE
NRBC BLD AUTO-RTO: 0 /100 WBC (ref 0–0.2)
NRBC BLD AUTO-RTO: 0 /100 WBC (ref 0–0.2)
PH UR STRIP.AUTO: 6 [PH] (ref 5–8)
PLAT MORPH BLD: NORMAL
PLATELET # BLD AUTO: 387 10*3/MM3 (ref 140–450)
PLATELET # BLD AUTO: 404 10*3/MM3 (ref 140–450)
PMV BLD AUTO: 9.7 FL (ref 6–12)
PMV BLD AUTO: 9.9 FL (ref 6–12)
POTASSIUM SERPL-SCNC: 3.6 MMOL/L (ref 3.5–5.2)
PROT SERPL-MCNC: 7.3 G/DL (ref 6–8.5)
PROT UR QL STRIP: NEGATIVE
RBC # BLD AUTO: 4.87 10*6/MM3 (ref 3.77–5.28)
RBC # BLD AUTO: 4.95 10*6/MM3 (ref 3.77–5.28)
RBC # UR: ABNORMAL /HPF
RBC MORPH BLD: NORMAL
REF LAB TEST METHOD: ABNORMAL
SODIUM SERPL-SCNC: 138 MMOL/L (ref 136–145)
SP GR UR STRIP: 1.03 (ref 1–1.03)
SQUAMOUS #/AREA URNS HPF: ABNORMAL /HPF
UROBILINOGEN UR QL STRIP: ABNORMAL
WBC # BLD AUTO: 13.26 10*3/MM3 (ref 3.4–10.8)
WBC # BLD AUTO: 15.8 10*3/MM3 (ref 3.4–10.8)
WBC MORPH BLD: NORMAL
WBC UR QL AUTO: ABNORMAL /HPF
WHOLE BLOOD HOLD SPECIMEN: NORMAL

## 2021-07-15 PROCEDURE — 81001 URINALYSIS AUTO W/SCOPE: CPT | Performed by: EMERGENCY MEDICINE

## 2021-07-15 PROCEDURE — 36415 COLL VENOUS BLD VENIPUNCTURE: CPT | Performed by: NURSE PRACTITIONER

## 2021-07-15 PROCEDURE — 85025 COMPLETE CBC W/AUTO DIFF WBC: CPT | Performed by: NURSE PRACTITIONER

## 2021-07-15 PROCEDURE — 85025 COMPLETE CBC W/AUTO DIFF WBC: CPT

## 2021-07-15 PROCEDURE — 83690 ASSAY OF LIPASE: CPT

## 2021-07-15 PROCEDURE — 96361 HYDRATE IV INFUSION ADD-ON: CPT

## 2021-07-15 PROCEDURE — 81025 URINE PREGNANCY TEST: CPT

## 2021-07-15 PROCEDURE — 81025 URINE PREGNANCY TEST: CPT | Performed by: EMERGENCY MEDICINE

## 2021-07-15 PROCEDURE — 80053 COMPREHEN METABOLIC PANEL: CPT

## 2021-07-15 PROCEDURE — 96374 THER/PROPH/DIAG INJ IV PUSH: CPT

## 2021-07-15 PROCEDURE — 99283 EMERGENCY DEPT VISIT LOW MDM: CPT

## 2021-07-15 PROCEDURE — 85007 BL SMEAR W/DIFF WBC COUNT: CPT

## 2021-07-15 RX ORDER — SODIUM CHLORIDE 0.9 % (FLUSH) 0.9 %
10 SYRINGE (ML) INJECTION AS NEEDED
Status: DISCONTINUED | OUTPATIENT
Start: 2021-07-15 | End: 2021-07-16 | Stop reason: HOSPADM

## 2021-07-15 RX ORDER — SODIUM CHLORIDE 9 MG/ML
10 INJECTION INTRAVENOUS AS NEEDED
Status: DISCONTINUED | OUTPATIENT
Start: 2021-07-15 | End: 2021-07-16 | Stop reason: HOSPADM

## 2021-07-16 VITALS
WEIGHT: 293 LBS | BODY MASS INDEX: 47.09 KG/M2 | HEIGHT: 66 IN | RESPIRATION RATE: 18 BRPM | HEART RATE: 82 BPM | DIASTOLIC BLOOD PRESSURE: 67 MMHG | SYSTOLIC BLOOD PRESSURE: 109 MMHG | OXYGEN SATURATION: 99 % | TEMPERATURE: 97.8 F

## 2021-07-16 LAB — HOLD SPECIMEN: NORMAL

## 2021-07-16 PROCEDURE — 96374 THER/PROPH/DIAG INJ IV PUSH: CPT

## 2021-07-16 PROCEDURE — 96361 HYDRATE IV INFUSION ADD-ON: CPT

## 2021-07-16 PROCEDURE — 25010000002 MORPHINE PER 10 MG: Performed by: EMERGENCY MEDICINE

## 2021-07-16 RX ORDER — CEFDINIR 300 MG/1
300 CAPSULE ORAL 2 TIMES DAILY
Qty: 14 CAPSULE | Refills: 0 | Status: SHIPPED | OUTPATIENT
Start: 2021-07-16 | End: 2021-07-16 | Stop reason: ALTCHOICE

## 2021-07-16 RX ORDER — MORPHINE SULFATE 4 MG/ML
4 INJECTION, SOLUTION INTRAMUSCULAR; INTRAVENOUS ONCE
Status: COMPLETED | OUTPATIENT
Start: 2021-07-16 | End: 2021-07-16

## 2021-07-16 RX ORDER — CEFDINIR 125 MG/5ML
300 POWDER, FOR SUSPENSION ORAL 2 TIMES DAILY
Qty: 180 ML | Refills: 0 | Status: SHIPPED | OUTPATIENT
Start: 2021-07-16 | End: 2021-07-23

## 2021-07-16 RX ORDER — ONDANSETRON 4 MG/1
4 TABLET, ORALLY DISINTEGRATING ORAL EVERY 6 HOURS PRN
Qty: 8 TABLET | Refills: 0 | Status: SHIPPED | OUTPATIENT
Start: 2021-07-16

## 2021-07-16 RX ADMIN — SODIUM CHLORIDE 2000 ML: 9 INJECTION, SOLUTION INTRAVENOUS at 00:08

## 2021-07-16 RX ADMIN — MORPHINE SULFATE 4 MG: 4 INJECTION, SOLUTION INTRAMUSCULAR; INTRAVENOUS at 00:34

## 2021-07-16 NOTE — ED PROVIDER NOTES
"Subjective   Patient is a very pleasant 31-year-old female who presents today with diarrhea, odynophagia, and concern for dehydration.  She states that she had a tonsillectomy performed 6 days ago by Dr. Lemon.  She states that the procedure went well to the best of her knowledge and that she had been doing relatively well.  She finished taking her pain medications, as prescribed, yesterday.  She states that the pain in the throat is worse today.  She has been able to swallow liquids and hydrate but the amount of hydration is limited secondary to the pain with swallowing.  She denies any bleeding from the postop area or anywhere else for that matter.  Denies chest pain, shortness of breath, abdominal pain, vomiting, or other acute complaints.  As noted above, she does have diarrhea and feels that she is having more diarrhea output than she is oral input.  She states anytime she eats or drinks orally it \"comes straight out\".      Dehydration  Location:  Generalized  Severity:  Moderate  Onset quality:  Gradual  Timing:  Constant  Progression:  Unchanged  Chronicity:  New  Associated symptoms: diarrhea, fatigue and sore throat    Associated symptoms: no abdominal pain, no chest pain, no congestion, no cough, no fever, no myalgias, no rash, no shortness of breath, no vomiting and no wheezing        Review of Systems   Constitutional: Positive for fatigue. Negative for fever.   HENT: Positive for sore throat. Negative for congestion.    Respiratory: Negative for cough, shortness of breath and wheezing.    Cardiovascular: Negative for chest pain.   Gastrointestinal: Positive for diarrhea. Negative for abdominal pain and vomiting.   Musculoskeletal: Negative for myalgias.   Skin: Negative for rash.   All other systems reviewed and are negative.      Past Medical History:   Diagnosis Date   • Abdominal pain    • Acute sinusitis    • Ankle sprain    • Anxiety    • Asthma    • Atopic dermatitis    • Biliary dyskinesia  "   • Cat bite      resolved   • Dysfunctional uterine bleeding    • Foot pain    • GERD (gastroesophageal reflux disease)    • Gout    • Hearing loss, bilateral     R/T PSORASIS   • IBS (irritable bowel syndrome)    • Influenza    • Metabolic syndrome    • Muscular aches    • Obesity    • Panic attack    • PCOS (polycystic ovarian syndrome)    • Pharyngitis    • Psoriasis    • URI (upper respiratory infection)    • Wears glasses        Allergies   Allergen Reactions   • Latex Hives, Itching, Swelling and Rash   • Pollen Extract Rash   • Tree Extract Other (See Comments)     CONGESTION       Past Surgical History:   Procedure Laterality Date   • CHOLECYSTECTOMY     • COLONOSCOPY     • TONSILLECTOMY AND ADENOIDECTOMY N/A 7/9/2021    Procedure: TONSILLECTOMY;  Surgeon: Ran Lemon MD;  Location: [facility];  Service: ENT;  Laterality: N/A;   • UMBILICAL HERNIA REPAIR     • WISDOM TOOTH EXTRACTION         Family History   Problem Relation Age of Onset   • Hypertension Father    • Obesity Father    • Diabetes Father    • Heart attack Father    • Sleep apnea Father    • Multiple sclerosis Mother    • Diabetes Mother    • Heart attack Paternal Grandfather    • Sleep apnea Paternal Grandfather    • Colon cancer Paternal Grandmother    • Cancer Paternal Grandmother         COLON   • Cancer Maternal Grandmother         BREAST   • Breast cancer Maternal Grandmother    • Asthma Maternal Grandfather        Social History     Socioeconomic History   • Marital status:      Spouse name: Not on file   • Number of children: Not on file   • Years of education: Not on file   • Highest education level: Not on file   Tobacco Use   • Smoking status: Never Smoker   • Smokeless tobacco: Never Used   Vaping Use   • Vaping Use: Never used   Substance and Sexual Activity   • Alcohol use: Yes     Comment: VERY RARE   • Drug use: No   • Sexual activity: Yes     Partners: Male     Birth control/protection: None           Objective    Physical Exam  Vitals and nursing note reviewed.   Constitutional:       Appearance: Normal appearance.   HENT:      Head: Normocephalic.      Mouth/Throat:      Comments: Patient's tonsillectomy site is healing as expected.  There is no swelling or bleeding present.  There is a white scabbing over the surgical sites as expected.  Eyes:      Extraocular Movements: Extraocular movements intact.      Pupils: Pupils are equal, round, and reactive to light.   Cardiovascular:      Rate and Rhythm: Normal rate and regular rhythm.      Pulses: Normal pulses.      Heart sounds: Normal heart sounds. No murmur heard.     Pulmonary:      Effort: Pulmonary effort is normal.      Breath sounds: Normal breath sounds. No stridor.   Abdominal:      General: Abdomen is flat. Bowel sounds are normal.      Palpations: Abdomen is soft.      Tenderness: There is no abdominal tenderness.   Musculoskeletal:         General: No swelling or tenderness. Normal range of motion.      Cervical back: Normal range of motion.   Skin:     General: Skin is warm and dry.      Capillary Refill: Capillary refill takes less than 2 seconds.      Coloration: Skin is not jaundiced.      Findings: No rash.   Neurological:      General: No focal deficit present.      Mental Status: She is alert and oriented to person, place, and time. Mental status is at baseline.   Psychiatric:         Mood and Affect: Mood normal.         Behavior: Behavior normal.         Thought Content: Thought content normal.         Procedures           ED Course        Recent Results (from the past 24 hour(s))   CBC Auto Differential    Collection Time: 07/15/21  9:02 AM    Specimen: Blood   Result Value Ref Range    WBC 13.26 (H) 3.40 - 10.80 10*3/mm3    RBC 4.87 3.77 - 5.28 10*6/mm3    Hemoglobin 12.5 12.0 - 15.9 g/dL    Hematocrit 40.4 34.0 - 46.6 %    MCV 83.0 79.0 - 97.0 fL    MCH 25.7 (L) 26.6 - 33.0 pg    MCHC 30.9 (L) 31.5 - 35.7 g/dL    RDW 14.7 12.3 - 15.4 %    RDW-SD  44.3 37.0 - 54.0 fl    MPV 9.9 6.0 - 12.0 fL    Platelets 387 140 - 450 10*3/mm3    Neutrophil % 67.3 42.7 - 76.0 %    Lymphocyte % 25.3 19.6 - 45.3 %    Monocyte % 5.8 5.0 - 12.0 %    Eosinophil % 0.6 0.3 - 6.2 %    Basophil % 0.5 0.0 - 1.5 %    Immature Grans % 0.5 0.0 - 0.5 %    Neutrophils, Absolute 8.94 (H) 1.70 - 7.00 10*3/mm3    Lymphocytes, Absolute 3.35 (H) 0.70 - 3.10 10*3/mm3    Monocytes, Absolute 0.77 0.10 - 0.90 10*3/mm3    Eosinophils, Absolute 0.08 0.00 - 0.40 10*3/mm3    Basophils, Absolute 0.06 0.00 - 0.20 10*3/mm3    Immature Grans, Absolute 0.06 (H) 0.00 - 0.05 10*3/mm3    nRBC 0.0 0.0 - 0.2 /100 WBC   Comprehensive Metabolic Panel    Collection Time: 07/15/21  9:06 PM    Specimen: Blood   Result Value Ref Range    Glucose 101 (H) 65 - 99 mg/dL    BUN 14 6 - 20 mg/dL    Creatinine 0.73 0.57 - 1.00 mg/dL    Sodium 138 136 - 145 mmol/L    Potassium 3.6 3.5 - 5.2 mmol/L    Chloride 102 98 - 107 mmol/L    CO2 23.0 22.0 - 29.0 mmol/L    Calcium 9.2 8.6 - 10.5 mg/dL    Total Protein 7.3 6.0 - 8.5 g/dL    Albumin 3.80 3.50 - 5.20 g/dL    ALT (SGPT) 21 1 - 33 U/L    AST (SGOT) 17 1 - 32 U/L    Alkaline Phosphatase 119 (H) 39 - 117 U/L    Total Bilirubin 0.8 0.0 - 1.2 mg/dL    eGFR Non African Amer 93 >60 mL/min/1.73    Globulin 3.5 gm/dL    A/G Ratio 1.1 g/dL    BUN/Creatinine Ratio 19.2 7.0 - 25.0    Anion Gap 13.0 5.0 - 15.0 mmol/L   Lipase    Collection Time: 07/15/21  9:06 PM    Specimen: Blood   Result Value Ref Range    Lipase 20 13 - 60 U/L   Green Top (Gel)    Collection Time: 07/15/21  9:06 PM   Result Value Ref Range    Extra Tube Hold for add-ons.    Lavender Top    Collection Time: 07/15/21  9:06 PM   Result Value Ref Range    Extra Tube hold for add-on    Gold Top - SST    Collection Time: 07/15/21  9:06 PM   Result Value Ref Range    Extra Tube Hold for add-ons.    Gray Top    Collection Time: 07/15/21  9:06 PM   Result Value Ref Range    Extra Tube Hold for add-ons.    CBC Auto  Differential    Collection Time: 07/15/21  9:06 PM    Specimen: Blood   Result Value Ref Range    WBC 15.80 (H) 3.40 - 10.80 10*3/mm3    RBC 4.95 3.77 - 5.28 10*6/mm3    Hemoglobin 12.9 12.0 - 15.9 g/dL    Hematocrit 41.0 34.0 - 46.6 %    MCV 82.8 79.0 - 97.0 fL    MCH 26.1 (L) 26.6 - 33.0 pg    MCHC 31.5 31.5 - 35.7 g/dL    RDW 15.1 12.3 - 15.4 %    RDW-SD 45.5 37.0 - 54.0 fl    MPV 9.7 6.0 - 12.0 fL    Platelets 404 140 - 450 10*3/mm3    Neutrophil % 62.0 42.7 - 76.0 %    Lymphocyte % 29.4 19.6 - 45.3 %    Monocyte % 6.8 5.0 - 12.0 %    Eosinophil % 1.0 0.3 - 6.2 %    Basophil % 0.4 0.0 - 1.5 %    Immature Grans % 0.4 0.0 - 0.5 %    Neutrophils, Absolute 9.79 (H) 1.70 - 7.00 10*3/mm3    Lymphocytes, Absolute 4.65 (H) 0.70 - 3.10 10*3/mm3    Monocytes, Absolute 1.07 (H) 0.10 - 0.90 10*3/mm3    Eosinophils, Absolute 0.16 0.00 - 0.40 10*3/mm3    Basophils, Absolute 0.07 0.00 - 0.20 10*3/mm3    Immature Grans, Absolute 0.06 (H) 0.00 - 0.05 10*3/mm3    nRBC 0.0 0.0 - 0.2 /100 WBC   Scan Slide    Collection Time: 07/15/21  9:06 PM    Specimen: Blood   Result Value Ref Range    RBC Morphology Normal Normal    WBC Morphology Normal Normal    Platelet Morphology Normal Normal   Urinalysis With Microscopic If Indicated (No Culture) - Urine, Clean Catch    Collection Time: 07/15/21 10:55 PM    Specimen: Urine, Clean Catch   Result Value Ref Range    Color, UA Yellow Yellow, Straw    Appearance, UA Cloudy (A) Clear    pH, UA 6.0 5.0 - 8.0    Specific Gravity, UA 1.026 1.001 - 1.030    Glucose, UA Negative Negative    Ketones, UA Trace (A) Negative    Bilirubin, UA Negative Negative    Blood, UA Negative Negative    Protein, UA Negative Negative    Leuk Esterase, UA Trace (A) Negative    Nitrite, UA Negative Negative    Urobilinogen, UA 0.2 E.U./dL 0.2 - 1.0 E.U./dL   Urinalysis, Microscopic Only - Urine, Clean Catch    Collection Time: 07/15/21 10:55 PM    Specimen: Urine, Clean Catch   Result Value Ref Range    RBC, UA 3-6  "(A) None Seen, 0-2 /HPF    WBC, UA 6-12 (A) None Seen, 0-2 /HPF    Bacteria, UA 3+ (A) None Seen, Trace /HPF    Squamous Epithelial Cells, UA 3-6 (A) None Seen, 0-2 /HPF    Hyaline Casts, UA 7-12 0 - 6 /LPF    Methodology Automated Microscopy    POC Pregnancy, Urine    Collection Time: 07/15/21 10:58 PM    Specimen: Urine   Result Value Ref Range    HCG, Urine, QL Negative Negative    Lot Number [id number]     Internal Positive Control Passed Positive, Passed    Internal Negative Control Negative Negative, Passed     Note: In addition to lab results from this visit, the labs listed above may include labs taken at another facility or during a different encounter within the last 24 hours. Please correlate lab times with ED admission and discharge times for further clarification of the services performed during this visit.    No orders to display     Vitals:    07/15/21 2040 07/15/21 2330 07/16/21 0130   BP: 143/100 122/77 109/67   BP Location: Left arm     Patient Position: Sitting     Pulse: 91 82 82   Resp: 18 16 18   Temp: 97.8 °F (36.6 °C)     TempSrc: Oral     SpO2: 98% 97% 99%   Weight: (!) 146 kg (321 lb)     Height: 167.6 cm (66\")       Medications   Sodium Chloride (PF) 0.9 % 10 mL (has no administration in time range)   sodium chloride 0.9 % flush 10 mL (has no administration in time range)   sodium chloride 0.9 % bolus 2,000 mL (0 mL Intravenous Stopped 7/16/21 0150)   Morphine sulfate (PF) injection 4 mg (4 mg Intravenous Given 7/16/21 0034)     ECG/EMG Results (last 24 hours)     ** No results found for the last 24 hours. **        No orders to display     Patient feels better following IV hydration.  She is comfortable with discharge at this time.  She will call Dr. Lemon, ENT, tomorrow for follow-up and have a low threshold to return to the emergency department symptoms persist, worsen, or other concerns arise.            MDM    Final diagnoses:   Diarrhea, unspecified type   Dehydration   Acute UTI "   Post-tonsillectomy pain       ED Disposition  ED Disposition     ED Disposition Condition Comment    Discharge Stable           No follow-up provider specified.       Medication List      No changes were made to your prescriptions during this visit.          Celestine Adames,   07/16/21 0213

## 2021-07-19 ENCOUNTER — LAB (OUTPATIENT)
Dept: LAB | Facility: HOSPITAL | Age: 31
End: 2021-07-19

## 2021-07-19 ENCOUNTER — TRANSCRIBE ORDERS (OUTPATIENT)
Dept: LAB | Facility: HOSPITAL | Age: 31
End: 2021-07-19

## 2021-07-19 DIAGNOSIS — L40.0 PSORIASIS VULGARIS: ICD-10-CM

## 2021-07-19 DIAGNOSIS — L40.0 PSORIASIS VULGARIS: Primary | ICD-10-CM

## 2021-07-19 PROCEDURE — 36415 COLL VENOUS BLD VENIPUNCTURE: CPT

## 2021-07-19 PROCEDURE — 86480 TB TEST CELL IMMUN MEASURE: CPT

## 2021-07-22 LAB
GAMMA INTERFERON BACKGROUND BLD IA-ACNC: 0.02 IU/ML
M TB IFN-G BLD-IMP: NEGATIVE
M TB IFN-G CD4+ BCKGRND COR BLD-ACNC: 0.03 IU/ML
M TB IFN-G CD4+CD8+ BCKGRND COR BLD-ACNC: 0.05 IU/ML
MITOGEN IGNF BLD-ACNC: >10 IU/ML
SERVICE CMNT-IMP: NORMAL

## 2021-08-01 DIAGNOSIS — J45.21 MILD INTERMITTENT ASTHMA WITH ACUTE EXACERBATION: ICD-10-CM

## 2021-08-02 RX ORDER — MONTELUKAST SODIUM 10 MG/1
TABLET ORAL
Qty: 90 TABLET | Refills: 1 | Status: SHIPPED | OUTPATIENT
Start: 2021-08-02 | End: 2022-10-03 | Stop reason: SDUPTHER

## 2021-09-09 RX ORDER — FLUCONAZOLE 150 MG/1
TABLET ORAL
Qty: 2 TABLET | Refills: 0 | Status: SHIPPED | OUTPATIENT
Start: 2021-09-09 | End: 2021-11-12

## 2021-09-09 NOTE — TELEPHONE ENCOUNTER
----- Message from Eliz Eisenberg sent at 9/9/2021  1:10 PM EDT -----  Regarding: Prescription Question  Contact: [phone]  Char Cooper!     I have a yeast infection starting up. Could you send me in a refill of the Flucanozale please to the [facility] in [place]?

## 2021-09-28 ENCOUNTER — TELEMEDICINE (OUTPATIENT)
Dept: INTERNAL MEDICINE | Facility: CLINIC | Age: 31
End: 2021-09-28

## 2021-09-28 DIAGNOSIS — J20.9 ACUTE BRONCHITIS, UNSPECIFIED ORGANISM: Primary | ICD-10-CM

## 2021-09-28 PROCEDURE — 99213 OFFICE O/P EST LOW 20 MIN: CPT | Performed by: PHYSICIAN ASSISTANT

## 2021-09-28 RX ORDER — DOXYCYCLINE 100 MG/1
100 CAPSULE ORAL 2 TIMES DAILY
Qty: 20 CAPSULE | Refills: 0 | Status: SHIPPED | OUTPATIENT
Start: 2021-09-28 | End: 2021-12-13

## 2021-09-28 RX ORDER — BUDESONIDE AND FORMOTEROL FUMARATE DIHYDRATE 160; 4.5 UG/1; UG/1
2 AEROSOL RESPIRATORY (INHALATION)
Qty: 10.2 G | Refills: 1 | Status: SHIPPED | OUTPATIENT
Start: 2021-09-28

## 2021-11-05 ENCOUNTER — TELEMEDICINE (OUTPATIENT)
Dept: INTERNAL MEDICINE | Facility: CLINIC | Age: 31
End: 2021-11-05

## 2021-11-05 DIAGNOSIS — R09.81 NASAL CONGESTION: Primary | ICD-10-CM

## 2021-11-05 DIAGNOSIS — R51.9 ACUTE NONINTRACTABLE HEADACHE, UNSPECIFIED HEADACHE TYPE: ICD-10-CM

## 2021-11-05 PROCEDURE — 99213 OFFICE O/P EST LOW 20 MIN: CPT

## 2021-11-05 PROCEDURE — 87804 INFLUENZA ASSAY W/OPTIC: CPT

## 2021-11-05 NOTE — PROGRESS NOTES
You have chosen to receive care through a telehealth visit.  Do you consent to use a video/audio connection for your medical care today? Yes   Patient's location was verified as being within the [facility].     Chief Complaint    Headache and nasal congestion x 1 day.     History of Present Illness:     Eliz Eisenberg is a 31 y.o. female who presents via video-conference for headache and nasal congestion x 1 day. Has been taking ibuprofen with mild relief in symptoms. Has not taken anything for the nasal congestion. Pain is rated as a 5 out of 10.       The following portions of the patient's history were reviewed and updated as appropriate: allergies, current medications, past family history, past medical history, past social history, past surgical history and problem list.    [facility]  The following portions of the patient's history were reviewed and updated as appropriate: allergies, current medications, past family history, past medical history, past social history, past surgical history and problem list.     Past Medical History:   Diagnosis Date   • Abdominal pain    • Acute sinusitis    • Ankle sprain    • Anxiety    • Asthma    • Atopic dermatitis    • Biliary dyskinesia    • Cat bite      resolved   • Dysfunctional uterine bleeding    • Foot pain    • GERD (gastroesophageal reflux disease)    • Gout    • Hearing loss, bilateral     R/T PSORASIS   • IBS (irritable bowel syndrome)    • Influenza    • Metabolic syndrome    • Muscular aches    • Obesity    • Panic attack    • PCOS (polycystic ovarian syndrome)    • Pharyngitis    • Psoriasis    • URI (upper respiratory infection)    • Wears glasses       Allergies   Allergen Reactions   • Latex Hives, Itching, Swelling and Rash   • Pollen Extract Rash   • Tree Extract Other (See Comments)     CONGESTION      Social History     Tobacco Use   • Smoking status: Never Smoker   • Smokeless tobacco: Never Used   Vaping Use   • Vaping Use: Never used    Substance Use Topics   • Alcohol use: Not Currently   • Drug use: No     Past Surgical History:   Procedure Laterality Date   • CHOLECYSTECTOMY     • COLONOSCOPY     • TONSILLECTOMY AND ADENOIDECTOMY N/A 7/9/2021    Procedure: TONSILLECTOMY;  Surgeon: Ran Lemon MD;  [facility]  Service: E[facility];  Laterality: N/A;   • UMBILICAL HERNIA REPAIR     • WISDOM TOOTH EXTRACTION        Family History   Problem Relation Age of Onset   • Hypertension Father    • Obesity Father    • Diabetes Father    • Heart attack Father    • Sleep apnea Father    • Multiple sclerosis Mother    • Diabetes Mother    • Heart attack Paternal Grandfather    • Sleep apnea Paternal Grandfather    • Colon cancer Paternal Grandmother    • Cancer Paternal Grandmother         COLON   • Cancer Maternal Grandmother         BREAST   • Breast cancer Maternal Grandmother    • Asthma Maternal Grandfather          Current Outpatient Medications:   •  Adalimumab (Humira Pen) 40 MG/0.4ML Pen-injector Kit, Inject  under the skin into the appropriate area as directed Every 14 (Fourteen) Days. 6/25/2021 LAST INJECTION, Disp: , Rfl:   •  albuterol (PROVENTIL HFA;VENTOLIN HFA) 108 (90 Base) MCG/ACT inhaler, Inhale 2 puffs Every 4 (Four) Hours As Needed for Wheezing., Disp: 1 inhaler, Rfl: 5  •  budesonide-formoterol (Symbicort) 160-4.5 MCG/ACT inhaler, Inhale 2 puffs 2 (Two) Times a Day., Disp: 10.2 g, Rfl: 1  •  cetirizine (zyrTEC) 5 MG tablet, Take 1 tablet by mouth Daily., Disp: , Rfl:   •  Cholecalciferol (Vitamin D3) 50 MCG (2000 UT) tablet, Take 2,000 Units by mouth Daily., Disp: , Rfl:   •  clomiPHENE (CLOMID) 50 MG tablet, Take one tablet daily for 5 days beginning 3rd day of cycle, Disp: 5 tablet, Rfl: 2  •  doxycycline (MONODOX) 100 MG capsule, Take 1 capsule by mouth 2 (Two) Times a Day., Disp: 20 capsule, Rfl: 0  •  fluconazole (DIFLUCAN) 150 MG tablet, Take one po daily x 2 days, Disp: 2 tablet, Rfl: 0  •  metFORMIN (GLUCOPHAGE) 500  MG tablet, TAKE TWO TABLETS BY MOUTH EVERY MORNING AND TAKE ONE TABLET BY MOUTH EVERY EVENING WITH MEALS, Disp: 270 tablet, Rfl: 3  •  montelukast (SINGULAIR) 10 MG tablet, TAKE ONE TABLET BY MOUTH ONCE NIGHTLY, Disp: 90 tablet, Rfl: 1  •  omeprazole (priLOSEC) 20 MG capsule, Take 20 mg by mouth Daily., Disp: , Rfl:   •  ondansetron ODT (ZOFRAN-ODT) 4 MG disintegrating tablet, Place 1 tablet on the tongue Every 6 (Six) Hours As Needed for Nausea or Vomiting., Disp: 8 tablet, Rfl: 0  •  promethazine (PHENERGAN) 25 MG tablet, TAKE ONE TABLET BY MOUTH EVERY 4 HOURS AS NEEDED FOR NAUSEA (Patient taking differently: Take 25 mg by mouth Every 6 (Six) Hours As Needed for Nausea.), Disp: 10 tablet, Rfl: 0  •  QUEtiapine (SEROquel) 25 MG tablet, Take 1-2 tablets at night, Disp: 60 tablet, Rfl: 2    Review of Systems   Constitutional: Negative for activity change, appetite change, fatigue and fever.   HENT: Positive for congestion. Negative for dental problem, drooling, ear discharge, ear pain, facial swelling, hearing loss, mouth sores, nosebleeds, postnasal drip, rhinorrhea, sinus pressure, sneezing, sore throat, swollen glands, tinnitus, trouble swallowing and voice change.    Respiratory: Negative for apnea, cough, choking, chest tightness, shortness of breath, wheezing and stridor.    Cardiovascular: Negative for chest pain, palpitations and leg swelling.   Gastrointestinal: Negative for abdominal distention, abdominal pain, anal bleeding, blood in stool, constipation, diarrhea, nausea, rectal pain, vomiting, GERD and indigestion.   Neurological: Positive for headache. Negative for dizziness, tremors, seizures, syncope, facial asymmetry, speech difficulty, weakness, light-headedness, numbness, memory problem and confusion.       Objective  Vital signs available: No      Assessment/Plan   1. Nasal congestion  - COVID-19 PCR, [facility], NP SWAB IN [facility] V[facility]AL TRANSPORT MEDIA/ORAL SWISH 24-30 HR TAT - Swab, Nasopharynx;  Future  - POCT Influenza A/B  - Encouraged to self-quarantine until COVID results are made available.   - Encouraged nasal saline irrigation with sterile saline nasal spray twice daily for sinus congestion  - Encouraged use OTC nasal steroid (I.e. Flonase, Nasacort, Nasonex) therapy for nasal congestion     2. Acute nonintractable headache, unspecified headache type  -Encouraged use of acetaminophen and Ibuprofen every 4-6 hours as needed for headache      Return for Next scheduled follow up.    Part of this note may be an electronic transcription/translation of spoken language to printed text using the Dragon Dictation System.    Electronically signed by:      Zaheer Saeed, APRN  11/05/2021

## 2021-11-06 ENCOUNTER — CLINICAL SUPPORT (OUTPATIENT)
Dept: INTERNAL MEDICINE | Facility: CLINIC | Age: 31
End: 2021-11-06

## 2021-11-06 DIAGNOSIS — R09.81 NASAL CONGESTION: ICD-10-CM

## 2021-11-06 PROCEDURE — U0004 COV-19 TEST NON-CDC HGH THRU: HCPCS

## 2021-11-07 LAB — SARS-COV-2 RNA NOSE QL NAA+PROBE: NOT DETECTED

## 2021-11-08 ENCOUNTER — TELEPHONE (OUTPATIENT)
Dept: INTERNAL MEDICINE | Facility: CLINIC | Age: 31
End: 2021-11-08

## 2021-11-08 DIAGNOSIS — J01.00 ACUTE MAXILLARY SINUSITIS, RECURRENCE NOT SPECIFIED: Primary | ICD-10-CM

## 2021-11-08 RX ORDER — AMOXICILLIN AND CLAVULANATE POTASSIUM 875; 125 MG/1; MG/1
1 TABLET, FILM COATED ORAL 2 TIMES DAILY
Qty: 14 TABLET | Refills: 0 | Status: SHIPPED | OUTPATIENT
Start: 2021-11-08 | End: 2021-11-15

## 2021-11-08 NOTE — TELEPHONE ENCOUNTER
Patient would like a work excuse e-mailed to her for Friday and today along with her Covid results.

## 2021-11-08 NOTE — TELEPHONE ENCOUNTER
Caller: Eliz Eisenberg    Relationship: Self    Best call back number:     [phone]     What medication are you requesting:     PATIENT CALLED TO REQUEST ISAI SALCEDO TO PRESCRIBE AN ANTIBIOTIC FOR HER SINCE SHE STILL HAS A FEVER AND HAS TESTED NEGATIVE FOR COVID AND FLU    What are your current symptoms:     FEVER  ASTHMA  BODY ACHES    How long have you been experiencing symptoms:    SINCE Friday NIGHT, 11/5/21    Have you had these symptoms before:    [] Yes  [x] No    Have you been treated for these symptoms before:   [] Yes  [x] No    If a prescription is needed, what is your preferred pharmacy and phone number:      [place], [place]    TELEPHONE CONTACT:    [phone]    Additional notes:    N/A    ISAI SALCEDO

## 2021-11-12 ENCOUNTER — OFFICE VISIT (OUTPATIENT)
Dept: INTERNAL MEDICINE | Facility: CLINIC | Age: 31
End: 2021-11-12

## 2021-11-12 VITALS
DIASTOLIC BLOOD PRESSURE: 88 MMHG | WEIGHT: 293 LBS | OXYGEN SATURATION: 94 % | HEART RATE: 89 BPM | BODY MASS INDEX: 54.75 KG/M2 | SYSTOLIC BLOOD PRESSURE: 124 MMHG

## 2021-11-12 DIAGNOSIS — R59.1 LYMPHADENOPATHY OF HEAD AND NECK: Primary | ICD-10-CM

## 2021-11-12 PROCEDURE — 99213 OFFICE O/P EST LOW 20 MIN: CPT | Performed by: PHYSICIAN ASSISTANT

## 2021-11-12 RX ORDER — ASPIRIN 81 MG/1
TABLET ORAL
COMMUNITY

## 2021-11-12 RX ORDER — METHYLPREDNISOLONE 4 MG/1
TABLET ORAL
Qty: 21 TABLET | Refills: 0 | Status: SHIPPED | OUTPATIENT
Start: 2021-11-12 | End: 2021-12-13

## 2021-11-12 RX ORDER — IBUPROFEN 600 MG/1
TABLET ORAL
COMMUNITY
End: 2022-08-11

## 2021-11-12 RX ORDER — OMEPRAZOLE 20 MG/1
TABLET, DELAYED RELEASE ORAL
COMMUNITY
End: 2022-04-18

## 2021-11-12 NOTE — PROGRESS NOTES
Chief Complaint   Patient presents with   • Right Lymph Node Swollen     Acute        Subjective     Eliz Eisenberg is a 31 y.o. female.        History of Present Illness     Pt's  started feeling bad 11/4 with fever and chills, 1 day of vomiting.. He tested negative for flu and Covid. Pt started feeling bad 2 days later with fever, chills and body aches. Neither had any upper respiratory symptoms. Continued for 3 days and then improved. Has felt normal in the past 3 days.     2 nights ago pt noticed some swelling on the right side of her neck. Felt like a swollen lymph node. Has not improved, feels like it may have gotten bigger. Took ibuprofen the last couple days for the discomfort.            Current Outpatient Medications:   •  Adalimumab (Humira Pen) 40 MG/0.4ML Pen-injector Kit, Inject  under the skin into the appropriate area as directed Every 14 (Fourteen) Days. 6/25/2021 LAST INJECTION, Disp: , Rfl:   •  albuterol (PROVENTIL HFA;VENTOLIN HFA) 108 (90 Base) MCG/ACT inhaler, Inhale 2 puffs Every 4 (Four) Hours As Needed for Wheezing., Disp: 1 inhaler, Rfl: 5  •  amoxicillin-clavulanate (Augmentin) 875-125 MG per tablet, Take 1 tablet by mouth 2 (Two) Times a Day for 7 days., Disp: 14 tablet, Rfl: 0  •  budesonide-formoterol (Symbicort) 160-4.5 MCG/ACT inhaler, Inhale 2 puffs 2 (Two) Times a Day., Disp: 10.2 g, Rfl: 1  •  cetirizine (zyrTEC) 5 MG tablet, Take 1 tablet by mouth Daily., Disp: , Rfl:   •  Cholecalciferol (Vitamin D3) 50 MCG (2000 UT) tablet, Take 2,000 Units by mouth Daily., Disp: , Rfl:   •  clomiPHENE (CLOMID) 50 MG tablet, Take one tablet daily for 5 days beginning 3rd day of cycle, Disp: 5 tablet, Rfl: 2  •  doxycycline (MONODOX) 100 MG capsule, Take 1 capsule by mouth 2 (Two) Times a Day., Disp: 20 capsule, Rfl: 0  •  metFORMIN (GLUCOPHAGE) 500 MG tablet, TAKE TWO TABLETS BY MOUTH EVERY MORNING AND TAKE ONE TABLET BY MOUTH EVERY EVENING WITH MEALS, Disp: 270 tablet, Rfl: 3  •   montelukast (SINGULAIR) 10 MG tablet, TAKE ONE TABLET BY MOUTH ONCE NIGHTLY, Disp: 90 tablet, Rfl: 1  •  omeprazole (priLOSEC) 20 MG capsule, Take 20 mg by mouth Daily., Disp: , Rfl:   •  ondansetron ODT (ZOFRAN-ODT) 4 MG disintegrating tablet, Place 1 tablet on the tongue Every 6 (Six) Hours As Needed for Nausea or Vomiting., Disp: 8 tablet, Rfl: 0  •  promethazine (PHENERGAN) 25 MG tablet, TAKE ONE TABLET BY MOUTH EVERY 4 HOURS AS NEEDED FOR NAUSEA (Patient taking differently: Take 25 mg by mouth Every 6 (Six) Hours As Needed for Nausea.), Disp: 10 tablet, Rfl: 0  •  QUEtiapine (SEROquel) 25 MG tablet, Take 1-2 tablets at night, Disp: 60 tablet, Rfl: 2  •  aspirin (aspirin) 81 MG EC tablet, Adult Low Dose Aspirin 81 mg tablet,delayed release  Take 1 tablet every day by oral route., Disp: , Rfl:   •  ibuprofen (ADVIL,MOTRIN) 600 MG tablet, ibuprofen 600 mg tablet, Disp: , Rfl:   •  methylPREDNISolone (Medrol) 4 MG dose pack, follow package directions, Disp: 21 tablet, Rfl: 0  •  omeprazole OTC (PriLOSEC OTC) 20 MG EC tablet, omeprazole 20 mg tablet,delayed release  Take 1 tablet every day by oral route., Disp: , Rfl:      PMFSH  The following portions of the patient's history were reviewed and updated as appropriate: allergies, current medications, past family history, past medical history, past social history, past surgical history and problem list.    Review of Systems   Constitutional: Negative for activity change, appetite change and fatigue.   HENT: Negative for congestion and rhinorrhea.    Respiratory: Negative for chest tightness and shortness of breath.    Cardiovascular: Negative for chest pain and palpitations.   Gastrointestinal: Negative for abdominal pain.   Genitourinary: Negative for dysuria.   Musculoskeletal: Negative for arthralgias and myalgias.   Neurological: Negative for dizziness, weakness, light-headedness and headaches.   Hematological: Positive for adenopathy.   Psychiatric/Behavioral:  Negative for dysphoric mood. The patient is not nervous/anxious.        Objective   /88   Pulse 89   Wt (!) 154 kg (339 lb 3.2 oz)   SpO2 94%   BMI 54.75 kg/m²     Physical Exam  Vitals and nursing note reviewed.   Constitutional:       Appearance: She is well-developed.   HENT:      Head: Normocephalic and atraumatic.      Right Ear: External ear normal.      Left Ear: External ear normal.   Eyes:      Conjunctiva/sclera: Conjunctivae normal.   Cardiovascular:      Rate and Rhythm: Normal rate and regular rhythm.   Pulmonary:      Effort: Pulmonary effort is normal.      Breath sounds: Normal breath sounds.   Musculoskeletal:         General: Normal range of motion.      Cervical back: Normal range of motion.   Lymphadenopathy:      Cervical: Cervical adenopathy present.      Right cervical: Superficial cervical adenopathy (grape-sized tender lymph node) present.   Skin:     General: Skin is warm and dry.   Psychiatric:         Behavior: Behavior normal.              ASSESSMENT/PLAN    Diagnoses and all orders for this visit:    1. Lymphadenopathy of head and neck (Primary)  Comments:  Take medrol dose pack to help with inflammation. If does not resolve or returns, schedule US of lymph nodes.  Orders:  -     methylPREDNISolone (Medrol) 4 MG dose pack; follow package directions  Dispense: 21 tablet; Refill: 0  -     US Head Neck Soft Tissue; Future             Return if symptoms worsen or fail to improve, for Next scheduled follow up.

## 2021-12-13 ENCOUNTER — OFFICE VISIT (OUTPATIENT)
Dept: INTERNAL MEDICINE | Facility: CLINIC | Age: 31
End: 2021-12-13

## 2021-12-13 ENCOUNTER — LAB (OUTPATIENT)
Dept: LAB | Facility: HOSPITAL | Age: 31
End: 2021-12-13

## 2021-12-13 VITALS
HEART RATE: 80 BPM | BODY MASS INDEX: 47.09 KG/M2 | SYSTOLIC BLOOD PRESSURE: 126 MMHG | RESPIRATION RATE: 18 BRPM | HEIGHT: 66 IN | OXYGEN SATURATION: 99 % | DIASTOLIC BLOOD PRESSURE: 82 MMHG | WEIGHT: 293 LBS

## 2021-12-13 DIAGNOSIS — R45.86 MOOD CHANGES: ICD-10-CM

## 2021-12-13 DIAGNOSIS — R59.1 LYMPHADENOPATHY OF HEAD AND NECK: Primary | ICD-10-CM

## 2021-12-13 DIAGNOSIS — R59.1 LYMPHADENOPATHY OF HEAD AND NECK: ICD-10-CM

## 2021-12-13 LAB
B-HCG UR QL: NEGATIVE
BASOPHILS # BLD AUTO: 0.05 10*3/MM3 (ref 0–0.2)
BASOPHILS NFR BLD AUTO: 0.5 % (ref 0–1.5)
DEPRECATED RDW RBC AUTO: 43.3 FL (ref 37–54)
EOSINOPHIL # BLD AUTO: 0.21 10*3/MM3 (ref 0–0.4)
EOSINOPHIL NFR BLD AUTO: 2 % (ref 0.3–6.2)
ERYTHROCYTE [DISTWIDTH] IN BLOOD BY AUTOMATED COUNT: 14.1 % (ref 12.3–15.4)
EXPIRATION DATE: NORMAL
HCT VFR BLD AUTO: 38.3 % (ref 34–46.6)
HETEROPH AB SER QL LA: NEGATIVE
HGB BLD-MCNC: 12.2 G/DL (ref 12–15.9)
IMM GRANULOCYTES # BLD AUTO: 0.06 10*3/MM3 (ref 0–0.05)
IMM GRANULOCYTES NFR BLD AUTO: 0.6 % (ref 0–0.5)
INTERNAL NEGATIVE CONTROL: NORMAL
INTERNAL POSITIVE CONTROL: NORMAL
LYMPHOCYTES # BLD AUTO: 3.18 10*3/MM3 (ref 0.7–3.1)
LYMPHOCYTES NFR BLD AUTO: 30.1 % (ref 19.6–45.3)
Lab: NORMAL
MCH RBC QN AUTO: 26.9 PG (ref 26.6–33)
MCHC RBC AUTO-ENTMCNC: 31.9 G/DL (ref 31.5–35.7)
MCV RBC AUTO: 84.4 FL (ref 79–97)
MONOCYTES # BLD AUTO: 0.71 10*3/MM3 (ref 0.1–0.9)
MONOCYTES NFR BLD AUTO: 6.7 % (ref 5–12)
NEUTROPHILS NFR BLD AUTO: 6.37 10*3/MM3 (ref 1.7–7)
NEUTROPHILS NFR BLD AUTO: 60.1 % (ref 42.7–76)
NRBC BLD AUTO-RTO: 0 /100 WBC (ref 0–0.2)
PLATELET # BLD AUTO: 337 10*3/MM3 (ref 140–450)
PMV BLD AUTO: 10.6 FL (ref 6–12)
RBC # BLD AUTO: 4.54 10*6/MM3 (ref 3.77–5.28)
WBC NRBC COR # BLD: 10.58 10*3/MM3 (ref 3.4–10.8)

## 2021-12-13 PROCEDURE — 86308 HETEROPHILE ANTIBODY SCREEN: CPT

## 2021-12-13 PROCEDURE — 99214 OFFICE O/P EST MOD 30 MIN: CPT

## 2021-12-13 PROCEDURE — 85025 COMPLETE CBC W/AUTO DIFF WBC: CPT

## 2021-12-13 PROCEDURE — 81025 URINE PREGNANCY TEST: CPT

## 2021-12-13 PROCEDURE — 36415 COLL VENOUS BLD VENIPUNCTURE: CPT

## 2021-12-13 RX ORDER — METHYLPREDNISOLONE 4 MG/1
TABLET ORAL
Qty: 21 TABLET | Refills: 0 | Status: SHIPPED | OUTPATIENT
Start: 2021-12-13 | End: 2021-12-18

## 2021-12-13 RX ORDER — FLUTICASONE PROPIONATE 50 MCG
2 SPRAY, SUSPENSION (ML) NASAL DAILY
Qty: 16 G | Refills: 2 | Status: SHIPPED | OUTPATIENT
Start: 2021-12-13 | End: 2022-12-08 | Stop reason: SDUPTHER

## 2021-12-13 NOTE — PROGRESS NOTES
Chief Complaint  Neck Pain (Pt states pain in neck x week)    Eliz Eisenberg presents to [facility]    HPI: Reports having swelling of lymph nodes on the right side of the neck at the end of November. Was placed on doxy and prednisone. Lymph nodes resolved. Has recently noticed swelling of the lymph nodes of the left side of the neck x 1 week. No other symptoms reported. Nodes are tender and sore. Reports pain is a 6 out of 10.     Also reports being more emotional than usual. Has been attempting pregnancy. Is inquisitive about a pregnancy test today. LMP 11/24/21.     Subjective         Health Maintenance   Topic   • COVID-19 Vaccine (1)   • ANNUAL PHYSICAL    • PAP SMEAR    • TDAP/TD VACCINES (2 - Td or Tdap)   • Pneumococcal Vaccine 0-64 (2 of 2 - PPSV23)   • HEPATITIS C SCREENING    • INFLUENZA VACCINE        [facility]  The following portions of the patient's history were reviewed and updated as appropriate: allergies, current medications, past family history, past medical history, past social history, past surgical history and problem list.     Past Medical History:   Diagnosis Date   • Abdominal pain    • Acute sinusitis    • Ankle sprain    • Anxiety    • Asthma    • Atopic dermatitis    • Biliary dyskinesia    • Cat bite      resolved   • Dysfunctional uterine bleeding    • Foot pain    • GERD (gastroesophageal reflux disease)    • Gout    • Hearing loss, bilateral     R/T PSORASIS   • IBS (irritable bowel syndrome)    • Influenza    • Metabolic syndrome    • Muscular aches    • Obesity    • Panic attack    • PCOS (polycystic ovarian syndrome)    • Pharyngitis    • Psoriasis    • URI (upper respiratory infection)    • Wears glasses       Allergies   Allergen Reactions   • Latex Hives, Itching, Swelling and Rash   • Pollen Extract Rash   • Tree Extract Other (See Comments)     CONGESTION      Social History     Tobacco Use   • Smoking status: Never Smoker   • Smokeless  tobacco: Never Used   Vaping Use   • Vaping Use: Never used   Substance Use Topics   • Alcohol use: Not Currently   • Drug use: No     Past Surgical History:   Procedure Laterality Date   • CHOLECYSTECTOMY     • COLONOSCOPY     • TONSILLECTOMY AND ADENOIDECTOMY N/A 7/9/2021    Procedure: TONSILLECTOMY;  Surgeon: Ran Lemon MD;  Location: [facility];  Service: ENT;  Laterality: N/A;   • UMBILICAL HERNIA REPAIR     • WISDOM TOOTH EXTRACTION        Family History   Problem Relation Age of Onset   • Hypertension Father    • Obesity Father    • Diabetes Father    • Heart attack Father    • Sleep apnea Father    • Multiple sclerosis Mother    • Diabetes Mother    • Heart attack Paternal Grandfather    • Sleep apnea Paternal Grandfather    • Colon cancer Paternal Grandmother    • Cancer Paternal Grandmother         COLON   • Cancer Maternal Grandmother         BREAST   • Breast cancer Maternal Grandmother    • Asthma Maternal Grandfather          Current Outpatient Medications:   •  Adalimumab (Humira Pen) 40 MG/0.4ML Pen-injector Kit, Inject  under the skin into the appropriate area as directed Every 14 (Fourteen) Days. 6/25/2021 LAST INJECTION, Disp: , Rfl:   •  albuterol (PROVENTIL HFA;VENTOLIN HFA) 108 (90 Base) MCG/ACT inhaler, Inhale 2 puffs Every 4 (Four) Hours As Needed for Wheezing., Disp: 1 inhaler, Rfl: 5  •  aspirin (aspirin) 81 MG EC tablet, Adult Low Dose Aspirin 81 mg tablet,delayed release  Take 1 tablet every day by oral route., Disp: , Rfl:   •  budesonide-formoterol (Symbicort) 160-4.5 MCG/ACT inhaler, Inhale 2 puffs 2 (Two) Times a Day., Disp: 10.2 g, Rfl: 1  •  cetirizine (zyrTEC) 5 MG tablet, Take 1 tablet by mouth Daily., Disp: , Rfl:   •  Cholecalciferol (Vitamin D3) 50 MCG (2000 UT) tablet, Take 2,000 Units by mouth Daily., Disp: , Rfl:   •  clomiPHENE (CLOMID) 50 MG tablet, Take one tablet daily for 5 days beginning 3rd day of cycle, Disp: 5 tablet, Rfl: 2  •  ibuprofen (ADVIL,MOTRIN) 600  MG tablet, ibuprofen 600 mg tablet, Disp: , Rfl:   •  metFORMIN (GLUCOPHAGE) 500 MG tablet, TAKE TWO TABLETS BY MOUTH EVERY MORNING AND TAKE ONE TABLET BY MOUTH EVERY EVENING WITH MEALS, Disp: 270 tablet, Rfl: 3  •  montelukast (SINGULAIR) 10 MG tablet, TAKE ONE TABLET BY MOUTH ONCE NIGHTLY, Disp: 90 tablet, Rfl: 1  •  omeprazole (priLOSEC) 20 MG capsule, Take 20 mg by mouth Daily., Disp: , Rfl:   •  omeprazole OTC (PriLOSEC OTC) 20 MG EC tablet, omeprazole 20 mg tablet,delayed release  Take 1 tablet every day by oral route., Disp: , Rfl:   •  ondansetron ODT (ZOFRAN-ODT) 4 MG disintegrating tablet, Place 1 tablet on the tongue Every 6 (Six) Hours As Needed for Nausea or Vomiting., Disp: 8 tablet, Rfl: 0  •  promethazine (PHENERGAN) 25 MG tablet, TAKE ONE TABLET BY MOUTH EVERY 4 HOURS AS NEEDED FOR NAUSEA (Patient taking differently: Take 25 mg by mouth Every 6 (Six) Hours As Needed for Nausea.), Disp: 10 tablet, Rfl: 0  •  QUEtiapine (SEROquel) 25 MG tablet, Take 1-2 tablets at night, Disp: 60 tablet, Rfl: 2  •  fluticasone (Flonase) 50 MCG/ACT nasal spray, 2 sprays into the nostril(s) as directed by provider Daily., Disp: 16 g, Rfl: 2  •  methylPREDNISolone (MEDROL) 4 MG dose pack, Take as directed on package instructions., Disp: 21 tablet, Rfl: 0    Review of Systems   Constitutional: Negative for activity change and appetite change.   HENT: Negative for congestion, dental problem, drooling, ear discharge, ear pain, facial swelling, hearing loss, mouth sores, nosebleeds, postnasal drip, rhinorrhea, sinus pressure, sneezing, sore throat, swollen glands, tinnitus, trouble swallowing and voice change.    Respiratory: Negative for apnea, cough, choking, chest tightness, shortness of breath, wheezing and stridor.    Cardiovascular: Negative for chest pain, palpitations and leg swelling.   Gastrointestinal: Negative for abdominal distention, abdominal pain, anal bleeding, blood in stool, constipation, diarrhea, nausea,  "rectal pain, vomiting, GERD and indigestion.   Allergic/Immunologic: Positive for environmental allergies. Negative for food allergies.   Hematological: Positive for adenopathy. Does not bruise/bleed easily.       Objective   Vital Signs  /82   Pulse 80   Resp 18   Ht 167.6 cm (66\")   Wt (!) 153 kg (337 lb)   SpO2 99%   BMI 54.39 kg/m²     Physical Exam  Constitutional:       Appearance: Normal appearance.   HENT:      Head: Normocephalic.      Nose: Nose normal.      Mouth/Throat:      Mouth: Mucous membranes are moist.      Pharynx: Oropharynx is clear.   Eyes:      Conjunctiva/sclera: Conjunctivae normal.      Pupils: Pupils are equal, round, and reactive to light.   Neck:      Thyroid: No thyroid mass, thyromegaly or thyroid tenderness.      Vascular: Normal carotid pulses. No JVD.      Trachea: Trachea and phonation normal.   Cardiovascular:      Rate and Rhythm: Normal rate and regular rhythm.      Pulses: Normal pulses.      Heart sounds: Normal heart sounds.   Pulmonary:      Effort: Pulmonary effort is normal.      Breath sounds: Normal breath sounds.   Chest:   Breasts:      Left: No axillary adenopathy or supraclavicular adenopathy.       Abdominal:      General: Bowel sounds are normal.      Palpations: Abdomen is soft.   Lymphadenopathy:      Head:      Right side of head: No submental, submandibular, tonsillar, preauricular, posterior auricular or occipital adenopathy.      Left side of head: Tonsillar and occipital adenopathy present. No submental, submandibular, preauricular or posterior auricular adenopathy.      Cervical: Cervical adenopathy present.      Left cervical: Superficial cervical adenopathy present. No deep or posterior cervical adenopathy.      Upper Body:      Left upper body: No supraclavicular, axillary or pectoral adenopathy.   Skin:     General: Skin is warm and dry.      Capillary Refill: Capillary refill takes less than 2 seconds.   Neurological:      Mental Status: " She is alert and oriented to person, place, and time.   Psychiatric:         Mood and Affect: Mood normal.         Behavior: Behavior normal.         Thought Content: Thought content normal.         Judgment: Judgment normal.          Result Review :     The following data was reviewed by: COURTNEY Farrar on 12/13/2021:  CMP    CMP 4/6/21 7/7/21 7/15/21   Glucose 74 116 (A) 101 (A)   BUN 9 13 14   Creatinine 0.57 0.68 0.73   eGFR Non African Am 125 101 93   Sodium 139 138 138   Potassium 4.3 4.3 3.6   Chloride 103 103 102   Calcium 8.9 8.9 9.2   Albumin 4.00 4.00 3.80   Total Bilirubin 0.4 0.4 0.8   Alkaline Phosphatase 114 122 (A) 119 (A)   AST (SGOT) 17 16 17   ALT (SGPT) 24 21 21   (A) Abnormal value              Assessment and Plan    1. Lymphadenopathy of head and neck  - US Head Neck Soft Tissue; Future  - fluticasone (Flonase) 50 MCG/ACT nasal spray; 2 sprays into the nostril(s) as directed by provider Daily.  Dispense: 16 g; Refill: 2  - methylPREDNISolone (MEDROL) 4 MG dose pack; Take as directed on package instructions.  Dispense: 21 tablet; Refill: 0  - CBC & Differential; Future  - Mononucleosis Screen; Future  - Mono negative.   - Will review CBC for abnormalities that could explain lymphadenopathy.   - Will review US and treat and/or refer as appropriate.   - Will trial daily Flonase to evaluate if allergies could be causing potential reactive nodes.   - Encouraged to follow-up with PCP if no improvement or worsening of symptoms.     2. Mood changes  - POCT pregnancy, urine  - Negative in office today.     Follow Up {Instructions Charge Capture  Follow-up Communications :23}    Return for Next scheduled follow up.    Patient was given instructions and counseling regarding her condition or for health maintenance advice. Please see specific information pulled into the AVS if appropriate.    Part of this note may be an electronic transcription/translation of spoken language to printed text using the  Dragon Dictation System.    Electronically signed by:   Zaheer Saeed, APRN  12/13/2021

## 2021-12-18 ENCOUNTER — TELEPHONE (OUTPATIENT)
Dept: INTERNAL MEDICINE | Facility: CLINIC | Age: 31
End: 2021-12-18

## 2021-12-18 NOTE — TELEPHONE ENCOUNTER
Pt states she saw Zaheer Saeed earlier this week; pt states she feels like she may have an earache; feels full.  Pt would like to know if she can get ear drops called in.  Pt would like a call back.

## 2021-12-20 NOTE — TELEPHONE ENCOUNTER
Left voice message stating Allison's message and that we have a few openings tomorrow if she wants to return call and make an appointment

## 2021-12-20 NOTE — TELEPHONE ENCOUNTER
I don't know of any ear drops for ear fullness. The flonase and steroid Zaheer prescribed would help with eustachian tube dysfunction and ear pain/fullness from that. If persisting, we would need to take a look in her ear.

## 2021-12-21 ENCOUNTER — HOSPITAL ENCOUNTER (OUTPATIENT)
Dept: ULTRASOUND IMAGING | Facility: HOSPITAL | Age: 31
Discharge: HOME | End: 2021-12-21

## 2021-12-21 DIAGNOSIS — R59.1 LYMPHADENOPATHY OF HEAD AND NECK: ICD-10-CM

## 2021-12-21 PROCEDURE — 76536 US EXAM OF HEAD AND NECK: CPT

## 2022-01-14 ENCOUNTER — APPOINTMENT (OUTPATIENT)
Dept: GENERAL RADIOLOGY | Facility: HOSPITAL | Age: 32
End: 2022-01-14

## 2022-01-14 ENCOUNTER — TELEPHONE (OUTPATIENT)
Dept: INTERNAL MEDICINE | Facility: CLINIC | Age: 32
End: 2022-01-14

## 2022-01-14 ENCOUNTER — READMISSION MANAGEMENT (OUTPATIENT)
Dept: CALL CENTER | Facility: HOSPITAL | Age: 32
End: 2022-01-14

## 2022-01-14 ENCOUNTER — HOSPITAL ENCOUNTER (EMERGENCY)
Facility: HOSPITAL | Age: 32
Discharge: HOME | End: 2022-01-14
Attending: EMERGENCY MEDICINE | Admitting: EMERGENCY MEDICINE

## 2022-01-14 VITALS
WEIGHT: 293 LBS | BODY MASS INDEX: 47.09 KG/M2 | SYSTOLIC BLOOD PRESSURE: 175 MMHG | DIASTOLIC BLOOD PRESSURE: 100 MMHG | HEART RATE: 96 BPM | HEIGHT: 66 IN | TEMPERATURE: 98.9 F | OXYGEN SATURATION: 97 % | RESPIRATION RATE: 24 BRPM

## 2022-01-14 DIAGNOSIS — J20.8 ACUTE BRONCHITIS DUE TO COVID-19 VIRUS: Primary | ICD-10-CM

## 2022-01-14 DIAGNOSIS — U07.1 ACUTE BRONCHITIS DUE TO COVID-19 VIRUS: Primary | ICD-10-CM

## 2022-01-14 LAB
ALBUMIN SERPL-MCNC: 3.8 G/DL (ref 3.5–5.2)
ALBUMIN/GLOB SERPL: 1.2 G/DL
ALP SERPL-CCNC: 106 U/L (ref 39–117)
ALT SERPL W P-5'-P-CCNC: 20 U/L (ref 1–33)
ANION GAP SERPL CALCULATED.3IONS-SCNC: 11 MMOL/L (ref 5–15)
AST SERPL-CCNC: 16 U/L (ref 1–32)
B-HCG UR QL: NEGATIVE
BASOPHILS # BLD AUTO: 0.03 10*3/MM3 (ref 0–0.2)
BASOPHILS NFR BLD AUTO: 0.5 % (ref 0–1.5)
BILIRUB SERPL-MCNC: 0.5 MG/DL (ref 0–1.2)
BUN SERPL-MCNC: 10 MG/DL (ref 6–20)
BUN/CREAT SERPL: 17.2 (ref 7–25)
CALCIUM SPEC-SCNC: 8.9 MG/DL (ref 8.6–10.5)
CHLORIDE SERPL-SCNC: 103 MMOL/L (ref 98–107)
CO2 SERPL-SCNC: 24 MMOL/L (ref 22–29)
CREAT SERPL-MCNC: 0.58 MG/DL (ref 0.57–1)
DEPRECATED RDW RBC AUTO: 45.4 FL (ref 37–54)
EOSINOPHIL # BLD AUTO: 0.14 10*3/MM3 (ref 0–0.4)
EOSINOPHIL NFR BLD AUTO: 2.1 % (ref 0.3–6.2)
ERYTHROCYTE [DISTWIDTH] IN BLOOD BY AUTOMATED COUNT: 14.9 % (ref 12.3–15.4)
EXPIRATION DATE: NORMAL
FLUAV RNA RESP QL NAA+PROBE: NOT DETECTED
FLUBV RNA RESP QL NAA+PROBE: NOT DETECTED
GFR SERPL CREATININE-BSD FRML MDRD: 121 ML/MIN/1.73
GLOBULIN UR ELPH-MCNC: 3.3 GM/DL
GLUCOSE SERPL-MCNC: 86 MG/DL (ref 65–99)
HCT VFR BLD AUTO: 36.2 % (ref 34–46.6)
HGB BLD-MCNC: 11.4 G/DL (ref 12–15.9)
HOLD SPECIMEN: NORMAL
IMM GRANULOCYTES # BLD AUTO: 0.03 10*3/MM3 (ref 0–0.05)
IMM GRANULOCYTES NFR BLD AUTO: 0.5 % (ref 0–0.5)
INTERNAL NEGATIVE CONTROL: NEGATIVE
INTERNAL POSITIVE CONTROL: POSITIVE
LYMPHOCYTES # BLD AUTO: 1.23 10*3/MM3 (ref 0.7–3.1)
LYMPHOCYTES NFR BLD AUTO: 18.5 % (ref 19.6–45.3)
Lab: NORMAL
MCH RBC QN AUTO: 26.5 PG (ref 26.6–33)
MCHC RBC AUTO-ENTMCNC: 31.5 G/DL (ref 31.5–35.7)
MCV RBC AUTO: 84 FL (ref 79–97)
MONOCYTES # BLD AUTO: 0.42 10*3/MM3 (ref 0.1–0.9)
MONOCYTES NFR BLD AUTO: 6.3 % (ref 5–12)
NEUTROPHILS NFR BLD AUTO: 4.79 10*3/MM3 (ref 1.7–7)
NEUTROPHILS NFR BLD AUTO: 72.1 % (ref 42.7–76)
NRBC BLD AUTO-RTO: 0 /100 WBC (ref 0–0.2)
NT-PROBNP SERPL-MCNC: 77.2 PG/ML (ref 0–450)
PLATELET # BLD AUTO: 253 10*3/MM3 (ref 140–450)
PMV BLD AUTO: 9.8 FL (ref 6–12)
POTASSIUM SERPL-SCNC: 4.2 MMOL/L (ref 3.5–5.2)
PROT SERPL-MCNC: 7.1 G/DL (ref 6–8.5)
QT INTERVAL: 362 MS
QTC INTERVAL: 454 MS
RBC # BLD AUTO: 4.31 10*6/MM3 (ref 3.77–5.28)
RSV RNA NPH QL NAA+NON-PROBE: NOT DETECTED
SARS-COV-2 RNA RESP QL NAA+PROBE: DETECTED
SODIUM SERPL-SCNC: 138 MMOL/L (ref 136–145)
TROPONIN T SERPL-MCNC: <0.01 NG/ML (ref 0–0.03)
WBC NRBC COR # BLD: 6.64 10*3/MM3 (ref 3.4–10.8)
WHOLE BLOOD HOLD SPECIMEN: NORMAL
WHOLE BLOOD HOLD SPECIMEN: NORMAL

## 2022-01-14 PROCEDURE — 93005 ELECTROCARDIOGRAM TRACING: CPT | Performed by: EMERGENCY MEDICINE

## 2022-01-14 PROCEDURE — 99283 EMERGENCY DEPT VISIT LOW MDM: CPT

## 2022-01-14 PROCEDURE — 80053 COMPREHEN METABOLIC PANEL: CPT | Performed by: EMERGENCY MEDICINE

## 2022-01-14 PROCEDURE — 87636 SARSCOV2 & INF A&B AMP PRB: CPT | Performed by: EMERGENCY MEDICINE

## 2022-01-14 PROCEDURE — 87637 SARSCOV2&INF A&B&RSV AMP PRB: CPT | Performed by: EMERGENCY MEDICINE

## 2022-01-14 PROCEDURE — 83880 ASSAY OF NATRIURETIC PEPTIDE: CPT | Performed by: EMERGENCY MEDICINE

## 2022-01-14 PROCEDURE — 84484 ASSAY OF TROPONIN QUANT: CPT | Performed by: EMERGENCY MEDICINE

## 2022-01-14 PROCEDURE — 71045 X-RAY EXAM CHEST 1 VIEW: CPT

## 2022-01-14 PROCEDURE — 85025 COMPLETE CBC W/AUTO DIFF WBC: CPT | Performed by: EMERGENCY MEDICINE

## 2022-01-14 PROCEDURE — C9803 HOPD COVID-19 SPEC COLLECT: HCPCS | Performed by: EMERGENCY MEDICINE

## 2022-01-14 PROCEDURE — 81025 URINE PREGNANCY TEST: CPT | Performed by: EMERGENCY MEDICINE

## 2022-01-14 RX ORDER — SODIUM CHLORIDE 0.9 % (FLUSH) 0.9 %
10 SYRINGE (ML) INJECTION AS NEEDED
Status: DISCONTINUED | OUTPATIENT
Start: 2022-01-14 | End: 2022-01-14 | Stop reason: HOSPADM

## 2022-01-14 NOTE — TELEPHONE ENCOUNTER
Caller: Eliz Eisenberg    Relationship: Self    Best call back number: [phone]    What orders are you requesting (i.e. lab or imaging):   LAB FOR MONOCLONAL ANTIBODY INFUSION    In what timeframe would the patient need to come in:   ASAP    Where will you receive your lab/imaging services: N/A    Additional notes:   PT STATED THAT SHE TESTED POSITIVE FOR COVID TODAY

## 2022-01-14 NOTE — OUTREACH NOTE
Prep Survey      Responses   [facility] facility patient discharged from? [place]   Is LACE score < 7 ? No   Emergency Room discharge w/ pulse ox? Yes   Eligibility Readm Mgmt   Discharge diagnosis covid-19   Does the patient have one of the following disease processes/diagnoses(primary or secondary)? COVID-19   Does the patient have Home health ordered? No   Is there a DME ordered? Yes   What DME was ordered? home with pulse oximeter   General alerts for this patient ED visit-sent home with pulse oximeter   Prep survey completed? Yes          Rhea Cox RN

## 2022-01-14 NOTE — DISCHARGE INSTRUCTIONS
Use Neb and inhalers every 3-4 hours at home.   Monitor O2 at home, if O2 readings 92 and below consistently return to ED.

## 2022-01-14 NOTE — TELEPHONE ENCOUNTER
Patient has phoned back and get it at [facility] but just needs an order for it to be faxed , please advise     Advised patient that we have not been able to find anywhere at this time that has any of the monoclonal antibodies

## 2022-01-14 NOTE — ED PROVIDER NOTES
Subjective   Pt is a 30 yo female presneting to ED with complaints of SOB.  PMHx significant for Asthma, Psoriasis, IBS, PCOS, Gout, Anxiety and Obesity. Pt reports developing SOB, chest tightness, nasal congestion and and headache that began this morning. She has hx of asthma and tried using an inhaler at home with little relief. 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 She has had her Covid vaccine x2 and Flu vaccine. She denies recent antibiotics or steroids. She denies tobacco, drug or ETOH use.       History provided by:  Patient and medical records      Review of Systems   Constitutional: Positive for chills and fatigue. Negative for fever.   HENT: Positive for congestion. Negative for sore throat and trouble swallowing.    Eyes: Negative for visual disturbance.   Respiratory: Positive for cough, chest tightness and shortness of breath.    Cardiovascular: Negative for leg swelling.   Gastrointestinal: Negative for abdominal pain, diarrhea, nausea and vomiting.   Genitourinary: Negative for difficulty urinating, dysuria, flank pain and hematuria.   Musculoskeletal: Negative for arthralgias and back pain.   Skin: Negative for rash and wound.   Neurological: Negative for dizziness, syncope, weakness, numbness and headaches.   Psychiatric/Behavioral: Negative for confusion.   All other systems reviewed and are negative.      Past Medical History:   Diagnosis Date   • Abdominal pain    • Acute sinusitis    • Ankle sprain    • Anxiety    • Asthma    • Atopic dermatitis    • Biliary dyskinesia    • Cat bite      resolved   • Dysfunctional uterine bleeding    • Foot pain    • GERD (gastroesophageal reflux disease)    • Gout    • Hearing loss, bilateral     R/T PSORASIS   • IBS (irritable bowel syndrome)    • Influenza    • Metabolic syndrome    • Muscular aches    • Obesity    • Panic attack    • PCOS (polycystic ovarian syndrome)    • Pharyngitis    • Psoriasis    • URI (upper respiratory infection)    • Wears  glasses        Allergies   Allergen Reactions   • Latex Hives, Itching, Swelling and Rash   • Pollen Extract Rash   • Tree Extract Other (See Comments)     CONGESTION       Past Surgical History:   Procedure Laterality Date   • CHOLECYSTECTOMY     • COLONOSCOPY     • TONSILLECTOMY AND ADENOIDECTOMY N/A 7/9/2021    Procedure: TONSILLECTOMY;  Surgeon: Ran Lemon MD;  Location: [facility];  Service: ENT;  Laterality: N/A;   • UMBILICAL HERNIA REPAIR     • WISDOM TOOTH EXTRACTION         Family History   Problem Relation Age of Onset   • Hypertension Father    • Obesity Father    • Diabetes Father    • Heart attack Father    • Sleep apnea Father    • Multiple sclerosis Mother    • Diabetes Mother    • Heart attack Paternal Grandfather    • Sleep apnea Paternal Grandfather    • Colon cancer Paternal Grandmother    • Cancer Paternal Grandmother         COLON   • Cancer Maternal Grandmother         BREAST   • Breast cancer Maternal Grandmother    • Asthma Maternal Grandfather        Social History     Socioeconomic History   • Marital status:    Tobacco Use   • Smoking status: Never Smoker   • Smokeless tobacco: Never Used   Vaping Use   • Vaping Use: Never used   Substance and Sexual Activity   • Alcohol use: Not Currently   • Drug use: No   • Sexual activity: Yes     Partners: Male     Birth control/protection: None           Objective   Physical Exam  Vitals and nursing note reviewed.   Constitutional:       Appearance: She is well-developed. She is obese.   HENT:      Head: Atraumatic.      Nose: Nose normal.   Eyes:      General: Lids are normal.      Conjunctiva/sclera: Conjunctivae normal.      Pupils: Pupils are equal, round, and reactive to light.   Cardiovascular:      Rate and Rhythm: Normal rate and regular rhythm.      Heart sounds: Normal heart sounds.   Pulmonary:      Effort: Pulmonary effort is normal.      Breath sounds: Normal breath sounds. No wheezing or rhonchi.   Abdominal:       General: There is no distension.      Palpations: Abdomen is soft.      Tenderness: There is no abdominal tenderness. There is no guarding or rebound.   Musculoskeletal:         General: No tenderness. Normal range of motion.      Cervical back: Normal range of motion and neck supple.   Skin:     General: Skin is warm and dry.      Findings: No erythema or rash.   Neurological:      Mental Status: She is alert and oriented to person, place, and time.      Sensory: No sensory deficit.   Psychiatric:         Speech: Speech normal.         Behavior: Behavior normal.         Procedures           ED Course  ED Course as of 01/14/22 1519   Fri Jan 14, 2022   1030 SpO2: 100 %  RA [RT]   1110 SpO2: 97 %  RA [RT]   1115 COVID19(!!): Detected [RT]      ED Course User Index  [RT] Jennifer Herbert PA      Re-examined patient several tines in ED. Pt resting and vitals stable. Discussed results and tx plan. Will dc home with symptomatic tx. She will use her home inhalers and nebs. Discharged home with pulse ox for monitoring. She will return to ED if new or worse sx.     Discussed patient with Dr. Cuellar who is agreeable with ED course and tx plan.     Reviewed old records.     Recent Results (from the past 24 hour(s))   COVID-19, FLU A/B, RSV PCR - Swab, Nasopharynx    Collection Time: 01/14/22 10:12 AM    Specimen: Nasopharynx; Swab   Result Value Ref Range    COVID19 Detected (C) Not Detected - Ref. Range    Influenza A PCR Not Detected Not Detected    Influenza B PCR Not Detected Not Detected    RSV, PCR Not Detected Not Detected   Comprehensive Metabolic Panel    Collection Time: 01/14/22 11:08 AM    Specimen: Blood   Result Value Ref Range    Glucose 86 65 - 99 mg/dL    BUN 10 6 - 20 mg/dL    Creatinine 0.58 0.57 - 1.00 mg/dL    Sodium 138 136 - 145 mmol/L    Potassium 4.2 3.5 - 5.2 mmol/L    Chloride 103 98 - 107 mmol/L    CO2 24.0 22.0 - 29.0 mmol/L    Calcium 8.9 8.6 - 10.5 mg/dL    Total Protein 7.1 6.0 - 8.5 g/dL     Albumin 3.80 3.50 - 5.20 g/dL    ALT (SGPT) 20 1 - 33 U/L    AST (SGOT) 16 1 - 32 U/L    Alkaline Phosphatase 106 39 - 117 U/L    Total Bilirubin 0.5 0.0 - 1.2 mg/dL    eGFR Non African Amer 121 >60 mL/min/1.73    Globulin 3.3 gm/dL    A/G Ratio 1.2 g/dL    BUN/Creatinine Ratio 17.2 7.0 - 25.0    Anion Gap 11.0 5.0 - 15.0 mmol/L   BNP    Collection Time: 01/14/22 11:08 AM    Specimen: Blood   Result Value Ref Range    proBNP 77.2 0.0 - 450.0 pg/mL   Troponin    Collection Time: 01/14/22 11:08 AM    Specimen: Blood   Result Value Ref Range    Troponin T <0.010 0.000 - 0.030 ng/mL   Green Top (Gel)    Collection Time: 01/14/22 11:08 AM   Result Value Ref Range    Extra Tube Hold for add-ons.    Lavender Top    Collection Time: 01/14/22 11:08 AM   Result Value Ref Range    Extra Tube hold for add-on    Gold Top - SST    Collection Time: 01/14/22 11:08 AM   Result Value Ref Range    Extra Tube Hold for add-ons.    Gray Top    Collection Time: 01/14/22 11:08 AM   Result Value Ref Range    Extra Tube Hold for add-ons.    Light Blue Top    Collection Time: 01/14/22 11:08 AM   Result Value Ref Range    Extra Tube hold for add-on    CBC Auto Differential    Collection Time: 01/14/22 11:08 AM    Specimen: Blood   Result Value Ref Range    WBC 6.64 3.40 - 10.80 10*3/mm3    RBC 4.31 3.77 - 5.28 10*6/mm3    Hemoglobin 11.4 (L) 12.0 - 15.9 g/dL    Hematocrit 36.2 34.0 - 46.6 %    MCV 84.0 79.0 - 97.0 fL    MCH 26.5 (L) 26.6 - 33.0 pg    MCHC 31.5 31.5 - 35.7 g/dL    RDW 14.9 12.3 - 15.4 %    RDW-SD 45.4 37.0 - 54.0 fl    MPV 9.8 6.0 - 12.0 fL    Platelets 253 140 - 450 10*3/mm3    Neutrophil % 72.1 42.7 - 76.0 %    Lymphocyte % 18.5 (L) 19.6 - 45.3 %    Monocyte % 6.3 5.0 - 12.0 %    Eosinophil % 2.1 0.3 - 6.2 %    Basophil % 0.5 0.0 - 1.5 %    Immature Grans % 0.5 0.0 - 0.5 %    Neutrophils, Absolute 4.79 1.70 - 7.00 10*3/mm3    Lymphocytes, Absolute 1.23 0.70 - 3.10 10*3/mm3    Monocytes, Absolute 0.42 0.10 - 0.90 10*3/mm3     Eosinophils, Absolute 0.14 0.00 - 0.40 10*3/mm3    Basophils, Absolute 0.03 0.00 - 0.20 10*3/mm3    Immature Grans, Absolute 0.03 0.00 - 0.05 10*3/mm3    nRBC 0.0 0.0 - 0.2 /100 WBC   POCT pregnancy, urine    Collection Time: 01/14/22 11:45 AM    Specimen: Urine   Result Value Ref Range    HCG, Urine, QL Negative Negative    Lot Number [id number]     Internal Positive Control Positive Positive, Passed    Internal Negative Control Negative Negative, Passed    Expiration Date 2023-05-31      Note: In addition to lab results from this visit, the labs listed above may include labs taken at another facility or during a different encounter within the last 24 hours. Please correlate lab times with ED admission and discharge times for further clarification of the services performed during this visit.    XR Chest 1 View   Final Result   Right greater than left basilar atelectasis similar to   comparison. No additional evidence of acute disease.       This report was finalized on 1/14/2022 11:36 AM by Kg Kohler.            Vitals:    01/14/22 1030 01/14/22 1035 01/14/22 1105 01/14/22 1110   BP:       BP Location:       Patient Position:       Pulse: 89 93 95 96   Resp:       Temp:       TempSrc:       SpO2: 100% 98% 100% 97%   Weight:       Height:         Medications - No data to display  ECG/EMG Results (last 24 hours)     Procedure Component Value Units Date/Time    ECG 12 Lead [[id number]] Collected: 01/14/22 1009     Updated: 01/14/22 1014        ECG 12 Lead             DISCHARGE    Patient discharged in stable condition.    Reviewed implications of results, diagnosis, meds, responsibility to follow up, warning signs and symptoms of possible worsening, potential complications and reasons to return to ER.    Patient/Family voiced understanding of above instructions.    Discussed plan for discharge, as there is no emergent indication for admission.  Pt/family is agreeable and understands need for follow up and  possible repeat testing.  Pt/family is aware that discharge does not mean that nothing is wrong but that it indicates no emergency is currently present that requires admission and they must continue care with follow-up as given below or with a physician of their choice.     FOLLOW-UP  Allison Pham PA  [address] [place], [place] [id number] [phone]    Schedule an appointment as soon as possible for a visit       [facility] Emergency Department  [address] [place], [place] [id number] [phone]    If symptoms worsen         Medication List      Changed    promethazine 25 MG tablet  Commonly known as: PHENERGAN  TAKE ONE TABLET BY MOUTH EVERY 4 HOURS AS NEEDED FOR NAUSEA  What changed: See the new instructions.                                                     MDM    Final diagnoses:   Acute bronchitis due to COVID-19 virus       ED Disposition  ED Disposition     ED Disposition Condition Comment    Discharge Stable           Allison Pham PA  [address] [id number] [phone]    Schedule an appointment as soon as possible for a visit       [facility] Emergency Department  [address] [place] [id number] [phone]    If symptoms worsen         Medication List      Changed    promethazine 25 MG tablet  Commonly known as: PHENERGAN  TAKE ONE TABLET BY MOUTH EVERY 4 HOURS AS NEEDED FOR NAUSEA  What changed: See the new instructions.             Jennifer Herbert PA  01/14/22 3632

## 2022-01-15 ENCOUNTER — READMISSION MANAGEMENT (OUTPATIENT)
Dept: CALL CENTER | Facility: HOSPITAL | Age: 32
End: 2022-01-15

## 2022-01-15 NOTE — OUTREACH NOTE
COVID-19 Week 1 Survey      Responses   [facility] patient discharged from? [place]   Does the patient have one of the following disease processes/diagnoses(primary or secondary)? COVID-19   COVID-19 underlying condition? None   Call Number Call 1   Week 1 Call successful? No  [CONTACT NUMBERS ANSWERED BY RECORDINGS. NO MESSAGES LEFT]   Discharge diagnosis covid-19          Shelby Thakur RN

## 2022-01-16 NOTE — TELEPHONE ENCOUNTER
Did not get this message before leaving yesterday but can write an order when I get back to the office.

## 2022-01-17 ENCOUNTER — READMISSION MANAGEMENT (OUTPATIENT)
Dept: CALL CENTER | Facility: HOSPITAL | Age: 32
End: 2022-01-17

## 2022-01-17 NOTE — OUTREACH NOTE
COVID-19 Week 1 Survey      Responses   [facility] patient discharged from? [place]   Does the patient have one of the following disease processes/diagnoses(primary or secondary)? COVID-19   COVID-19 underlying condition? None   Call Number Call 2   Week 1 Call successful? No   Discharge diagnosis covid-19          Azul Wu RN

## 2022-01-18 ENCOUNTER — READMISSION MANAGEMENT (OUTPATIENT)
Dept: CALL CENTER | Facility: HOSPITAL | Age: 32
End: 2022-01-18

## 2022-01-18 ENCOUNTER — TELEPHONE (OUTPATIENT)
Dept: INTERNAL MEDICINE | Facility: CLINIC | Age: 32
End: 2022-01-18

## 2022-01-18 NOTE — TELEPHONE ENCOUNTER
Pt states she is trying to get a covid antibody infusion @ [facility].  Pt was told they only have two doses left.  Clinic said provider can call to get pt scheduled.  PH:  [phone].  Pt would also like call back regarding this.

## 2022-01-18 NOTE — TELEPHONE ENCOUNTER
Spoke with infusions center and changed her to sotrovimab 500 mg infusion at the same time tomorrow. New order to be faxed. Please let the patient know.

## 2022-01-18 NOTE — TELEPHONE ENCOUNTER
Patient advised that order was changed and faxed to [facility] but her scheduled time for the infusion is the same

## 2022-01-18 NOTE — TELEPHONE ENCOUNTER
[facility] would like for you to call because it can take a long time to get the fax, order was faxed but since they only have 2 doses left if you call they can go ahead and put her on the schedule and then wait for the order, the number patient gave me was [phone]

## 2022-01-18 NOTE — TELEPHONE ENCOUNTER
Patient advised that she is scheduled for infusion tomorrow at 10:45 and needs to call and get instructions from them for tomorrow

## 2022-01-18 NOTE — TELEPHONE ENCOUNTER
She is schedule for tomorrow, 1/19 at 10:45. She can call back to [phone] to get the instructions and provide insurance information.    Please fax order and insurance info to [phone]. Thanks

## 2022-01-18 NOTE — OUTREACH NOTE
COVID-19 Week 1 Survey      Responses   [facility] patient discharged from? [place]   Does the patient have one of the following disease processes/diagnoses(primary or secondary)? COVID-19   COVID-19 underlying condition? None   Call Number Call 3   Week 1 Call successful? Yes   Call start time [phone]   Call end time 0812   Discharge diagnosis covid-19   Medication alerts for this patient PCP scheduling monoclonal antibody infusion for patient today   Meds reviewed with patient/caregiver? Yes   Is the patient having any side effects they believe may be caused by any medication additions or changes? Yes   Does the patient have all medications ordered at discharge? N/A   Is the patient taking all medications as directed (includes completed medication regime)? Yes   Does the patient have a primary care provider?  Yes   Comments regarding PCP PCP scheduling monoclonal antibody infusion for patient today   Does the patient have an appointment with their PCP or specialist within 7 days of discharge? No   What is preventing the patient from scheduling follow up appointments within 7 days of discharge? --  [PCP to schedule for after infusion and quarantine]   Nursing Interventions Educated patient on importance of making appointment,  Advised patient to make appointment   Has the patient kept scheduled appointments due by today? N/A   Has home health visited the patient within 72 hours of discharge? N/A   What DME was ordered? home with pulse oximeter   Psychosocial issues? No   Did the patient receive a copy of their discharge instructions? Yes   Did the patient receive a copy of COVID-19 specific instructions? Yes   Nursing interventions Reviewed instructions with patient   What is the patient's perception of their health status since discharge? Improving   Does the patient have any of the following symptoms? Shortness of breath,  Cough,  Loss of taste/smell  [Productive cough with yellow sputum]   Nursing  Interventions Nurse provided patient education   Pulse Ox monitoring Intermittent   Pulse Ox device source Hospital   O2 Sat comments 93-94% on RA   O2 Sat: education provided Sat levels,  Monitoring frequency,  When to seek care   O2 Sat education comments Per d/c AVS, if O2 sat consistently below 92% after rest and deep breathing, return to ED   Is the patient/caregiver able to teach back steps to recovery at home? Set small, achievable goals for return to baseline health,  Rest and rebuild strength, gradually increase activity,  Eat a well-balance diet  [Fluids to aid in keeping secretions thin]   If the patient is a current smoker, are they able to teach back resources for cessation? Not a smoker   Is the patient/caregiver able to teach back the hierarchy of who to call/visit for symptoms/problems? PCP, Specialist, Home health nurse, Urgent Care, ED, 911 Yes   COVID-19 call completed? Yes   Wrap up additional comments Patient to receive antibody infusion today. Would like one more f/u call          Donna Lizarraga RN

## 2022-01-18 NOTE — TELEPHONE ENCOUNTER
[facility] called stating that they received order for Regeron & that it is not an infusion; it is an injection.  Another drug is used for infusion.  They wanted to make sure we are aware b/c the pt believes they are getting an infusion.

## 2022-02-27 ENCOUNTER — TELEPHONE (OUTPATIENT)
Dept: INTERNAL MEDICINE | Facility: CLINIC | Age: 32
End: 2022-02-27

## 2022-02-27 NOTE — TELEPHONE ENCOUNTER
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 Has been experiencing diarrhea and chills since Friday. Has tried eating bland foods, tried phenergan, and OTC antidiarrheals with no improvement in symptoms. Reports having a fever yesterday which has since improved. Has not been tested for COVID because she states she had COVID in January. States as soon as she eats she needs to go to the bathroom. Denies blood or pus in the stool. Patient was advised in oral rehydration therapy with water and electrolyte replacement. Encouraged bland diet and avoidance of offending foods. OTC anti-diarrheals recommended sparingly. Discussed that it would be preferable for patient to avoid antidiarrheals unless absolutely necessary. Encouraged in proper hygiene measures prior to preparing and consuming foods and after bowel or bladder habits to avoid cross contamination to others in household. Advised in typical duration of 2-4 days for resolution of viral gastroenteritis symptoms. Encouraged to seek further follow-up in office of ER treatment for worsening symptoms and/or inability to keep food/fluids down for 24 hours.

## 2022-04-18 ENCOUNTER — OFFICE VISIT (OUTPATIENT)
Dept: INTERNAL MEDICINE | Facility: CLINIC | Age: 32
End: 2022-04-18

## 2022-04-18 ENCOUNTER — LAB (OUTPATIENT)
Dept: LAB | Facility: HOSPITAL | Age: 32
End: 2022-04-18

## 2022-04-18 VITALS
OXYGEN SATURATION: 98 % | BODY MASS INDEX: 47.09 KG/M2 | RESPIRATION RATE: 16 BRPM | WEIGHT: 293 LBS | DIASTOLIC BLOOD PRESSURE: 92 MMHG | SYSTOLIC BLOOD PRESSURE: 140 MMHG | HEIGHT: 66 IN | TEMPERATURE: 97.6 F | HEART RATE: 110 BPM

## 2022-04-18 DIAGNOSIS — Z00.00 HEALTH CARE MAINTENANCE: Primary | ICD-10-CM

## 2022-04-18 DIAGNOSIS — Z23 NEED FOR COVID-19 VACCINE: ICD-10-CM

## 2022-04-18 DIAGNOSIS — Z00.00 HEALTH CARE MAINTENANCE: ICD-10-CM

## 2022-04-18 DIAGNOSIS — E55.9 VITAMIN D DEFICIENCY: ICD-10-CM

## 2022-04-18 LAB
25(OH)D3 SERPL-MCNC: 25.2 NG/ML (ref 30–100)
ALBUMIN SERPL-MCNC: 4.1 G/DL (ref 3.5–5.2)
ALBUMIN/GLOB SERPL: 1.1 G/DL
ALP SERPL-CCNC: 120 U/L (ref 39–117)
ALT SERPL W P-5'-P-CCNC: 23 U/L (ref 1–33)
ANION GAP SERPL CALCULATED.3IONS-SCNC: 14.8 MMOL/L (ref 5–15)
AST SERPL-CCNC: 20 U/L (ref 1–32)
BASOPHILS # BLD AUTO: 0.04 10*3/MM3 (ref 0–0.2)
BASOPHILS NFR BLD AUTO: 0.4 % (ref 0–1.5)
BILIRUB SERPL-MCNC: 0.4 MG/DL (ref 0–1.2)
BUN SERPL-MCNC: 9 MG/DL (ref 6–20)
BUN/CREAT SERPL: 15 (ref 7–25)
CALCIUM SPEC-SCNC: 9.2 MG/DL (ref 8.6–10.5)
CHLORIDE SERPL-SCNC: 102 MMOL/L (ref 98–107)
CHOLEST SERPL-MCNC: 180 MG/DL (ref 0–200)
CO2 SERPL-SCNC: 22.2 MMOL/L (ref 22–29)
CREAT SERPL-MCNC: 0.6 MG/DL (ref 0.57–1)
DEPRECATED RDW RBC AUTO: 42.8 FL (ref 37–54)
EGFRCR SERPLBLD CKD-EPI 2021: 123.2 ML/MIN/1.73
EOSINOPHIL # BLD AUTO: 0.19 10*3/MM3 (ref 0–0.4)
EOSINOPHIL NFR BLD AUTO: 1.7 % (ref 0.3–6.2)
ERYTHROCYTE [DISTWIDTH] IN BLOOD BY AUTOMATED COUNT: 14.9 % (ref 12.3–15.4)
GLOBULIN UR ELPH-MCNC: 3.6 GM/DL
GLUCOSE SERPL-MCNC: 85 MG/DL (ref 65–99)
HCT VFR BLD AUTO: 39.1 % (ref 34–46.6)
HDLC SERPL-MCNC: 29 MG/DL (ref 40–60)
HGB BLD-MCNC: 12.6 G/DL (ref 12–15.9)
IMM GRANULOCYTES # BLD AUTO: 0.05 10*3/MM3 (ref 0–0.05)
IMM GRANULOCYTES NFR BLD AUTO: 0.5 % (ref 0–0.5)
LDLC SERPL CALC-MCNC: 131 MG/DL (ref 0–100)
LDLC/HDLC SERPL: 4.44 {RATIO}
LYMPHOCYTES # BLD AUTO: 2.42 10*3/MM3 (ref 0.7–3.1)
LYMPHOCYTES NFR BLD AUTO: 22.1 % (ref 19.6–45.3)
MCH RBC QN AUTO: 25.7 PG (ref 26.6–33)
MCHC RBC AUTO-ENTMCNC: 32.2 G/DL (ref 31.5–35.7)
MCV RBC AUTO: 79.6 FL (ref 79–97)
MONOCYTES # BLD AUTO: 0.54 10*3/MM3 (ref 0.1–0.9)
MONOCYTES NFR BLD AUTO: 4.9 % (ref 5–12)
NEUTROPHILS NFR BLD AUTO: 7.7 10*3/MM3 (ref 1.7–7)
NEUTROPHILS NFR BLD AUTO: 70.4 % (ref 42.7–76)
NRBC BLD AUTO-RTO: 0 /100 WBC (ref 0–0.2)
PLATELET # BLD AUTO: 391 10*3/MM3 (ref 140–450)
PMV BLD AUTO: 10.5 FL (ref 6–12)
POTASSIUM SERPL-SCNC: 4.2 MMOL/L (ref 3.5–5.2)
PROT SERPL-MCNC: 7.7 G/DL (ref 6–8.5)
RBC # BLD AUTO: 4.91 10*6/MM3 (ref 3.77–5.28)
SODIUM SERPL-SCNC: 139 MMOL/L (ref 136–145)
TRIGL SERPL-MCNC: 111 MG/DL (ref 0–150)
TSH SERPL DL<=0.05 MIU/L-ACNC: 2.86 UIU/ML (ref 0.27–4.2)
VLDLC SERPL-MCNC: 20 MG/DL (ref 5–40)
WBC NRBC COR # BLD: 10.94 10*3/MM3 (ref 3.4–10.8)

## 2022-04-18 PROCEDURE — 80061 LIPID PANEL: CPT

## 2022-04-18 PROCEDURE — 91305 COVID-19 (PFIZER) 12+ YRS: CPT | Performed by: PHYSICIAN ASSISTANT

## 2022-04-18 PROCEDURE — 0051A COVID-19 (PFIZER) 12+ YRS: CPT | Performed by: PHYSICIAN ASSISTANT

## 2022-04-18 PROCEDURE — 99395 PREV VISIT EST AGE 18-39: CPT | Performed by: PHYSICIAN ASSISTANT

## 2022-04-18 PROCEDURE — 80050 GENERAL HEALTH PANEL: CPT

## 2022-04-18 PROCEDURE — 82306 VITAMIN D 25 HYDROXY: CPT

## 2022-04-18 NOTE — PROGRESS NOTES
"Chief Complaint   Patient presents with   • Annual Exam     Seasonal Allergies       Subjective     History of Present Illness    Eliz Eisenberg is a 31 y.o. female.     The patient is being seen for a health maintenance evaluation.  The last health maintenance was 1 year(s) ago.    Social History  Eliz  does not smoke cigarettes.   She drinks no alcohol.  She does not use illicit drugs.    General History  Eliz  does have regular dental visits.  She does complain of vision problems. Wears glasses. Last eye exam was 2019.  Immunizations are not up to date. The patient needs the following immunizations: Covid booster to be done today    Lifestyle  Eliz  consumes in general, a \"healthy\" diet  .  She exercises rarely. Difficult to find time d/t change in job.    Reproductive Health  Eliz  is premenopausal.  She reports periods are irregular.  She is sexually active. Her contraceptive plan is no method.    Screening  Last pap was 2/2021 by Dr. Ayers. History of abnormal pap smear or family history of gyn cancer: none  Last mammogram was  never. Personal or family history of abnormal mammograms or breast cancer: maternal GM with breast cancer age 80  Last colonoscopy was 2016, repeat at routine screening. Family history of colon cancer: paternal GM with colon cancer  Last DEXA was never.     Pt is off Humira because she is struggling to get it covered by insurance. Her dermatologist, Dr. Humphreys is working on this.    Needs to schedule with gyn.                Current Outpatient Medications:   •  Adalimumab (HUMIRA) 40 MG/0.4ML Pen-injector Kit, Inject  under the skin into the appropriate area as directed Every 14 (Fourteen) Days. 6/25/2021 LAST INJECTION, Disp: , Rfl:   •  albuterol (PROVENTIL HFA;VENTOLIN HFA) 108 (90 Base) MCG/ACT inhaler, Inhale 2 puffs Every 4 (Four) Hours As Needed for Wheezing., Disp: 1 inhaler, Rfl: 5  •  aspirin 81 MG EC tablet, Adult Low Dose Aspirin 81 mg tablet,delayed " release  Take 1 tablet every day by oral route., Disp: , Rfl:   •  budesonide-formoterol (Symbicort) 160-4.5 MCG/ACT inhaler, Inhale 2 puffs 2 (Two) Times a Day., Disp: 10.2 g, Rfl: 1  •  cetirizine (zyrTEC) 5 MG tablet, Take 1 tablet by mouth Daily., Disp: , Rfl:   •  Cholecalciferol (Vitamin D3) 50 MCG (2000 UT) tablet, Take 2,000 Units by mouth Daily., Disp: , Rfl:   •  fluticasone (Flonase) 50 MCG/ACT nasal spray, 2 sprays into the nostril(s) as directed by provider Daily., Disp: 16 g, Rfl: 2  •  ibuprofen (ADVIL,MOTRIN) 600 MG tablet, ibuprofen 600 mg tablet, Disp: , Rfl:   •  metFORMIN (GLUCOPHAGE) 500 MG tablet, TAKE TWO TABLETS BY MOUTH EVERY MORNING AND TAKE ONE TABLET BY MOUTH EVERY EVENING WITH MEALS, Disp: 270 tablet, Rfl: 3  •  montelukast (SINGULAIR) 10 MG tablet, TAKE ONE TABLET BY MOUTH ONCE NIGHTLY, Disp: 90 tablet, Rfl: 1  •  mupirocin (BACTROBAN) 2 % ointment, mupirocin 2 % topical ointment  Apply topically on the left ear bid x 10-14 days, Disp: , Rfl:   •  omeprazole (priLOSEC) 20 MG capsule, Take 20 mg by mouth Daily., Disp: , Rfl:   •  ondansetron ODT (ZOFRAN-ODT) 4 MG disintegrating tablet, Place 1 tablet on the tongue Every 6 (Six) Hours As Needed for Nausea or Vomiting., Disp: 8 tablet, Rfl: 0  •  promethazine (PHENERGAN) 25 MG tablet, TAKE ONE TABLET BY MOUTH EVERY 4 HOURS AS NEEDED FOR NAUSEA (Patient taking differently: Take 25 mg by mouth Every 6 (Six) Hours As Needed for Nausea.), Disp: 10 tablet, Rfl: 0  •  clomiPHENE (CLOMID) 50 MG tablet, Take one tablet daily for 5 days beginning 3rd day of cycle, Disp: 5 tablet, Rfl: 2  •  QUEtiapine (SEROquel) 25 MG tablet, Take 1-2 tablets at night, Disp: 60 tablet, Rfl: 2     PMFSH  The following portions of the patient's history were reviewed and updated as appropriate: allergies, current medications, past family history, past medical history, past social history, past surgical history and problem list.    Review of Systems   Constitutional:  "Negative for appetite change, fever and unexpected weight change.   HENT: Negative for ear pain, facial swelling and sore throat.    Eyes: Negative for pain and visual disturbance.   Respiratory: Negative for chest tightness, shortness of breath and wheezing.    Cardiovascular: Negative for chest pain and palpitations.   Gastrointestinal: Negative for abdominal pain and blood in stool.   Endocrine: Negative.    Genitourinary: Negative for difficulty urinating and hematuria.   Musculoskeletal: Negative for joint swelling.   Neurological: Negative for dizziness, tremors, seizures, syncope and headaches.   Hematological: Negative for adenopathy.   Psychiatric/Behavioral: Negative.        Objective   /92   Pulse 110   Temp 97.6 °F (36.4 °C) (Temporal)   Resp 16   Ht 167.6 cm (65.98\")   Wt (!) 148 kg (327 lb)   SpO2 98%   BMI 52.80 kg/m²     Physical Exam  Vitals and nursing note reviewed.   Constitutional:       General: She is not in acute distress.     Appearance: She is well-developed. She is obese. She is not diaphoretic.   HENT:      Head: Normocephalic and atraumatic. Hair is normal.      Right Ear: Hearing, tympanic membrane, ear canal and external ear normal. No decreased hearing noted. No drainage.      Left Ear: Hearing, tympanic membrane, ear canal and external ear normal. No decreased hearing noted.      Nose: No nasal deformity.   Eyes:      General: Lids are normal. Lids are everted, no foreign bodies appreciated.         Right eye: No discharge.         Left eye: No discharge.      Conjunctiva/sclera: Conjunctivae normal.      Pupils: Pupils are equal, round, and reactive to light.   Neck:      Thyroid: No thyromegaly.      Vascular: No JVD.      Trachea: No tracheal deviation.   Cardiovascular:      Rate and Rhythm: Normal rate and regular rhythm.      Pulses: Normal pulses.      Heart sounds: Normal heart sounds. No murmur heard.    No friction rub. No gallop.   Pulmonary:      Effort: " Pulmonary effort is normal. No respiratory distress.      Breath sounds: Normal breath sounds. No wheezing or rales.   Chest:      Chest wall: No tenderness.   Abdominal:      General: Bowel sounds are normal. There is no distension.      Palpations: Abdomen is soft. There is no mass.      Tenderness: There is no abdominal tenderness. There is no guarding or rebound.      Hernia: No hernia is present.   Musculoskeletal:         General: No tenderness or deformity. Normal range of motion.      Cervical back: Normal range of motion and neck supple.   Lymphadenopathy:      Cervical: No cervical adenopathy.   Skin:     General: Skin is warm and dry.      Findings: No erythema or rash.   Neurological:      Mental Status: She is alert and oriented to person, place, and time.      Cranial Nerves: No cranial nerve deficit.      Motor: No abnormal muscle tone.      Coordination: Coordination normal.      Deep Tendon Reflexes: Reflexes are normal and symmetric. Reflexes normal.   Psychiatric:         Behavior: Behavior normal.         Thought Content: Thought content normal.         Judgment: Judgment normal.              ASSESSMENT/PLAN    Diagnoses and all orders for this visit:    1. Health care maintenance (Primary)  Assessment & Plan:  Immunizations: Covid booster today  Eye exam: done 10/2019  Pap Smear: done 2/2021 by Dr Ayers  Mammogram: due age 40  Dexa: due postmenopausal  Colonoscopy: due age 45  Labs: fasting labs ordered    Counseled patient regarding multimodal approach with healthy nutrition, healthy sleep, regular physical activity, social activities, counseling, safety measures and medications.     Orders:  -     CBC & Differential; Future  -     Comprehensive Metabolic Panel; Future  -     Lipid Panel; Future  -     TSH; Future    2. Vitamin D deficiency  -     Vitamin D 25 Hydroxy; Future    3. Need for COVID-19 vaccine  -     COVID-19 Vaccine (Pfizer) Gray Cap           Return in about 1 year (around  4/18/2023) for Annual with fasting labs.

## 2022-04-18 NOTE — PROGRESS NOTES
Immunization  Covid Immunization performed in left deltoid by VASQUEZ CHANDLER MA. Patient tolerated the procedure well without complications.  04/18/22   VASQUEZ CHANDLER MA

## 2022-04-20 ENCOUNTER — PATIENT MESSAGE (OUTPATIENT)
Dept: INTERNAL MEDICINE | Facility: CLINIC | Age: 32
End: 2022-04-20

## 2022-04-20 DIAGNOSIS — E55.9 VITAMIN D DEFICIENCY: Primary | ICD-10-CM

## 2022-04-20 NOTE — ASSESSMENT & PLAN NOTE
Immunizations: Covid booster today  Eye exam: done 10/2019  Pap Smear: done 2/2021 by Dr Ayers  Mammogram: due age 40  Dexa: due postmenopausal  Colonoscopy: due age 45  Labs: fasting labs ordered    Counseled patient regarding multimodal approach with healthy nutrition, healthy sleep, regular physical activity, social activities, counseling, safety measures and medications.

## 2022-04-21 RX ORDER — AMOXICILLIN AND CLAVULANATE POTASSIUM 875; 125 MG/1; MG/1
1 TABLET, FILM COATED ORAL 2 TIMES DAILY
Qty: 20 TABLET | Refills: 0 | Status: SHIPPED | OUTPATIENT
Start: 2022-04-21 | End: 2022-05-02

## 2022-04-22 RX ORDER — ERGOCALCIFEROL 1.25 MG/1
50000 CAPSULE ORAL WEEKLY
Qty: 12 CAPSULE | Refills: 3 | Status: SHIPPED | OUTPATIENT
Start: 2022-04-22

## 2022-05-02 ENCOUNTER — LAB (OUTPATIENT)
Dept: LAB | Facility: HOSPITAL | Age: 32
End: 2022-05-02

## 2022-05-02 ENCOUNTER — OFFICE VISIT (OUTPATIENT)
Dept: OBSTETRICS AND GYNECOLOGY | Facility: CLINIC | Age: 32
End: 2022-05-02

## 2022-05-02 VITALS
BODY MASS INDEX: 52.8 KG/M2 | SYSTOLIC BLOOD PRESSURE: 130 MMHG | WEIGHT: 293 LBS | RESPIRATION RATE: 16 BRPM | DIASTOLIC BLOOD PRESSURE: 80 MMHG

## 2022-05-02 DIAGNOSIS — E28.2 PCOS (POLYCYSTIC OVARIAN SYNDROME): ICD-10-CM

## 2022-05-02 DIAGNOSIS — N92.6 IRREGULAR PERIODS: ICD-10-CM

## 2022-05-02 DIAGNOSIS — E66.01 MORBID OBESITY WITH BMI OF 50.0-59.9, ADULT: ICD-10-CM

## 2022-05-02 DIAGNOSIS — Z01.411 ENCOUNTER FOR GYNECOLOGICAL EXAMINATION WITH ABNORMAL FINDING: Primary | ICD-10-CM

## 2022-05-02 DIAGNOSIS — E88.81 METABOLIC SYNDROME: ICD-10-CM

## 2022-05-02 LAB — HBA1C MFR BLD: 5.8 % (ref 4.8–5.6)

## 2022-05-02 PROCEDURE — 83036 HEMOGLOBIN GLYCOSYLATED A1C: CPT

## 2022-05-02 PROCEDURE — 83525 ASSAY OF INSULIN: CPT

## 2022-05-02 PROCEDURE — 99395 PREV VISIT EST AGE 18-39: CPT | Performed by: NURSE PRACTITIONER

## 2022-05-02 RX ORDER — PROGESTERONE 200 MG/1
400 CAPSULE ORAL DAILY
Qty: 20 CAPSULE | Refills: 6 | Status: SHIPPED | OUTPATIENT
Start: 2022-05-02 | End: 2022-05-12

## 2022-05-02 NOTE — PROGRESS NOTES
Chief Complaint  Eliz Eisenberg is a 31 y.o.  female presenting for Annual Exam    History of Present Illness  Very pleasant 30yo nulligravid woman, here today for annual gyn exam.  She has longstanding hx PCOS and irreg periods.  Had true oligomenorrhea in the distant past (would skip menses for months at a time).  Menses have continued to be somewhat irregular, but she doesn't skip as many as before.  She will sometimes have light bldg x just few days of flow, with absence of molimina.  (We discussed the possibility of that being anovulatory bldg vs the longer/heavier/ cramping kind of menses being ovulatory cycles.  We discussed prevention of hyperplasia.)  They would love to conceive, as they have been off hormonal contraception for ~ 2 yrs.  She has had some labs recently, but no A1C or insulin level.  Willing to do so, and she is fasting today.  We also discussed her hyperlipidemia, and she was encouraged to increase movement/ walking/ exercise to help boost the HDL.  We also discussed fertility awareness, ovulation predictor testing.  She takes the Metformin faithfully (1000 mg in AM / 500 mg at PM) and tolerates it well.  Trying to work on healthier eating and wt loss.    The following portions of the patient's history were reviewed and updated as appropriate: allergies, current medications, past family history, past medical history, past social history, past surgical history and problem list.    Allergies   Allergen Reactions   • Latex Hives, Itching, Swelling and Rash   • Pollen Extract Rash   • Tree Extract Other (See Comments)     CONGESTION         Current Outpatient Medications:   •  albuterol (PROVENTIL HFA;VENTOLIN HFA) 108 (90 Base) MCG/ACT inhaler, Inhale 2 puffs Every 4 (Four) Hours As Needed for Wheezing., Disp: 1 inhaler, Rfl: 5  •  aspirin 81 MG EC tablet, Adult Low Dose Aspirin 81 mg tablet,delayed release  Take 1 tablet every day by oral route., Disp: , Rfl:   •   budesonide-formoterol (Symbicort) 160-4.5 MCG/ACT inhaler, Inhale 2 puffs 2 (Two) Times a Day., Disp: 10.2 g, Rfl: 1  •  cetirizine (zyrTEC) 5 MG tablet, Take 1 tablet by mouth Daily., Disp: , Rfl:   •  Cholecalciferol (Vitamin D3) 50 MCG (2000 UT) tablet, Take 2,000 Units by mouth Daily., Disp: , Rfl:   •  clomiPHENE (CLOMID) 50 MG tablet, Take one tablet daily for 5 days beginning 3rd day of cycle, Disp: 5 tablet, Rfl: 2  •  fluticasone (Flonase) 50 MCG/ACT nasal spray, 2 sprays into the nostril(s) as directed by provider Daily., Disp: 16 g, Rfl: 2  •  ibuprofen (ADVIL,MOTRIN) 600 MG tablet, ibuprofen 600 mg tablet, Disp: , Rfl:   •  metFORMIN (GLUCOPHAGE) 500 MG tablet, TAKE TWO TABLETS BY MOUTH EVERY MORNING AND TAKE ONE TABLET BY MOUTH EVERY EVENING WITH MEALS, Disp: 270 tablet, Rfl: 3  •  montelukast (SINGULAIR) 10 MG tablet, TAKE ONE TABLET BY MOUTH ONCE NIGHTLY, Disp: 90 tablet, Rfl: 1  •  mupirocin (BACTROBAN) 2 % ointment, mupirocin 2 % topical ointment  Apply topically on the left ear bid x 10-14 days, Disp: , Rfl:   •  omeprazole (priLOSEC) 20 MG capsule, Take 20 mg by mouth Daily., Disp: , Rfl:   •  ondansetron ODT (ZOFRAN-ODT) 4 MG disintegrating tablet, Place 1 tablet on the tongue Every 6 (Six) Hours As Needed for Nausea or Vomiting., Disp: 8 tablet, Rfl: 0  •  promethazine (PHENERGAN) 25 MG tablet, TAKE ONE TABLET BY MOUTH EVERY 4 HOURS AS NEEDED FOR NAUSEA (Patient taking differently: Take 25 mg by mouth Every 6 (Six) Hours As Needed for Nausea.), Disp: 10 tablet, Rfl: 0  •  QUEtiapine (SEROquel) 25 MG tablet, Take 1-2 tablets at night, Disp: 60 tablet, Rfl: 2  •  vitamin D (ERGOCALCIFEROL) 1.25 MG (57938 UT) capsule capsule, Take 1 capsule by mouth 1 (One) Time Per Week., Disp: 12 capsule, Rfl: 3  •  Adalimumab (HUMIRA) 40 MG/0.4ML Pen-injector Kit, Inject  under the skin into the appropriate area as directed Every 14 (Fourteen) Days. 6/25/2021 LAST INJECTION, Disp: , Rfl:   •  Progesterone  (Prometrium) 200 MG capsule, Take 2 capsules by mouth Daily for 10 days., Disp: 20 capsule, Rfl: 6    Past Medical History:   Diagnosis Date   • Abdominal pain    • Acute sinusitis    • Ankle sprain    • Anxiety    • Asthma    • Atopic dermatitis    • Biliary dyskinesia    • Cat bite      resolved   • Dysfunctional uterine bleeding    • Foot pain    • GERD (gastroesophageal reflux disease)    • Gout    • Hearing loss, bilateral     R/T PSORASIS   • IBS (irritable bowel syndrome)    • Influenza    • Metabolic syndrome    • Muscular aches    • Obesity    • Panic attack    • PCOS (polycystic ovarian syndrome)    • Pharyngitis    • Psoriasis    • URI (upper respiratory infection)    • Wears glasses         Past Surgical History:   Procedure Laterality Date   • CHOLECYSTECTOMY     • COLONOSCOPY     • TONSILLECTOMY AND ADENOIDECTOMY N/A 7/9/2021    Procedure: TONSILLECTOMY;  Surgeon: Ran Lemon MD;  Location: [facility];  Service: ENT;  Laterality: N/A;   • UMBILICAL HERNIA REPAIR     • WISDOM TOOTH EXTRACTION         Objective  /80   Resp 16   Wt (!) 148 kg (327 lb)   LMP 04/22/2022   Breastfeeding No   BMI 52.80 kg/m²     Physical Exam  Exam conducted with a chaperone present.   Constitutional:       Appearance: Normal appearance. She is obese.   HENT:      Head: Normocephalic.   Neck:      Thyroid: No thyroid mass or thyromegaly.   Cardiovascular:      Rate and Rhythm: Normal rate and regular rhythm.      Heart sounds: Normal heart sounds.   Pulmonary:      Effort: Pulmonary effort is normal.      Breath sounds: Normal breath sounds.   Chest:   Breasts:      Right: No inverted nipple, mass, nipple discharge, axillary adenopathy or supraclavicular adenopathy.      Left: No inverted nipple, mass, nipple discharge, axillary adenopathy or supraclavicular adenopathy.       Abdominal:      Palpations: Abdomen is soft. There is no mass.      Tenderness: There is no abdominal tenderness.   Genitourinary:      General: Normal vulva.      Labia:         Right: No lesion.         Left: No lesion.       Vagina: Normal. No vaginal discharge or erythema.      Cervix: No discharge, lesion or erythema.      Uterus: Not enlarged and not tender.       Adnexa:         Right: No mass or tenderness.          Left: No mass or tenderness.        Comments: Vaginal mucosa is pink / well estrogenized.  No abnormal discharge or CMT.  Difficult pelvic exam due to body habitus.  Nontender uterus.  Nontender adnexa bilat.  Anus appears wnl.  No rectal exam performed.  Lymphadenopathy:      Upper Body:      Right upper body: No supraclavicular or axillary adenopathy.      Left upper body: No supraclavicular or axillary adenopathy.   Neurological:      Mental Status: She is alert.         Assessment/Plan   Diagnoses and all orders for this visit:    1. Encounter for gynecological examination with abnormal finding (Primary)    2. PCOS (polycystic ovarian syndrome)  -     Insulin, Random; Future  -     Hemoglobin A1c; Future    3. Irregular periods    4. Metabolic syndrome  -     metFORMIN (GLUCOPHAGE) 500 MG tablet; TAKE TWO TABLETS BY MOUTH EVERY MORNING AND TAKE ONE TABLET BY MOUTH EVERY EVENING WITH MEALS  Dispense: 270 tablet; Refill: 3    5. Morbid obesity with BMI of 50.0-59.9, adult (HCC)    Other orders  -     Progesterone (Prometrium) 200 MG capsule; Take 2 capsules by mouth Daily for 10 days.  Dispense: 20 capsule; Refill: 6        Procedures    19 to 39: Counseling/Anticipatory Guidance Discussed: nutrition, physical activity, breast cancer and self breast exams and Fertility awareness / ovulation predictor testing/ risks of hyperplasia        Return in about 3 months (around 8/2/2022) for Recheck FU IR /PCOS / Bldg pattern.    Asia Evans, APRN  05/02/2022

## 2022-05-07 LAB — INSULIN SERPL-ACNC: 22 UIU/ML

## 2022-05-09 DIAGNOSIS — E88.81 INSULIN RESISTANCE: Primary | ICD-10-CM

## 2022-05-19 ENCOUNTER — TELEPHONE (OUTPATIENT)
Dept: OBSTETRICS AND GYNECOLOGY | Facility: CLINIC | Age: 32
End: 2022-05-19

## 2022-05-19 RX ORDER — FLUCONAZOLE 150 MG/1
150 TABLET ORAL EVERY OTHER DAY
Qty: 3 TABLET | Refills: 0 | Status: SHIPPED | OUTPATIENT
Start: 2022-05-19 | End: 2022-07-18 | Stop reason: SDUPTHER

## 2022-05-19 NOTE — TELEPHONE ENCOUNTER
----- Message from Eliz Eisenberg sent at 5/19/2022  8:32 AM EDT -----  Regarding: Prescription   Goodmorning, I think I may have a yeast infection coming on. Would you be able to call in some meds to the pharmacy for me please?

## 2022-06-29 ENCOUNTER — TELEPHONE (OUTPATIENT)
Dept: OBSTETRICS AND GYNECOLOGY | Facility: CLINIC | Age: 32
End: 2022-06-29

## 2022-06-29 NOTE — TELEPHONE ENCOUNTER
I have attempted to return the patient's call at her request.  No answer, but I left a voicemail message letting Sharifa know that I would call back at another time.

## 2022-06-29 NOTE — TELEPHONE ENCOUNTER
Patient has been informed to start next round of progesterone 30 days after she started this round.  She voiced understanding.

## 2022-06-29 NOTE — TELEPHONE ENCOUNTER
Patient called back.  She is taking progesterone 200 mg and had a episode of dizziness.  She is taking medication in the morning.  I have advised that she should take this medication at bedtime due to side effect of sleepiness/dizziness.  She voiced understanding.  She has PCOS and irregular menses.  She was given prescription for progesterone at the time of her last office visit.  She started taking medication 6/21/22 and started her period later that day.  She sent a message to Juanita and was instructed to complete medication as directed.  Patient wonders about when to start next 10 day course of progesterone?  Please advise.

## 2022-07-05 ENCOUNTER — TELEPHONE (OUTPATIENT)
Dept: INTERNAL MEDICINE | Facility: CLINIC | Age: 32
End: 2022-07-05

## 2022-07-05 NOTE — TELEPHONE ENCOUNTER
Caller: Eliz Eisenberg    Relationship to patient: Self    Best call back number: [phone]      Date of positive COVID19 test: NA        COVID19 symptoms: NONE        Additional information or concerns: THE PATIENT IS GOING ON A TRIP AND NEEDS A PCR TEST THE PATIENT WILL BE LEAVING ON 07/17/2022 PLEASE CALL PATIENT TO LET HER KNOW IF THAT CAN BE DONE

## 2022-07-08 ENCOUNTER — CLINICAL SUPPORT (OUTPATIENT)
Dept: INTERNAL MEDICINE | Facility: CLINIC | Age: 32
End: 2022-07-08

## 2022-07-08 ENCOUNTER — TELEPHONE (OUTPATIENT)
Dept: INTERNAL MEDICINE | Facility: CLINIC | Age: 32
End: 2022-07-08

## 2022-07-08 DIAGNOSIS — R05.9 COUGH: Primary | ICD-10-CM

## 2022-07-08 PROCEDURE — U0004 COV-19 TEST NON-CDC HGH THRU: HCPCS | Performed by: PHYSICIAN ASSISTANT

## 2022-07-08 NOTE — TELEPHONE ENCOUNTER
Pt has Psoriasis in knee joint causing inflammation asking if any OTC medication can help with her pain. Please advise!

## 2022-07-08 NOTE — TELEPHONE ENCOUNTER
Pt notified she can take tylenol 650 mg every 6 hrs, or Ibuprofen  mg every 6 hrs as needed.  If having to take for more than 5-7 days she needs to contact her Rheumatologist for better options. Verbalized understanding.

## 2022-07-08 NOTE — TELEPHONE ENCOUNTER
She can take tylenol 650 mg every 6 hrs, or Ibuprofen  mg every 6 hrs as needed.  If having to take for more than 5-7 days she needs to contact her Rheumatologist for better options.    thanks

## 2022-07-09 LAB — SARS-COV-2 RNA NOSE QL NAA+PROBE: NOT DETECTED

## 2022-07-18 ENCOUNTER — HOSPITAL ENCOUNTER (OUTPATIENT)
Dept: CARDIOLOGY | Facility: HOSPITAL | Age: 32
Discharge: HOME | End: 2022-07-18

## 2022-07-18 ENCOUNTER — TELEMEDICINE (OUTPATIENT)
Dept: INTERNAL MEDICINE | Facility: CLINIC | Age: 32
End: 2022-07-18

## 2022-07-18 ENCOUNTER — HOSPITAL ENCOUNTER (OUTPATIENT)
Dept: GENERAL RADIOLOGY | Facility: HOSPITAL | Age: 32
Discharge: HOME | End: 2022-07-18

## 2022-07-18 VITALS — WEIGHT: 293 LBS | BODY MASS INDEX: 47.09 KG/M2 | HEIGHT: 66 IN

## 2022-07-18 DIAGNOSIS — M25.562 PAIN AND SWELLING OF LEFT KNEE: Primary | ICD-10-CM

## 2022-07-18 DIAGNOSIS — M25.462 PAIN AND SWELLING OF LEFT KNEE: ICD-10-CM

## 2022-07-18 DIAGNOSIS — M25.562 PAIN AND SWELLING OF LEFT KNEE: ICD-10-CM

## 2022-07-18 DIAGNOSIS — M25.462 PAIN AND SWELLING OF LEFT KNEE: Primary | ICD-10-CM

## 2022-07-18 LAB
BH CV LOWER VASCULAR LEFT COMMON FEMORAL AUGMENT: NORMAL
BH CV LOWER VASCULAR LEFT COMMON FEMORAL COMPETENT: NORMAL
BH CV LOWER VASCULAR LEFT COMMON FEMORAL COMPRESS: NORMAL
BH CV LOWER VASCULAR LEFT COMMON FEMORAL PHASIC: NORMAL
BH CV LOWER VASCULAR LEFT COMMON FEMORAL SPONT: NORMAL
BH CV LOWER VASCULAR LEFT DISTAL FEMORAL COMPRESS: NORMAL
BH CV LOWER VASCULAR LEFT GASTRONEMIUS COMPRESS: NORMAL
BH CV LOWER VASCULAR LEFT GREATER SAPH AK COMPRESS: NORMAL
BH CV LOWER VASCULAR LEFT GREATER SAPH BK COMPRESS: NORMAL
BH CV LOWER VASCULAR LEFT LESSER SAPH COMPRESS: NORMAL
BH CV LOWER VASCULAR LEFT MID FEMORAL AUGMENT: NORMAL
BH CV LOWER VASCULAR LEFT MID FEMORAL COMPETENT: NORMAL
BH CV LOWER VASCULAR LEFT MID FEMORAL COMPRESS: NORMAL
BH CV LOWER VASCULAR LEFT MID FEMORAL PHASIC: NORMAL
BH CV LOWER VASCULAR LEFT MID FEMORAL SPONT: NORMAL
BH CV LOWER VASCULAR LEFT PERONEAL COMPRESS: NORMAL
BH CV LOWER VASCULAR LEFT POPLITEAL AUGMENT: NORMAL
BH CV LOWER VASCULAR LEFT POPLITEAL COMPETENT: NORMAL
BH CV LOWER VASCULAR LEFT POPLITEAL COMPRESS: NORMAL
BH CV LOWER VASCULAR LEFT POPLITEAL PHASIC: NORMAL
BH CV LOWER VASCULAR LEFT POPLITEAL SPONT: NORMAL
BH CV LOWER VASCULAR LEFT POSTERIOR TIBIAL COMPRESS: NORMAL
BH CV LOWER VASCULAR LEFT PROFUNDA FEMORAL COMPRESS: NORMAL
BH CV LOWER VASCULAR LEFT PROXIMAL FEMORAL COMPRESS: NORMAL
BH CV LOWER VASCULAR LEFT SAPHENOFEMORAL JUNCTION COMPRESS: NORMAL
BH CV LOWER VASCULAR RIGHT COMMON FEMORAL AUGMENT: NORMAL
BH CV LOWER VASCULAR RIGHT COMMON FEMORAL COMPETENT: NORMAL
BH CV LOWER VASCULAR RIGHT COMMON FEMORAL COMPRESS: NORMAL
BH CV LOWER VASCULAR RIGHT COMMON FEMORAL PHASIC: NORMAL
BH CV LOWER VASCULAR RIGHT COMMON FEMORAL SPONT: NORMAL
BH CV LOWER VASCULAR RIGHT SAPHENOFEMORAL JUNCTION COMPRESS: NORMAL
MAXIMAL PREDICTED HEART RATE: 188 BPM
STRESS TARGET HR: 160 BPM

## 2022-07-18 PROCEDURE — 99213 OFFICE O/P EST LOW 20 MIN: CPT | Performed by: PHYSICIAN ASSISTANT

## 2022-07-18 PROCEDURE — 93971 EXTREMITY STUDY: CPT

## 2022-07-18 PROCEDURE — 93971 EXTREMITY STUDY: CPT | Performed by: INTERNAL MEDICINE

## 2022-07-18 PROCEDURE — 73560 X-RAY EXAM OF KNEE 1 OR 2: CPT

## 2022-07-18 RX ORDER — METHYLPREDNISOLONE 4 MG/1
TABLET ORAL
Qty: 21 TABLET | Refills: 0 | Status: SHIPPED | OUTPATIENT
Start: 2022-07-18 | End: 2022-08-11

## 2022-07-18 RX ORDER — FLUCONAZOLE 150 MG/1
150 TABLET ORAL EVERY OTHER DAY
Qty: 3 TABLET | Refills: 0 | Status: SHIPPED | OUTPATIENT
Start: 2022-07-18 | End: 2022-08-11

## 2022-07-18 NOTE — PROGRESS NOTES
Chief Complaint   Patient presents with   • Knee Pain       Subjective   Eliz Eisenberg is a 32 y.o. female.       History of Present Illness     This was an audio and video enabled telemedicine encounter.    A week ago pt had some mild morning swelling in her left knee. Went on cruise vacation and was able to tolerate- did not bother her at all. Three days ago she developed progressive swelling in her left knee and it would go down with elevation, ice, tylenol/ibuprofen. Feels tight and painful d/t the swelling. Her calf is tight, ankle is swollen. No injury that she knows of. Sometimes feels unstable. She was on a cruise and did not walk much. Did fly, about 2 hour flight. Some warmth, no redness in calf.      Current Outpatient Medications:   •  fluconazole (Diflucan) 150 MG tablet, Take 1 tablet by mouth Every Other Day., Disp: 3 tablet, Rfl: 0  •  Adalimumab (HUMIRA) 40 MG/0.4ML Pen-injector Kit, Inject  under the skin into the appropriate area as directed Every 14 (Fourteen) Days. 6/25/2021 LAST INJECTION, Disp: , Rfl:   •  albuterol (PROVENTIL HFA;VENTOLIN HFA) 108 (90 Base) MCG/ACT inhaler, Inhale 2 puffs Every 4 (Four) Hours As Needed for Wheezing., Disp: 1 inhaler, Rfl: 5  •  aspirin 81 MG EC tablet, Adult Low Dose Aspirin 81 mg tablet,delayed release  Take 1 tablet every day by oral route., Disp: , Rfl:   •  budesonide-formoterol (Symbicort) 160-4.5 MCG/ACT inhaler, Inhale 2 puffs 2 (Two) Times a Day., Disp: 10.2 g, Rfl: 1  •  cetirizine (zyrTEC) 5 MG tablet, Take 1 tablet by mouth Daily., Disp: , Rfl:   •  Cholecalciferol (Vitamin D3) 50 MCG (2000 UT) tablet, Take 2,000 Units by mouth Daily., Disp: , Rfl:   •  clomiPHENE (CLOMID) 50 MG tablet, Take one tablet daily for 5 days beginning 3rd day of cycle, Disp: 5 tablet, Rfl: 2  •  fluticasone (Flonase) 50 MCG/ACT nasal spray, 2 sprays into the nostril(s) as directed by provider Daily., Disp: 16 g, Rfl: 2  •  ibuprofen (ADVIL,MOTRIN) 600 MG tablet,  ibuprofen 600 mg tablet, Disp: , Rfl:   •  metFORMIN (GLUCOPHAGE) 500 MG tablet, TAKE TWO TABLETS BY MOUTH EVERY MORNING AND TAKE ONE TABLET BY MOUTH EVERY EVENING WITH MEALS, Disp: 270 tablet, Rfl: 3  •  methylPREDNISolone (Medrol) 4 MG dose pack, follow package directions, Disp: 21 tablet, Rfl: 0  •  montelukast (SINGULAIR) 10 MG tablet, TAKE ONE TABLET BY MOUTH ONCE NIGHTLY, Disp: 90 tablet, Rfl: 1  •  mupirocin (BACTROBAN) 2 % ointment, mupirocin 2 % topical ointment  Apply topically on the left ear bid x 10-14 days, Disp: , Rfl:   •  omeprazole (priLOSEC) 20 MG capsule, Take 20 mg by mouth Daily., Disp: , Rfl:   •  ondansetron ODT (ZOFRAN-ODT) 4 MG disintegrating tablet, Place 1 tablet on the tongue Every 6 (Six) Hours As Needed for Nausea or Vomiting., Disp: 8 tablet, Rfl: 0  •  promethazine (PHENERGAN) 25 MG tablet, TAKE ONE TABLET BY MOUTH EVERY 4 HOURS AS NEEDED FOR NAUSEA (Patient taking differently: Take 25 mg by mouth Every 6 (Six) Hours As Needed for Nausea.), Disp: 10 tablet, Rfl: 0  •  QUEtiapine (SEROquel) 25 MG tablet, Take 1-2 tablets at night, Disp: 60 tablet, Rfl: 2  •  vitamin D (ERGOCALCIFEROL) 1.25 MG (32104 UT) capsule capsule, Take 1 capsule by mouth 1 (One) Time Per Week., Disp: 12 capsule, Rfl: 3     PMFSH  The following portions of the patient's history were reviewed and updated as appropriate: allergies, current medications, past family history, past medical history, past social history, past surgical history and problem list.    Review of Systems   Constitutional: Negative for fever and unexpected weight change.   HENT: Negative.    Eyes: Negative.    Respiratory: Negative for chest tightness, shortness of breath and wheezing.    Cardiovascular: Negative.    Gastrointestinal: Negative for abdominal pain.   Endocrine: Negative.    Genitourinary: Negative.    Musculoskeletal: Positive for arthralgias, gait problem and joint swelling.   Skin: Negative for color change, rash and wound.    Allergic/Immunologic: Negative.    Neurological: Negative for seizures, syncope and numbness.   Hematological: Negative for adenopathy. Does not bruise/bleed easily.   Psychiatric/Behavioral: Negative for confusion.       Objective   There were no vitals taken for this visit.    Physical Exam  Constitutional:       Appearance: She is well-developed.   HENT:      Head: Normocephalic and atraumatic.      Right Ear: External ear normal.      Left Ear: External ear normal.      Nose: Nose normal.   Eyes:      Conjunctiva/sclera: Conjunctivae normal.   Cardiovascular:      Rate and Rhythm: Normal rate.   Pulmonary:      Effort: Pulmonary effort is normal.   Musculoskeletal:         General: Normal range of motion.      Cervical back: Normal range of motion.   Neurological:      Mental Status: She is alert and oriented to person, place, and time.   Psychiatric:         Behavior: Behavior normal.         Thought Content: Thought content normal.         Judgment: Judgment normal.              ASSESSMENT/PLAN    Diagnoses and all orders for this visit:    1. Pain and swelling of left knee (Primary)  Comments:  Unable to fully evaluate d/t nature of video visit. Refer for venous duplex to r/o DVT with recent travel. Check XR. Treat with medrol dose pack. Continue elevation, ice, trial of compression wrap for support. Further recs based on results.  Orders:  -     methylPREDNISolone (Medrol) 4 MG dose pack; follow package directions  Dispense: 21 tablet; Refill: 0  -     XR Knee 1 or 2 View Left; Future  -     Duplex Venous Lower Extremity - Left CAR    Other orders  -     fluconazole (Diflucan) 150 MG tablet; Take 1 tablet by mouth Every Other Day.  Dispense: 3 tablet; Refill: 0             No follow-ups on file.

## 2022-07-19 NOTE — PROGRESS NOTES
The xray of your knee shows a small amount of fluid (effusion) on your knee but no other bony abnormalities. Hopefully the steroid will help with your symptoms. Let me know if it is not getting better.

## 2022-07-20 ENCOUNTER — TELEPHONE (OUTPATIENT)
Dept: INTERNAL MEDICINE | Facility: CLINIC | Age: 32
End: 2022-07-20

## 2022-07-20 NOTE — TELEPHONE ENCOUNTER
"HUB TO READ     \" Attempted to contact patient to let her know we can't fax the images she will need the disk from where she got her imaging done. I did fax the results to the ortho doctor\"   "

## 2022-07-20 NOTE — TELEPHONE ENCOUNTER
Caller: Bert Eisenberg Ashlie    Relationship: Self    Best call back number: [phone]    What form or medical record are you requesting: PLEASE SEND THE XRAY AND ULTRASOUND IMAGES TO DR JOSEPHINE STEWARD [facility].    Who is requesting this form or medical record from you: BERT    .  Timeframe paperwork needed: BERT HAS AN APPT 07/21/22 9 AM.     CALL BERT IF IT IS QUICKER FOR HER TO .

## 2022-08-11 ENCOUNTER — OFFICE VISIT (OUTPATIENT)
Dept: OBSTETRICS AND GYNECOLOGY | Facility: CLINIC | Age: 32
End: 2022-08-11

## 2022-08-11 VITALS
HEIGHT: 66 IN | DIASTOLIC BLOOD PRESSURE: 76 MMHG | WEIGHT: 293 LBS | BODY MASS INDEX: 47.09 KG/M2 | SYSTOLIC BLOOD PRESSURE: 128 MMHG

## 2022-08-11 DIAGNOSIS — N91.3 PRIMARY OLIGOMENORRHEA: Primary | ICD-10-CM

## 2022-08-11 DIAGNOSIS — E66.01 MORBID OBESITY WITH BMI OF 50.0-59.9, ADULT: ICD-10-CM

## 2022-08-11 DIAGNOSIS — Z31.9 INFERTILITY MANAGEMENT: ICD-10-CM

## 2022-08-11 PROCEDURE — 99213 OFFICE O/P EST LOW 20 MIN: CPT | Performed by: NURSE PRACTITIONER

## 2022-08-11 RX ORDER — PROGESTERONE 200 MG/1
2 CAPSULE ORAL TAKE AS DIRECTED
COMMUNITY
Start: 2022-07-18

## 2022-08-11 RX ORDER — FLUOCINONIDE TOPICAL SOLUTION USP, 0.05% 0.5 MG/ML
1 SOLUTION TOPICAL AS NEEDED
COMMUNITY
Start: 2022-07-19

## 2022-08-11 RX ORDER — ADALIMUMAB 40MG/0.4ML
80 KIT SUBCUTANEOUS
COMMUNITY
Start: 2022-07-20 | End: 2023-02-17

## 2022-08-11 NOTE — PROGRESS NOTES
Chief Complaint  Eliz Eisenberg is a 32 y.o.  female presenting for Follow-up (3 month follow-up.  Assess menses.)    History of Present Illness  Eliz is a very pleasant 31yo nulligravid young woman.  She &  have been without hormonal contraception for > 2 yrs now.  They would love to conceive a pregnancy.  But, she battles a lifetime hx of oligomenorrhea / chronic anovulation.  She has PCOS and IR.  (Last insulin was sl elevated at 22, despite being faithful to take Metformin.)  Her last A1C was also elevated at 5.8, putting her in the Pre-DM range.  She battles weight, despite good healthy dieting and trying to exercise a lot.  (Has a current meniscus tear left knee --- in brace x 3 more wks; so exercise is difficult at this time.)  We discussed bariatric surgery consult / counseling, and she would like to meet them.    The following portions of the patient's history were reviewed and updated as appropriate: allergies, current medications, past family history, past medical history, past social history, past surgical history and problem list.    Allergies   Allergen Reactions   • Latex Hives, Itching, Swelling and Rash   • Pollen Extract Rash   • Tree Extract Other (See Comments)     CONGESTION         Current Outpatient Medications:   •  albuterol (PROVENTIL HFA;VENTOLIN HFA) 108 (90 Base) MCG/ACT inhaler, Inhale 2 puffs Every 4 (Four) Hours As Needed for Wheezing., Disp: 1 inhaler, Rfl: 5  •  aspirin 81 MG EC tablet, Adult Low Dose Aspirin 81 mg tablet,delayed release  Take 1 tablet every day by oral route., Disp: , Rfl:   •  budesonide-formoterol (Symbicort) 160-4.5 MCG/ACT inhaler, Inhale 2 puffs 2 (Two) Times a Day., Disp: 10.2 g, Rfl: 1  •  cetirizine (zyrTEC) 5 MG tablet, Take 1 tablet by mouth Daily., Disp: , Rfl:   •  fluocinonide (LIDEX) 0.05 % external solution, Apply 1 application topically to the appropriate area as directed As Needed., Disp: , Rfl:   •  fluticasone (Flonase) 50  MCG/ACT nasal spray, 2 sprays into the nostril(s) as directed by provider Daily., Disp: 16 g, Rfl: 2  •  Humira 40 MG/0.4ML Prefilled Syringe Kit injection, 80 mg Every 14 (Fourteen) Days., Disp: , Rfl:   •  metFORMIN (GLUCOPHAGE) 500 MG tablet, TAKE TWO TABLETS BY MOUTH EVERY MORNING AND TAKE ONE TABLET BY MOUTH EVERY EVENING WITH MEALS, Disp: 270 tablet, Rfl: 3  •  montelukast (SINGULAIR) 10 MG tablet, TAKE ONE TABLET BY MOUTH ONCE NIGHTLY, Disp: 90 tablet, Rfl: 1  •  omeprazole (priLOSEC) 20 MG capsule, Take 20 mg by mouth Daily., Disp: , Rfl:   •  ondansetron ODT (ZOFRAN-ODT) 4 MG disintegrating tablet, Place 1 tablet on the tongue Every 6 (Six) Hours As Needed for Nausea or Vomiting., Disp: 8 tablet, Rfl: 0  •  Progesterone (PROMETRIUM) 200 MG capsule, Take 2 capsules by mouth Take As Directed., Disp: , Rfl:   •  promethazine (PHENERGAN) 25 MG tablet, TAKE ONE TABLET BY MOUTH EVERY 4 HOURS AS NEEDED FOR NAUSEA (Patient taking differently: Take 25 mg by mouth Every 6 (Six) Hours As Needed for Nausea.), Disp: 10 tablet, Rfl: 0  •  vitamin D (ERGOCALCIFEROL) 1.25 MG (92083 UT) capsule capsule, Take 1 capsule by mouth 1 (One) Time Per Week., Disp: 12 capsule, Rfl: 3    Past Medical History:   Diagnosis Date   • Abdominal pain    • Acute sinusitis    • Ankle sprain    • Anxiety    • Asthma    • Atopic dermatitis    • Biliary dyskinesia    • Cat bite      resolved   • Current tear of meniscus    • Dysfunctional uterine bleeding    • Foot pain    • GERD (gastroesophageal reflux disease)    • Gout    • Hearing loss, bilateral     R/T PSORASIS   • IBS (irritable bowel syndrome)    • Influenza    • Metabolic syndrome    • Muscular aches    • Obesity    • Panic attack    • PCOS (polycystic ovarian syndrome)    • Pharyngitis    • Psoriasis    • URI (upper respiratory infection)    • Wears glasses         Past Surgical History:   Procedure Laterality Date   • CHOLECYSTECTOMY     • COLONOSCOPY     • TONSILLECTOMY AND  "ADENOIDECTOMY N/A 7/9/2021    Procedure: TONSILLECTOMY;  Surgeon: Ran Lemon MD;  [facility]: [facility];  Service: ENT;  Laterality: N/A;   • UMBILICAL HERNIA REPAIR     • WISDOM TOOTH EXTRACTION         Objective  /76   Ht 167 cm (65.75\")   Wt (!) 148 kg (325 lb 6.4 oz)   LMP 07/29/2022 (Exact Date)   Breastfeeding No   BMI 52.92 kg/m²     Physical Exam  Constitutional:       General: She is not in acute distress.     Appearance: Normal appearance. She is obese. She is not ill-appearing.   HENT:      Head: Normocephalic.   Eyes:      Pupils: Pupils are equal, round, and reactive to light.   Cardiovascular:      Rate and Rhythm: Normal rate and regular rhythm.      Heart sounds: Normal heart sounds. No murmur heard.  Pulmonary:      Effort: Pulmonary effort is normal. No respiratory distress.      Breath sounds: Normal breath sounds.   Skin:     General: Skin is warm and dry.   Neurological:      Mental Status: She is alert and oriented to person, place, and time.   Psychiatric:         Mood and Affect: Mood normal.         Behavior: Behavior normal.         Assessment/Plan   Diagnoses and all orders for this visit:    1. Primary oligomenorrhea (Primary)  -     Ambulatory Referral to Infertility    2. Morbid obesity with BMI of 50.0-59.9, adult (HCC)  -     Ambulatory Referral to Bariatric Surgery    3. Infertility management  -     Ambulatory Referral to Infertility    Refer to Dr. Almanzar & Refer to  Bariatric Specialist.  Encouraged to continue trying to diet in a healthy way.  Continue the Cyclic Prometrium (If neg UPT & no spontaneous menses q 60 days.)      Procedures            Return for Annual physical.    Asia Evans, APRN  08/11/2022  "

## 2022-08-29 ENCOUNTER — TRANSCRIBE ORDERS (OUTPATIENT)
Dept: LAB | Facility: HOSPITAL | Age: 32
End: 2022-08-29

## 2022-08-29 ENCOUNTER — LAB (OUTPATIENT)
Dept: LAB | Facility: HOSPITAL | Age: 32
End: 2022-08-29

## 2022-08-29 DIAGNOSIS — M25.562 LEFT KNEE PAIN, UNSPECIFIED CHRONICITY: ICD-10-CM

## 2022-08-29 DIAGNOSIS — E88.81 INSULIN RESISTANCE: ICD-10-CM

## 2022-08-29 DIAGNOSIS — M25.562 LEFT KNEE PAIN, UNSPECIFIED CHRONICITY: Primary | ICD-10-CM

## 2022-08-29 LAB
BASOPHILS # BLD AUTO: 0.04 10*3/MM3 (ref 0–0.2)
BASOPHILS NFR BLD AUTO: 0.4 % (ref 0–1.5)
CHROMATIN AB SERPL-ACNC: <10 IU/ML (ref 0–14)
CRP SERPL-MCNC: 1.79 MG/DL (ref 0–0.5)
DEPRECATED RDW RBC AUTO: 41.2 FL (ref 37–54)
EOSINOPHIL # BLD AUTO: 0.17 10*3/MM3 (ref 0–0.4)
EOSINOPHIL NFR BLD AUTO: 1.5 % (ref 0.3–6.2)
ERYTHROCYTE [DISTWIDTH] IN BLOOD BY AUTOMATED COUNT: 14.9 % (ref 12.3–15.4)
ERYTHROCYTE [SEDIMENTATION RATE] IN BLOOD: 68 MM/HR (ref 0–20)
HCT VFR BLD AUTO: 38.6 % (ref 34–46.6)
HGB BLD-MCNC: 12.3 G/DL (ref 12–15.9)
IMM GRANULOCYTES # BLD AUTO: 0.06 10*3/MM3 (ref 0–0.05)
IMM GRANULOCYTES NFR BLD AUTO: 0.5 % (ref 0–0.5)
LYMPHOCYTES # BLD AUTO: 2.26 10*3/MM3 (ref 0.7–3.1)
LYMPHOCYTES NFR BLD AUTO: 19.8 % (ref 19.6–45.3)
MCH RBC QN AUTO: 24.7 PG (ref 26.6–33)
MCHC RBC AUTO-ENTMCNC: 31.9 G/DL (ref 31.5–35.7)
MCV RBC AUTO: 77.5 FL (ref 79–97)
MONOCYTES # BLD AUTO: 0.52 10*3/MM3 (ref 0.1–0.9)
MONOCYTES NFR BLD AUTO: 4.6 % (ref 5–12)
NEUTROPHILS NFR BLD AUTO: 73.2 % (ref 42.7–76)
NEUTROPHILS NFR BLD AUTO: 8.34 10*3/MM3 (ref 1.7–7)
NRBC BLD AUTO-RTO: 0 /100 WBC (ref 0–0.2)
PLATELET # BLD AUTO: 386 10*3/MM3 (ref 140–450)
PMV BLD AUTO: 9.7 FL (ref 6–12)
RBC # BLD AUTO: 4.98 10*6/MM3 (ref 3.77–5.28)
URATE SERPL-MCNC: 5.8 MG/DL (ref 2.4–5.7)
WBC NRBC COR # BLD: 11.39 10*3/MM3 (ref 3.4–10.8)

## 2022-08-29 PROCEDURE — 36415 COLL VENOUS BLD VENIPUNCTURE: CPT | Performed by: ORTHOPAEDIC SURGERY

## 2022-08-29 PROCEDURE — 86200 CCP ANTIBODY: CPT

## 2022-08-29 PROCEDURE — 86038 ANTINUCLEAR ANTIBODIES: CPT

## 2022-08-29 PROCEDURE — 85025 COMPLETE CBC W/AUTO DIFF WBC: CPT

## 2022-08-29 PROCEDURE — 86431 RHEUMATOID FACTOR QUANT: CPT

## 2022-08-29 PROCEDURE — 84550 ASSAY OF BLOOD/URIC ACID: CPT

## 2022-08-29 PROCEDURE — 85652 RBC SED RATE AUTOMATED: CPT

## 2022-08-29 PROCEDURE — 86140 C-REACTIVE PROTEIN: CPT | Performed by: ORTHOPAEDIC SURGERY

## 2022-08-29 PROCEDURE — 86617 LYME DISEASE ANTIBODY: CPT

## 2022-08-29 PROCEDURE — 81374 HLA I TYPING 1 ANTIGEN LR: CPT

## 2022-08-30 LAB — ANA SER QL: NEGATIVE

## 2022-09-02 LAB — CCP IGA+IGG SERPL IA-ACNC: 10 UNITS (ref 0–19)

## 2022-09-08 LAB
B BURGDOR IGG PATRN SER IB-IMP: NEGATIVE
B BURGDOR IGM PATRN SER IB-IMP: NEGATIVE
B BURGDOR18KD IGG SER QL IB: ABNORMAL
B BURGDOR23KD IGG SER QL IB: ABNORMAL
B BURGDOR23KD IGM SER QL IB: ABNORMAL
B BURGDOR28KD IGG SER QL IB: ABNORMAL
B BURGDOR30KD IGG SER QL IB: ABNORMAL
B BURGDOR39KD IGG SER QL IB: ABNORMAL
B BURGDOR39KD IGM SER QL IB: ABNORMAL
B BURGDOR41KD IGG SER QL IB: PRESENT
B BURGDOR41KD IGM SER QL IB: ABNORMAL
B BURGDOR45KD IGG SER QL IB: ABNORMAL
B BURGDOR58KD IGG SER QL IB: ABNORMAL
B BURGDOR66KD IGG SER QL IB: ABNORMAL
B BURGDOR93KD IGG SER QL IB: ABNORMAL

## 2022-09-09 LAB — HLA-B27 QL NAA+PROBE: NEGATIVE

## 2022-10-03 ENCOUNTER — OFFICE VISIT (OUTPATIENT)
Dept: INTERNAL MEDICINE | Facility: CLINIC | Age: 32
End: 2022-10-03

## 2022-10-03 VITALS
SYSTOLIC BLOOD PRESSURE: 130 MMHG | BODY MASS INDEX: 47.09 KG/M2 | HEIGHT: 66 IN | OXYGEN SATURATION: 98 % | DIASTOLIC BLOOD PRESSURE: 98 MMHG | TEMPERATURE: 97 F | HEART RATE: 86 BPM | WEIGHT: 293 LBS

## 2022-10-03 DIAGNOSIS — Z23 ENCOUNTER FOR ADMINISTRATION OF VACCINE: ICD-10-CM

## 2022-10-03 DIAGNOSIS — H92.02 EAR PAIN, LEFT: ICD-10-CM

## 2022-10-03 DIAGNOSIS — R10.9 FLANK PAIN: Primary | ICD-10-CM

## 2022-10-03 DIAGNOSIS — Z23 NEED FOR VACCINATION: ICD-10-CM

## 2022-10-03 DIAGNOSIS — Z23 NEED FOR INFLUENZA VACCINATION: ICD-10-CM

## 2022-10-03 DIAGNOSIS — J45.21 MILD INTERMITTENT ASTHMA WITH ACUTE EXACERBATION: ICD-10-CM

## 2022-10-03 PROCEDURE — 90471 IMMUNIZATION ADMIN: CPT | Performed by: NURSE PRACTITIONER

## 2022-10-03 PROCEDURE — 90686 IIV4 VACC NO PRSV 0.5 ML IM: CPT | Performed by: NURSE PRACTITIONER

## 2022-10-03 PROCEDURE — 0124A PR ADM SARSCOV2 30MCG/0.3ML BST: CPT | Performed by: NURSE PRACTITIONER

## 2022-10-03 PROCEDURE — 91312 COVID-19 (PFIZER) BIVALENT BOOSTER 12+YRS: CPT | Performed by: NURSE PRACTITIONER

## 2022-10-03 PROCEDURE — 99213 OFFICE O/P EST LOW 20 MIN: CPT | Performed by: NURSE PRACTITIONER

## 2022-10-03 RX ORDER — MONTELUKAST SODIUM 10 MG/1
10 TABLET ORAL NIGHTLY
Qty: 90 TABLET | Refills: 1 | Status: SHIPPED | OUTPATIENT
Start: 2022-10-03

## 2022-10-03 RX ORDER — CIPROFLOXACIN AND DEXAMETHASONE 3; 1 MG/ML; MG/ML
4 SUSPENSION/ DROPS AURICULAR (OTIC) 2 TIMES DAILY
Qty: 7.5 ML | Refills: 1 | Status: SHIPPED | OUTPATIENT
Start: 2022-10-03

## 2022-10-03 NOTE — PROGRESS NOTES
"    Office Note     Name: Eliz Eisenberg    : 1990     MRN: [id number]     Chief Complaint  Earache (Left ear full), Abdominal Pain, Flank Pain (Started two weeks ago), and Injections    Subjective     History of Present Illness:  Eliz Eisenberg is a 32 y.o. female who presents today for complaints of left ear pain, left-sided flank pain, abdominal pain, and left ear fullness x2 weeks.  Patient went to [facility] a few weeks ago for similar complaints.  She reports she was given a steroid pack and Keflex for a \"allergic reaction\".  She ended back at [facility] for complaints of flank pain and abdominal pain.  She was told that she had \"2 \"kidney stones, her UA and culture were negative, but she was treated with nitrofurantoin.  She does feel that she has passed 1 stone.  She has been drinking cranberry and lemon juice at home and has increased her fluids to about 120 ounces per day.  She denies blood in urine but does report that her urine is dark brown especially in the mornings.  She does have a history of frequent UTI and yeast infection.  She currently rates her pain an 8 out of 10.    She complains of left ear pain, fullness, and inflammation.  She denies sore throat, fever, cough.  She denies any over-the-counter remedy for symptoms.    Patient is normally followed by VINNIE Jang.  Patient denies further complaints or concerns at this time.  Patient does want her flu vaccine and COVID booster today.  Review of Systems   Constitutional: Negative for chills and fever.   HENT: Positive for ear pain. Negative for congestion, hearing loss, postnasal drip, sinus pressure, sinus pain and sore throat.    Respiratory: Negative.    Gastrointestinal: Positive for abdominal pain.   Genitourinary: Positive for flank pain.       Objective     Vital Signs  /98   Pulse 86   Temp 97 °F (36.1 °C)   Ht 167.6 cm (66\")   Wt (!) 152 kg (334 lb)   SpO2 98%   BMI 53.91 kg/m²   Estimated body mass index is " "53.91 kg/m² as calculated from the following:    Height as of this encounter: 167.6 cm (66\").    Weight as of this encounter: 152 kg (334 lb).          Physical Exam  Constitutional:       Appearance: Normal appearance.   HENT:      Head: Normocephalic and atraumatic.      Left Ear: Tympanic membrane is erythematous.      Nose: Nose normal.   Eyes:      Extraocular Movements: Extraocular movements intact.      Conjunctiva/sclera: Conjunctivae normal.      Pupils: Pupils are equal, round, and reactive to light.   Pulmonary:      Effort: Pulmonary effort is normal.   Abdominal:      Tenderness: There is abdominal tenderness. There is left CVA tenderness.   Musculoskeletal:         General: Normal range of motion.      Cervical back: Neck supple.   Skin:     General: Skin is warm and dry.   Neurological:      General: No focal deficit present.      Mental Status: She is alert and oriented to person, place, and time. Mental status is at baseline.   Psychiatric:         Mood and Affect: Mood normal.         Thought Content: Thought content normal.         Judgment: Judgment normal.          Assessment and Plan     Diagnoses and all orders for this visit:    1. Flank pain (Primary)  Positive left flank pain and CVA tenderness.  Patient just had UA with culture that she reports was negative, although she was treated with nitrofurantoin.  Will cancel UA as patient has been on 2 rounds of antibiotics.  Will do KUB to further evaluate kidney stones.  Go to the ED or return to clinic if symptoms worsen or fail to improve.    -     Cancel: POC Urinalysis Dipstick  -     XR Abdomen KUB; Future    2. Ear pain, left  Erythematous TM noted.  Will give Ciprodex drops twice daily for 5 days.  Return to clinic if symptoms worsen or fail to improve.    -     ciprofloxacin-dexamethasone (CIPRODEX) 0.3-0.1 % otic suspension; Administer 4 drops into the left ear 2 (Two) Times a Day.  Dispense: 7.5 mL; Refill: 1      3. Mild intermittent " asthma with acute exacerbation  Patient is requesting refill of Singulair.  Condition stable.    -     montelukast (SINGULAIR) 10 MG tablet; Take 1 tablet by mouth Every Night.  Dispense: 90 tablet; Refill: 1    4. Need for influenza vaccination  -     FluLaval/Fluarix/Fluzone >6 Months    5. Need for vaccination  -     COVID-19 Bivalent Booster (Pfizer) 12+yrs        Follow Up  Return if symptoms worsen or fail to improve.    Ciara Gutierrez, APRN

## 2022-10-03 NOTE — PROGRESS NOTES
Immunization  Immunization performed in Right arm by Joann Reynoso MA. Patient tolerated the procedure well without complications.  10/03/22   Joann Reynoso MA

## 2022-10-11 ENCOUNTER — LAB (OUTPATIENT)
Dept: LAB | Facility: HOSPITAL | Age: 32
End: 2022-10-11

## 2022-10-11 ENCOUNTER — TRANSCRIBE ORDERS (OUTPATIENT)
Dept: LAB | Facility: HOSPITAL | Age: 32
End: 2022-10-11

## 2022-10-11 DIAGNOSIS — N92.1 METRORRHAGIA: ICD-10-CM

## 2022-10-11 DIAGNOSIS — N92.1 METRORRHAGIA: Primary | ICD-10-CM

## 2022-10-11 LAB
ANION GAP SERPL CALCULATED.3IONS-SCNC: 9.5 MMOL/L (ref 5–15)
BASOPHILS # BLD AUTO: 0.06 10*3/MM3 (ref 0–0.2)
BASOPHILS NFR BLD AUTO: 0.6 % (ref 0–1.5)
BUN SERPL-MCNC: 9 MG/DL (ref 6–20)
BUN/CREAT SERPL: 17 (ref 7–25)
CALCIUM SPEC-SCNC: 9.4 MG/DL (ref 8.6–10.5)
CHLORIDE SERPL-SCNC: 102 MMOL/L (ref 98–107)
CO2 SERPL-SCNC: 25.5 MMOL/L (ref 22–29)
CREAT SERPL-MCNC: 0.53 MG/DL (ref 0.57–1)
DEPRECATED RDW RBC AUTO: 43.4 FL (ref 37–54)
EGFRCR SERPLBLD CKD-EPI 2021: 126.2 ML/MIN/1.73
EOSINOPHIL # BLD AUTO: 0.17 10*3/MM3 (ref 0–0.4)
EOSINOPHIL NFR BLD AUTO: 1.7 % (ref 0.3–6.2)
ERYTHROCYTE [DISTWIDTH] IN BLOOD BY AUTOMATED COUNT: 15.3 % (ref 12.3–15.4)
GLUCOSE SERPL-MCNC: 88 MG/DL (ref 65–99)
HCT VFR BLD AUTO: 36 % (ref 34–46.6)
HGB BLD-MCNC: 11.8 G/DL (ref 12–15.9)
IMM GRANULOCYTES # BLD AUTO: 0.06 10*3/MM3 (ref 0–0.05)
IMM GRANULOCYTES NFR BLD AUTO: 0.6 % (ref 0–0.5)
LYMPHOCYTES # BLD AUTO: 2.6 10*3/MM3 (ref 0.7–3.1)
LYMPHOCYTES NFR BLD AUTO: 25.5 % (ref 19.6–45.3)
MCH RBC QN AUTO: 25.8 PG (ref 26.6–33)
MCHC RBC AUTO-ENTMCNC: 32.8 G/DL (ref 31.5–35.7)
MCV RBC AUTO: 78.6 FL (ref 79–97)
MONOCYTES # BLD AUTO: 0.51 10*3/MM3 (ref 0.1–0.9)
MONOCYTES NFR BLD AUTO: 5 % (ref 5–12)
NEUTROPHILS NFR BLD AUTO: 6.81 10*3/MM3 (ref 1.7–7)
NEUTROPHILS NFR BLD AUTO: 66.6 % (ref 42.7–76)
NRBC BLD AUTO-RTO: 0 /100 WBC (ref 0–0.2)
PLATELET # BLD AUTO: 390 10*3/MM3 (ref 140–450)
PMV BLD AUTO: 10 FL (ref 6–12)
POTASSIUM SERPL-SCNC: 4.1 MMOL/L (ref 3.5–5.2)
PROLACTIN SERPL-MCNC: 5.74 NG/ML (ref 4.79–23.3)
RBC # BLD AUTO: 4.58 10*6/MM3 (ref 3.77–5.28)
SODIUM SERPL-SCNC: 137 MMOL/L (ref 136–145)
TESTOST SERPL-MCNC: 15.5 NG/DL (ref 8.4–48.1)
TSH SERPL DL<=0.05 MIU/L-ACNC: 3.19 UIU/ML (ref 0.27–4.2)
WBC NRBC COR # BLD: 10.21 10*3/MM3 (ref 3.4–10.8)

## 2022-10-11 PROCEDURE — 82627 DEHYDROEPIANDROSTERONE: CPT

## 2022-10-11 PROCEDURE — 84403 ASSAY OF TOTAL TESTOSTERONE: CPT

## 2022-10-11 PROCEDURE — 85025 COMPLETE CBC W/AUTO DIFF WBC: CPT

## 2022-10-11 PROCEDURE — 84146 ASSAY OF PROLACTIN: CPT

## 2022-10-11 PROCEDURE — 36415 COLL VENOUS BLD VENIPUNCTURE: CPT

## 2022-10-11 PROCEDURE — 80048 BASIC METABOLIC PNL TOTAL CA: CPT

## 2022-10-11 PROCEDURE — 83498 ASY HYDROXYPROGESTERONE 17-D: CPT

## 2022-10-11 PROCEDURE — 84443 ASSAY THYROID STIM HORMONE: CPT

## 2022-10-12 LAB — DHEA-S SERPL-MCNC: 104 UG/DL (ref 84.8–378)

## 2022-10-15 LAB — 17OHP SERPL-MCNC: 14 NG/DL

## 2022-10-25 ENCOUNTER — HOSPITAL ENCOUNTER (EMERGENCY)
Facility: HOSPITAL | Age: 32
Discharge: HOME | End: 2022-10-25
Attending: EMERGENCY MEDICINE | Admitting: EMERGENCY MEDICINE

## 2022-10-25 VITALS
WEIGHT: 293 LBS | RESPIRATION RATE: 18 BRPM | SYSTOLIC BLOOD PRESSURE: 157 MMHG | DIASTOLIC BLOOD PRESSURE: 108 MMHG | HEIGHT: 66 IN | HEART RATE: 86 BPM | OXYGEN SATURATION: 99 % | BODY MASS INDEX: 47.09 KG/M2 | TEMPERATURE: 98.3 F

## 2022-10-25 DIAGNOSIS — S01.111A LACERATION, EYELID, RIGHT, INITIAL ENCOUNTER: Primary | ICD-10-CM

## 2022-10-25 PROCEDURE — 90715 TDAP VACCINE 7 YRS/> IM: CPT | Performed by: PHYSICIAN ASSISTANT

## 2022-10-25 PROCEDURE — 99282 EMERGENCY DEPT VISIT SF MDM: CPT

## 2022-10-25 PROCEDURE — 25010000002 TETANUS-DIPHTH-ACELL PERTUSSIS 5-2.5-18.5 LF-MCG/0.5 SUSPENSION PREFILLED SYRINGE: Performed by: PHYSICIAN ASSISTANT

## 2022-10-25 PROCEDURE — 90471 IMMUNIZATION ADMIN: CPT | Performed by: PHYSICIAN ASSISTANT

## 2022-10-25 RX ORDER — LIDOCAINE HYDROCHLORIDE 10 MG/ML
5 INJECTION, SOLUTION EPIDURAL; INFILTRATION; INTRACAUDAL; PERINEURAL ONCE
Status: COMPLETED | OUTPATIENT
Start: 2022-10-25 | End: 2022-10-25

## 2022-10-25 RX ADMIN — LIDOCAINE HYDROCHLORIDE 5 ML: 10 INJECTION, SOLUTION EPIDURAL; INFILTRATION; INTRACAUDAL; PERINEURAL at 09:20

## 2022-10-25 RX ADMIN — TETANUS TOXOID, REDUCED DIPHTHERIA TOXOID AND ACELLULAR PERTUSSIS VACCINE, ADSORBED 0.5 ML: 5; 2.5; 8; 8; 2.5 SUSPENSION INTRAMUSCULAR at 09:12

## 2022-10-25 NOTE — ED PROVIDER NOTES
Subjective   History of Present Illness  Ms. Eisenberg is a 32-year-old female who presents to the emergency department with a laceration to the right upper eyelid from her cat's claws.  The patient states that the cat jumped and it's claws went across her face, resulting in a laceration to the right upper eyelid.  She denies any vision change or eye pain.  She states that the wound bled for quite some time before it finally stopped with direct pressure.  She denies any other injury.  She is not sure when her last tetanus shot was.  No known health issues.        Review of Systems   HENT:        Laceration to the right upper eyelid   Eyes: Negative for pain and visual disturbance.   Respiratory: Negative for cough and shortness of breath.    Cardiovascular: Negative for chest pain.   Gastrointestinal: Negative for nausea.   Musculoskeletal: Negative for neck pain.   Skin: Positive for wound (Laceration to the right upper eyelid).   Neurological: Negative for numbness and headaches.   Hematological: Does not bruise/bleed easily.   Psychiatric/Behavioral: Negative.        Past Medical History:   Diagnosis Date   • Abdominal pain    • Acute sinusitis    • Ankle sprain    • Anxiety    • Asthma    • Atopic dermatitis    • Biliary dyskinesia    • Cat bite      resolved   • Current tear of meniscus    • Dysfunctional uterine bleeding    • Foot pain    • GERD (gastroesophageal reflux disease)    • Gout    • Hearing loss, bilateral     R/T PSORASIS   • IBS (irritable bowel syndrome)    • Influenza    • Metabolic syndrome    • Muscular aches    • Obesity    • Panic attack    • PCOS (polycystic ovarian syndrome)    • Pharyngitis    • Psoriasis    • URI (upper respiratory infection)    • Wears glasses        Allergies   Allergen Reactions   • Latex Hives, Itching, Swelling and Rash   • Elastic Bandages & [Zinc] Hives   • Pollen Extract Rash   • Tree Extract Other (See Comments)     CONGESTION       Past Surgical History:    Procedure Laterality Date   • CHOLECYSTECTOMY     • COLONOSCOPY     • TONSILLECTOMY AND ADENOIDECTOMY N/A 7/9/2021    Procedure: TONSILLECTOMY;  Surgeon: Ran Lemon MD;  Location: [facility];  Service: ENT;  Laterality: N/A;   • UMBILICAL HERNIA REPAIR     • WISDOM TOOTH EXTRACTION         Family History   Problem Relation Age of Onset   • Hypertension Father    • Obesity Father    • Diabetes Father    • Heart attack Father    • Sleep apnea Father    • Multiple sclerosis Mother    • Diabetes Mother    • Heart attack Paternal Grandfather    • Sleep apnea Paternal Grandfather    • Colon cancer Paternal Grandmother    • Cancer Paternal Grandmother         COLON   • Cancer Maternal Grandmother         BREAST   • Breast cancer Maternal Grandmother    • Asthma Maternal Grandfather        Social History     Socioeconomic History   • Marital status:    Tobacco Use   • Smoking status: Never   • Smokeless tobacco: Never   Vaping Use   • Vaping Use: Never used   Substance and Sexual Activity   • Alcohol use: Not Currently   • Drug use: No   • Sexual activity: Yes     Partners: Male     Birth control/protection: None           Objective   Physical Exam  Constitutional:       General: She is not in acute distress.  HENT:      Nose: Nose normal.      Mouth/Throat:      Mouth: Mucous membranes are moist.   Eyes:      Extraocular Movements: Extraocular movements intact.      Conjunctiva/sclera: Conjunctivae normal.      Pupils: Pupils are equal, round, and reactive to light.      Comments: 2 cm laceration to the right upper eyelid.  No injury to the eye proper.  Normal extraocular motion.   Cardiovascular:      Rate and Rhythm: Normal rate.      Pulses: Normal pulses.   Pulmonary:      Effort: Pulmonary effort is normal.   Musculoskeletal:         General: Normal range of motion.      Cervical back: Normal range of motion.   Skin:     General: Skin is warm and dry.   Neurological:      General: No focal deficit  present.      Mental Status: She is alert.   Psychiatric:         Mood and Affect: Mood normal.         Laceration Repair    Date/Time: 10/25/2022 10:31 AM  Performed by: Daryl Lundberg PA  Authorized by: Mustapha Kern MD     Consent:     Consent obtained:  Verbal    Consent given by:  Patient    Risks discussed:  Infection, pain and poor cosmetic result  Universal protocol:     Procedure explained and questions answered to patient or proxy's satisfaction: yes      Patient identity confirmed:  Verbally with patient  Anesthesia:     Anesthesia method:  Local infiltration    Local anesthetic:  Lidocaine 1% w/o epi  Laceration details:     Location:  Face    Face location:  R upper eyelid    Extent:  Partial thickness    Length (cm):  2.5  Pre-procedure details:     Preparation:  Patient was prepped and draped in usual sterile fashion  Exploration:     Hemostasis achieved with:  Direct pressure    Contaminated: no    Treatment:     Area cleansed with:  Saline    Amount of cleaning:  Standard    Irrigation solution:  Sterile saline    Irrigation method:  Syringe  Skin repair:     Repair method:  Sutures    Suture size:  6-0    Suture material:  Nylon    Suture technique:  Simple interrupted    Number of sutures:  5  Approximation:     Approximation:  Close  Repair type:     Repair type:  Simple  Post-procedure details:     Dressing:  Sterile dressing    Procedure completion:  Tolerated well, no immediate complications               ED Course      32 yr old female with laceration to right upper eyelid due to cat's claws.  Wound cleansed and sutured.  Will d/c home with topical bactroban cream and have return in 7 days for suture removal.                                         MDM    Final diagnoses:   Laceration, eyelid, right, initial encounter       ED Disposition  ED Disposition     ED Disposition   Discharge    Condition   Stable    Comment   --             [facility] Emergency  Department  [address] [place] [id number] [phone]  In 7 days  For suture removal         Medication List      New Prescriptions    mupirocin 2 % ointment  Commonly known as: BACTROBAN  Apply 1 application topically to the appropriate area as directed 3 (Three) Times a Day.        Changed    promethazine 25 MG tablet  Commonly known as: PHENERGAN  TAKE ONE TABLET BY MOUTH EVERY 4 HOURS AS NEEDED FOR NAUSEA  What changed: See the new instructions.           Where to Get Your Medications      These medications were sent to [facility] [id number] - [place], [place] - [address] - [phone]  - [phone]   [address], [facility] [address]    Phone: [phone]   · mupirocin 2 % ointment          Daryl Lundberg, PA  10/25/22 1037

## 2022-10-25 NOTE — DISCHARGE INSTRUCTIONS
Keep wound clean.  Apply small amount of bactroban ointment to laceration as prescribed.  Return for suture removal in 7 days.

## 2022-11-04 ENCOUNTER — HOSPITAL ENCOUNTER (EMERGENCY)
Facility: HOSPITAL | Age: 32
Discharge: HOME | End: 2022-11-04

## 2022-11-04 VITALS
WEIGHT: 293 LBS | DIASTOLIC BLOOD PRESSURE: 104 MMHG | SYSTOLIC BLOOD PRESSURE: 145 MMHG | HEIGHT: 66 IN | BODY MASS INDEX: 47.09 KG/M2 | OXYGEN SATURATION: 98 % | TEMPERATURE: 97.9 F | RESPIRATION RATE: 16 BRPM | HEART RATE: 95 BPM

## 2022-11-04 PROCEDURE — 99202 OFFICE O/P NEW SF 15 MIN: CPT

## 2022-11-06 ENCOUNTER — HOSPITAL ENCOUNTER (EMERGENCY)
Facility: HOSPITAL | Age: 32
Discharge: HOME | End: 2022-11-06
Attending: HOSPITALIST
Payer: COMMERCIAL

## 2022-11-06 VITALS
HEART RATE: 103 BPM | HEIGHT: 66 IN | DIASTOLIC BLOOD PRESSURE: 72 MMHG | OXYGEN SATURATION: 98 % | WEIGHT: 293 LBS | RESPIRATION RATE: 16 BRPM | SYSTOLIC BLOOD PRESSURE: 123 MMHG | BODY MASS INDEX: 47.09 KG/M2 | TEMPERATURE: 98.6 F

## 2022-11-06 DIAGNOSIS — R68.89 FLU-LIKE SYMPTOMS: Primary | ICD-10-CM

## 2022-11-06 LAB
BACTERIA: ABNORMAL /HPF
BILIRUBIN URINE: NEGATIVE
BLOOD, URINE: ABNORMAL
CLARITY: CLEAR
COLOR: YELLOW
EPITHELIAL CELLS, UA: ABNORMAL /HPF (ref 0–5)
GLUCOSE URINE: NEGATIVE MG/DL
HCG(URINE) PREGNANCY TEST: NEGATIVE
KETONES, URINE: NEGATIVE MG/DL
LEUKOCYTE ESTERASE, URINE: NEGATIVE
MICROSCOPIC EXAMINATION: YES
NITRITE, URINE: NEGATIVE
PH UA: 7 (ref 5–8)
PROTEIN UA: NEGATIVE MG/DL
RAPID INFLUENZA  B AGN: NEGATIVE
RAPID INFLUENZA A AGN: NEGATIVE
RBC UA: ABNORMAL /HPF (ref 0–4)
S PYO AG THROAT QL: NEGATIVE
SARS-COV-2, NAAT: NOT DETECTED
SPECIFIC GRAVITY UA: 1.01 (ref 1–1.03)
URINE REFLEX TO CULTURE: ABNORMAL
URINE TYPE: ABNORMAL
UROBILINOGEN, URINE: 0.2 E.U./DL
WBC UA: ABNORMAL /HPF (ref 0–5)

## 2022-11-06 PROCEDURE — 99283 EMERGENCY DEPT VISIT LOW MDM: CPT

## 2022-11-06 PROCEDURE — 87635 SARS-COV-2 COVID-19 AMP PRB: CPT

## 2022-11-06 PROCEDURE — 84703 CHORIONIC GONADOTROPIN ASSAY: CPT

## 2022-11-06 PROCEDURE — 87804 INFLUENZA ASSAY W/OPTIC: CPT

## 2022-11-06 PROCEDURE — 81001 URINALYSIS AUTO W/SCOPE: CPT

## 2022-11-06 PROCEDURE — 87880 STREP A ASSAY W/OPTIC: CPT

## 2022-11-06 RX ORDER — FLUTICASONE PROPIONATE 50 MCG
1 SPRAY, SUSPENSION (ML) NASAL DAILY
COMMUNITY

## 2022-11-06 RX ORDER — MONTELUKAST SODIUM 10 MG/1
10 TABLET ORAL NIGHTLY
COMMUNITY

## 2022-11-06 RX ORDER — OSELTAMIVIR PHOSPHATE 75 MG/1
75 CAPSULE ORAL 2 TIMES DAILY
Qty: 10 CAPSULE | Refills: 0 | Status: SHIPPED | OUTPATIENT
Start: 2022-11-06 | End: 2022-11-11

## 2022-11-06 RX ORDER — ASPIRIN 81 MG/1
81 TABLET ORAL DAILY
COMMUNITY

## 2022-11-06 RX ORDER — OMEPRAZOLE 20 MG/1
20 CAPSULE, DELAYED RELEASE ORAL DAILY
COMMUNITY

## 2022-11-06 RX ORDER — ALBUTEROL SULFATE 90 UG/1
2 AEROSOL, METERED RESPIRATORY (INHALATION) EVERY 4 HOURS PRN
COMMUNITY

## 2022-11-06 RX ORDER — ERGOCALCIFEROL 1.25 MG/1
50000 CAPSULE ORAL WEEKLY
COMMUNITY

## 2022-11-06 RX ORDER — CETIRIZINE HYDROCHLORIDE 5 MG/1
5 TABLET ORAL DAILY
COMMUNITY

## 2022-11-06 RX ORDER — BUDESONIDE AND FORMOTEROL FUMARATE DIHYDRATE 160; 4.5 UG/1; UG/1
2 AEROSOL RESPIRATORY (INHALATION) 2 TIMES DAILY
COMMUNITY

## 2022-11-06 ASSESSMENT — PAIN DESCRIPTION - DESCRIPTORS: DESCRIPTORS: ACHING

## 2022-11-06 ASSESSMENT — PAIN - FUNCTIONAL ASSESSMENT: PAIN_FUNCTIONAL_ASSESSMENT: 0-10

## 2022-11-06 ASSESSMENT — PAIN SCALES - GENERAL: PAINLEVEL_OUTOF10: 7

## 2022-11-06 ASSESSMENT — PAIN DESCRIPTION - LOCATION: LOCATION: GENERALIZED

## 2022-11-06 ASSESSMENT — PAIN DESCRIPTION - PAIN TYPE: TYPE: ACUTE PAIN

## 2022-11-06 ASSESSMENT — PAIN DESCRIPTION - FREQUENCY: FREQUENCY: CONTINUOUS

## 2022-11-06 NOTE — ED NOTES
Patient with c/o shortness of breath, nasal congestion and cough that started this am around 0400. Patient states that she has had positive exposures to flu and rsv at school.      Lexa Briseno RN  11/06/22 4386

## 2022-11-07 NOTE — ED PROVIDER NOTES
[address] [facility] ENCOUNTER      Pt Name: Mayur Miramontes  MRN: [id number]  YOB: 1990  Date of evaluation: 11/6/2022  Provider: Jayleen Maciel, [facility]       Chief Complaint   Patient presents with    Shortness of Breath    Cough    Nasal Congestion         HISTORY OF PRESENT ILLNESS  (Location/Symptom, Timing/Onset, Context/Setting, Quality, Duration, Modifying Factors, Severity.)   Mayur Miramontes is a 28 y.o. female who presents to the emergency department for symptoms of fever, nasal congestion, earache, and body aches. She has a initial complaint of cough on here but she denied cough when I asked her about this. Patient states that symptom started all about the 4:00 this morning. States that she did not feel very well at all. Advised that she has had some headache with the symptoms. Again earache. She denies any loss of taste or smell. Denies sore throat but states that her ears were bothering her some. Denies any cough that she states she has sensation that she has been feeling short of breath. Denies any chest pain with the symptoms. Denies any nausea vomiting or diarrhea. Denies any abdominal discomfort at the ordinary. Positive for body aches. Denies any numbness tingling weakness of the extremities out of ordinary. 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 She is COVID vaccinated with 4injections and states that she is flu vaccinated also. Patient will upon arrival here had temperature of 102.9 initially but is now down to 98.6      Nursing notes were reviewed.     REVIEW OFSYSTEMS    (2-9 systems for level 4, 10 or more for level 5)   ROS:  General:  + fevers, + chills, no weakness, +body aches  Cardiovascular:  No chest pain, no palpitations  Respiratory:  +shortness of breath, no cough, no wheezing  Gastrointestinal:  No pain, no nausea, no vomiting, no diarrhea  Musculoskeletal:  No muscle pain, no joint pain  Skin:  No rash, no easy bruising  Neurologic:  No speech problems, + headache, no extremity weakness  Psychiatric:  No anxiety  Genitourinary:  No dysuria, no hematuria    Except as noted above the remainder of the review of systems was reviewed and negative. PAST MEDICAL HISTORY     Past Medical History:   Diagnosis Date    Asthma     Environmental allergies     Kidney stone     PCOS (polycystic ovarian syndrome)          SURGICAL HISTORY       Past Surgical History:   Procedure Laterality Date    CHOLECYSTECTOMY      HERNIA REPAIR      TONSILLECTOMY           CURRENT MEDICATIONS       Previous Medications    ALBUTEROL SULFATE HFA (VENTOLIN HFA) 108 (90 BASE) MCG/ACT INHALER    Inhale 2 puffs into the lungs every 4 hours as needed for Wheezing    ASPIRIN 81 MG EC TABLET    Take 81 mg by mouth daily    BUDESONIDE-FORMOTEROL (SYMBICORT) 160-4.5 MCG/ACT AERO    Inhale 2 puffs into the lungs 2 times daily    CETIRIZINE (ZYRTEC) 5 MG TABLET    Take 5 mg by mouth daily    FLUTICASONE (FLONASE) 50 MCG/ACT NASAL SPRAY    1 spray by Each Nostril route daily    METFORMIN (GLUCOPHAGE) 500 MG TABLET    Take 500 mg by mouth Patient takes 1000 mg in the am and takes 500 mg in the evening    MONTELUKAST (SINGULAIR) 10 MG TABLET    Take 10 mg by mouth nightly    OMEPRAZOLE (PRILOSEC) 20 MG DELAYED RELEASE CAPSULE    Take 20 mg by mouth Daily    VITAMIN D (ERGOCALCIFEROL) 1.25 MG (39402 UT) CAPS CAPSULE    Take 50,000 Units by mouth once a week       ALLERGIES     Latex and Adhesive tape    FAMILY HISTORY     History reviewed. No pertinent family history.        SOCIAL HISTORY       Social History     Socioeconomic History    Marital status:      Spouse name: None    Number of children: None    Years of education: None    Highest education level: None   Tobacco Use    Smoking status: Never    Smokeless tobacco: Never   Substance and Sexual Activity    Alcohol use: Yes     Comment: rarely    Drug use: Never         PHYSICAL EXAM    (up to 7 for level 4, 8 or more for level 5)     ED Triage Vitals [11/06/22 1703]   BP Temp Temp Source Heart Rate Resp SpO2 Height Weight   (!) 148/98 (!) 102.9 °F (39.4 °C) Oral (!) 103 16 97 % 5' 6\" (1.676 m) (!) 328 lb (148.8 kg)       Physical Exam  General :Patient is awake, alert, oriented, in no acute distress, nontoxic appearing  HEENT: Pupils are equally round and reactive to light, EOMI, conjunctivae clear. Oral mucosa is moist, no exudate. Uvula is midline, bilateral tympanic membranes and canals are normal in appearance. No erythema to the posterior pharynx. No asymmetrical tissue  Neck: Neck is supple, full range of motion, trachea midline, no adenopathy noted  Cardiac: Heart regular rate, rhythm, no murmurs, rubs, or gallops  Lungs: Lungs are clear to auscultation, there is no wheezing, rhonchi, or rales. There is no use of accessory muscles. Abdomen: Abdomen is soft, nontender, nondistended. There is no firm or pulsatile masses, no rebound rigidity or guarding. obese  Musculoskeletal: No focal muscle deficits are appreciated  Neuro: Motor intact, sensory intact, level of consciousness is normal  Dermatology: Skin is warm and dry  Psych: Mentation is grossly normal, cognition is grossly normal. Affect is appropriate.       DIAGNOSTIC RESULTS     EKG: All EKG's are interpreted by the Emergency Department Physician who either signs or Co-signs this chart in the [address] a cardiologist.        RADIOLOGY:   Non-plain film images such as CT, Ultrasound and MRI are read by the radiologist. Plain radiographic images are visualized and preliminarily interpreted by the emergency physician with the below findings:      [] Radiologist's Report Reviewed:  No orders to display         ED BEDSIDE ULTRASOUND:   Performed by ED Physician - none    LABS:    I have reviewed and interpreted all of the currently available lab results from this visit (ifapplicable):  Results for orders placed or performed during the hospital encounter of 11/06/22   COVID-19, Rapid    Specimen: Nasopharyngeal Swab   Result Value Ref Range    SARS-CoV-2, NAAT Not Detected Not Detected   Rapid Influenza A/B Antigens    Specimen: Nasopharyngeal   Result Value Ref Range    Rapid Influenza A Ag Negative Negative    Rapid Influenza B Ag Negative Negative   Strep Screen Group A Throat    Specimen: Throat   Result Value Ref Range    Rapid Strep A Screen Negative Negative   Pregnancy, Urine   Result Value Ref Range    HCG(Urine) Pregnancy Test Negative Detects HCG level >20 MIU/mL   Urinalysis with Reflex to Culture    Specimen: Urine   Result Value Ref Range    Color, UA Yellow Straw/Yellow    Clarity, UA Clear Clear    Glucose, Ur Negative Negative mg/dL    Bilirubin Urine Negative Negative    Ketones, Urine Negative Negative mg/dL    Specific Gravity, UA 1.015 1.005 - 1.030    Blood, Urine TRACE-INTACT (A) Negative    pH, UA 7.0 5.0 - 8.0    Protein, UA Negative Negative mg/dL    Urobilinogen, Urine 0.2 <2.0 E.U./dL    Nitrite, Urine Negative Negative    Leukocyte Esterase, Urine Negative Negative    Microscopic Examination YES     Urine Type NotGiven     Urine Reflex to Culture Not Indicated    Microscopic Urinalysis   Result Value Ref Range    WBC, UA 0-2 0 - 5 /HPF    RBC, UA 3-4 0 - 4 /HPF    Epithelial Cells, UA 0-1 0 - 5 /HPF    Bacteria, UA 1+ (A) None Seen /HPF        All other labs were within normal range or not returned as of this dictation.     EMERGENCY DEPARTMENT COURSE and DIFFERENTIAL DIAGNOSIS/MDM:   Vitals:    Vitals:    11/06/22 1703 11/06/22 1939   BP: (!) 148/98    Pulse: (!) 103    Resp: 16    Temp: (!) 102.9 °F (39.4 °C) 98.6 °F (37 °C)   TempSrc: Oral Oral   SpO2: 97%    Weight: (!) 328 lb (148.8 kg)    Height: 5' 6\" (1.676 m)        MEDICATIONS ADMINISTERED IN ED:  Medications - No data to display    After initial evaluation examination I did have a conversation with the patient and her family about upcoming plan, treatment possible disposition which they are agreeable to the time of dictation. Advised that she had rapid COVID, strep, influenza and UA performed upon arrival.  Patient was not initially given anything for her fever but is now afebrile at time of examination however this is 3-1/2 hours after she signed in. Resting comfortably stretcher no acute distress nontoxic-appearing at this time. Patient advised that her final disposition will determine once her diagnostic studies been performed reviewed. Rapid COVID screen was negative    Rapid influenza screen was negative    Rapid strep screen was negative    UA showed trace intact blood. No culture was indicated. Microscopy showed 0-2 white cells, 3-4 red cells, 0-1 epithelial and +1 bacteria    Patient's diagnostic studies were discussed with her and her family and they do state understanding. Patient advised that her symptoms literally just started less than 24 hours ago so is possible that it is just too early to have a positive test however her symptoms do seem more consistent with influenza especially since we are having rising numbers in this test.  She does meet criteria for treatment with Tamiflu since her symptoms are less than 48 hours and she has flulike symptoms. Patient was agreeable to this. I did discuss with her the use of Pedialyte versus Gatorade or Powerade for fluid resuscitation. Also Motrin or Tylenol for pain and fever control. I discussed with him the possibility of the use of an inhaler but she states they already have inhalers and nebulizers at home that they can use. Otherwise patient advised that she cannot return back to work or school until she is fever free for 24 hours without the use of Tylenol or Motrin she does state her understanding of this. She was offered a work excuse but she declined. Otherwise discharged home in stable condition.       The patient advised they do need to follow-up with her regular family physician within the next 1 to 2 days for evaluation. They were also given instruction of the symptoms worsens or new symptoms arise or should return back to emergency department for further evaluation work-up. Is this patient to be included in the SEP-1 Core Measure due to severe sepsis or septic shock? No   Exclusion criteria - the patient is NOT to be included for SEP-1 Core Measure due to:  Viral etiology found or highly suspected (including COVID-19) without concomitant bacterial infection          CONSULTS:  None    PROCEDURES:  Procedures    CRITICAL CARE TIME    Total Critical Care time was 0 minutes, excluding separately reportable procedures. There was a high probability of clinically significant/life threatening deterioration in the patient's condition which required my urgent intervention. FINAL IMPRESSION      1. Flu-like symptoms          DISPOSITION/PLAN   DISPOSITION Decision To Discharge 11/06/2022 07:55:40 PM      PATIENT REFERRED TO:  Melisa Hill  [phone]    In 2 days      [facility]  [place] [address].. [facility]  [phone]    As needed, If symptoms worsen    DISCHARGE MEDICATIONS:  New Prescriptions    No medications on file       Comment: Please note this report has been produced using speech recognition software and may contain errorsrelated to that system including errors in grammar, punctuation, and spelling, as well as words and phrases that may be inappropriate. If there are any questions or concerns please feel free to contact the dictating providerfor clarification.     Sandra Nolasco DO  Attending Emergency Physician               Sandra Nolasco DO  11/06/22 1956

## 2022-11-10 ENCOUNTER — OFFICE VISIT (OUTPATIENT)
Dept: INTERNAL MEDICINE | Facility: CLINIC | Age: 32
End: 2022-11-10

## 2022-11-10 VITALS
RESPIRATION RATE: 16 BRPM | DIASTOLIC BLOOD PRESSURE: 76 MMHG | OXYGEN SATURATION: 98 % | HEART RATE: 97 BPM | TEMPERATURE: 97.6 F | SYSTOLIC BLOOD PRESSURE: 122 MMHG

## 2022-11-10 DIAGNOSIS — H66.90 ACUTE OTITIS MEDIA, UNSPECIFIED OTITIS MEDIA TYPE: Primary | ICD-10-CM

## 2022-11-10 DIAGNOSIS — J06.9 UPPER RESPIRATORY TRACT INFECTION, UNSPECIFIED TYPE: ICD-10-CM

## 2022-11-10 DIAGNOSIS — J02.9 SORE THROAT: ICD-10-CM

## 2022-11-10 DIAGNOSIS — F41.9 ANXIETY: ICD-10-CM

## 2022-11-10 LAB
EXPIRATION DATE: NORMAL
INTERNAL CONTROL: NORMAL
Lab: NORMAL
S PYO AG THROAT QL: NEGATIVE

## 2022-11-10 PROCEDURE — 87880 STREP A ASSAY W/OPTIC: CPT | Performed by: NURSE PRACTITIONER

## 2022-11-10 PROCEDURE — 87081 CULTURE SCREEN ONLY: CPT | Performed by: NURSE PRACTITIONER

## 2022-11-10 PROCEDURE — 99213 OFFICE O/P EST LOW 20 MIN: CPT | Performed by: NURSE PRACTITIONER

## 2022-11-10 PROCEDURE — 87147 CULTURE TYPE IMMUNOLOGIC: CPT | Performed by: NURSE PRACTITIONER

## 2022-11-10 RX ORDER — AMOXICILLIN AND CLAVULANATE POTASSIUM 875; 125 MG/1; MG/1
1 TABLET, FILM COATED ORAL 2 TIMES DAILY
Qty: 14 TABLET | Refills: 0 | Status: SHIPPED | OUTPATIENT
Start: 2022-11-10 | End: 2022-11-17

## 2022-11-10 RX ORDER — HYDROXYZINE HYDROCHLORIDE 25 MG/1
25 TABLET, FILM COATED ORAL 3 TIMES DAILY PRN
Qty: 30 TABLET | Refills: 0 | Status: SHIPPED | OUTPATIENT
Start: 2022-11-10

## 2022-11-10 NOTE — PROGRESS NOTES
Follow Up Office Visit      Date: 11/10/2022   Patient Name: Eliz Eisenberg  : 1990   MRN: [id number]     Chief Complaint:    Chief Complaint   Patient presents with   • Cough     Pt dx w/ Flu on 22 in ED, pt having ear fullness and slight cough   • Earache       History of Present Illness: Eliz Eisenberg is a 32 y.o. female who is here today to follow up with acute upper respiratory symptoms that started 4 days ago. She was evaluated in the ER at [facility] on  with negative covid, flu, and strep tests. She reports bilateral ear pain that started 2 days ago. She c/o ear ache, cough, and sore throat. She has been using tamiflu (prescribed by [facility]), tylenol, ibuprofen, mucinex, ciprodex otic gtts, dayquil, and nyquil for symptom management.         Subjective      Review of Systems:   Review of Systems   Constitutional: Positive for fatigue and fever. Negative for chills.   HENT: Positive for congestion, ear pain, postnasal drip, rhinorrhea, sneezing and sore throat. Negative for ear discharge, sinus pressure and trouble swallowing.    Respiratory: Positive for cough, shortness of breath and wheezing.    Cardiovascular: Negative for chest pain, palpitations and leg swelling.        Reports 1 episode of chest pain a few days ago.  States that she woke up in the middle the night with a panic attack.  Chest pain was related to/associated with anxiety.  Takes hydroxyzine as needed with good control of symptoms.  She is currently out at this med.   Gastrointestinal: Negative.        I have reviewed the patients family history, social history, past medical history, past surgical history and have updated it as appropriate.     Medications:     Current Outpatient Medications:   •  albuterol (PROVENTIL HFA;VENTOLIN HFA) 108 (90 Base) MCG/ACT inhaler, Inhale 2 puffs Every 4 (Four) Hours As Needed for Wheezing., Disp: 1 inhaler, Rfl: 5  •  aspirin 81 MG EC tablet, Adult Low  Dose Aspirin 81 mg tablet,delayed release  Take 1 tablet every day by oral route., Disp: , Rfl:   •  budesonide-formoterol (Symbicort) 160-4.5 MCG/ACT inhaler, Inhale 2 puffs 2 (Two) Times a Day., Disp: 10.2 g, Rfl: 1  •  cetirizine (zyrTEC) 5 MG tablet, Take 1 tablet by mouth Daily., Disp: , Rfl:   •  ciprofloxacin-dexamethasone (CIPRODEX) 0.3-0.1 % otic suspension, Administer 4 drops into the left ear 2 (Two) Times a Day., Disp: 7.5 mL, Rfl: 1  •  fluocinonide (LIDEX) 0.05 % external solution, Apply 1 application topically to the appropriate area as directed As Needed., Disp: , Rfl:   •  fluticasone (Flonase) 50 MCG/ACT nasal spray, 2 sprays into the nostril(s) as directed by provider Daily., Disp: 16 g, Rfl: 2  •  Humira 40 MG/0.4ML Prefilled Syringe Kit injection, 80 mg Every 14 (Fourteen) Days., Disp: , Rfl:   •  metFORMIN (GLUCOPHAGE) 500 MG tablet, TAKE TWO TABLETS BY MOUTH EVERY MORNING AND TAKE ONE TABLET BY MOUTH EVERY EVENING WITH MEALS, Disp: 270 tablet, Rfl: 3  •  montelukast (SINGULAIR) 10 MG tablet, Take 1 tablet by mouth Every Night., Disp: 90 tablet, Rfl: 1  •  mupirocin (BACTROBAN) 2 % ointment, Apply 1 application topically to the appropriate area as directed 3 (Three) Times a Day., Disp: 1 g, Rfl: 0  •  omeprazole (priLOSEC) 20 MG capsule, Take 20 mg by mouth Daily., Disp: , Rfl:   •  ondansetron ODT (ZOFRAN-ODT) 4 MG disintegrating tablet, Place 1 tablet on the tongue Every 6 (Six) Hours As Needed for Nausea or Vomiting., Disp: 8 tablet, Rfl: 0  •  Progesterone (PROMETRIUM) 200 MG capsule, Take 2 capsules by mouth Take As Directed., Disp: , Rfl:   •  promethazine (PHENERGAN) 25 MG tablet, TAKE ONE TABLET BY MOUTH EVERY 4 HOURS AS NEEDED FOR NAUSEA (Patient taking differently: Take 1 tablet by mouth Every 6 (Six) Hours As Needed for Nausea.), Disp: 10 tablet, Rfl: 0  •  vitamin D (ERGOCALCIFEROL) 1.25 MG (56412 UT) capsule capsule, Take 1 capsule by mouth 1 (One) Time Per Week., Disp: 12 capsule,  Rfl: 3  •  amoxicillin-clavulanate (Augmentin) 875-125 MG per tablet, Take 1 tablet by mouth 2 (Two) Times a Day for 7 days., Disp: 14 tablet, Rfl: 0  •  hydrOXYzine (ATARAX) 25 MG tablet, Take 1 tablet by mouth 3 (Three) Times a Day As Needed for Anxiety., Disp: 30 tablet, Rfl: 0    Allergies:   Allergies   Allergen Reactions   • Latex Hives, Itching, Swelling and Rash   • Elastic Bandages & [Zinc] Hives   • Pollen Extract Rash   • Tree Extract Other (See Comments)     CONGESTION       Objective     Physical Exam: Please see above  Vital Signs:   Vitals:    11/10/22 1632   BP: 122/76   Pulse: 97   Resp: 16   Temp: 97.6 °F (36.4 °C)   SpO2: 98%   PainSc:   2     There is no height or weight on file to calculate BMI.    Physical Exam  Vitals and nursing note reviewed.   Constitutional:       General: She is not in acute distress.     Appearance: Normal appearance. She is obese. She is not ill-appearing.   HENT:      Head: Normocephalic and atraumatic.      Right Ear: Tenderness present. No swelling. No middle ear effusion. There is no impacted cerumen. Tympanic membrane is erythematous.      Left Ear: Tenderness present. No swelling.  No middle ear effusion. There is no impacted cerumen. Tympanic membrane is erythematous.      Ears:      Comments: Skin around external left ear is irritated, slightly red, and dry/pealing-- consistent with seborrheic dermatitis.      Nose: Congestion present.      Right Sinus: No maxillary sinus tenderness or frontal sinus tenderness.      Left Sinus: No maxillary sinus tenderness or frontal sinus tenderness.      Mouth/Throat:      Mouth: Mucous membranes are moist.      Tongue: No lesions.      Pharynx: Oropharynx is clear. Posterior oropharyngeal erythema present. No oropharyngeal exudate.      Comments: Red patches noted on tongue, denies recent consumption of red drink, food, candy, or lozenge.   Eyes:      Conjunctiva/sclera: Conjunctivae normal.      Pupils: Pupils are equal,  round, and reactive to light.   Cardiovascular:      Rate and Rhythm: Normal rate and regular rhythm.      Heart sounds: Normal heart sounds.   Pulmonary:      Effort: Pulmonary effort is normal. No respiratory distress.      Breath sounds: Normal breath sounds. No wheezing, rhonchi or rales.   Musculoskeletal:      Cervical back: Neck supple.   Lymphadenopathy:      Cervical: No cervical adenopathy.   Skin:     General: Skin is warm and dry.      Capillary Refill: Capillary refill takes less than 2 seconds.   Neurological:      General: No focal deficit present.      Mental Status: She is alert and oriented to person, place, and time. Mental status is at baseline.      Motor: No weakness.         Procedures    Results:   Imaging:     Labs:       Assessment / Plan      Assessment/Plan:   Diagnoses and all orders for this visit:    1. Acute otitis media, unspecified otitis media type (Primary)  -     amoxicillin-clavulanate (Augmentin) 875-125 MG per tablet; Take 1 tablet by mouth 2 (Two) Times a Day for 7 days.  Dispense: 14 tablet; Refill: 0    2. Upper respiratory tract infection, unspecified type  -rapid covid and flu tests negative on 11/6, pt currently on antiviral tx  -continue OTC cold meds you have been using for symptom relief. Specifically, I recommend antihistamine, nasal saline spray 2-3x/day, steroid nasal spray (OTC flonase, nasacort, or rhinocort) 2 sprays/nostril QAM - reviewed how to use correctly; and prn mucinex (or mucinex DM); aggressive fluid intake and adequate rest  -discussed s/s to monitor for and would warrant re-eval if you experience  -f/u prn    3. Sore throat  -     POCT rapid strep A: negative  -     Beta Strep Culture, Throat - , Throat; Future  -     Beta Strep Culture, Throat - Swab, Throat    4. Anxiety  -I will send a short supply of Atarax to her pharmacy since she is completely out.  Instructed to schedule follow-up with whichever provider is managing anxiety and related  treatment.  Further refills need to come from that provider.  -   hydrOXYzine (ATARAX) 25 MG tablet; Take 1 tablet by mouth 3 (Three) Times a Day As Needed for Anxiety.  Dispense: 30 tablet; Refill: 0         Follow Up:   Return if symptoms worsen or fail to improve.    COURTNEY Dela Cruz  [facility] [place]

## 2022-11-11 LAB — BACTERIA SPEC AEROBE CULT: ABNORMAL

## 2022-11-29 ENCOUNTER — OFFICE VISIT (OUTPATIENT)
Dept: INTERNAL MEDICINE | Facility: CLINIC | Age: 32
End: 2022-11-29

## 2022-11-29 VITALS
HEART RATE: 88 BPM | SYSTOLIC BLOOD PRESSURE: 124 MMHG | TEMPERATURE: 98.8 F | OXYGEN SATURATION: 97 % | DIASTOLIC BLOOD PRESSURE: 82 MMHG | RESPIRATION RATE: 16 BRPM

## 2022-11-29 DIAGNOSIS — J45.21 MILD INTERMITTENT ASTHMA WITH ACUTE EXACERBATION: ICD-10-CM

## 2022-11-29 DIAGNOSIS — H92.03 OTALGIA OF BOTH EARS: Primary | ICD-10-CM

## 2022-11-29 PROCEDURE — 96372 THER/PROPH/DIAG INJ SC/IM: CPT | Performed by: PHYSICIAN ASSISTANT

## 2022-11-29 PROCEDURE — 99213 OFFICE O/P EST LOW 20 MIN: CPT | Performed by: PHYSICIAN ASSISTANT

## 2022-11-29 RX ORDER — METHYLPREDNISOLONE ACETATE 40 MG/ML
40 INJECTION, SUSPENSION INTRA-ARTICULAR; INTRALESIONAL; INTRAMUSCULAR; SOFT TISSUE ONCE
Status: COMPLETED | OUTPATIENT
Start: 2022-11-29 | End: 2022-11-29

## 2022-11-29 RX ORDER — FLUCONAZOLE 150 MG/1
150 TABLET ORAL
Qty: 2 TABLET | Refills: 0 | Status: SHIPPED | OUTPATIENT
Start: 2022-11-29 | End: 2023-01-25 | Stop reason: SDUPTHER

## 2022-11-29 RX ORDER — METHYLPREDNISOLONE 4 MG/1
TABLET ORAL
Qty: 21 TABLET | Refills: 0 | Status: SHIPPED | OUTPATIENT
Start: 2022-11-29 | End: 2023-02-16

## 2022-11-29 RX ORDER — ALBUTEROL SULFATE 90 UG/1
2 AEROSOL, METERED RESPIRATORY (INHALATION) EVERY 4 HOURS PRN
Qty: 18 G | Refills: 2 | Status: SHIPPED | OUTPATIENT
Start: 2022-11-29

## 2022-11-29 RX ADMIN — METHYLPREDNISOLONE ACETATE 40 MG: 40 INJECTION, SUSPENSION INTRA-ARTICULAR; INTRALESIONAL; INTRAMUSCULAR; SOFT TISSUE at 11:05

## 2022-11-29 NOTE — PROGRESS NOTES
"Chief Complaint   Patient presents with   • Cough   • Nasal Congestion       Subjective     Eliz Eisenberg is a 32 y.o. female.        History of Present Illness     Had body aches and a high fever 1 month ago. Went to the emergency room. Was tested for the influenza, COVID-19, and strep and was treated for the influenza because of her symptoms. Was given the Tamiflu and was informed to alternate Tylenol and ibuprofen to help with fever and body aches. Was taking the Mucinex too. Saw COURTNEY Dela Cruz due to otalgia which was her new issue and was tested for strep again. Was informed to use the Flonase nasal spray and the amoxicillin was prescribed. Was informed to continue taking the Mucinex, but it was not helpful. Has the fluid in the ear and sore throat currently. Feels the pressure in her ears. It will pop throughout the day, which provides some relief. Her throat feels raw, but denies feeling any different in her mouth or tongue. Can cough and blow her nose, but \"nothing comes out.\" Denies nasal congestion. Has a post-nasal drip which is causing throat irritation and a cough. Has had the same cough since she had flu-like symptoms, which is not improving. Uses the albuterol inhaler, which is helpful with her symptoms sometimes. Has been using the nebulizer a lot, which has helped for a short period of time.    Her psoriasis is flaring up. Currently takes the Humira every 2 weeks. Received her last injection on 11/25/2022. Can take steroid with the Humira and the steroid helps with psoriasis sometimes.         Current Outpatient Medications:   •  albuterol sulfate  (90 Base) MCG/ACT inhaler, Inhale 2 puffs Every 4 (Four) Hours As Needed for Wheezing., Disp: 18 g, Rfl: 2  •  aspirin 81 MG EC tablet, Adult Low Dose Aspirin 81 mg tablet,delayed release  Take 1 tablet every day by oral route., Disp: , Rfl:   •  budesonide-formoterol (Symbicort) 160-4.5 MCG/ACT inhaler, Inhale 2 puffs 2 (Two) Times " a Day., Disp: 10.2 g, Rfl: 1  •  cetirizine (zyrTEC) 5 MG tablet, Take 1 tablet by mouth Daily., Disp: , Rfl:   •  ciprofloxacin-dexamethasone (CIPRODEX) 0.3-0.1 % otic suspension, Administer 4 drops into the left ear 2 (Two) Times a Day., Disp: 7.5 mL, Rfl: 1  •  fluocinonide (LIDEX) 0.05 % external solution, Apply 1 application topically to the appropriate area as directed As Needed., Disp: , Rfl:   •  fluticasone (Flonase) 50 MCG/ACT nasal spray, 2 sprays into the nostril(s) as directed by provider Daily., Disp: 16 g, Rfl: 2  •  Humira 40 MG/0.4ML Prefilled Syringe Kit injection, 80 mg Every 14 (Fourteen) Days., Disp: , Rfl:   •  hydrOXYzine (ATARAX) 25 MG tablet, Take 1 tablet by mouth 3 (Three) Times a Day As Needed for Anxiety., Disp: 30 tablet, Rfl: 0  •  metFORMIN (GLUCOPHAGE) 500 MG tablet, TAKE TWO TABLETS BY MOUTH EVERY MORNING AND TAKE ONE TABLET BY MOUTH EVERY EVENING WITH MEALS, Disp: 270 tablet, Rfl: 3  •  montelukast (SINGULAIR) 10 MG tablet, Take 1 tablet by mouth Every Night., Disp: 90 tablet, Rfl: 1  •  mupirocin (BACTROBAN) 2 % ointment, Apply 1 application topically to the appropriate area as directed 3 (Three) Times a Day., Disp: 1 g, Rfl: 0  •  omeprazole (priLOSEC) 20 MG capsule, Take 20 mg by mouth Daily., Disp: , Rfl:   •  ondansetron ODT (ZOFRAN-ODT) 4 MG disintegrating tablet, Place 1 tablet on the tongue Every 6 (Six) Hours As Needed for Nausea or Vomiting., Disp: 8 tablet, Rfl: 0  •  Progesterone (PROMETRIUM) 200 MG capsule, Take 2 capsules by mouth Take As Directed., Disp: , Rfl:   •  promethazine (PHENERGAN) 25 MG tablet, TAKE ONE TABLET BY MOUTH EVERY 4 HOURS AS NEEDED FOR NAUSEA (Patient taking differently: Take 25 mg by mouth Every 6 (Six) Hours As Needed for Nausea.), Disp: 10 tablet, Rfl: 0  •  vitamin D (ERGOCALCIFEROL) 1.25 MG (57304 UT) capsule capsule, Take 1 capsule by mouth 1 (One) Time Per Week., Disp: 12 capsule, Rfl: 3  •  fluconazole (Diflucan) 150 MG tablet, Take 1  tablet by mouth Every 3 (Three) Days., Disp: 2 tablet, Rfl: 0  •  methylPREDNISolone (Medrol) 4 MG dose pack, follow package directions, Disp: 21 tablet, Rfl: 0     PMFSH  The following portions of the patient's history were reviewed and updated as appropriate: allergies, current medications, past family history, past medical history, past social history, past surgical history and problem list.    Review of Systems   Constitutional: Positive for fatigue. Negative for activity change and unexpected weight change.   HENT: Positive for congestion, postnasal drip and sore throat. Negative for ear pain.    Eyes: Negative for pain and discharge.   Respiratory: Positive for cough. Negative for chest tightness, shortness of breath and wheezing.    Cardiovascular: Negative for chest pain and palpitations.   Gastrointestinal: Negative for abdominal pain, diarrhea and vomiting.   Endocrine: Negative.    Genitourinary: Negative.    Musculoskeletal: Negative for joint swelling.   Skin: Negative for color change, rash and wound.   Allergic/Immunologic: Negative.    Neurological: Negative for seizures and syncope.   Psychiatric/Behavioral: Negative.        Objective   /82   Pulse 88   Temp 98.8 °F (37.1 °C)   Resp 16   SpO2 97%     Physical Exam  Vitals and nursing note reviewed.   Constitutional:       General: She is not in acute distress.     Appearance: She is well-developed. She is not toxic-appearing or diaphoretic.   HENT:      Head: Normocephalic and atraumatic. Hair is normal.      Right Ear: External ear normal. No drainage, swelling or tenderness. Tympanic membrane is retracted.      Left Ear: External ear normal. No drainage, swelling or tenderness. Tympanic membrane is retracted.      Nose: Mucosal edema present.      Mouth/Throat:      Mouth: No oral lesions.      Pharynx: Uvula midline. Posterior oropharyngeal erythema present. No oropharyngeal exudate or uvula swelling.   Eyes:      General: No scleral  icterus.        Right eye: No discharge.         Left eye: No discharge.      Conjunctiva/sclera: Conjunctivae normal.      Pupils: Pupils are equal, round, and reactive to light.   Cardiovascular:      Rate and Rhythm: Normal rate and regular rhythm.      Heart sounds: Normal heart sounds. No murmur heard.    No gallop.   Pulmonary:      Effort: No respiratory distress.      Breath sounds: Normal breath sounds. No stridor. No wheezing or rales.   Chest:      Chest wall: No tenderness.   Abdominal:      Palpations: Abdomen is soft.      Tenderness: There is no abdominal tenderness.   Musculoskeletal:      Cervical back: Normal range of motion and neck supple.   Lymphadenopathy:      Cervical: No cervical adenopathy.   Skin:     General: Skin is warm and dry.      Findings: No rash.   Neurological:      Mental Status: She is alert and oriented to person, place, and time.      Motor: No abnormal muscle tone.   Psychiatric:         Behavior: Behavior normal.         Thought Content: Thought content normal.         Judgment: Judgment normal.              ASSESSMENT/PLAN    Diagnoses and all orders for this visit:    1. Otalgia of both ears (Primary)  Comments:  A steroid injection, Depomedrol 40 mg, is given to the patient. She will start taking oral steroid taper on 11/30/2022.   Orders:  -     methylPREDNISolone acetate (DEPO-medrol) injection 40 mg  -     methylPREDNISolone (Medrol) 4 MG dose pack; follow package directions  Dispense: 21 tablet; Refill: 0    2. Mild intermittent asthma with acute exacerbation  Comments:  A refill for an albuterol inhaler is provided. Will continue on Mucinex. Depomedrol 40 mg IM in office. Start medrol dose pack tomorrow.    Orders:  -     albuterol sulfate  (90 Base) MCG/ACT inhaler; Inhale 2 puffs Every 4 (Four) Hours As Needed for Wheezing.  Dispense: 18 g; Refill: 2    Other orders  -     fluconazole (Diflucan) 150 MG tablet; Take 1 tablet by mouth Every 3 (Three) Days.   Dispense: 2 tablet; Refill: 0             No follow-ups on file.     Transcribed from ambient dictation for VINNIE James by Ayah Marquis.  11/29/22   12:34 EST    Patient or patient representative verbalized consent to the visit recording.  I have personally performed the services described in this document as transcribed by the above individual, and it is both accurate and complete.

## 2022-12-07 ENCOUNTER — TRANSCRIBE ORDERS (OUTPATIENT)
Dept: LAB | Facility: HOSPITAL | Age: 32
End: 2022-12-07

## 2022-12-07 ENCOUNTER — LAB (OUTPATIENT)
Dept: LAB | Facility: HOSPITAL | Age: 32
End: 2022-12-07

## 2022-12-07 ENCOUNTER — OFFICE VISIT (OUTPATIENT)
Dept: INTERNAL MEDICINE | Facility: CLINIC | Age: 32
End: 2022-12-07

## 2022-12-07 VITALS
BODY MASS INDEX: 47.09 KG/M2 | HEIGHT: 66 IN | TEMPERATURE: 96.5 F | HEART RATE: 89 BPM | OXYGEN SATURATION: 97 % | DIASTOLIC BLOOD PRESSURE: 82 MMHG | WEIGHT: 293 LBS | SYSTOLIC BLOOD PRESSURE: 124 MMHG

## 2022-12-07 DIAGNOSIS — N97.0 FEMALE INFERTILITY ASSOCIATED WITH ANOVULATION: Primary | ICD-10-CM

## 2022-12-07 DIAGNOSIS — L03.011 PARONYCHIA OF FINGER, RIGHT: Primary | ICD-10-CM

## 2022-12-07 DIAGNOSIS — N97.0 FEMALE INFERTILITY ASSOCIATED WITH ANOVULATION: ICD-10-CM

## 2022-12-07 LAB — PROGEST SERPL-MCNC: 0.25 NG/ML

## 2022-12-07 PROCEDURE — 0124A COVID-19 (PFIZER) BIVALENT BOOSTER 12+YRS: CPT | Performed by: PHYSICIAN ASSISTANT

## 2022-12-07 PROCEDURE — 84144 ASSAY OF PROGESTERONE: CPT

## 2022-12-07 PROCEDURE — 36415 COLL VENOUS BLD VENIPUNCTURE: CPT

## 2022-12-07 PROCEDURE — 91312 COVID-19 (PFIZER) BIVALENT BOOSTER 12+YRS: CPT | Performed by: PHYSICIAN ASSISTANT

## 2022-12-07 PROCEDURE — 99213 OFFICE O/P EST LOW 20 MIN: CPT | Performed by: PHYSICIAN ASSISTANT

## 2022-12-07 RX ORDER — DOXYCYCLINE 100 MG/1
100 CAPSULE ORAL 2 TIMES DAILY
Qty: 20 CAPSULE | Refills: 0 | Status: SHIPPED | OUTPATIENT
Start: 2022-12-07 | End: 2023-02-16

## 2022-12-07 NOTE — PROGRESS NOTES
Chief Complaint   Patient presents with   • Hand Pain     Middle finger on left hand/ hang nail issue       Subjective     Eliz Eisenberg is a 32 y.o. female.        History of Present Illness     Eliz Eisenberg is a 32-year-old female who presents today for hang nail issue on middle finger, right hand.     She had a hang nail that occurred like normal and took some cuticle clippers to cut it about a week ago on Sunday, 11/27/22. She did not have any pain after she clipped the hang nail and her nail was fine for a couple of days until it became really puffy, sore, and started to have drainage coming from it. The area is now very sensitive. She mentions that today has been the worse it has been since it started to present these symptoms. She notes it has become more red and swollen. She confirms that it is still draining slightly, but not as bad as it was previously. The patient was on a steroid last week and finished it yesterday, she was hoping that would take care of the infection in her nail; however, it did not. She notes that she was recently on the previous medications; amoxicillin, Mucinex , and a steroid pack.     She has been soaking her nail in peroxide and notes that it did bubble up quite a bit, but it did not seem to make her nail look any better. Another remedy she tried was soaking her finger in warm water that included Epson salt; however, she states that it seemed to irritate it more. She mentions that about 2 days ago, her finger was really swollen and so sensitive to palpation. It has improved since then, especially with less drainage.     The patient already had Bactroban cream prescribed to her to which she stated that it was prescribed by her other specialist regarding her psoriasis in her bilateral ears. She inquired if the most recent COVID-19 booster vaccine is being provided today in the clinic.       Current Outpatient Medications:   •  albuterol sulfate  (90 Base) MCG/ACT  inhaler, Inhale 2 puffs Every 4 (Four) Hours As Needed for Wheezing., Disp: 18 g, Rfl: 2  •  aspirin 81 MG EC tablet, Adult Low Dose Aspirin 81 mg tablet,delayed release  Take 1 tablet every day by oral route., Disp: , Rfl:   •  budesonide-formoterol (Symbicort) 160-4.5 MCG/ACT inhaler, Inhale 2 puffs 2 (Two) Times a Day., Disp: 10.2 g, Rfl: 1  •  cetirizine (zyrTEC) 5 MG tablet, Take 1 tablet by mouth Daily., Disp: , Rfl:   •  ciprofloxacin-dexamethasone (CIPRODEX) 0.3-0.1 % otic suspension, Administer 4 drops into the left ear 2 (Two) Times a Day., Disp: 7.5 mL, Rfl: 1  •  fluconazole (Diflucan) 150 MG tablet, Take 1 tablet by mouth Every 3 (Three) Days., Disp: 2 tablet, Rfl: 0  •  fluocinonide (LIDEX) 0.05 % external solution, Apply 1 application topically to the appropriate area as directed As Needed., Disp: , Rfl:   •  Humira 40 MG/0.4ML Prefilled Syringe Kit injection, 80 mg Every 14 (Fourteen) Days., Disp: , Rfl:   •  hydrOXYzine (ATARAX) 25 MG tablet, Take 1 tablet by mouth 3 (Three) Times a Day As Needed for Anxiety., Disp: 30 tablet, Rfl: 0  •  metFORMIN (GLUCOPHAGE) 500 MG tablet, TAKE TWO TABLETS BY MOUTH EVERY MORNING AND TAKE ONE TABLET BY MOUTH EVERY EVENING WITH MEALS, Disp: 270 tablet, Rfl: 3  •  methylPREDNISolone (Medrol) 4 MG dose pack, follow package directions, Disp: 21 tablet, Rfl: 0  •  montelukast (SINGULAIR) 10 MG tablet, Take 1 tablet by mouth Every Night., Disp: 90 tablet, Rfl: 1  •  mupirocin (BACTROBAN) 2 % ointment, Apply 1 application topically to the appropriate area as directed 3 (Three) Times a Day., Disp: 1 g, Rfl: 0  •  omeprazole (priLOSEC) 20 MG capsule, Take 20 mg by mouth Daily., Disp: , Rfl:   •  ondansetron ODT (ZOFRAN-ODT) 4 MG disintegrating tablet, Place 1 tablet on the tongue Every 6 (Six) Hours As Needed for Nausea or Vomiting., Disp: 8 tablet, Rfl: 0  •  Progesterone (PROMETRIUM) 200 MG capsule, Take 2 capsules by mouth Take As Directed., Disp: , Rfl:   •   "promethazine (PHENERGAN) 25 MG tablet, TAKE ONE TABLET BY MOUTH EVERY 4 HOURS AS NEEDED FOR NAUSEA (Patient taking differently: Take 25 mg by mouth Every 6 (Six) Hours As Needed for Nausea.), Disp: 10 tablet, Rfl: 0  •  vitamin D (ERGOCALCIFEROL) 1.25 MG (30798 UT) capsule capsule, Take 1 capsule by mouth 1 (One) Time Per Week., Disp: 12 capsule, Rfl: 3  •  doxycycline (MONODOX) 100 MG capsule, Take 1 capsule by mouth 2 (Two) Times a Day., Disp: 20 capsule, Rfl: 0  •  fluticasone (Flonase) 50 MCG/ACT nasal spray, 2 sprays into the nostril(s) as directed by provider Daily., Disp: 16 g, Rfl: 5     PMFSH  The following portions of the patient's history were reviewed and updated as appropriate: allergies, current medications, past family history, past medical history, past social history, past surgical history and problem list.    Review of Systems   Constitutional: Negative for activity change, appetite change, fatigue, fever and unexpected weight change.   HENT: Negative for facial swelling, mouth sores and trouble swallowing.    Eyes: Negative for pain, discharge, itching and visual disturbance.   Respiratory: Negative for chest tightness, shortness of breath and wheezing.    Cardiovascular: Negative for chest pain and palpitations.   Gastrointestinal: Negative for abdominal pain, diarrhea, nausea and vomiting.   Endocrine: Negative.    Genitourinary: Negative.    Musculoskeletal: Negative for joint swelling.   Skin: Positive for color change. Negative for rash.   Allergic/Immunologic: Negative.    Neurological: Negative for seizures and syncope.   Hematological: Does not bruise/bleed easily.   Psychiatric/Behavioral: Negative.        Objective   /82   Pulse 89   Temp 96.5 °F (35.8 °C)   Ht 167.6 cm (65.98\")   Wt (!) 147 kg (324 lb)   SpO2 97%   BMI 52.32 kg/m²     Physical Exam  Vitals and nursing note reviewed.   Constitutional:       Appearance: She is well-developed.   HENT:      Head: Normocephalic " and atraumatic.      Right Ear: External ear normal.      Left Ear: External ear normal.   Eyes:      Conjunctiva/sclera: Conjunctivae normal.   Cardiovascular:      Rate and Rhythm: Normal rate and regular rhythm.   Pulmonary:      Effort: Pulmonary effort is normal.      Breath sounds: Normal breath sounds.   Musculoskeletal:         General: Normal range of motion.        Arms:       Cervical back: Normal range of motion.   Skin:     General: Skin is warm and dry.   Psychiatric:         Behavior: Behavior normal.         ASSESSMENT/PLAN    Diagnoses and all orders for this visit:    1. Paronychia of finger, right (Primary)  Comments:  Advised to soak in warm plain water or with epsom salts. Apply bactroban ointment after clean and dry. Start doxycycline if wound is worsening.  Orders:  -     doxycycline (MONODOX) 100 MG capsule; Take 1 capsule by mouth 2 (Two) Times a Day.  Dispense: 20 capsule; Refill: 0  -     mupirocin (BACTROBAN) 2 % ointment; Apply 1 application topically to the appropriate area as directed 3 (Three) Times a Day.  Dispense: 1 g; Refill: 0    Other orders  -     COVID-19 Bivalent Booster (Pfizer) 12+yrs             Return if symptoms worsen or fail to improve, for Next scheduled follow up.       Transcribed from ambient dictation for VINNIE James by Luz Moss.  12/07/22   11:12 EST    Patient or patient representative verbalized consent to the visit recording.  I have personally performed the services described in this document as transcribed by the above individual, and it is both accurate and complete.

## 2022-12-08 DIAGNOSIS — R59.1 LYMPHADENOPATHY OF HEAD AND NECK: ICD-10-CM

## 2022-12-08 RX ORDER — FLUTICASONE PROPIONATE 50 MCG
2 SPRAY, SUSPENSION (ML) NASAL DAILY
Qty: 16 G | Refills: 5 | Status: SHIPPED | OUTPATIENT
Start: 2022-12-08

## 2023-01-07 PROCEDURE — 96375 TX/PRO/DX INJ NEW DRUG ADDON: CPT

## 2023-01-07 PROCEDURE — 99283 EMERGENCY DEPT VISIT LOW MDM: CPT

## 2023-01-07 PROCEDURE — 96374 THER/PROPH/DIAG INJ IV PUSH: CPT

## 2023-01-07 RX ORDER — SODIUM CHLORIDE 0.9 % (FLUSH) 0.9 %
10 SYRINGE (ML) INJECTION AS NEEDED
Status: DISCONTINUED | OUTPATIENT
Start: 2023-01-07 | End: 2023-01-08 | Stop reason: HOSPADM

## 2023-01-08 ENCOUNTER — HOSPITAL ENCOUNTER (EMERGENCY)
Facility: HOSPITAL | Age: 33
Discharge: HOME | End: 2023-01-08
Attending: EMERGENCY MEDICINE | Admitting: EMERGENCY MEDICINE
Payer: COMMERCIAL

## 2023-01-08 ENCOUNTER — APPOINTMENT (OUTPATIENT)
Dept: CT IMAGING | Facility: HOSPITAL | Age: 33
End: 2023-01-08
Payer: COMMERCIAL

## 2023-01-08 VITALS
DIASTOLIC BLOOD PRESSURE: 99 MMHG | TEMPERATURE: 98.1 F | RESPIRATION RATE: 16 BRPM | WEIGHT: 293 LBS | BODY MASS INDEX: 47.09 KG/M2 | HEIGHT: 66 IN | SYSTOLIC BLOOD PRESSURE: 155 MMHG | OXYGEN SATURATION: 93 % | HEART RATE: 97 BPM

## 2023-01-08 DIAGNOSIS — R51.9 ACUTE NONINTRACTABLE HEADACHE, UNSPECIFIED HEADACHE TYPE: Primary | ICD-10-CM

## 2023-01-08 DIAGNOSIS — L50.9 URTICARIA: ICD-10-CM

## 2023-01-08 DIAGNOSIS — B37.2 SKIN YEAST INFECTION: ICD-10-CM

## 2023-01-08 LAB
HOLD SPECIMEN: NORMAL
WHOLE BLOOD HOLD COAG: NORMAL
WHOLE BLOOD HOLD SPECIMEN: NORMAL

## 2023-01-08 PROCEDURE — 96374 THER/PROPH/DIAG INJ IV PUSH: CPT

## 2023-01-08 PROCEDURE — 25010000002 DIPHENHYDRAMINE PER 50 MG: Performed by: EMERGENCY MEDICINE

## 2023-01-08 PROCEDURE — 96375 TX/PRO/DX INJ NEW DRUG ADDON: CPT

## 2023-01-08 PROCEDURE — 70450 CT HEAD/BRAIN W/O DYE: CPT

## 2023-01-08 PROCEDURE — 25010000002 DROPERIDOL PER 5 MG: Performed by: EMERGENCY MEDICINE

## 2023-01-08 RX ORDER — DROPERIDOL 2.5 MG/ML
5 INJECTION, SOLUTION INTRAMUSCULAR; INTRAVENOUS ONCE
Status: DISCONTINUED | OUTPATIENT
Start: 2023-01-08 | End: 2023-01-08

## 2023-01-08 RX ORDER — DROPERIDOL 2.5 MG/ML
5 INJECTION, SOLUTION INTRAMUSCULAR; INTRAVENOUS ONCE
Status: COMPLETED | OUTPATIENT
Start: 2023-01-08 | End: 2023-01-08

## 2023-01-08 RX ORDER — DIPHENHYDRAMINE HYDROCHLORIDE 50 MG/ML
25 INJECTION INTRAMUSCULAR; INTRAVENOUS ONCE
Status: COMPLETED | OUTPATIENT
Start: 2023-01-08 | End: 2023-01-08

## 2023-01-08 RX ADMIN — DIPHENHYDRAMINE HYDROCHLORIDE 25 MG: 50 INJECTION, SOLUTION INTRAMUSCULAR; INTRAVENOUS at 05:45

## 2023-01-08 RX ADMIN — DROPERIDOL 5 MG: 2.5 INJECTION, SOLUTION INTRAMUSCULAR; INTRAVENOUS at 05:33

## 2023-01-08 NOTE — ED PROVIDER NOTES
[place]    EMERGENCY DEPARTMENT ENCOUNTER      Pt Name: Eliz Eisenberg  MRN: [id number]  YOB: 1990  Date of evaluation: 1/7/2023  Provider: John Win MD    CHIEF COMPLAINT       Chief Complaint   Patient presents with   • Allergic Reaction   • Headache         HISTORY OF PRESENT ILLNESS   Eliz Eisenberg is a 32 y.o. female who presents to the emergency department with concern for allergic reaction to one of her new medications that read she received in the office on Wednesday.  She is having moderate aching bifrontal headache as well as rash to the abdominal area and is also complaining about some shortness of breath.  Patient does have history of migraine and she took her Imitrex at approximately 6 PM without any relief.  She states that her headache is consistent with her usual migraine without any other abnormal features.  She denies any associated neck pain or stiffness, fever, chills, visual changes, or peripheral paresthesias, weakness, or numbness.  She denies any difficulty ambulating.  She has had no abdominal pain, vomiting, or diarrhea.  She does tell me that her rash and shortness of breath have resolved during the course of her wait.      Nursing notes were reviewed.    REVIEW OF SYSTEMS     ROS:  A chief complaint appropriate review of systems was completed and is negative except as noted in the HPI.      PAST MEDICAL HISTORY     Past Medical History:   Diagnosis Date   • Abdominal pain    • Acute sinusitis    • Ankle sprain    • Anxiety    • Asthma    • Atopic dermatitis    • Biliary dyskinesia    • Cat bite      resolved   • Current tear of meniscus    • Dysfunctional uterine bleeding    • Foot pain    • GERD (gastroesophageal reflux disease)    • Gout    • Hearing loss, bilateral     R/T PSORASIS   • IBS (irritable bowel syndrome)    • Influenza    • Metabolic syndrome    • Muscular aches    • Obesity    • Panic attack    • PCOS (polycystic ovarian syndrome)    •  Pharyngitis    • Psoriasis    • URI (upper respiratory infection)    • Wears glasses          SURGICAL HISTORY       Past Surgical History:   Procedure Laterality Date   • CHOLECYSTECTOMY     • COLONOSCOPY     • TONSILLECTOMY AND ADENOIDECTOMY N/A 7/9/2021    Procedure: TONSILLECTOMY;  Surgeon: Ran Lemon MD;  [facility];  Service: ENT;  Laterality: N/A;   • UMBILICAL HERNIA REPAIR     • WISDOM TOOTH EXTRACTION           CURRENT MEDICATIONS     No current facility-administered medications for this encounter.    Current Outpatient Medications:   •  albuterol sulfate  (90 Base) MCG/ACT inhaler, Inhale 2 puffs Every 4 (Four) Hours As Needed for Wheezing., Disp: 18 g, Rfl: 2  •  aspirin 81 MG EC tablet, Adult Low Dose Aspirin 81 mg tablet,delayed release  Take 1 tablet every day by oral route., Disp: , Rfl:   •  budesonide-formoterol (Symbicort) 160-4.5 MCG/ACT inhaler, Inhale 2 puffs 2 (Two) Times a Day., Disp: 10.2 g, Rfl: 1  •  cetirizine (zyrTEC) 5 MG tablet, Take 1 tablet by mouth Daily., Disp: , Rfl:   •  ciprofloxacin-dexamethasone (CIPRODEX) 0.3-0.1 % otic suspension, Administer 4 drops into the left ear 2 (Two) Times a Day., Disp: 7.5 mL, Rfl: 1  •  doxycycline (MONODOX) 100 MG capsule, Take 1 capsule by mouth 2 (Two) Times a Day., Disp: 20 capsule, Rfl: 0  •  fluconazole (Diflucan) 150 MG tablet, Take 1 tablet by mouth Every 3 (Three) Days., Disp: 2 tablet, Rfl: 0  •  fluocinonide (LIDEX) 0.05 % external solution, Apply 1 application topically to the appropriate area as directed As Needed., Disp: , Rfl:   •  fluticasone (Flonase) 50 MCG/ACT nasal spray, 2 sprays into the nostril(s) as directed by provider Daily., Disp: 16 g, Rfl: 5  •  Humira 40 MG/0.4ML Prefilled Syringe Kit injection, 80 mg Every 14 (Fourteen) Days., Disp: , Rfl:   •  hydrOXYzine (ATARAX) 25 MG tablet, Take 1 tablet by mouth 3 (Three) Times a Day As Needed for Anxiety., Disp: 30 tablet, Rfl: 0  •  metFORMIN  (GLUCOPHAGE) 500 MG tablet, TAKE TWO TABLETS BY MOUTH EVERY MORNING AND TAKE ONE TABLET BY MOUTH EVERY EVENING WITH MEALS, Disp: 270 tablet, Rfl: 3  •  methylPREDNISolone (Medrol) 4 MG dose pack, follow package directions, Disp: 21 tablet, Rfl: 0  •  montelukast (SINGULAIR) 10 MG tablet, Take 1 tablet by mouth Every Night., Disp: 90 tablet, Rfl: 1  •  mupirocin (BACTROBAN) 2 % ointment, Apply 1 application topically to the appropriate area as directed 3 (Three) Times a Day., Disp: 1 g, Rfl: 0  •  omeprazole (priLOSEC) 20 MG capsule, Take 20 mg by mouth Daily., Disp: , Rfl:   •  ondansetron ODT (ZOFRAN-ODT) 4 MG disintegrating tablet, Place 1 tablet on the tongue Every 6 (Six) Hours As Needed for Nausea or Vomiting., Disp: 8 tablet, Rfl: 0  •  Progesterone (PROMETRIUM) 200 MG capsule, Take 2 capsules by mouth Take As Directed., Disp: , Rfl:   •  promethazine (PHENERGAN) 25 MG tablet, TAKE ONE TABLET BY MOUTH EVERY 4 HOURS AS NEEDED FOR NAUSEA (Patient taking differently: Take 25 mg by mouth Every 6 (Six) Hours As Needed for Nausea.), Disp: 10 tablet, Rfl: 0  •  vitamin D (ERGOCALCIFEROL) 1.25 MG (56382 UT) capsule capsule, Take 1 capsule by mouth 1 (One) Time Per Week., Disp: 12 capsule, Rfl: 3    ALLERGIES     Latex, Elastic bandages & [zinc], Pollen extract, and Tree extract    FAMILY HISTORY       Family History   Problem Relation Age of Onset   • Hypertension Father    • Obesity Father    • Diabetes Father    • Heart attack Father    • Sleep apnea Father    • Multiple sclerosis Mother    • Diabetes Mother    • Heart attack Paternal Grandfather    • Sleep apnea Paternal Grandfather    • Colon cancer Paternal Grandmother    • Cancer Paternal Grandmother         COLON   • Cancer Maternal Grandmother         BREAST   • Breast cancer Maternal Grandmother    • Asthma Maternal Grandfather           SOCIAL HISTORY       Social History     Socioeconomic History   • Marital status:    Tobacco Use   • Smoking status:  Never   • Smokeless tobacco: Never   Vaping Use   • Vaping Use: Never used   Substance and Sexual Activity   • Alcohol use: Not Currently   • Drug use: No   • Sexual activity: Yes     Partners: Male     Birth control/protection: None         PHYSICAL EXAM    (up to 7 for level 4, 8 or more for level 5)     Vitals:    01/08/23 0530 01/08/23 0600 01/08/23 0630 01/08/23 0636   BP: 135/87 123/82 155/99    Pulse: 109 102 97    Resp: 16 15 16    Temp:    98.1 °F (36.7 °C)   TempSrc:    Oral   SpO2: 94% 96% 93%    Weight:       Height:           General: Awake, alert, no acute distress.  HEENT: Conjunctivae normal.  Neck: Trachea midline.  No nuchal rigidity.  Cardiac: Heart regular rate, rhythm, no murmurs, rubs, or gallops  Lungs: Lungs are clear to auscultation, there is no wheezing, rhonchi, or rales. There is no use of accessory muscles.  Chest wall: There is no tenderness to palpation over the chest wall or over ribs  Abdomen: Abdomen is soft, nontender, nondistended. There are no firm or pulsatile masses, no rebound rigidity or guarding.   Musculoskeletal: No deformity.  Neuro: Alert and oriented x4.  Cranial nerves II through XII are grossly intact.  There are no visual field deficits.  Cerebellar function intact with finger-to-nose and heel-to-shin testing.  Patient observed ambulating by myself and there is no evidence of ataxia or other gait abnormality.  Motor strength is intact in the face and strength is 5 out of 5 in all 4 extremities without any asymmetry.  Sensation to light touch is intact in all 4 extremities without any asymmetry.  Dermatology: Skin is warm and dry  Psych: Mentation is grossly normal, cognition is grossly normal. Affect is appropriate.        DIAGNOSTIC RESULTS     EKG: All EKGs are interpreted by the Emergency Department Physician who either signs or Co-signs this chart in the absence of a cardiologist.    No orders to display         RADIOLOGY:   [x] Radiologist's Report Reviewed:  CT  Head Without Contrast   Final Result      1.  No acute intracranial abnormality.    2.  Mucosal thickening and fluid level within the left maxillary sinus. Findings can represent acute sinusitis in the appropriate clinical setting.                        Electronically signed by:  Jean Paul Lynne DO     1/8/2023 4:14 AM Mountain Time          I ordered and independently reviewed the above noted radiographic studies.        LABS:    I have reviewed and interpreted all of the currently available lab results from this visit (if applicable):  Results for orders placed or performed during the hospital encounter of 01/08/23   Green Top (Gel)   Result Value Ref Range    Extra Tube Hold for add-ons.    Lavender Top   Result Value Ref Range    Extra Tube hold for add-on    Gold Top - SST   Result Value Ref Range    Extra Tube Hold for add-ons.    Gray Top   Result Value Ref Range    Extra Tube Hold for add-ons.    Light Blue Top   Result Value Ref Range    Extra Tube Hold for add-ons.         If labs were ordered, I independently reviewed the results and considered them in treating the patient.      EMERGENCY DEPARTMENT COURSE and DIFFERENTIAL DIAGNOSIS/MDM:   Vitals:  AS OF 22:09 EST    BP - 155/99  HR - 97  TEMP - 98.1 °F (36.7 °C) (Oral)  O2 SATS - 93%        Discussion below represents my analysis of pertinent findings related to patient's condition, differential diagnosis, treatment plan and final disposition.      Differential diagnosis:  The differential diagnosis associated with the patient's presentation includes: Migraine, subarachnoid hemorrhage, intracranial mass, intracranial hemorrhage, CNS infection, anaphylaxis, hives, allergic reaction      Independent interpretations (ECG/rhythm strip/X-ray/US/CT scan): I personally reviewed the patient's noncontrast head CT which demonstrates no evidence of intracranial hemorrhage or mass.      Additional sources:  Discussed/obtained information from independent  historians:   [x] Spouse: I spoke with the patient's spouse at bedside.  He expresses concern that the patient appeared to have hives across her chest and abdomen prior to arriving to the ED but this is since resolved.   [] Parent:   [] Friend:   [] EMS:   [] Other:  External (non-ED) record review:   [] Inpatient record:   [] Office record:   [] Outpatient record:   [] Prior Outpatient labs:   [] Prior Outpatient radiology:   [] Primary Care record:   [] Outside ED record:   [] Other:     Patient's care impacted by:   [] Diabetes   [] Hypertension   [] Coronary Artery Disease   [] Cancer   [x] Other: History of atopy    Care significantly affected by Social Determinants of Health (housing and economic circumstances, unemployment)    [] Yes     [x] No   If yes, Patient's care significantly limited by  Social Determinants of Health including:    [] Inadequate housing    [] Low income    [] Alcoholism and drug addiction in family    [] Problems related to primary support group    [] Unemployment    [] Problems related to employment    [] Other Social Determinants of Health:       Consideration of admission/observation vs discharge: I did consider admission for observation given her complaint of severe allergic reaction prior to arrival, however she had no symptoms concerning for anaphylaxis during the course of her stay in the ED and required no emergency medications such as epinephrine so I felt that she was most appropriate for discharge home with continued monitoring of her symptoms.      I considered prescription management with:    [] Pain medication:   [] Antiviral:   [] Antibiotic:   [x] Other: I did consider prescribing a course of steroids for her allergic reaction, however her symptoms have completely resolved and I had concerned about precipitating hyperglycemia in this patient with metabolic syndrome.    Additional orders considered but not ordered:  The following testing was considered but ultimately not  selected after discussion with patient/family: I did consider providing Benadryl, Pepcid, and epinephrine in the ED given her allergic reaction symptoms, however her symptoms had already resolved by the time she was evaluated and so these were not necessary.    ED Course:    ED Course as of 01/26/23 2209   Sun Jan 08, 2023   0611 On re-exam, pt resting comfortably in bed. HA is resolved. Rash is resolved. She does have some yeast under her breasts. Will rx nystatin for that.  [NS]      ED Course User Index  [NS] John Win MD             I had a discussion with the patient/family regarding diagnosis, diagnostic results, treatment plan, and medications.  The patient/family indicated understanding of these instructions.  I spent adequate time at the bedside preceding discharge necessary to personally discuss the aftercare instructions, giving patient education, providing explanations of the results of our evaluations/findings, and my decision making to assure that the patient/family understand the plan of care.  Time was allotted to answer questions at that time and throughout the ED course.  Emphasis was placed on timely follow-up after discharge.  I also discussed the potential for the development of an acute emergent condition requiring further evaluation, admission, or even surgical intervention. I discussed that we found nothing during the visit today indicating the need for further workup, admission, or the presence of an unstable medical condition.  I encouraged the patient to return to the emergency department immediately for ANY concerns, worsening, new complaints, or if symptoms persist and unable to seek follow-up in a timely fashion.  The patient/family expressed understanding and agreement with this plan.  The patient will follow-up with their PCP in 1-2 days for reevaluation.           PROCEDURES:  Procedures    CRITICAL CARE TIME        FINAL IMPRESSION      1. Acute nonintractable headache,  unspecified headache type    2. Skin yeast infection    3. Urticaria          DISPOSITION/PLAN     ED Disposition     ED Disposition   Discharge    Condition   Stable    Comment   --               Comment: Please note this report has been produced using speech recognition software.      John Win MD  Attending Emergency Physician           John Win MD  01/26/23 4933

## 2023-01-25 RX ORDER — FLUCONAZOLE 150 MG/1
150 TABLET ORAL
Qty: 2 TABLET | Refills: 0 | Status: SHIPPED | OUTPATIENT
Start: 2023-01-25 | End: 2023-02-16

## 2023-02-16 ENCOUNTER — OFFICE VISIT (OUTPATIENT)
Dept: INTERNAL MEDICINE | Facility: CLINIC | Age: 33
End: 2023-02-16
Payer: COMMERCIAL

## 2023-02-16 VITALS
WEIGHT: 293 LBS | SYSTOLIC BLOOD PRESSURE: 130 MMHG | HEART RATE: 118 BPM | BODY MASS INDEX: 53.97 KG/M2 | DIASTOLIC BLOOD PRESSURE: 84 MMHG | OXYGEN SATURATION: 95 % | TEMPERATURE: 97.3 F

## 2023-02-16 DIAGNOSIS — F51.04 PSYCHOPHYSIOLOGICAL INSOMNIA: ICD-10-CM

## 2023-02-16 DIAGNOSIS — R30.0 DYSURIA: Primary | ICD-10-CM

## 2023-02-16 PROBLEM — G43.909 MIGRAINES: Status: ACTIVE | Noted: 2023-02-16

## 2023-02-16 LAB
BILIRUB BLD-MCNC: NEGATIVE MG/DL
CLARITY, POC: ABNORMAL
COLOR UR: ABNORMAL
EXPIRATION DATE: ABNORMAL
GLUCOSE UR STRIP-MCNC: NEGATIVE MG/DL
KETONES UR QL: NEGATIVE
LEUKOCYTE EST, POC: ABNORMAL
Lab: ABNORMAL
NITRITE UR-MCNC: NEGATIVE MG/ML
PH UR: 6 [PH] (ref 5–8)
PROT UR STRIP-MCNC: ABNORMAL MG/DL
RBC # UR STRIP: ABNORMAL /UL
SP GR UR: 1.02 (ref 1–1.03)
UROBILINOGEN UR QL: ABNORMAL

## 2023-02-16 PROCEDURE — 81003 URINALYSIS AUTO W/O SCOPE: CPT | Performed by: PHYSICIAN ASSISTANT

## 2023-02-16 PROCEDURE — 87088 URINE BACTERIA CULTURE: CPT | Performed by: PHYSICIAN ASSISTANT

## 2023-02-16 PROCEDURE — 99213 OFFICE O/P EST LOW 20 MIN: CPT | Performed by: PHYSICIAN ASSISTANT

## 2023-02-16 PROCEDURE — 87086 URINE CULTURE/COLONY COUNT: CPT | Performed by: PHYSICIAN ASSISTANT

## 2023-02-16 PROCEDURE — 87186 SC STD MICRODIL/AGAR DIL: CPT | Performed by: PHYSICIAN ASSISTANT

## 2023-02-16 RX ORDER — SUMATRIPTAN 100 MG/1
TABLET, FILM COATED ORAL
COMMUNITY
Start: 2023-01-23

## 2023-02-16 RX ORDER — NITROFURANTOIN 25; 75 MG/1; MG/1
100 CAPSULE ORAL 2 TIMES DAILY
Qty: 10 CAPSULE | Refills: 0 | Status: SHIPPED | OUTPATIENT
Start: 2023-02-16

## 2023-02-16 RX ORDER — MELOXICAM 15 MG/1
15 TABLET ORAL DAILY
COMMUNITY

## 2023-02-16 RX ORDER — GABAPENTIN 300 MG/1
300 CAPSULE ORAL DAILY
COMMUNITY

## 2023-02-16 RX ORDER — TOPIRAMATE 25 MG/1
TABLET ORAL
COMMUNITY
Start: 2023-01-23

## 2023-02-16 RX ORDER — FLUCONAZOLE 150 MG/1
150 TABLET ORAL
Qty: 2 TABLET | Refills: 0 | Status: SHIPPED | OUTPATIENT
Start: 2023-02-16

## 2023-02-16 NOTE — PROGRESS NOTES
Chief Complaint   Patient presents with   • Dysuria     Acute        Subjective     Eliz Ashlie Eisenberg is a 32 y.o. female.        History of Present Illness     Ms. Eisenberg is a 32-year-old female who presents today for a possible urinary tract infection.         She is unsure if she has a urinary tract infection. She is experiencing symptoms of urgency. She denies any symptoms of burning. She feels like there is irritation down there. She denies any symptoms of discharge or itching. It is not unbearable, but it feels uncomfortable.          She has felt pain in her kidneys a couple of times. She denies passing a second kidney stone. The pain is only on the left side. She has been drinking cranberry juice.         Her mother  suddenly a couple of weeks ago and she is dealing with the shock of this. She is not sleeping very well because she cannot get the images of her mother in the hospital out of her head. She has gone back to work. She feels okay as long as she is distracted.  She sits up until 2:00 or 3:00 AM and her body gives out. She is getting about 4 to 5 hours of sleep every night. She has people she can talk to, but she cannot talk to them all day. She has taken hydroxyzine in the past, but she has not taken it recently. She has tried melatonin.         Current Outpatient Medications:   •  albuterol sulfate  (90 Base) MCG/ACT inhaler, Inhale 2 puffs Every 4 (Four) Hours As Needed for Wheezing., Disp: 18 g, Rfl: 2  •  aspirin 81 MG EC tablet, Adult Low Dose Aspirin 81 mg tablet,delayed release  Take 1 tablet every day by oral route., Disp: , Rfl:   •  budesonide-formoterol (Symbicort) 160-4.5 MCG/ACT inhaler, Inhale 2 puffs 2 (Two) Times a Day., Disp: 10.2 g, Rfl: 1  •  cetirizine (zyrTEC) 5 MG tablet, Take 1 tablet by mouth Daily., Disp: , Rfl:   •  ciprofloxacin-dexamethasone (CIPRODEX) 0.3-0.1 % otic suspension, Administer 4 drops into the left ear 2 (Two) Times a Day., Disp: 7.5 mL, Rfl:  1  •  fluocinonide (LIDEX) 0.05 % external solution, Apply 1 application topically to the appropriate area as directed As Needed., Disp: , Rfl:   •  fluticasone (Flonase) 50 MCG/ACT nasal spray, 2 sprays into the nostril(s) as directed by provider Daily., Disp: 16 g, Rfl: 5  •  hydrOXYzine (ATARAX) 25 MG tablet, Take 1 tablet by mouth 3 (Three) Times a Day As Needed for Anxiety., Disp: 30 tablet, Rfl: 0  •  meloxicam (MOBIC) 15 MG tablet, Take 15 mg by mouth Daily., Disp: , Rfl:   •  metFORMIN (GLUCOPHAGE) 500 MG tablet, TAKE TWO TABLETS BY MOUTH EVERY MORNING AND TAKE ONE TABLET BY MOUTH EVERY EVENING WITH MEALS, Disp: 270 tablet, Rfl: 3  •  montelukast (SINGULAIR) 10 MG tablet, Take 1 tablet by mouth Every Night., Disp: 90 tablet, Rfl: 1  •  mupirocin (BACTROBAN) 2 % ointment, Apply 1 application topically to the appropriate area as directed 3 (Three) Times a Day., Disp: 1 g, Rfl: 0  •  omeprazole (priLOSEC) 20 MG capsule, Take 20 mg by mouth Daily., Disp: , Rfl:   •  ondansetron ODT (ZOFRAN-ODT) 4 MG disintegrating tablet, Place 1 tablet on the tongue Every 6 (Six) Hours As Needed for Nausea or Vomiting., Disp: 8 tablet, Rfl: 0  •  Progesterone (PROMETRIUM) 200 MG capsule, Take 2 capsules by mouth Take As Directed., Disp: , Rfl:   •  promethazine (PHENERGAN) 25 MG tablet, TAKE ONE TABLET BY MOUTH EVERY 4 HOURS AS NEEDED FOR NAUSEA (Patient taking differently: Take 25 mg by mouth Every 6 (Six) Hours As Needed for Nausea.), Disp: 10 tablet, Rfl: 0  •  vitamin D (ERGOCALCIFEROL) 1.25 MG (54120 UT) capsule capsule, Take 1 capsule by mouth 1 (One) Time Per Week., Disp: 12 capsule, Rfl: 3  •  fluconazole (Diflucan) 150 MG tablet, Take 1 tablet by mouth Every 3 (Three) Days., Disp: 2 tablet, Rfl: 0  •  gabapentin (NEURONTIN) 300 MG capsule, Take 300 mg by mouth Daily., Disp: , Rfl:   •  Humira 40 MG/0.4ML Prefilled Syringe Kit injection, 80 mg Every 14 (Fourteen) Days., Disp: , Rfl:   •  nitrofurantoin,  macrocrystal-monohydrate, (Macrobid) 100 MG capsule, Take 1 capsule by mouth 2 (Two) Times a Day., Disp: 10 capsule, Rfl: 0  •  SUMAtriptan (IMITREX) 100 MG tablet, , Disp: , Rfl:   •  topiramate (TOPAMAX) 25 MG tablet, , Disp: , Rfl:      PMFSH  The following portions of the patient's history were reviewed and updated as appropriate: allergies, current medications, past family history, past medical history, past social history, past surgical history and problem list.    Review of Systems   Constitutional: Positive for fatigue. Negative for fever.   HENT: Negative for mouth sores and trouble swallowing.    Eyes: Negative for pain and visual disturbance.   Respiratory: Negative for shortness of breath and wheezing.    Cardiovascular: Negative for chest pain and palpitations.   Gastrointestinal: Negative for abdominal pain and blood in stool.   Endocrine: Negative.    Genitourinary: Positive for dysuria and frequency.   Musculoskeletal: Negative for joint swelling.   Skin: Negative.    Allergic/Immunologic: Negative.    Neurological: Negative for seizures and syncope.   Hematological: Negative for adenopathy.   Psychiatric/Behavioral: Positive for decreased concentration and sleep disturbance.       Objective   /84   Pulse 118   Temp 97.3 °F (36.3 °C)   Wt (!) 152 kg (334 lb 6.4 oz)   SpO2 95%   BMI 53.97 kg/m²     Physical Exam  Vitals and nursing note reviewed.   Constitutional:       Appearance: She is well-developed.   HENT:      Head: Normocephalic and atraumatic.      Right Ear: External ear normal.      Left Ear: External ear normal.   Eyes:      Conjunctiva/sclera: Conjunctivae normal.   Cardiovascular:      Rate and Rhythm: Normal rate and regular rhythm.   Pulmonary:      Effort: Pulmonary effort is normal.      Breath sounds: Normal breath sounds.   Musculoskeletal:         General: Normal range of motion.      Cervical back: Normal range of motion.   Skin:     General: Skin is warm and dry.    Psychiatric:         Mood and Affect: Affect is tearful.         Behavior: Behavior normal.         Results for orders placed or performed in visit on 02/16/23   Urine Culture - Urine, Urine, Clean Catch    Specimen: Urine, Clean Catch   Result Value Ref Range    Urine Culture >100,000 CFU/mL Escherichia coli (A)    POC Urinalysis Dipstick, Automated    Specimen: Urine   Result Value Ref Range    Color Dark Yellow Yellow, Straw, Dark Yellow, Belkis    Clarity, UA Cloudy (A) Clear    Specific Gravity  1.025 1.005 - 1.030    pH, Urine 6.0 5.0 - 8.0    Leukocytes Small (1+) (A) Negative    Nitrite, UA Negative Negative    Protein, POC Trace (A) Negative mg/dL    Glucose, UA Negative Negative mg/dL    Ketones, UA Negative Negative    Urobilinogen, UA 0.2 E.U./dL Normal, 0.2 E.U./dL    Bilirubin Negative Negative    Blood, UA 1+ (A) Negative    Lot Number [id number]     Expiration Date 03/25/2024         ASSESSMENT/PLAN    Diagnoses and all orders for this visit:    1. Dysuria (Primary)  Comments:  Send urine for culture. Prescribed macrobid. If symptoms remain mild, wait for results to start medication. If worsening, start medication.  Orders:  -     POC Urinalysis Dipstick, Automated  -     Urine Culture - Urine, Urine, Clean Catch; Future  -     Urine Culture - Urine, Urine, Clean Catch  -     nitrofurantoin, macrocrystal-monohydrate, (Macrobid) 100 MG capsule; Take 1 capsule by mouth 2 (Two) Times a Day.  Dispense: 10 capsule; Refill: 0    2. Psychophysiological insomnia  Comments:  Trial of hydroxyzine 25-50 mg qhs.    Other orders  -     fluconazole (Diflucan) 150 MG tablet; Take 1 tablet by mouth Every 3 (Three) Days.  Dispense: 2 tablet; Refill: 0           Return if symptoms worsen or fail to improve, for Next scheduled follow up.     Transcribed from ambient dictation for VINNIE James by Marilyn Palomo.  02/16/23   14:42 EST    Patient or patient representative verbalized consent to the visit  recording.  I have personally performed the services described in this document as transcribed by the above individual, and it is both accurate and complete.

## 2023-02-18 LAB — BACTERIA SPEC AEROBE CULT: ABNORMAL

## 2023-02-19 NOTE — PROGRESS NOTES
The urine culture confirmed that you have a UTI and the macrobid will treat it. Please complete the course of antibiotics.

## 2023-03-12 DIAGNOSIS — L03.011 PARONYCHIA OF FINGER, RIGHT: ICD-10-CM

## 2023-03-23 ENCOUNTER — LAB (OUTPATIENT)
Dept: LAB | Facility: HOSPITAL | Age: 33
End: 2023-03-23
Payer: COMMERCIAL

## 2023-03-23 ENCOUNTER — TRANSCRIBE ORDERS (OUTPATIENT)
Dept: LAB | Facility: HOSPITAL | Age: 33
End: 2023-03-23
Payer: COMMERCIAL

## 2023-03-23 DIAGNOSIS — L40.0 PSORIASIS VULGARIS: ICD-10-CM

## 2023-03-23 DIAGNOSIS — Z92.25 STATUS POST RECENT IMMUNOSUPPRESSIVE THERAPY: ICD-10-CM

## 2023-03-23 DIAGNOSIS — Z79.899 ENCOUNTER FOR LONG-TERM (CURRENT) USE OF OTHER MEDICATIONS: ICD-10-CM

## 2023-03-23 DIAGNOSIS — L40.0 PSORIASIS VULGARIS: Primary | ICD-10-CM

## 2023-03-23 PROCEDURE — 86480 TB TEST CELL IMMUN MEASURE: CPT

## 2023-03-23 PROCEDURE — 85025 COMPLETE CBC W/AUTO DIFF WBC: CPT

## 2023-03-23 PROCEDURE — 36415 COLL VENOUS BLD VENIPUNCTURE: CPT

## 2023-03-23 PROCEDURE — 80053 COMPREHEN METABOLIC PANEL: CPT

## 2023-03-24 LAB
ALBUMIN SERPL-MCNC: 3.8 G/DL (ref 3.5–5.2)
ALBUMIN/GLOB SERPL: 0.9 G/DL
ALP SERPL-CCNC: 105 U/L (ref 39–117)
ALT SERPL W P-5'-P-CCNC: 20 U/L (ref 1–33)
ANION GAP SERPL CALCULATED.3IONS-SCNC: 12.6 MMOL/L (ref 5–15)
AST SERPL-CCNC: 20 U/L (ref 1–32)
BASOPHILS # BLD AUTO: 0.03 10*3/MM3 (ref 0–0.2)
BASOPHILS NFR BLD AUTO: 0.3 % (ref 0–1.5)
BILIRUB SERPL-MCNC: 0.4 MG/DL (ref 0–1.2)
BUN SERPL-MCNC: 11 MG/DL (ref 6–20)
BUN/CREAT SERPL: 18 (ref 7–25)
CALCIUM SPEC-SCNC: 9.5 MG/DL (ref 8.6–10.5)
CHLORIDE SERPL-SCNC: 104 MMOL/L (ref 98–107)
CO2 SERPL-SCNC: 24.4 MMOL/L (ref 22–29)
CREAT SERPL-MCNC: 0.61 MG/DL (ref 0.57–1)
DEPRECATED RDW RBC AUTO: 39.3 FL (ref 37–54)
EGFRCR SERPLBLD CKD-EPI 2021: 122 ML/MIN/1.73
EOSINOPHIL # BLD AUTO: 0.21 10*3/MM3 (ref 0–0.4)
EOSINOPHIL NFR BLD AUTO: 1.9 % (ref 0.3–6.2)
ERYTHROCYTE [DISTWIDTH] IN BLOOD BY AUTOMATED COUNT: 14.1 % (ref 12.3–15.4)
GLOBULIN UR ELPH-MCNC: 4.1 GM/DL
GLUCOSE SERPL-MCNC: 71 MG/DL (ref 65–99)
HCT VFR BLD AUTO: 33.8 % (ref 34–46.6)
HGB BLD-MCNC: 11.1 G/DL (ref 12–15.9)
IMM GRANULOCYTES # BLD AUTO: 0.06 10*3/MM3 (ref 0–0.05)
IMM GRANULOCYTES NFR BLD AUTO: 0.5 % (ref 0–0.5)
LYMPHOCYTES # BLD AUTO: 2.69 10*3/MM3 (ref 0.7–3.1)
LYMPHOCYTES NFR BLD AUTO: 24.1 % (ref 19.6–45.3)
MCH RBC QN AUTO: 25.6 PG (ref 26.6–33)
MCHC RBC AUTO-ENTMCNC: 32.8 G/DL (ref 31.5–35.7)
MCV RBC AUTO: 78.1 FL (ref 79–97)
MONOCYTES # BLD AUTO: 0.58 10*3/MM3 (ref 0.1–0.9)
MONOCYTES NFR BLD AUTO: 5.2 % (ref 5–12)
NEUTROPHILS NFR BLD AUTO: 68 % (ref 42.7–76)
NEUTROPHILS NFR BLD AUTO: 7.59 10*3/MM3 (ref 1.7–7)
NRBC BLD AUTO-RTO: 0 /100 WBC (ref 0–0.2)
PLATELET # BLD AUTO: 351 10*3/MM3 (ref 140–450)
PMV BLD AUTO: 10.1 FL (ref 6–12)
POTASSIUM SERPL-SCNC: 3.8 MMOL/L (ref 3.5–5.2)
PROT SERPL-MCNC: 7.9 G/DL (ref 6–8.5)
RBC # BLD AUTO: 4.33 10*6/MM3 (ref 3.77–5.28)
SODIUM SERPL-SCNC: 141 MMOL/L (ref 136–145)
WBC NRBC COR # BLD: 11.16 10*3/MM3 (ref 3.4–10.8)

## 2023-03-26 LAB
GAMMA INTERFERON BACKGROUND BLD IA-ACNC: 0.04 IU/ML
M TB IFN-G BLD-IMP: NEGATIVE
M TB IFN-G CD4+ T-CELLS BLD-ACNC: 0.06 IU/ML
M TBIFN-G CD4+ CD8+T-CELLS BLD-ACNC: 0.05 IU/ML
MITOGEN IGNF BLD-ACNC: >10 IU/ML
QUANTIFERON INCUBATION: NORMAL
SERVICE CMNT-IMP: NORMAL

## 2023-04-20 DIAGNOSIS — J45.21 MILD INTERMITTENT ASTHMA WITH ACUTE EXACERBATION: ICD-10-CM

## 2023-04-20 RX ORDER — MONTELUKAST SODIUM 10 MG/1
TABLET ORAL
Qty: 90 TABLET | Refills: 1 | Status: SHIPPED | OUTPATIENT
Start: 2023-04-20

## 2023-04-21 DIAGNOSIS — E55.9 VITAMIN D DEFICIENCY: ICD-10-CM

## 2023-04-21 RX ORDER — ERGOCALCIFEROL 1.25 MG/1
CAPSULE ORAL
Qty: 12 CAPSULE | Refills: 0 | Status: SHIPPED | OUTPATIENT
Start: 2023-04-21

## 2023-04-26 ENCOUNTER — OFFICE VISIT (OUTPATIENT)
Dept: BARIATRICS/WEIGHT MGMT | Facility: CLINIC | Age: 33
End: 2023-04-26
Payer: COMMERCIAL

## 2023-04-26 ENCOUNTER — DOCUMENTATION (OUTPATIENT)
Dept: BARIATRICS/WEIGHT MGMT | Facility: CLINIC | Age: 33
End: 2023-04-26
Payer: COMMERCIAL

## 2023-04-26 ENCOUNTER — OFFICE VISIT (OUTPATIENT)
Dept: BEHAVIORAL HEALTH | Facility: CLINIC | Age: 33
End: 2023-04-26
Payer: COMMERCIAL

## 2023-04-26 VITALS
HEART RATE: 98 BPM | OXYGEN SATURATION: 98 % | RESPIRATION RATE: 16 BRPM | HEIGHT: 66 IN | WEIGHT: 293 LBS | TEMPERATURE: 97.8 F | DIASTOLIC BLOOD PRESSURE: 86 MMHG | SYSTOLIC BLOOD PRESSURE: 132 MMHG | BODY MASS INDEX: 47.09 KG/M2

## 2023-04-26 DIAGNOSIS — J45.909 ASTHMA, UNSPECIFIED ASTHMA SEVERITY, UNSPECIFIED WHETHER COMPLICATED, UNSPECIFIED WHETHER PERSISTENT: ICD-10-CM

## 2023-04-26 DIAGNOSIS — K21.9 GASTROESOPHAGEAL REFLUX DISEASE, UNSPECIFIED WHETHER ESOPHAGITIS PRESENT: ICD-10-CM

## 2023-04-26 DIAGNOSIS — R76.8 HELICOBACTER PYLORI ANTIBODY POSITIVE: ICD-10-CM

## 2023-04-26 DIAGNOSIS — E66.01 MORBID OBESITY WITH BMI OF 50.0-59.9, ADULT: Primary | ICD-10-CM

## 2023-04-26 DIAGNOSIS — F43.21 GRIEF: ICD-10-CM

## 2023-04-26 DIAGNOSIS — R53.83 FATIGUE, UNSPECIFIED TYPE: ICD-10-CM

## 2023-04-26 DIAGNOSIS — Z71.89 ENCOUNTER FOR PSYCHOLOGICAL ASSESSMENT PRIOR TO BARIATRIC SURGERY: ICD-10-CM

## 2023-04-26 PROBLEM — K58.9 IBS (IRRITABLE BOWEL SYNDROME): Status: ACTIVE | Noted: 2023-04-26

## 2023-04-26 PROBLEM — L40.9 PSORIASIS: Status: ACTIVE | Noted: 2023-04-26

## 2023-04-26 PROBLEM — M19.90 ARTHRITIS: Status: ACTIVE | Noted: 2023-04-26

## 2023-04-26 PROCEDURE — 90791 PSYCH DIAGNOSTIC EVALUATION: CPT | Performed by: PSYCHOLOGIST

## 2023-04-26 PROCEDURE — 99204 OFFICE O/P NEW MOD 45 MIN: CPT | Performed by: PHYSICIAN ASSISTANT

## 2023-04-26 RX ORDER — SODIUM CHLORIDE 0.9 % (FLUSH) 0.9 %
3 SYRINGE (ML) INJECTION EVERY 12 HOURS SCHEDULED
OUTPATIENT
Start: 2023-04-26

## 2023-04-26 RX ORDER — SODIUM CHLORIDE 9 MG/ML
40 INJECTION, SOLUTION INTRAVENOUS AS NEEDED
OUTPATIENT
Start: 2023-04-26

## 2023-04-26 RX ORDER — SODIUM CHLORIDE 0.9 % (FLUSH) 0.9 %
3-10 SYRINGE (ML) INJECTION AS NEEDED
OUTPATIENT
Start: 2023-04-26

## 2023-04-26 RX ORDER — SODIUM CHLORIDE 9 MG/ML
150 INJECTION, SOLUTION INTRAVENOUS CONTINUOUS
OUTPATIENT
Start: 2023-04-26

## 2023-04-26 NOTE — PROGRESS NOTES
PROGRESS NOTE    Data:    Eliz Eisenberg is a 32 y.o. female who met with the undersigned for a scheduled psychological evaluation from 9:00 - 9:45am.      Clinical Maneuvering/Intervention:      Chief complaint and history of presenting illness/Problems: struggling with obesity for several years. Despite trying different weight loss plans and diets, the pt reported being unsuccessful in losing weight. A psychological evaluation was conducted in order to assess past and current level of functioning. Areas assessed included, but were not limited to: perception of social support, perception of ability to face and deal with challenges in life (positive functioning), anxiety symptoms, depressive symptoms, perspective on beliefs/belief system, coping skills for stress, intelligence level, addiction issues, etc. Therapeutic rapport was established. Interventions conducted today were geared towards assessing the pt's readiness for weight loss surgery and identifying and psychological contraindications for undergoing such a major life change. Social support was deemed strong (specific to weight loss surgery/weight loss in this manner and in a general sense): , co-workers, and friends. Current psychological struggles were described as low to moderate and included grief over the loss of her mother three months ago. She feels down/frustrated situational to being overweight as well, in part because, she believes conceiving has been difficult due to extra weight. She talked about how important and meaningful it is to her to have weight loss surgery. Coping skills for distress and related to undergoing a major life change such as weight loss surgery/weight loss were deemed strong and included knowing herself well, asks for help as needed, maintains a satisfying marriage, good sense of humor, follows directions well, intelligent, responsible person, maintains quality relationships with others, and believes in herself  that she will be successful with weight loss surgery. The pt endorsed having characteristics of readiness to undergo major life changes inherent in the journey of weight loss surgery. She could speak to having 'suffered enough,' and the decision to have weight loss surgery is one she feels confident about. The pt expressed gratitude for today's visit.     Past Family and Social History:      History of family mental health problems: none endorsed    Psychosocial history: treatment of psychiatric care in the past (N/A), alcohol/substance abuse treatment in the past (N/A) , alcohol/substance abuse problems (N/A), inpatient psychiatric care (N/A).    Mental Status Exam (MSE):  Hygiene:  good  Dress: normal  Attitude:  cooperative and proactive  Motor Activity: normal  Speech: normal  Mood:   hopeful  Affect:  congruent  Thought Processes: normal  Thought Content:  normal  Suicidal Thoughts:  not endorsed  Homicidal Thoughts:  not endorsed  Crisis Safety Plan: not needed   Hallucinations:  none      Patient's Support Network Includes:  family, friends      Progress toward goal: there is evidence to suggest that she is taking measures to improve the quality of her life including seeking weight loss surgery      Functional Status: moderate to high      Prognosis: good with weight loss surgery    Evaluation, Diagnoses, and Ability/Capacity to Respond to Treatment:      The pt presented to be struggling with grief and frustrations/feeling defeated at times, by being overweight (BMI = 54.23, morbid obesity). Results of MSE demonstrated a functional status of moderate to high. Strengths: belief in self that she will be successful with weight loss surgery, etc (see detailed list of coping skills above). Needed for growth (CPT code requirement for Weaknesses): continued healing from the loss of her mother, and weight loss.      From a psychological standpoint, the pt presents as a good candidate for bariatric surgery. She is  motivated for the surgery, has showed readiness for the lifestyle change in terms of starting to adjust her eating habits, and seems to have appropriate expectations of how to prepare and how to live after surgery in order to lose weight successfully.    Treatment Plan:      Short term goals: Continue to lean on loved ones in order to process and heal from her loss. Start improving her health by following up with her bariatric surgeon in order to receive weight loss surgery as soon as feasible/appropriate and demonstrate success with compliance to adhering to the recommended diet. Long term goals: reach a healthy weight and overall improved mood via taking control over her health. Continue to lean on loved ones in order to process and heal from her loss.     Zeinab Toussaint, PhD, LP

## 2023-04-26 NOTE — PROGRESS NOTES
[facility]  [address]  [address]  [facility] [id number] [phone]      Patient  Name:  Eliz Eisenberg  :  1990      Date of Visit: 2023      Chief Complaint:  weight gain; unable to maintain weight loss      History of Present Illness:  Eliz Eisenberg is a 32 y.o. female who presents today for evaluation, education and consultation regarding metabolic and bariatric surgery with Dr. Sanyd.     Eliz has been overweight for at least 20+ years, has been 35 pounds or more overweight for at least 20+ years, has been 100 pounds or more overweight for 20 or more years and started dieting at age 15.  Previous diet attempts include: Herbal Life, Low Carbohydrate, Low Fat, [facility], Calorie Counting, Lexis's Diet and Slim Fast; Weight Watchers and HMR; None.  The most weight Eliz lost was 33 pounds but was unable to maintain that weight loss.  Her maximum lifetime weight is 347 pounds.      GI: History of GERD on daily PPI.  Remote EGD several years ago.  No prior history of H. pylori.  She is status post laparoscopic cholecystectomy in 2018 for biliary dyskinesia.  She also had an umbilical hernia repair at that time.  She has a history of IBS and also has some intermittent chronic nausea.    She is currently seeing rheumatology for psoriasis and arthritis.  They are contemplating starting a biologic in the near future pending insurance approval.  She takes Mobic daily.    She is currently taking a daily baby aspirin because she was instructed to do so while undergoing hormonal therapy for infertility.  She is no longer undergoing treatment for infertility.    Other past medical history significant for asthma, PCOS, and anxiety.     Complete history has been obtained and discussed today, as pertinent to metabolic/ bariatric surgery.     Past Medical History:   Diagnosis Date   • Abdominal pain    • Acute sinusitis    • Allergic rhinitis     • Ankle sprain    • Anxiety    • Arthritis 2017    On Mobic daily   • Asthma    • Atopic dermatitis    • Biliary dyskinesia    • Cat bite      resolved   • Current tear of meniscus    • Dysfunctional uterine bleeding    • Foot pain    • GERD (gastroesophageal reflux disease)     Omeprazole daily. remote EGD. No hx of h pylori   • Gout    • Hearing loss, bilateral     R/T PSORASIS   • IBS (irritable bowel syndrome)    • Influenza    • Metabolic syndrome    • Migraines    • Muscular aches    • Obesity    • Panic attack    • PCOS (polycystic ovarian syndrome)    • Pharyngitis    • Psoriasis     planning to start Taltz   • Restless leg syndrome    • URI (upper respiratory infection)    • Wears glasses      Past Surgical History:   Procedure Laterality Date   • COLONOSCOPY     • LAPAROSCOPIC CHOLECYSTECTOMY  2018    no gallstones   • TONSILLECTOMY AND ADENOIDECTOMY N/A 07/09/2021    Procedure: TONSILLECTOMY;  Surgeon: Ran Lemon MD;  [facility]: [facility];  Service: ENT;  Laterality: N/A;   • UMBILICAL HERNIA REPAIR  2018    w/ cholecystectomy   • WISDOM TOOTH EXTRACTION         Allergies   Allergen Reactions   • Latex Hives, Itching, Swelling and Rash   • Cimzia [Certolizumab Pegol] Unknown - Low Severity   • Dilantin [Phenytoin] Unknown - Low Severity   • Elastic Bandages & [Zinc] Hives   • Tape Hives   • Iodine Hives, Swelling and Rash   • Pollen Extract Rash   • Tree Extract Other (See Comments)     CONGESTION       Current Outpatient Medications:   •  albuterol sulfate  (90 Base) MCG/ACT inhaler, Inhale 2 puffs Every 4 (Four) Hours As Needed for Wheezing., Disp: 18 g, Rfl: 2  •  aspirin 81 MG EC tablet, Adult Low Dose Aspirin 81 mg tablet,delayed release  Take 1 tablet every day by oral route., Disp: , Rfl:   •  budesonide-formoterol (Symbicort) 160-4.5 MCG/ACT inhaler, Inhale 2 puffs 2 (Two) Times a Day., Disp: 10.2 g, Rfl: 1  •  cetirizine (zyrTEC) 5 MG tablet, Take 1 tablet by mouth Daily., Disp:  , Rfl:   •  fluticasone (Flonase) 50 MCG/ACT nasal spray, 2 sprays into the nostril(s) as directed by provider Daily., Disp: 16 g, Rfl: 5  •  gabapentin (NEURONTIN) 300 MG capsule, Take 1 capsule by mouth Daily., Disp: , Rfl:   •  hydrOXYzine (ATARAX) 25 MG tablet, Take 1 tablet by mouth 3 (Three) Times a Day As Needed for Anxiety., Disp: 30 tablet, Rfl: 0  •  meloxicam (MOBIC) 15 MG tablet, Take 1 tablet by mouth Daily., Disp: , Rfl:   •  metFORMIN (GLUCOPHAGE) 500 MG tablet, TAKE TWO TABLETS BY MOUTH EVERY MORNING AND TAKE ONE TABLET BY MOUTH EVERY EVENING WITH MEALS, Disp: 270 tablet, Rfl: 3  •  montelukast (SINGULAIR) 10 MG tablet, TAKE ONE TABLET BY MOUTH ONCE NIGHTLY, Disp: 90 tablet, Rfl: 1  •  mupirocin (BACTROBAN) 2 % ointment, APPLY TO AFFECTED AREA(S) THREE TIMES A DAY, Disp: 22 g, Rfl: 0  •  omeprazole (priLOSEC) 20 MG capsule, Take 1 capsule by mouth Daily., Disp: , Rfl:   •  ondansetron ODT (ZOFRAN-ODT) 4 MG disintegrating tablet, Place 1 tablet on the tongue Every 6 (Six) Hours As Needed for Nausea or Vomiting., Disp: 8 tablet, Rfl: 0  •  promethazine (PHENERGAN) 25 MG tablet, TAKE ONE TABLET BY MOUTH EVERY 4 HOURS AS NEEDED FOR NAUSEA (Patient taking differently: Take 1 tablet by mouth Every 6 (Six) Hours As Needed for Nausea.), Disp: 10 tablet, Rfl: 0  •  SUMAtriptan (IMITREX) 100 MG tablet, Take 1 tablet by mouth As Needed., Disp: , Rfl:   •  topiramate (TOPAMAX) 25 MG tablet, Take 1 tablet by mouth Daily., Disp: , Rfl:   •  vitamin D (ERGOCALCIFEROL) 1.25 MG (57618 UT) capsule capsule, TAKE ONE CAPSULE BY MOUTH ONCE WEEKLY, Disp: 12 capsule, Rfl: 0  •  Ixekizumab (Taltz) 80 MG/ML solution auto-injector, Inject 160mg (2 pens) under the skin at week 0, then inject 80mg (1 pen) under the skin 2 weeks later (Patient not taking: Reported on 4/26/2023), Disp: 3 mL, Rfl: 0  •  Ixekizumab (Taltz) 80 MG/ML solution auto-injector, Inject 80mg ( 1 pen) under the skin every 2 weeks, from weeks 4-10) (Patient  not taking: Reported on 4/26/2023), Disp: 2 mL, Rfl: 1    Social History     Socioeconomic History   • Marital status:    Tobacco Use   • Smoking status: Never   • Smokeless tobacco: Never   Vaping Use   • Vaping Use: Never used   Substance and Sexual Activity   • Alcohol use: Not Currently   • Drug use: No   • Sexual activity: Yes     Partners: Male     Birth control/protection: None     Social History     Social History Narrative    Patient lives in [place], [place].   She is  with no children and works full time as the  of [employer].       Family History   Problem Relation Age of Onset   • Heart disease Mother    • Multiple sclerosis Mother    • Diabetes Mother    • Arthritis Mother    • Asthma Mother    • Early death Mother    • Kidney disease Mother    • Hypertension Father    • Obesity Father    • Diabetes Father    • Heart attack Father    • Sleep apnea Father    • No Known Problems Sister    • Cancer Maternal Grandmother         Breast   • Breast cancer Maternal Grandmother    • Diabetes Maternal Grandmother    • Asthma Maternal Grandfather    • Diabetes Maternal Grandfather    • Sleep apnea Maternal Grandfather    • Colon cancer Paternal Grandmother    • Cancer Paternal Grandmother         COLON   • Heart attack Paternal Grandfather    • Sleep apnea Paternal Grandfather    • Obesity Paternal Grandfather    • Hypertension Paternal Grandfather        Review of Systems:  Constitutional:  reports fatigue, weight gain and denies fevers, chills.  HEENT:  denies headache, ear pain or loss of hearing, blurred or double vision, nasal discharge or sore throat.  Cardiovascular:  reports none and denies HTN, HLD, CAD, Atrial Fib, hx heart disease, heart murmur, hx MI, chest pain, palpitations, edema, hx DVT.  Respiratory:  reports dyspnea on exertion, asthma and denies shortness of breath , cough , wheezing, sleep apnea, COPD, hx PE.  Gastrointestinal:   reports heartburn, abdominal pain, IBS, gallbladder issues and denies dysphagia, nausea, vomiting, diarrhea, constipation, melena, blood in stool, liver disease, hx pancreatitis.  Genitourinary:  reports none and denies history of  frequent UTI, incontinence, hematuria, dysuria, polyuria, polydipsia, renal insufficiency, renal failure.    Musculoskeletal:  reports joint pain, arthritis, autoimmune disease and denies fibromyalgia.  Neurological:  reports migraines and denies headaches, numbness /tingling, dizziness, confusion, seizure, stroke.  Psychiatric:  reports hx anxiety and denies depressed mood, hx depression, feeling anxious, bipolar disorder, suicidal ideation, hx suicide attempt, hx self injury, hx substance abuse, eating disorder.  Endocrine:  reports PCOS and denies endometriosis, glucose intolerance, diabetes, thyroid disease, gout.  Hematologic:  reports none and denies bruising, bleeding disorder, hx anemia, hx blood transfusion.  Skin:  reports Psoriasis and denies hx MRSA.    Physical Exam:  Vital Signs:  Weight: (!) 152 kg (336 lb)   Body mass index is 54.23 kg/m².  Temp: 97.8 °F (36.6 °C)   Heart Rate: 98   BP: 132/86     Physical Exam  Constitutional:       Appearance: She is obese.   HENT:      Head: Normocephalic and atraumatic.   Eyes:      Extraocular Movements: Extraocular movements intact.      Conjunctiva/sclera: Conjunctivae normal.   Cardiovascular:      Rate and Rhythm: Normal rate and regular rhythm.   Pulmonary:      Effort: Pulmonary effort is normal.      Breath sounds: Normal breath sounds.   Abdominal:      General: Bowel sounds are normal. There is no distension.      Palpations: Abdomen is soft. There is no mass.      Tenderness: There is no abdominal tenderness.      Comments: Old lap scars   Musculoskeletal:         General: Normal range of motion.      Cervical back: Normal range of motion and neck supple.   Skin:     General: Skin is warm and dry.   Neurological:       General: No focal deficit present.      Mental Status: She is alert and oriented to person, place, and time.   Psychiatric:         Mood and Affect: Mood normal.         Behavior: Behavior normal.         Thought Content: Thought content normal.         Judgment: Judgment normal.         Patient Active Problem List   Diagnosis   • Obesity, morbid, BMI 50 or higher   • Metabolic syndrome   • Gout   • Viral gastroenteritis   • Nausea   • Diarrhea   • Biliary dyskinesia   • Health care maintenance   • JENN (obstructive sleep apnea) - possible   • Excessive daytime sleepiness   • Migraines   • Asthma   • GERD (gastroesophageal reflux disease)   • IBS (irritable bowel syndrome)   • Arthritis   • Psoriasis       Assessment:  32 y.o. female with medically complicated obesity pursuing sleeve gastrectomy.    Metabolic & Bariatric Surgery is deemed medically necessary given the following: Class 3 Severe Obesity (BMI >=40). Obesity-related health conditions include the following: GERD and PCOS, infertility, arthritis, anxiety . Obesity is worsening. BMI is is above average; BMI management plan is completed. We discussed consulting a Bariatric surgeon.        Plan:  Further evaluation will include: CBC, CMP, Lipids, TSH, HgA1C, H.Pylori serum, EKG, CXR, Pulmonary Function Testing and EGD.    The risks and benefits of the upper endoscopy were discussed with the patient in detail and all questions were answered.  Possibility of perforation, bleeding, aspiration, and anesthesia reaction were reviewed.  Patient agrees to proceed.      Additional clearances needed prior to surgery will include: Rheumatology.     Patient understands that bariatric surgery is not cosmetic surgery but rather a tool to help make a lifelong commitment to lifestyle changes including diet, exercise and behavior modifications.  The patient has been educated today on those expected postoperative lifestyle changes.  Psychological and Nutritional consultations  will be completed prior to surgery.  Instructions on how to access [employer] (an internet based site w/ educational surgical videos) were given to the patient.  Recommended perioperative vitamin supplementation was reviewed.  The importance of avoiding ASA/ NSAIDS/ steroids/ tobacco/nicotine/ hormones/ immunomodulators perioperatively was discussed in detail.  All questions/concerns have been addressed.      Further input to follow pending the above.           VINNIE Go      Addendum:    H. pylori antibody test positive.  Will prescribe Prevpac and plan to repeat H. pylori breath test in approximately 6 weeks.  Proceed with EGD as planned.

## 2023-04-26 NOTE — PROGRESS NOTES
Bariatric Nutrition Consult     Name: Eliz Eisenberg   : 1990   AGE: 32 y.o.   MRN: [id number]      Consult Date: 2023     Surgery desired: sleeve    Height: 167.6cm                  Current weight: 336lbs                    BMI: 54.23    Highest weight: current                           Lowest weight: 220lbs    Goals: 200lbs, to move more        Past Medical History:   Diagnosis Date   • Abdominal pain    • Acute sinusitis    • Allergic rhinitis    • Ankle sprain    • Anxiety    • Arthritis 2017    On Mobic daily   • Asthma    • Atopic dermatitis    • Biliary dyskinesia    • Cat bite      resolved   • Current tear of meniscus    • Dysfunctional uterine bleeding    • Foot pain    • GERD (gastroesophageal reflux disease)     Omeprazole daily. remote EGD. No hx of h pylori   • Gout    • Hearing loss, bilateral     R/T PSORASIS   • IBS (irritable bowel syndrome)    • Influenza    • Metabolic syndrome    • Migraines    • Muscular aches    • Obesity    • Panic attack    • PCOS (polycystic ovarian syndrome)    • Pharyngitis    • Psoriasis     planning to start Taltz   • Restless leg syndrome    • URI (upper respiratory infection)    • Wears glasses                                  Diet history reveals 3 meals daily, trying to reduce carbs and increase protein  24 hr recall:      Breakfast: protein bar/premier protein    Lunch: 1/2 c rice, 1 cup broccoli, 1/2 cup Tamazight beef    Dinner: 3 cups salad and 2 slices sausage pizza    Protein sources: chicken, meat, turkey, cheese, protein drinks, protein bars    Drinks: coke zero, premier protein, unsweet tea with splenda, water    Food allergies/intolerances: salad and corn intolerance but eats them anyway    Night eating: no    Patient has/has not been diagnosed with an eating disorder: no    Exercise/activity: walking    Main bariatric nutrition principles discussed and explained. Patient needs to focus on 100g protein daily, 100-140g carbohydrates  daily, healthy fat intake, 64 oz fluid daily, no carbonation, and try protein drinks/protein powders. Avoid high fructose corn syrup. Patient verbalized understanding and queries were answered.  Additional nutritional counseling will be available      Jessica Vega RD,LD

## 2023-04-26 NOTE — H&P (VIEW-ONLY)
[facility] GROUP BARIATRIC SURGERY  [address]  [address]  [facility] [id number] [phone]      Patient  Name:  Eliz Eisenberg  :  1990      Date of Visit: 2023      Chief Complaint:  weight gain; unable to maintain weight loss      History of Present Illness:  Eliz Eisenberg is a 32 y.o. female who presents today for evaluation, education and consultation regarding metabolic and bariatric surgery with Dr. Sandy.     Eliz has been overweight for at least 20+ years, has been 35 pounds or more overweight for at least 20+ years, has been 100 pounds or more overweight for 20 or more years and started dieting at age 15.  Previous diet attempts include: Herbal Life, Low Carbohydrate, Low Fat, [facility], Calorie Counting, [employer]'s Diet and Slim Fast; Weight Watchers and HMR; None.  The most weight Eliz lost was 33 pounds but was unable to maintain that weight loss.  Her maximum lifetime weight is 347 pounds.      GI: History of GERD on daily PPI.  Remote EGD several years ago.  No prior history of H. pylori.  She is status post laparoscopic cholecystectomy in 2018 for biliary dyskinesia.  She also had an umbilical hernia repair at that time.  She has a history of IBS and also has some intermittent chronic nausea.    She is currently seeing rheumatology for psoriasis and arthritis.  They are contemplating starting a biologic in the near future pending insurance approval.  She takes Mobic daily.    She is currently taking a daily baby aspirin because she was instructed to do so while undergoing hormonal therapy for infertility.  She is no longer undergoing treatment for infertility.    Other past medical history significant for asthma, PCOS, and anxiety.     Complete history has been obtained and discussed today, as pertinent to metabolic/ bariatric surgery.     Past Medical History:   Diagnosis Date   • Abdominal pain    • Acute sinusitis    • Allergic rhinitis     • Ankle sprain    • Anxiety    • Arthritis 2017    On Mobic daily   • Asthma    • Atopic dermatitis    • Biliary dyskinesia    • Cat bite      resolved   • Current tear of meniscus    • Dysfunctional uterine bleeding    • Foot pain    • GERD (gastroesophageal reflux disease)     Omeprazole daily. remote EGD. No hx of h pylori   • Gout    • Hearing loss, bilateral     R/T PSORASIS   • IBS (irritable bowel syndrome)    • Influenza    • Metabolic syndrome    • Migraines    • Muscular aches    • Obesity    • Panic attack    • PCOS (polycystic ovarian syndrome)    • Pharyngitis    • Psoriasis     planning to start Taltz   • Restless leg syndrome    • URI (upper respiratory infection)    • Wears glasses      Past Surgical History:   Procedure Laterality Date   • COLONOSCOPY     • LAPAROSCOPIC CHOLECYSTECTOMY  2018    no gallstones   • TONSILLECTOMY AND ADENOIDECTOMY N/A 07/09/2021    Procedure: TONSILLECTOMY;  Surgeon: Ran Lemon MD;  Location: [facility];  Service: ENT;  Laterality: N/A;   • UMBILICAL HERNIA REPAIR  2018    w/ cholecystectomy   • WISDOM TOOTH EXTRACTION         Allergies   Allergen Reactions   • Latex Hives, Itching, Swelling and Rash   • Cimzia [Certolizumab Pegol] Unknown - Low Severity   • Dilantin [Phenytoin] Unknown - Low Severity   • Elastic Bandages & [Zinc] Hives   • Tape Hives   • Iodine Hives, Swelling and Rash   • Pollen Extract Rash   • Tree Extract Other (See Comments)     CONGESTION       Current Outpatient Medications:   •  albuterol sulfate  (90 Base) MCG/ACT inhaler, Inhale 2 puffs Every 4 (Four) Hours As Needed for Wheezing., Disp: 18 g, Rfl: 2  •  aspirin 81 MG EC tablet, Adult Low Dose Aspirin 81 mg tablet,delayed release  Take 1 tablet every day by oral route., Disp: , Rfl:   •  budesonide-formoterol (Symbicort) 160-4.5 MCG/ACT inhaler, Inhale 2 puffs 2 (Two) Times a Day., Disp: 10.2 g, Rfl: 1  •  cetirizine (zyrTEC) 5 MG tablet, Take 1 tablet by mouth Daily., Disp:  , Rfl:   •  fluticasone (Flonase) 50 MCG/ACT nasal spray, 2 sprays into the nostril(s) as directed by provider Daily., Disp: 16 g, Rfl: 5  •  gabapentin (NEURONTIN) 300 MG capsule, Take 1 capsule by mouth Daily., Disp: , Rfl:   •  hydrOXYzine (ATARAX) 25 MG tablet, Take 1 tablet by mouth 3 (Three) Times a Day As Needed for Anxiety., Disp: 30 tablet, Rfl: 0  •  meloxicam (MOBIC) 15 MG tablet, Take 1 tablet by mouth Daily., Disp: , Rfl:   •  metFORMIN (GLUCOPHAGE) 500 MG tablet, TAKE TWO TABLETS BY MOUTH EVERY MORNING AND TAKE ONE TABLET BY MOUTH EVERY EVENING WITH MEALS, Disp: 270 tablet, Rfl: 3  •  montelukast (SINGULAIR) 10 MG tablet, TAKE ONE TABLET BY MOUTH ONCE NIGHTLY, Disp: 90 tablet, Rfl: 1  •  mupirocin (BACTROBAN) 2 % ointment, APPLY TO AFFECTED AREA(S) THREE TIMES A DAY, Disp: 22 g, Rfl: 0  •  omeprazole (priLOSEC) 20 MG capsule, Take 1 capsule by mouth Daily., Disp: , Rfl:   •  ondansetron ODT (ZOFRAN-ODT) 4 MG disintegrating tablet, Place 1 tablet on the tongue Every 6 (Six) Hours As Needed for Nausea or Vomiting., Disp: 8 tablet, Rfl: 0  •  promethazine (PHENERGAN) 25 MG tablet, TAKE ONE TABLET BY MOUTH EVERY 4 HOURS AS NEEDED FOR NAUSEA (Patient taking differently: Take 1 tablet by mouth Every 6 (Six) Hours As Needed for Nausea.), Disp: 10 tablet, Rfl: 0  •  SUMAtriptan (IMITREX) 100 MG tablet, Take 1 tablet by mouth As Needed., Disp: , Rfl:   •  topiramate (TOPAMAX) 25 MG tablet, Take 1 tablet by mouth Daily., Disp: , Rfl:   •  vitamin D (ERGOCALCIFEROL) 1.25 MG (70070 UT) capsule capsule, TAKE ONE CAPSULE BY MOUTH ONCE WEEKLY, Disp: 12 capsule, Rfl: 0  •  Ixekizumab (Taltz) 80 MG/ML solution auto-injector, Inject 160mg (2 pens) under the skin at week 0, then inject 80mg (1 pen) under the skin 2 weeks later (Patient not taking: Reported on 4/26/2023), Disp: 3 mL, Rfl: 0  •  Ixekizumab (Taltz) 80 MG/ML solution auto-injector, Inject 80mg ( 1 pen) under the skin every 2 weeks, from weeks 4-10) (Patient  not taking: Reported on 4/26/2023), Disp: 2 mL, Rfl: 1    Social History     Socioeconomic History   • Marital status:    Tobacco Use   • Smoking status: Never   • Smokeless tobacco: Never   Vaping Use   • Vaping Use: Never used   Substance and Sexual Activity   • Alcohol use: Not Currently   • Drug use: No   • Sexual activity: Yes     Partners: Male     Birth control/protection: None     Social History     Social History Narrative    Patient lives in [place], [place].   She is  with no children and works full time as the  of [employer] [place].       Family History   Problem Relation Age of Onset   • Heart disease Mother    • Multiple sclerosis Mother    • Diabetes Mother    • Arthritis Mother    • Asthma Mother    • Early death Mother    • Kidney disease Mother    • Hypertension Father    • Obesity Father    • Diabetes Father    • Heart attack Father    • Sleep apnea Father    • No Known Problems Sister    • Cancer Maternal Grandmother         Breast   • Breast cancer Maternal Grandmother    • Diabetes Maternal Grandmother    • Asthma Maternal Grandfather    • Diabetes Maternal Grandfather    • Sleep apnea Maternal Grandfather    • Colon cancer Paternal Grandmother    • Cancer Paternal Grandmother         COLON   • Heart attack Paternal Grandfather    • Sleep apnea Paternal Grandfather    • Obesity Paternal Grandfather    • Hypertension Paternal Grandfather        Review of Systems:  Constitutional:  reports fatigue, weight gain and denies fevers, chills.  HEENT:  denies headache, ear pain or loss of hearing, blurred or double vision, nasal discharge or sore throat.  Cardiovascular:  reports none and denies HTN, HLD, CAD, Atrial Fib, hx heart disease, heart murmur, hx MI, chest pain, palpitations, edema, hx DVT.  Respiratory:  reports dyspnea on exertion, asthma and denies shortness of breath , cough , wheezing, sleep apnea, COPD, hx PE.  Gastrointestinal:   reports heartburn, abdominal pain, IBS, gallbladder issues and denies dysphagia, nausea, vomiting, diarrhea, constipation, melena, blood in stool, liver disease, hx pancreatitis.  Genitourinary:  reports none and denies history of  frequent UTI, incontinence, hematuria, dysuria, polyuria, polydipsia, renal insufficiency, renal failure.    Musculoskeletal:  reports joint pain, arthritis, autoimmune disease and denies fibromyalgia.  Neurological:  reports migraines and denies headaches, numbness /tingling, dizziness, confusion, seizure, stroke.  Psychiatric:  reports hx anxiety and denies depressed mood, hx depression, feeling anxious, bipolar disorder, suicidal ideation, hx suicide attempt, hx self injury, hx substance abuse, eating disorder.  Endocrine:  reports PCOS and denies endometriosis, glucose intolerance, diabetes, thyroid disease, gout.  Hematologic:  reports none and denies bruising, bleeding disorder, hx anemia, hx blood transfusion.  Skin:  reports Psoriasis and denies hx MRSA.    Physical Exam:  Vital Signs:  Weight: (!) 152 kg (336 lb)   Body mass index is 54.23 kg/m².  Temp: 97.8 °F (36.6 °C)   Heart Rate: 98   BP: 132/86     Physical Exam  Constitutional:       Appearance: She is obese.   HENT:      Head: Normocephalic and atraumatic.   Eyes:      Extraocular Movements: Extraocular movements intact.      Conjunctiva/sclera: Conjunctivae normal.   Cardiovascular:      Rate and Rhythm: Normal rate and regular rhythm.   Pulmonary:      Effort: Pulmonary effort is normal.      Breath sounds: Normal breath sounds.   Abdominal:      General: Bowel sounds are normal. There is no distension.      Palpations: Abdomen is soft. There is no mass.      Tenderness: There is no abdominal tenderness.      Comments: Old lap scars   Musculoskeletal:         General: Normal range of motion.      Cervical back: Normal range of motion and neck supple.   Skin:     General: Skin is warm and dry.   Neurological:       General: No focal deficit present.      Mental Status: She is alert and oriented to person, place, and time.   Psychiatric:         Mood and Affect: Mood normal.         Behavior: Behavior normal.         Thought Content: Thought content normal.         Judgment: Judgment normal.         Patient Active Problem List   Diagnosis   • Obesity, morbid, BMI 50 or higher   • Metabolic syndrome   • Gout   • Viral gastroenteritis   • Nausea   • Diarrhea   • Biliary dyskinesia   • Health care maintenance   • JENN (obstructive sleep apnea) - possible   • Excessive daytime sleepiness   • Migraines   • Asthma   • GERD (gastroesophageal reflux disease)   • IBS (irritable bowel syndrome)   • Arthritis   • Psoriasis       Assessment:  32 y.o. female with medically complicated obesity pursuing sleeve gastrectomy.    Metabolic & Bariatric Surgery is deemed medically necessary given the following: Class 3 Severe Obesity (BMI >=40). Obesity-related health conditions include the following: GERD and PCOS, infertility, arthritis, anxiety . Obesity is worsening. BMI is is above average; BMI management plan is completed. We discussed consulting a Bariatric surgeon.        Plan:  Further evaluation will include: CBC, CMP, Lipids, TSH, HgA1C, H.Pylori serum, EKG, CXR, Pulmonary Function Testing and EGD.    The risks and benefits of the upper endoscopy were discussed with the patient in detail and all questions were answered.  Possibility of perforation, bleeding, aspiration, and anesthesia reaction were reviewed.  Patient agrees to proceed.      Additional clearances needed prior to surgery will include: Rheumatology.     Patient understands that bariatric surgery is not cosmetic surgery but rather a tool to help make a lifelong commitment to lifestyle changes including diet, exercise and behavior modifications.  The patient has been educated today on those expected postoperative lifestyle changes.  Psychological and Nutritional consultations  will be completed prior to surgery.  Instructions on how to access [employer] (an internet based site w/ educational surgical videos) were given to the patient.  Recommended perioperative vitamin supplementation was reviewed.  The importance of avoiding ASA/ NSAIDS/ steroids/ tobacco/nicotine/ hormones/ immunomodulators perioperatively was discussed in detail.  All questions/concerns have been addressed.      Further input to follow pending the above.           VINNIE Go      Addendum:    H. pylori antibody test positive.  Will prescribe Prevpac and plan to repeat H. pylori breath test in approximately 6 weeks.  Proceed with EGD as planned.

## 2023-04-28 PROBLEM — R76.8 HELICOBACTER PYLORI ANTIBODY POSITIVE: Status: ACTIVE | Noted: 2023-04-28

## 2023-04-28 LAB
ALBUMIN SERPL-MCNC: 4 G/DL (ref 3.5–5.2)
ALBUMIN/GLOB SERPL: 1.2 G/DL
ALP SERPL-CCNC: 118 U/L (ref 39–117)
ALT SERPL-CCNC: 15 U/L (ref 1–33)
AST SERPL-CCNC: 12 U/L (ref 1–32)
BASOPHILS # BLD AUTO: 0.04 10*3/MM3 (ref 0–0.2)
BASOPHILS NFR BLD AUTO: 0.4 % (ref 0–1.5)
BILIRUB SERPL-MCNC: 0.4 MG/DL (ref 0–1.2)
BUN SERPL-MCNC: 13 MG/DL (ref 6–20)
BUN/CREAT SERPL: 17.1 (ref 7–25)
CALCIUM SERPL-MCNC: 9.7 MG/DL (ref 8.6–10.5)
CHLORIDE SERPL-SCNC: 105 MMOL/L (ref 98–107)
CHOLEST SERPL-MCNC: 191 MG/DL (ref 0–200)
CO2 SERPL-SCNC: 22.9 MMOL/L (ref 22–29)
CREAT SERPL-MCNC: 0.76 MG/DL (ref 0.57–1)
EGFRCR SERPLBLD CKD-EPI 2021: 106.9 ML/MIN/1.73
EOSINOPHIL # BLD AUTO: 0.16 10*3/MM3 (ref 0–0.4)
EOSINOPHIL NFR BLD AUTO: 1.4 % (ref 0.3–6.2)
ERYTHROCYTE [DISTWIDTH] IN BLOOD BY AUTOMATED COUNT: 14.6 % (ref 12.3–15.4)
GLOBULIN SER CALC-MCNC: 3.4 GM/DL
GLUCOSE SERPL-MCNC: 80 MG/DL (ref 65–99)
H PYLORI IGA SER-ACNC: 20.6 UNITS (ref 0–8.9)
H PYLORI IGG SER IA-ACNC: 0.18 INDEX VALUE (ref 0–0.79)
HBA1C MFR BLD: 5.5 % (ref 4.8–5.6)
HCT VFR BLD AUTO: 33.8 % (ref 34–46.6)
HDLC SERPL-MCNC: 35 MG/DL (ref 40–60)
HGB BLD-MCNC: 10.8 G/DL (ref 12–15.9)
IMM GRANULOCYTES # BLD AUTO: 0.06 10*3/MM3 (ref 0–0.05)
IMM GRANULOCYTES NFR BLD AUTO: 0.5 % (ref 0–0.5)
LDLC SERPL CALC-MCNC: 135 MG/DL (ref 0–100)
LYMPHOCYTES # BLD AUTO: 2.62 10*3/MM3 (ref 0.7–3.1)
LYMPHOCYTES NFR BLD AUTO: 23.6 % (ref 19.6–45.3)
MCH RBC QN AUTO: 24.8 PG (ref 26.6–33)
MCHC RBC AUTO-ENTMCNC: 32 G/DL (ref 31.5–35.7)
MCV RBC AUTO: 77.7 FL (ref 79–97)
MONOCYTES # BLD AUTO: 0.56 10*3/MM3 (ref 0.1–0.9)
MONOCYTES NFR BLD AUTO: 5 % (ref 5–12)
NEUTROPHILS # BLD AUTO: 7.65 10*3/MM3 (ref 1.7–7)
NEUTROPHILS NFR BLD AUTO: 69.1 % (ref 42.7–76)
NRBC BLD AUTO-RTO: 0 /100 WBC (ref 0–0.2)
PLATELET # BLD AUTO: 340 10*3/MM3 (ref 140–450)
POTASSIUM SERPL-SCNC: 4.7 MMOL/L (ref 3.5–5.2)
PROT SERPL-MCNC: 7.4 G/DL (ref 6–8.5)
RBC # BLD AUTO: 4.35 10*6/MM3 (ref 3.77–5.28)
SODIUM SERPL-SCNC: 142 MMOL/L (ref 136–145)
TRIGL SERPL-MCNC: 116 MG/DL (ref 0–150)
TSH SERPL DL<=0.005 MIU/L-ACNC: 3.59 UIU/ML (ref 0.27–4.2)
VLDLC SERPL CALC-MCNC: 21 MG/DL (ref 5–40)
WBC # BLD AUTO: 11.09 10*3/MM3 (ref 3.4–10.8)

## 2023-04-28 RX ORDER — AMOXICILLIN 500 MG/1
1000 CAPSULE ORAL 2 TIMES DAILY
Qty: 56 CAPSULE | Refills: 0 | Status: SHIPPED | OUTPATIENT
Start: 2023-04-28 | End: 2023-05-12

## 2023-04-28 RX ORDER — CLARITHROMYCIN 500 MG/1
500 TABLET, COATED ORAL 2 TIMES DAILY
Qty: 28 TABLET | Refills: 0 | Status: SHIPPED | OUTPATIENT
Start: 2023-04-28 | End: 2023-05-12

## 2023-04-28 RX ORDER — OMEPRAZOLE 20 MG/1
20 CAPSULE, DELAYED RELEASE ORAL 2 TIMES DAILY
Qty: 28 CAPSULE | Refills: 0 | Status: SHIPPED | OUTPATIENT
Start: 2023-04-28 | End: 2023-05-12

## 2023-05-03 ENCOUNTER — OFFICE VISIT (OUTPATIENT)
Dept: OBSTETRICS AND GYNECOLOGY | Facility: CLINIC | Age: 33
End: 2023-05-03
Payer: COMMERCIAL

## 2023-05-03 VITALS
DIASTOLIC BLOOD PRESSURE: 80 MMHG | BODY MASS INDEX: 47.09 KG/M2 | SYSTOLIC BLOOD PRESSURE: 112 MMHG | HEIGHT: 66 IN | WEIGHT: 293 LBS

## 2023-05-03 DIAGNOSIS — D64.9 ANEMIA, UNSPECIFIED TYPE: ICD-10-CM

## 2023-05-03 DIAGNOSIS — Z01.411 ENCOUNTER FOR GYNECOLOGICAL EXAMINATION WITH ABNORMAL FINDING: Primary | ICD-10-CM

## 2023-05-03 DIAGNOSIS — N92.1 MENORRHAGIA WITH IRREGULAR CYCLE: ICD-10-CM

## 2023-05-03 DIAGNOSIS — B35.4 TINEA CORPORIS: ICD-10-CM

## 2023-05-03 RX ORDER — PROGESTERONE 200 MG/1
400 CAPSULE ORAL DAILY
Qty: 20 CAPSULE | Refills: 6 | Status: SHIPPED | OUTPATIENT
Start: 2023-05-03 | End: 2023-05-13

## 2023-05-03 RX ORDER — NYSTATIN 100000 U/G
1 OINTMENT TOPICAL 2 TIMES DAILY
Qty: 30 G | Refills: 3 | Status: SHIPPED | OUTPATIENT
Start: 2023-05-03

## 2023-05-03 NOTE — PROGRESS NOTES
Chief Complaint  Eliz Eisenberg is a 32 y.o.  female presenting for Annual Exam (Patient was seen by bariatrics last week.  HGB was low, positive for H. Pylori.  States she is having dizziness/weakness with this period.)    History of Present Illness  Eliz is a pleasant 33yo nulligravid woman.  She and her  haven't used any contraception since Spring 2020.  She would like to conceive.  She did have a visit with Dr. Almanzar in the past year.  He encouraged wt loss, but he did give ovulation induction meds.  Sadly, there were no conceptions.  She also had a consult visit with bariatric surgeons.  She is considering bariatric surgery, but she is uncertain.  (And her  is reluctant for her to do this.)  We discussed the option of semaglutides.  She needs more time to research that option & come to a decision.  She has been faithful to use the prometrium if no spontaneous menses at least q 60 days.  She has had a couple (2 or 3) spontaneous menses; but otherwise, had to use the progestin-challenge & get reg withdrawal bleeds.  She lost a parent unexpectantly few months ago; she is experiencing normal, yet difficult grief.      Her menstrual periods are sometimes heavier now than last year.  They will last 4-7 days with 2 days being heavy.  On the heaviest day, she will saturate a pad/tampon q one (1) hour.  States this LMP was heavier than her normal periods.      The following portions of the patient's history were reviewed and updated as appropriate: allergies, current medications, past family history, past medical history, past social history, past surgical history and problem list.    Allergies   Allergen Reactions   • Latex Hives, Itching, Swelling and Rash   • Cimzia [Certolizumab Pegol] Unknown - Low Severity   • Dilantin [Phenytoin] Unknown - Low Severity   • Elastic Bandages & [Zinc] Hives   • Tape Hives   • Iodine Hives, Swelling and Rash   • Pollen Extract Rash   • Tree Extract Other  (See Comments)     CONGESTION         Current Outpatient Medications:   •  albuterol sulfate  (90 Base) MCG/ACT inhaler, Inhale 2 puffs Every 4 (Four) Hours As Needed for Wheezing., Disp: 18 g, Rfl: 2  •  amoxicillin (AMOXIL) 500 MG capsule, Take 2 capsules by mouth 2 (Two) Times a Day for 14 days., Disp: 56 capsule, Rfl: 0  •  aspirin 81 MG EC tablet, Adult Low Dose Aspirin 81 mg tablet,delayed release  Take 1 tablet every day by oral route., Disp: , Rfl:   •  budesonide-formoterol (Symbicort) 160-4.5 MCG/ACT inhaler, Inhale 2 puffs 2 (Two) Times a Day., Disp: 10.2 g, Rfl: 1  •  cetirizine (zyrTEC) 5 MG tablet, Take 1 tablet by mouth Daily., Disp: , Rfl:   •  clarithromycin (BIAXIN) 500 MG tablet, Take 1 tablet by mouth 2 (Two) Times a Day for 14 days., Disp: 28 tablet, Rfl: 0  •  fluticasone (Flonase) 50 MCG/ACT nasal spray, 2 sprays into the nostril(s) as directed by provider Daily., Disp: 16 g, Rfl: 5  •  gabapentin (NEURONTIN) 300 MG capsule, Take 1 capsule by mouth Daily., Disp: , Rfl:   •  hydrOXYzine (ATARAX) 25 MG tablet, Take 1 tablet by mouth 3 (Three) Times a Day As Needed for Anxiety., Disp: 30 tablet, Rfl: 0  •  Ixekizumab (Taltz) 80 MG/ML solution auto-injector, Inject 160mg (2 pens) under the skin at week 0, then inject 80mg (1 pen) under the skin 2 weeks later (Patient not taking: Reported on 4/26/2023), Disp: 3 mL, Rfl: 0  •  Ixekizumab (Taltz) 80 MG/ML solution auto-injector, Inject 80mg ( 1 pen) under the skin every 2 weeks, from weeks 4-10) (Patient not taking: Reported on 4/26/2023), Disp: 2 mL, Rfl: 1  •  meloxicam (MOBIC) 15 MG tablet, Take 1 tablet by mouth Daily., Disp: , Rfl:   •  metFORMIN (GLUCOPHAGE) 500 MG tablet, TAKE TWO TABLETS BY MOUTH EVERY MORNING AND TAKE ONE TABLET BY MOUTH EVERY EVENING WITH MEALS, Disp: 270 tablet, Rfl: 3  •  montelukast (SINGULAIR) 10 MG tablet, TAKE ONE TABLET BY MOUTH ONCE NIGHTLY, Disp: 90 tablet, Rfl: 1  •  mupirocin (BACTROBAN) 2 % ointment,  APPLY TO AFFECTED AREA(S) THREE TIMES A DAY, Disp: 22 g, Rfl: 0  •  nystatin (MYCOSTATIN) 483292 UNIT/GM ointment, Apply 1 application topically to the appropriate area as directed 2 (Two) Times a Day., Disp: 30 g, Rfl: 3  •  omeprazole (priLOSEC) 20 MG capsule, Take 1 capsule by mouth Daily., Disp: , Rfl:   •  omeprazole (priLOSEC) 20 MG capsule, Take 1 capsule by mouth 2 (Two) Times a Day for 14 days., Disp: 28 capsule, Rfl: 0  •  ondansetron ODT (ZOFRAN-ODT) 4 MG disintegrating tablet, Place 1 tablet on the tongue Every 6 (Six) Hours As Needed for Nausea or Vomiting., Disp: 8 tablet, Rfl: 0  •  Progesterone (Prometrium) 200 MG capsule, Take 2 capsules by mouth Daily for 10 days., Disp: 20 capsule, Rfl: 6  •  promethazine (PHENERGAN) 25 MG tablet, TAKE ONE TABLET BY MOUTH EVERY 4 HOURS AS NEEDED FOR NAUSEA (Patient taking differently: Take 1 tablet by mouth Every 6 (Six) Hours As Needed for Nausea.), Disp: 10 tablet, Rfl: 0  •  SUMAtriptan (IMITREX) 100 MG tablet, Take 1 tablet by mouth As Needed., Disp: , Rfl:   •  topiramate (TOPAMAX) 25 MG tablet, Take 1 tablet by mouth Daily., Disp: , Rfl:   •  vitamin D (ERGOCALCIFEROL) 1.25 MG (13793 UT) capsule capsule, TAKE ONE CAPSULE BY MOUTH ONCE WEEKLY, Disp: 12 capsule, Rfl: 0    Past Medical History:   Diagnosis Date   • Abdominal pain    • Acute sinusitis    • Allergic rhinitis    • Ankle sprain    • Anxiety    • Arthritis 2017    On Mobic daily   • Asthma    • Atopic dermatitis    • Biliary dyskinesia    • Cat bite      resolved   • Current tear of meniscus    • Dysfunctional uterine bleeding    • Foot pain    • GERD (gastroesophageal reflux disease)     Omeprazole daily. remote EGD. No hx of h pylori   • Gout    • Hearing loss, bilateral     R/T PSORASIS   • Helicobacter pylori antibody positive     4/26/23. Rx prevpak   • IBS (irritable bowel syndrome)    • Influenza    • Metabolic syndrome    • Migraines    • Muscular aches    • Obesity    • Panic attack    •  "PCOS (polycystic ovarian syndrome)    • Pharyngitis    • Psoriasis     planning to start Taltz   • Restless leg syndrome    • URI (upper respiratory infection)    • Wears glasses         Past Surgical History:   Procedure Laterality Date   • COLONOSCOPY     • LAPAROSCOPIC CHOLECYSTECTOMY  2018    no gallstones   • TONSILLECTOMY AND ADENOIDECTOMY N/A 07/09/2021    Procedure: TONSILLECTOMY;  Surgeon: Ran Lemon MD;  Location: [facility];  Service: ENT;  Laterality: N/A;   • UMBILICAL HERNIA REPAIR  2018    w/ cholecystectomy   • WISDOM TOOTH EXTRACTION         Objective  /80   Ht 167.6 cm (66\")   Wt (!) 152 kg (335 lb 6.4 oz)   LMP 04/29/2023 (Exact Date)   Breastfeeding No   BMI 54.13 kg/m²     Physical Exam  Vitals and nursing note reviewed. Exam conducted with a chaperone present.   Constitutional:       General: She is not in acute distress.     Appearance: Normal appearance. She is obese. She is not ill-appearing.   HENT:      Head: Normocephalic.   Neck:      Thyroid: No thyroid mass or thyromegaly.   Cardiovascular:      Rate and Rhythm: Normal rate and regular rhythm.      Heart sounds: Normal heart sounds. No murmur heard.  Pulmonary:      Effort: Pulmonary effort is normal. No respiratory distress.      Breath sounds: Normal breath sounds.   Chest:   Breasts:     Right: No inverted nipple, mass or nipple discharge.      Left: No inverted nipple, mass or nipple discharge.   Abdominal:      Palpations: Abdomen is soft. There is no mass.      Tenderness: There is no abdominal tenderness.   Genitourinary:     General: Normal vulva.      Labia:         Right: No rash, tenderness or lesion.         Left: No rash, tenderness or lesion.       Vagina: Normal. No vaginal discharge or erythema.      Cervix: No discharge, lesion or erythema.      Uterus: Not enlarged and not tender.       Adnexa:         Right: No mass or tenderness.          Left: No mass or tenderness.        Comments: Anus appears " wnl.  No rectal exam performed.  Lymphadenopathy:      Upper Body:      Right upper body: No supraclavicular or axillary adenopathy.      Left upper body: No supraclavicular or axillary adenopathy.   Skin:     General: Skin is warm and dry.      Comments: Tinea corporis under breasts.   Neurological:      Mental Status: She is alert and oriented to person, place, and time.   Psychiatric:         Mood and Affect: Mood normal.         Behavior: Behavior normal.         Assessment/Plan   Diagnoses and all orders for this visit:    1. Encounter for gynecological examination with abnormal finding (Primary)    2. Anemia, unspecified type    3. Menorrhagia with irregular cycle  -     US Non-ob Transvaginal; Future    4. Tinea corporis    Other orders  -     Progesterone (Prometrium) 200 MG capsule; Take 2 capsules by mouth Daily for 10 days.  Dispense: 20 capsule; Refill: 6  -     nystatin (MYCOSTATIN) 748610 UNIT/GM ointment; Apply 1 application topically to the appropriate area as directed 2 (Two) Times a Day.  Dispense: 30 g; Refill: 3        Procedures            Return in about 4 months (around 9/3/2023) for Recheck ([facility] calendar).    Asia Evans, APRN  05/03/2023

## 2023-05-09 RX ORDER — OMEPRAZOLE 20 MG/1
CAPSULE, DELAYED RELEASE ORAL
Qty: 28 CAPSULE | Refills: 0 | Status: SHIPPED | OUTPATIENT
Start: 2023-05-09

## 2023-05-10 ENCOUNTER — PRE-ADMISSION TESTING (OUTPATIENT)
Dept: PREADMISSION TESTING | Facility: HOSPITAL | Age: 33
End: 2023-05-10
Payer: COMMERCIAL

## 2023-05-10 LAB
DEPRECATED RDW RBC AUTO: 44 FL (ref 37–54)
ERYTHROCYTE [DISTWIDTH] IN BLOOD BY AUTOMATED COUNT: 15 % (ref 12.3–15.4)
HCT VFR BLD AUTO: 35.5 % (ref 34–46.6)
HGB BLD-MCNC: 10.7 G/DL (ref 12–15.9)
MCH RBC QN AUTO: 24.3 PG (ref 26.6–33)
MCHC RBC AUTO-ENTMCNC: 30.1 G/DL (ref 31.5–35.7)
MCV RBC AUTO: 80.5 FL (ref 79–97)
PLATELET # BLD AUTO: 355 10*3/MM3 (ref 140–450)
PMV BLD AUTO: 9.2 FL (ref 6–12)
QT INTERVAL: 380 MS
QTC INTERVAL: 454 MS
RBC # BLD AUTO: 4.41 10*6/MM3 (ref 3.77–5.28)
WBC NRBC COR # BLD: 14.17 10*3/MM3 (ref 3.4–10.8)

## 2023-05-10 PROCEDURE — 36415 COLL VENOUS BLD VENIPUNCTURE: CPT

## 2023-05-10 PROCEDURE — 93005 ELECTROCARDIOGRAM TRACING: CPT

## 2023-05-10 PROCEDURE — 85027 COMPLETE CBC AUTOMATED: CPT

## 2023-05-10 NOTE — PAT
EKG from PAT today faxed to anesthesiology department for review and cardiac clearance. RN spoke with Dr Davies   and reviewed pertinent medical history and EKG results.  Per Dr Davies , patient is cleared to proceed with procedure as planned without additional cardiac testing. Patient denies chest pain or increased shortness of breath.     An arrival time for procedure was not provided during PAT visit. If patient had any questions or concerns about their arrival time, they were instructed to contact their surgeon/physician.  Additionally, if the patient referred to an arrival time that was acquired from their my chart account, patient was encouraged to verify that time with their surgeon/physician. Arrival times are NOT provided in Pre Admission Testing Department.    Patient denies any current skin issues.

## 2023-05-11 ENCOUNTER — HOSPITAL ENCOUNTER (OUTPATIENT)
Dept: PULMONOLOGY | Facility: HOSPITAL | Age: 33
Discharge: HOME | End: 2023-05-11
Admitting: PHYSICIAN ASSISTANT
Payer: COMMERCIAL

## 2023-05-11 DIAGNOSIS — J45.909 ASTHMA, UNSPECIFIED ASTHMA SEVERITY, UNSPECIFIED WHETHER COMPLICATED, UNSPECIFIED WHETHER PERSISTENT: ICD-10-CM

## 2023-05-11 DIAGNOSIS — E66.01 MORBID OBESITY WITH BMI OF 50.0-59.9, ADULT: ICD-10-CM

## 2023-05-11 DIAGNOSIS — K21.9 GASTROESOPHAGEAL REFLUX DISEASE, UNSPECIFIED WHETHER ESOPHAGITIS PRESENT: ICD-10-CM

## 2023-05-11 DIAGNOSIS — R53.83 FATIGUE, UNSPECIFIED TYPE: ICD-10-CM

## 2023-05-11 PROCEDURE — 94726 PLETHYSMOGRAPHY LUNG VOLUMES: CPT

## 2023-05-11 PROCEDURE — 94729 DIFFUSING CAPACITY: CPT

## 2023-05-11 PROCEDURE — 94010 BREATHING CAPACITY TEST: CPT

## 2023-05-15 ENCOUNTER — HOSPITAL ENCOUNTER (OUTPATIENT)
Facility: HOSPITAL | Age: 33
Setting detail: HOSPITAL OUTPATIENT SURGERY
Discharge: HOME | End: 2023-05-15
Attending: SURGERY | Admitting: SURGERY
Payer: COMMERCIAL

## 2023-05-15 ENCOUNTER — ANESTHESIA EVENT (OUTPATIENT)
Dept: GASTROENTEROLOGY | Facility: HOSPITAL | Age: 33
End: 2023-05-15
Payer: COMMERCIAL

## 2023-05-15 ENCOUNTER — ANESTHESIA (OUTPATIENT)
Dept: GASTROENTEROLOGY | Facility: HOSPITAL | Age: 33
End: 2023-05-15
Payer: COMMERCIAL

## 2023-05-15 VITALS
OXYGEN SATURATION: 98 % | DIASTOLIC BLOOD PRESSURE: 80 MMHG | HEART RATE: 76 BPM | RESPIRATION RATE: 16 BRPM | SYSTOLIC BLOOD PRESSURE: 131 MMHG

## 2023-05-15 DIAGNOSIS — K21.9 GASTROESOPHAGEAL REFLUX DISEASE, UNSPECIFIED WHETHER ESOPHAGITIS PRESENT: ICD-10-CM

## 2023-05-15 LAB
B-HCG UR QL: NEGATIVE
EXPIRATION DATE: NORMAL
INTERNAL NEGATIVE CONTROL: NEGATIVE
INTERNAL POSITIVE CONTROL: POSITIVE
Lab: NORMAL

## 2023-05-15 PROCEDURE — 25010000002 PROPOFOL 10 MG/ML EMULSION: Performed by: NURSE ANESTHETIST, CERTIFIED REGISTERED

## 2023-05-15 PROCEDURE — 43239 EGD BIOPSY SINGLE/MULTIPLE: CPT | Performed by: SURGERY

## 2023-05-15 PROCEDURE — 88305 TISSUE EXAM BY PATHOLOGIST: CPT | Performed by: SURGERY

## 2023-05-15 PROCEDURE — 81025 URINE PREGNANCY TEST: CPT | Performed by: ANESTHESIOLOGY

## 2023-05-15 RX ORDER — FAMOTIDINE 10 MG/ML
20 INJECTION, SOLUTION INTRAVENOUS ONCE
Status: COMPLETED | OUTPATIENT
Start: 2023-05-15 | End: 2023-05-15

## 2023-05-15 RX ORDER — LIDOCAINE HYDROCHLORIDE 10 MG/ML
INJECTION, SOLUTION EPIDURAL; INFILTRATION; INTRACAUDAL; PERINEURAL AS NEEDED
Status: DISCONTINUED | OUTPATIENT
Start: 2023-05-15 | End: 2023-05-15 | Stop reason: SURG

## 2023-05-15 RX ORDER — SODIUM CHLORIDE 9 MG/ML
40 INJECTION, SOLUTION INTRAVENOUS AS NEEDED
Status: DISCONTINUED | OUTPATIENT
Start: 2023-05-15 | End: 2023-05-15 | Stop reason: HOSPADM

## 2023-05-15 RX ORDER — IPRATROPIUM BROMIDE AND ALBUTEROL SULFATE 2.5; .5 MG/3ML; MG/3ML
3 SOLUTION RESPIRATORY (INHALATION) ONCE AS NEEDED
Status: DISCONTINUED | OUTPATIENT
Start: 2023-05-15 | End: 2023-05-15 | Stop reason: HOSPADM

## 2023-05-15 RX ORDER — ONDANSETRON 2 MG/ML
4 INJECTION INTRAMUSCULAR; INTRAVENOUS ONCE AS NEEDED
Status: DISCONTINUED | OUTPATIENT
Start: 2023-05-15 | End: 2023-05-15 | Stop reason: HOSPADM

## 2023-05-15 RX ORDER — SODIUM CHLORIDE 9 MG/ML
50 INJECTION, SOLUTION INTRAVENOUS CONTINUOUS
Status: DISCONTINUED | OUTPATIENT
Start: 2023-05-15 | End: 2023-05-15 | Stop reason: HOSPADM

## 2023-05-15 RX ORDER — SODIUM CHLORIDE, SODIUM LACTATE, POTASSIUM CHLORIDE, CALCIUM CHLORIDE 600; 310; 30; 20 MG/100ML; MG/100ML; MG/100ML; MG/100ML
9 INJECTION, SOLUTION INTRAVENOUS CONTINUOUS
Status: CANCELLED | OUTPATIENT
Start: 2023-05-15

## 2023-05-15 RX ORDER — FAMOTIDINE 20 MG/1
20 TABLET, FILM COATED ORAL ONCE
Status: CANCELLED | OUTPATIENT
Start: 2023-05-15 | End: 2023-05-15

## 2023-05-15 RX ORDER — PROPOFOL 10 MG/ML
VIAL (ML) INTRAVENOUS AS NEEDED
Status: DISCONTINUED | OUTPATIENT
Start: 2023-05-15 | End: 2023-05-15 | Stop reason: SURG

## 2023-05-15 RX ORDER — SODIUM CHLORIDE 0.9 % (FLUSH) 0.9 %
10 SYRINGE (ML) INJECTION AS NEEDED
Status: DISCONTINUED | OUTPATIENT
Start: 2023-05-15 | End: 2023-05-15 | Stop reason: HOSPADM

## 2023-05-15 RX ORDER — LIDOCAINE HYDROCHLORIDE 10 MG/ML
0.5 INJECTION, SOLUTION EPIDURAL; INFILTRATION; INTRACAUDAL; PERINEURAL ONCE AS NEEDED
Status: CANCELLED | OUTPATIENT
Start: 2023-05-15

## 2023-05-15 RX ORDER — SODIUM CHLORIDE 0.9 % (FLUSH) 0.9 %
10 SYRINGE (ML) INJECTION EVERY 12 HOURS SCHEDULED
Status: DISCONTINUED | OUTPATIENT
Start: 2023-05-15 | End: 2023-05-15 | Stop reason: HOSPADM

## 2023-05-15 RX ADMIN — PROPOFOL 150 MG: 10 INJECTION, EMULSION INTRAVENOUS at 12:07

## 2023-05-15 RX ADMIN — PROPOFOL 50 MG: 10 INJECTION, EMULSION INTRAVENOUS at 12:08

## 2023-05-15 RX ADMIN — SODIUM CHLORIDE 350 ML/HR: 9 INJECTION, SOLUTION INTRAVENOUS at 12:01

## 2023-05-15 RX ADMIN — LIDOCAINE HYDROCHLORIDE 50 MG: 10 INJECTION, SOLUTION EPIDURAL; INFILTRATION; INTRACAUDAL; PERINEURAL at 12:07

## 2023-05-15 RX ADMIN — FAMOTIDINE 20 MG: 10 INJECTION INTRAVENOUS at 11:54

## 2023-05-15 NOTE — ANESTHESIA POSTPROCEDURE EVALUATION
Patient: Eliz Dailey Gooseshannon    Procedure Summary     Date: 05/15/23 Room / Location:  [facility]    Anesthesia Start: 1201 Anesthesia Stop: 1218    Procedure: ESOPHAGOGASTRODUODENOSCOPY Diagnosis:       Gastroesophageal reflux disease, unspecified whether esophagitis present      (Gastroesophageal reflux disease, unspecified whether esophagitis present [K21.9])    Surgeons: Beatriz Sandy MD Provider: Ugo Issa MD    Anesthesia Type: general, MAC ASA Status: 3          Anesthesia Type: general, MAC    Vitals  Vitals Value Taken Time   /80 05/15/23 1230   Temp     Pulse 76 05/15/23 1230   Resp 16 05/15/23 1230   SpO2 98 % 05/15/23 1230           Post Anesthesia Care and Evaluation    Patient location during evaluation: PACU  Patient participation: complete - patient participated  Level of consciousness: awake and alert  Pain management: adequate    Airway patency: patent  Anesthetic complications: No anesthetic complications  PONV Status: none  Cardiovascular status: hemodynamically stable and acceptable  Respiratory status: nonlabored ventilation, acceptable and nasal cannula  Hydration status: acceptable

## 2023-05-15 NOTE — OP NOTE
PROCEDURE NOTE.    Date: 05/15/23    Patient: Eliz Eisenberg     Surgeon: Beatriz Sandy MD      Preoperative Diagnosis: GERD     Postoperative Diagnosis: GERD     Procedure Performed: Esophagogastroduodenoscopy with biopsy (CPT 64106)    Estimated Blood Loss: minimal    Complications: none    Findings: GEJ at 40 cm, no hiatal hernia.  +Bile throughout stomach.     Specimens: Distal esophageal (39 cm) and antral biopsies     Indications: Eliz Eisenberg is a 32 y.o. year old supermorbidly obese female who is preparing for sleeve gastrectomy.  The patient has reflux that is that is controlled on daily PPI.  She presents today for diagnostic endoscopy.         Procedure:      After informed consent was taken, the patient was brought to the operating room and placed in the supine position. MAC was given by anesthesia staff. A bite block was placed and time out performed. A flexible endoscope was passed transorally down to the second portion of the duodenum without difficulty. The scope was slowly withdrawn and the anatomy examined with photodocumentation throughout. The duodenum was normal. The pylorus was without spasm. The antrum of the stomach was without significant gastritis. A biopsy was taken to rule out H. Pylori. The scope was retroflexed and the body, fundus, and cardia were examined. There was no abnormality and no hiatal hernia seen from this angle. The scope was withdrawn back into the esophagus after decompressing the stomach. The GE junction was at 40 cm and the distal esophagus showed no evidence of reflux esophagitis. Biopsy was taken. There was no hiatal hernia. The scope was further withdrawn. There was no other esophageal abnormality. The vocal cords were normal. The scope was withdrawn completely and the procedure concluded. The patient was awakened and taken to recovery in good condition.      Recommendations: We will discuss biopsy results at next office appointment.

## 2023-05-15 NOTE — ANESTHESIA PREPROCEDURE EVALUATION
Anesthesia Evaluation     Patient summary reviewed and Nursing notes reviewed   NPO Solid Status: > 8 hours  NPO Liquid Status: > 8 hours           Airway   Mallampati: II  TM distance: >3 FB  Neck ROM: full  No difficulty expected  Dental      Pulmonary    (+) asthma,recent URI resolved, sleep apnea (denies -snores possible ),   (-) not a smoker  Cardiovascular     ECG reviewed    (-) hypertension, past MI, dysrhythmias, cardiac stents    ROS comment: ECG NSR     Neuro/Psych  (+) headaches, psychiatric history,    (-) seizures, CVA  GI/Hepatic/Renal/Endo    (+) obesity, morbid obesity, GERD,    (-) no renal disease, diabetes, no thyroid disorder    Musculoskeletal     Abdominal    Substance History      OB/GYN          Other   arthritis,          Phys Exam Other: 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    Plan discussed with CRNA.        CODE STATUS:

## 2023-05-23 DIAGNOSIS — R94.2 ABNORMAL PFTS: Primary | ICD-10-CM

## 2023-05-30 RX ORDER — FLUCONAZOLE 150 MG/1
TABLET ORAL
Qty: 2 TABLET | Refills: 0 | OUTPATIENT
Start: 2023-05-30

## 2023-06-02 RX ORDER — FLUCONAZOLE 150 MG/1
150 TABLET ORAL ONCE
Qty: 1 TABLET | Refills: 0 | Status: SHIPPED | OUTPATIENT
Start: 2023-06-02 | End: 2023-06-02

## 2023-06-07 ENCOUNTER — OFFICE VISIT (OUTPATIENT)
Dept: INTERNAL MEDICINE | Facility: CLINIC | Age: 33
End: 2023-06-07
Payer: COMMERCIAL

## 2023-06-07 ENCOUNTER — LAB (OUTPATIENT)
Dept: LAB | Facility: HOSPITAL | Age: 33
End: 2023-06-07
Payer: COMMERCIAL

## 2023-06-07 VITALS
OXYGEN SATURATION: 99 % | WEIGHT: 293 LBS | HEIGHT: 66 IN | TEMPERATURE: 97.1 F | BODY MASS INDEX: 47.09 KG/M2 | SYSTOLIC BLOOD PRESSURE: 134 MMHG | DIASTOLIC BLOOD PRESSURE: 82 MMHG | HEART RATE: 82 BPM

## 2023-06-07 DIAGNOSIS — R76.8 HELICOBACTER PYLORI ANTIBODY POSITIVE: ICD-10-CM

## 2023-06-07 DIAGNOSIS — Z00.00 HEALTH CARE MAINTENANCE: Primary | ICD-10-CM

## 2023-06-07 DIAGNOSIS — D64.9 ANEMIA, UNSPECIFIED TYPE: ICD-10-CM

## 2023-06-07 LAB
BASOPHILS # BLD AUTO: 0.04 10*3/MM3 (ref 0–0.2)
BASOPHILS NFR BLD AUTO: 0.4 % (ref 0–1.5)
DEPRECATED RDW RBC AUTO: 41.1 FL (ref 37–54)
EOSINOPHIL # BLD AUTO: 0.26 10*3/MM3 (ref 0–0.4)
EOSINOPHIL NFR BLD AUTO: 2.5 % (ref 0.3–6.2)
ERYTHROCYTE [DISTWIDTH] IN BLOOD BY AUTOMATED COUNT: 15.1 % (ref 12.3–15.4)
HCT VFR BLD AUTO: 33.3 % (ref 34–46.6)
HGB BLD-MCNC: 10.9 G/DL (ref 12–15.9)
IMM GRANULOCYTES # BLD AUTO: 0.06 10*3/MM3 (ref 0–0.05)
IMM GRANULOCYTES NFR BLD AUTO: 0.6 % (ref 0–0.5)
LYMPHOCYTES # BLD AUTO: 2.81 10*3/MM3 (ref 0.7–3.1)
LYMPHOCYTES NFR BLD AUTO: 26.9 % (ref 19.6–45.3)
MCH RBC QN AUTO: 25.2 PG (ref 26.6–33)
MCHC RBC AUTO-ENTMCNC: 32.7 G/DL (ref 31.5–35.7)
MCV RBC AUTO: 77.1 FL (ref 79–97)
MONOCYTES # BLD AUTO: 0.52 10*3/MM3 (ref 0.1–0.9)
MONOCYTES NFR BLD AUTO: 5 % (ref 5–12)
NEUTROPHILS NFR BLD AUTO: 6.74 10*3/MM3 (ref 1.7–7)
NEUTROPHILS NFR BLD AUTO: 64.6 % (ref 42.7–76)
NRBC BLD AUTO-RTO: 0 /100 WBC (ref 0–0.2)
PLATELET # BLD AUTO: 340 10*3/MM3 (ref 140–450)
PMV BLD AUTO: 10.1 FL (ref 6–12)
RBC # BLD AUTO: 4.32 10*6/MM3 (ref 3.77–5.28)
WBC NRBC COR # BLD: 10.43 10*3/MM3 (ref 3.4–10.8)

## 2023-06-07 PROCEDURE — 83013 H PYLORI (C-13) BREATH: CPT

## 2023-06-07 PROCEDURE — 85025 COMPLETE CBC W/AUTO DIFF WBC: CPT

## 2023-06-07 NOTE — PROGRESS NOTES
"Chief Complaint   Patient presents with    Annual Exam       Subjective     Eliz Eisenberg is a 32 y.o. female.        History of Present Illness  The patient is being seen for a health maintenance evaluation.  The last health maintenance was 1 year(s) ago.     Social History  Eliz  does not smoke cigarettes.   She drinks rare alcohol.  She does not use illicit drugs.     General History  Eliz  does have regular dental visits.  She does complain of vision problems. Wears glasses. Last eye exam was 2022.  Immunizations are up to date. The patient needs the following immunizations: none     Lifestyle  Eliz  consumes in general, a \"healthy\" diet  .  She exercises 2-3 times a week.      Reproductive Health  Eliz  is premenopausal.  She reports periods are irregular.  She is sexually active. Her contraceptive plan is no method.     Screening  Last pap was 2/2021 by Dr. Ayers, sees gyn. History of abnormal pap smear or family history of gyn cancer: none  Last mammogram was  never. Personal or family history of abnormal mammograms or breast cancer: maternal GM with breast cancer age 80  Last colonoscopy was 2016, repeat at routine screening. Family history of colon cancer: paternal GM with colon cancer  Last DEXA was never.      Ms. Eliz Eisenberg is a 32-year-old female who presents for a healthcare maintenance annual physical.    She finally saw bariatrics. She went through a lot of tests with them. She inquires if she would write a letter of recommendation for them because they want her to have a gastric sleeve. She lost 8 to 10 pounds every time she did that. Once she stopped, she gained all back. She has tried Weight Watchers, [place], and a low carb diet. She tends to lose weight with anything she does, but it does not stay off. The moment she stops eating healthy, she gains it back. She has not been able to tolerate much recently with H. pylori. She has increased high protein in food such as " "grilled chicken breast. She does not eat fried foods because she cannot tolerate them without a gallbladder. She eats a lot of salads. She does not eat past 8:00 PM or 8:30 PM. Anything she does right now does not seem to do anything. She does not like the way she looks.    She has done a pulmonary function test. It was abnormal. She was referred to a pulmonologist for that. She has a repeat of that at the end of 06/2023. Rheumatology must sign off on it. They found H. pylori. She must do a breath test today in the lab to see if it is gone. She was told that she had an actual infection. She was told that they were going to test for the antibodies and then they called her back and said they would put her on 2 antibiotics because she had an infection. It \"wrecked\" her stomach.    She does not have a yeast infection now. She took a dose of fluconazole and it helped.     She is still doing the same job. It is getting better. It is not as stressful as last year. Her mother passed away at the end of 01/2023.      Current Outpatient Medications:     albuterol sulfate  (90 Base) MCG/ACT inhaler, Inhale 2 puffs Every 4 (Four) Hours As Needed for Wheezing., Disp: 18 g, Rfl: 2    aspirin 81 MG EC tablet, Take 1 tablet by mouth Daily., Disp: , Rfl:     budesonide-formoterol (Symbicort) 160-4.5 MCG/ACT inhaler, Inhale 2 puffs 2 (Two) Times a Day., Disp: 10.2 g, Rfl: 1    cetirizine (zyrTEC) 5 MG tablet, Take 1 tablet by mouth Daily., Disp: , Rfl:     fluticasone (Flonase) 50 MCG/ACT nasal spray, 2 sprays into the nostril(s) as directed by provider Daily., Disp: 16 g, Rfl: 5    gabapentin (NEURONTIN) 300 MG capsule, Take 1 capsule by mouth Daily., Disp: , Rfl:     hydrOXYzine (ATARAX) 25 MG tablet, Take 1 tablet by mouth 3 (Three) Times a Day As Needed for Anxiety., Disp: 30 tablet, Rfl: 0    meloxicam (MOBIC) 15 MG tablet, Take 1 tablet by mouth Daily., Disp: , Rfl:     metFORMIN (GLUCOPHAGE) 500 MG tablet, TAKE TWO " TABLETS BY MOUTH EVERY MORNING AND TAKE ONE TABLET BY MOUTH EVERY EVENING WITH MEALS, Disp: 270 tablet, Rfl: 3    montelukast (SINGULAIR) 10 MG tablet, TAKE ONE TABLET BY MOUTH ONCE NIGHTLY, Disp: 90 tablet, Rfl: 1    mupirocin (BACTROBAN) 2 % ointment, APPLY TO AFFECTED AREA(S) THREE TIMES A DAY (Patient taking differently: Apply 1 application topically to the appropriate area as directed Daily.), Disp: 22 g, Rfl: 0    nystatin (MYCOSTATIN) 161845 UNIT/GM ointment, Apply 1 application topically to the appropriate area as directed 2 (Two) Times a Day., Disp: 30 g, Rfl: 3    omeprazole (priLOSEC) 20 MG capsule, TAKE ONE CAPSULE BY MOUTH TWICE A DAY FOR 14 DAYS (Patient taking differently: 1 capsule.), Disp: 28 capsule, Rfl: 0    ondansetron ODT (ZOFRAN-ODT) 4 MG disintegrating tablet, Place 1 tablet on the tongue Every 6 (Six) Hours As Needed for Nausea or Vomiting., Disp: 8 tablet, Rfl: 0    promethazine (PHENERGAN) 25 MG tablet, TAKE ONE TABLET BY MOUTH EVERY 4 HOURS AS NEEDED FOR NAUSEA (Patient taking differently: Take 1 tablet by mouth Every 6 (Six) Hours As Needed for Nausea.), Disp: 10 tablet, Rfl: 0    SUMAtriptan (IMITREX) 100 MG tablet, Take 1 tablet by mouth As Needed., Disp: , Rfl:     topiramate (TOPAMAX) 25 MG tablet, Take 1 tablet by mouth Daily., Disp: , Rfl:     vitamin D (ERGOCALCIFEROL) 1.25 MG (33291 UT) capsule capsule, TAKE ONE CAPSULE BY MOUTH ONCE WEEKLY (Patient taking differently: Take 1 capsule by mouth Every 7 (Seven) Days. sundays), Disp: 12 capsule, Rfl: 0     PMFSH  The following portions of the patient's history were reviewed and updated as appropriate: allergies, current medications, past family history, past medical history, past social history, past surgical history, and problem list.    Review of Systems   Constitutional:  Negative for appetite change, fever and unexpected weight change.   HENT:  Negative for ear pain, facial swelling and sore throat.    Eyes:  Negative for pain  "and visual disturbance.   Respiratory:  Negative for chest tightness, shortness of breath and wheezing.    Cardiovascular:  Negative for chest pain and palpitations.   Gastrointestinal:  Negative for abdominal pain and blood in stool.   Endocrine: Negative.    Genitourinary:  Negative for difficulty urinating and hematuria.   Musculoskeletal:  Negative for joint swelling.   Neurological:  Negative for dizziness, tremors, seizures, syncope and headaches.   Hematological:  Negative for adenopathy.   Psychiatric/Behavioral: Negative.       Objective   /82   Pulse 82   Temp 97.1 °F (36.2 °C)   Ht 167.6 cm (66\")   Wt (!) 152 kg (335 lb)   SpO2 99%   BMI 54.07 kg/m²     Physical Exam  Vitals and nursing note reviewed.   Constitutional:       General: She is not in acute distress.     Appearance: She is well-developed. She is not diaphoretic.   HENT:      Head: Normocephalic and atraumatic. Hair is normal.      Right Ear: Hearing, tympanic membrane, ear canal and external ear normal. No decreased hearing noted. No drainage.      Left Ear: Hearing, tympanic membrane, ear canal and external ear normal. No decreased hearing noted.      Nose: No nasal deformity.   Eyes:      General: Lids are normal. Lids are everted, no foreign bodies appreciated.         Right eye: No discharge.         Left eye: No discharge.      Conjunctiva/sclera: Conjunctivae normal.      Pupils: Pupils are equal, round, and reactive to light.   Neck:      Thyroid: No thyromegaly.      Vascular: No JVD.      Trachea: No tracheal deviation.   Cardiovascular:      Rate and Rhythm: Normal rate and regular rhythm.      Pulses: Normal pulses.      Heart sounds: Normal heart sounds. No murmur heard.    No friction rub. No gallop.   Pulmonary:      Effort: Pulmonary effort is normal. No respiratory distress.      Breath sounds: Normal breath sounds. No wheezing or rales.   Chest:      Chest wall: No tenderness.   Abdominal:      General: Bowel " sounds are normal. There is no distension.      Palpations: Abdomen is soft. There is no mass.      Tenderness: There is no abdominal tenderness. There is no guarding or rebound.      Hernia: No hernia is present.   Musculoskeletal:         General: No tenderness or deformity. Normal range of motion.      Cervical back: Normal range of motion and neck supple.   Lymphadenopathy:      Cervical: No cervical adenopathy.   Skin:     General: Skin is warm and dry.      Findings: No erythema or rash.   Neurological:      Mental Status: She is alert and oriented to person, place, and time.      Cranial Nerves: No cranial nerve deficit.      Motor: No abnormal muscle tone.      Coordination: Coordination normal.      Deep Tendon Reflexes: Reflexes are normal and symmetric. Reflexes normal.   Psychiatric:         Behavior: Behavior normal.         Thought Content: Thought content normal.         Judgment: Judgment normal.            ASSESSMENT/PLAN    Diagnoses and all orders for this visit:    1. Health care maintenance (Primary)  Assessment & Plan:  Immunizations: up to date  Eye exam: done 2022  Pap Smear: done 2/2021 by Dr Ayers  Mammogram: due age 40  Dexa: due postmenopausal  Colonoscopy: due age 45  Labs: fasting labs ordered    Counseled patient regarding multimodal approach with healthy nutrition, healthy sleep, regular physical activity, social activities, counseling, safety measures and medications.       2. Anemia, unspecified type  -     CBC & Differential; Future    Other orders  -     Pneumococcal Conjugate Vaccine 20-Valent (PCV20)             No follow-ups on file.    Transcribed from ambient dictation for VINNIE James by Uma Arce.  06/07/23   14:06 EDT    Patient or patient representative verbalized consent to the visit recording.  I have personally performed the services described in this document as transcribed by the above individual, and it is both accurate and complete.

## 2023-06-09 LAB — UREA BREATH TEST QL: NEGATIVE

## 2023-06-09 NOTE — ASSESSMENT & PLAN NOTE
Immunizations: up to date  Eye exam: done 2022  Pap Smear: done 2/2021 by Dr Ayers  Mammogram: due age 40  Dexa: due postmenopausal  Colonoscopy: due age 45  Labs: fasting labs ordered    Counseled patient regarding multimodal approach with healthy nutrition, healthy sleep, regular physical activity, social activities, counseling, safety measures and medications.

## 2023-06-13 RX ORDER — OMEPRAZOLE 20 MG/1
CAPSULE, DELAYED RELEASE ORAL
Qty: 28 CAPSULE | Refills: 0 | Status: SHIPPED | OUTPATIENT
Start: 2023-06-13

## 2023-06-27 PROBLEM — Z01.811 PREOPERATIVE RESPIRATORY EXAMINATION: Status: ACTIVE | Noted: 2023-06-27

## 2023-07-26 ENCOUNTER — OFFICE VISIT (OUTPATIENT)
Dept: BEHAVIORAL HEALTH | Facility: CLINIC | Age: 33
End: 2023-07-26
Payer: COMMERCIAL

## 2023-07-26 VITALS
SYSTOLIC BLOOD PRESSURE: 128 MMHG | HEIGHT: 66 IN | HEART RATE: 96 BPM | DIASTOLIC BLOOD PRESSURE: 88 MMHG | BODY MASS INDEX: 47.09 KG/M2 | WEIGHT: 293 LBS

## 2023-07-26 DIAGNOSIS — F43.23 ADJUSTMENT DISORDER WITH MIXED ANXIETY AND DEPRESSED MOOD: Primary | ICD-10-CM

## 2023-07-26 RX ORDER — BUSPIRONE HYDROCHLORIDE 10 MG/1
10 TABLET ORAL 2 TIMES DAILY
Qty: 60 TABLET | Refills: 0 | Status: SHIPPED | OUTPATIENT
Start: 2023-07-26

## 2023-07-26 NOTE — PROGRESS NOTES
New Patient Office Visit      Patient Name: Eliz Eisenberg  : 1990   MRN: [id number]     Referring Provider: Allison Pham PA    Chief Complaint:      ICD-10-CM ICD-9-CM   1. Adjustment disorder with mixed anxiety and depressed mood  F43.23 309.28        History of Present Illness:   Eliz Eisenberg is a 33 y.o. female who is here today for evaluation and medication management. She reports that about 3 weeks ago, she woke up with a really high heart rate and feeling anxious. She reports that she tried to continue to get up and go and while out, her  noticed that her lips were blue. She reports that was having increased pulse, chest tightness and shortness of breath. She reports that she had a cardiac work up at the ER and was cleared, but PCP provided heart monitor and follow up with cardiology. She reports that she is unsure if her symptoms are anxiety related. She reports that in middle and high school she had panic attacks, but she was able to cope and work through them. She reports that in January she got a call from her dad, that her  mom was headed to the hospital. She reports that this was not uncommon and her mom had been hospitalized before for various illnesses. She reports that this time her mom had sudden cardiac arrest and they lost her within one hour, and she did not get to say goodbye to her mom. She reports that not long after that her dad had some significant cardiac symptoms, but had a work up and was cleared. She reports that she has had a lot going on at work, and feels like she has not fully grieved losing her mom. She reports that recently she had a falling out with her close friend of 8 years, because her friend said that she couldn't talk to her because she was so depressed. She reports that she has always been really close with family and she and her sister have struggled with trying to comfort their dad, but getting on his nerves because they were trying  to be around him too much. She reports that she feels like she has to bear the weight of the family. She reports that she can get very angry and scream at her , and gets frustrated easily with things, but this is all new since her mom has passed away. She reports that she doesn't have a good relationship with her mom's sister, because she treated maternal grandfather bad, and also sees her treating her grandmother bad too.    Current Treatments: planning to have bariatric surgery, cardiac monitor in place, to see cardiology  Medications: see patient medication record on file  Therapy: refer    Subjective      Review of Systems:   Review of Systems   Constitutional:  Negative for appetite change and unexpected weight change.   Eyes:  Negative for visual disturbance.   Respiratory:  Positive for chest tightness and shortness of breath.         Not currently having these symptoms, but has had and also history of asthma   Cardiovascular:  Positive for palpitations. Negative for chest pain.        Not currently, but within the last 3 weeks   Musculoskeletal:  Positive for arthralgias. Negative for gait problem.        Diagnosed with rheumatoid arthritis   Skin:  Negative for rash and wound.        History of psoriasis   Neurological:  Negative for dizziness, tremors, seizures, weakness and light-headedness.   Psychiatric/Behavioral:  Positive for dysphoric mood and sleep disturbance. Negative for agitation, behavioral problems, confusion, decreased concentration, hallucinations, self-injury and suicidal ideas. The patient is nervous/anxious. The patient is not hyperactive.    Currently wearing heart monitor, will see cardiologist 2nd week in August, high bp    Sleep pattern: can fall asleep, usually on couch, then goes to bed, tosses and turns all night, then can't get back to sleep, has had nightmares, but this is not new, gets 5-6 hours, feels tired all day, naps occasionally, sometimes 30 min-1.5  hours  Appetite: no recent weight changes, not hungry, recently eating 1 meal per day, has never eaten breakfast     Past Medical History:   Past Medical History:   Diagnosis Date    Abdominal pain     Acute sinusitis     Allergic     Allergic rhinitis     Ankle sprain     Anxiety     Arthritis 2017    On Mobic daily    Asthma     Atopic dermatitis     Biliary dyskinesia     Cat bite      resolved    Clotting disorder     Current tear of meniscus     Dysfunctional uterine bleeding     Eczema     Foot pain     GERD (gastroesophageal reflux disease)     Omeprazole daily. remote EGD. No hx of h pylori    Gout     Hearing loss, bilateral     R/T PSORASIS    Helicobacter pylori antibody positive     4/26/23. Rx prevpak    IBS (irritable bowel syndrome)     Influenza     Metabolic syndrome     Migraines     Muscular aches     Obesity     Panic attack     PCOS (polycystic ovarian syndrome)     Pharyngitis     Psoriasis     planning to start Taltz    Psoriasis     Restless leg syndrome     URI (upper respiratory infection)     Wears glasses        Past Surgical History:   Past Surgical History:   Procedure Laterality Date    COLONOSCOPY      ENDOSCOPY N/A 5/15/2023    Procedure: ESOPHAGOGASTRODUODENOSCOPY;  Surgeon: Beatriz Sandy MD;  Location:  [facility]COPY;  Service: Bariatric;  Laterality: N/A;    LAPAROSCOPIC CHOLECYSTECTOMY  2018    no gallstones    TONSILLECTOMY AND ADENOIDECTOMY N/A 07/09/2021    Procedure: TONSILLECTOMY;  Surgeon: Ran Lemon MD;  Location:  [facility] OR;  Service: ENT;  Laterality: N/A;    UMBILICAL HERNIA REPAIR  2018    w/ cholecystectomy    WISDOM TOOTH EXTRACTION         Family History:   Family History   Problem Relation Age of Onset    Heart disease Mother     Multiple sclerosis Mother     Diabetes Mother     Arthritis Mother     Asthma Mother     Early death Mother     Kidney disease Mother     Hypertension Father     Obesity Father     Diabetes Father     Heart attack  Father     Sleep apnea Father     No Known Problems Sister     Cancer Maternal Grandmother         Breast    Breast cancer Maternal Grandmother     Diabetes Maternal Grandmother     Asthma Maternal Grandfather     Diabetes Maternal Grandfather     Sleep apnea Maternal Grandfather     Colon cancer Paternal Grandmother     Cancer Paternal Grandmother         COLON    Heart attack Paternal Grandfather     Sleep apnea Paternal Grandfather     Obesity Paternal Grandfather     Hypertension Paternal Grandfather        Family Psychiatric History:  Maternal grandmother with dementia and forgets that her daughter has passed, so they have to discuss it multiple times with her  Paternal grandmother losing memory  Mom diagnosed with depression    Screening Scores:   PHQ-9 : 16  ABIODUN-7 : 19    Psychiatric History:     Previous medications: hydroxyzine a few years ago, started with panic attacks, would take one and it would help, but it would make her sleepy  Inpatient admissions: denies  History of suicide/self harm attempts: denies  Trauma/Abuse History: denies  Developmental History: on target    RISK ASSESSMENT:    Does patient have intent for suicide? denies  Does patient have thoughts of suicide? denies  Does patient have a current plan for suicide? denies  Access to firearms or weapons: two guns, locked up  History of suicide attempts: denies  History of homicidal ideation: denies  Family history of suicide or attempts: denies  Risk Taking/Impulsive Behavior (current or past): denies Describe: None   Protective factors: referred for bariatric surgery, wants to have kids in the future    Social History:    Highest level of education obtained: college, assoc  degree in education, cda  Living situation:   Patient's Occupation:  for  center  Leisure and Recreation:  [place], [employer] airplane, movies, camping  Support system: , sister and dad  Illicit substance use: denies  Alcohol  use: wine maybe once a month  Tobacco use: denies  Caffeine: occasionally    Legal History:   No legal history noted today.     Medications:     Current Outpatient Medications:     albuterol sulfate  (90 Base) MCG/ACT inhaler, Inhale 2 puffs Every 4 (Four) Hours As Needed for Wheezing., Disp: 18 g, Rfl: 2    aspirin 81 MG EC tablet, Take 1 tablet by mouth Daily., Disp: , Rfl:     cetirizine (zyrTEC) 5 MG tablet, Take 1 tablet by mouth Daily., Disp: , Rfl:     fluocinonide (LIDEX) 0.05 % external solution, , Disp: , Rfl:     fluticasone (Flonase) 50 MCG/ACT nasal spray, 2 sprays into the nostril(s) as directed by provider Daily., Disp: 16 g, Rfl: 5    Fluticasone-Umeclidin-Vilant (Trelegy Ellipta) 100-62.5-25 MCG/ACT inhaler, Inhale 1 puff Daily. NDC 6449-5900-45.  Expires November 2024.  Lot #10 RV 5L, Disp: 1 each, Rfl: 0    gabapentin (NEURONTIN) 300 MG capsule, Take 1 capsule by mouth Daily., Disp: , Rfl:     hydrOXYzine (ATARAX) 25 MG tablet, Take 1 tablet by mouth 3 (Three) Times a Day As Needed for Anxiety., Disp: 30 tablet, Rfl: 0    meloxicam (MOBIC) 15 MG tablet, Take 1 tablet by mouth Daily., Disp: , Rfl:     metFORMIN (GLUCOPHAGE) 500 MG tablet, TAKE TWO TABLETS BY MOUTH EVERY MORNING AND TAKE ONE TABLET BY MOUTH EVERY EVENING WITH MEALS, Disp: 270 tablet, Rfl: 3    montelukast (SINGULAIR) 10 MG tablet, TAKE ONE TABLET BY MOUTH ONCE NIGHTLY, Disp: 90 tablet, Rfl: 1    mupirocin (BACTROBAN) 2 % ointment, APPLY TO AFFECTED AREA(S) THREE TIMES A DAY (Patient taking differently: Apply 1 application  topically to the appropriate area as directed Daily.), Disp: 22 g, Rfl: 0    nystatin (MYCOSTATIN) 796967 UNIT/GM ointment, Apply 1 application topically to the appropriate area as directed 2 (Two) Times a Day., Disp: 30 g, Rfl: 3    omeprazole (priLOSEC) 20 MG capsule, TAKE ONE CAPSULE BY MOUTH TWICE A DAY, Disp: 28 capsule, Rfl: 0    ondansetron ODT (ZOFRAN-ODT) 4 MG disintegrating tablet, Place 1 tablet  "on the tongue Every 6 (Six) Hours As Needed for Nausea or Vomiting., Disp: 8 tablet, Rfl: 0    promethazine (PHENERGAN) 25 MG tablet, TAKE ONE TABLET BY MOUTH EVERY 4 HOURS AS NEEDED FOR NAUSEA (Patient taking differently: Take 1 tablet by mouth Every 6 (Six) Hours As Needed for Nausea.), Disp: 10 tablet, Rfl: 0    rOPINIRole (REQUIP) 0.5 MG tablet, One at bedtime for restless leg, Disp: , Rfl:     SUMAtriptan (IMITREX) 100 MG tablet, Take 1 tablet by mouth As Needed., Disp: , Rfl:     topiramate (TOPAMAX) 25 MG tablet, Take 1 tablet by mouth Daily., Disp: , Rfl:     triamcinolone (KENALOG) 0.1 % ointment, , Disp: , Rfl:     vitamin D (ERGOCALCIFEROL) 1.25 MG (01623 UT) capsule capsule, TAKE ONE CAPSULE BY MOUTH ONCE WEEKLY (Patient taking differently: Take 1 capsule by mouth Every 7 (Seven) Days. sundays), Disp: 12 capsule, Rfl: 0    busPIRone (BUSPAR) 10 MG tablet, Take 1 tablet by mouth 2 (Two) Times a Day., Disp: 60 tablet, Rfl: 0    Medication Considerations:  MORGAN reviewed and appropriate.      Allergies:   Allergies   Allergen Reactions    Cimzia [Certolizumab Pegol] Hives, Unknown - Low Severity and Headache    Elastic Bandages & [Zinc] Hives    Latex Hives, Itching, Swelling and Rash     Gloves     Tape Hives    Dilantin [Phenytoin] Unknown - Low Severity    Iodine Hives, Swelling and Rash    Pollen Extract Rash    Tree Extract Other (See Comments)     CONGESTION       Objective     Physical Exam:  Vital Signs:   Vitals:    07/26/23 0901   BP: 128/88   Pulse: 96   Weight: (!) 151 kg (333 lb 9.6 oz)   Height: 167.6 cm (66\")     Body mass index is 53.84 kg/m².     Mental Status Exam:   Hygiene:   good  Cooperation:  Cooperative  Eye Contact:  Good  Psychomotor Behavior:  Appropriate  Affect:  Full range  Mood: sad and anxious  Speech:  Normal  Thought Process:  Goal directed and Linear  Thought Content:  Normal  Suicidal:  None  Homicidal:  None  Hallucinations:  None  Delusion:  None  Memory:  " Intact  Orientation:  Person, Place, Time, and Situation  Reliability:  good  Insight:  Good  Judgement:  Good  Impulse Control:  Good  Physical/Medical Issues:  Yes see problem list      Assessment / Plan      Visit Diagnosis/Orders Placed This Visit:  Diagnoses and all orders for this visit:    1. Adjustment disorder with mixed anxiety and depressed mood (Primary)  -     busPIRone (BUSPAR) 10 MG tablet; Take 1 tablet by mouth 2 (Two) Times a Day.  Dispense: 60 tablet; Refill: 0  -     Ambulatory Referral to Behavioral Health         Functional Status: No impairment    Prognosis: Good with Ongoing Treatment     Impression/Formulation:  Patient appeared alert and oriented.  Patient is voluntarily requesting to begin outpatient psychiatric treatment at [facility]. Patient is receptive to assistance with maintaining a stable lifestyle.  Patient presents with history of     ICD-10-CM ICD-9-CM   1. Adjustment disorder with mixed anxiety and depressed mood  F43.23 309.28   Reviewed patient's previous provider notes. Reviewed most recent labs. Patient meets DSM V diagnostic criteria for diagnoses. Diagnoses may be updated as more information becomes available.      Treatment Plan:   Begin buspirone 10 mg twice a day.   Refer to individual therapy  Follow up in 2 weeks or sooner if needed  Patient will continue supportive psychotherapy efforts and medications as indicated. Clinic will obtain release of information for current treatment team for continuity of care as needed. Patient will contact this office, call 911 or present to the nearest emergency room should suicidal or homicidal ideations occur. Discussed medication options and treatment plan of prescribed medication(s) as well as the risks, benefits, and potential side effects. Patient ackowledged and verbally consented to continue with current treatment plan and was educated on the importance of compliance with treatment and follow-up  appointments.     Patient instructions:   Instructed will take 4-6 weeks for full therapeutic effect. Medication risks and side effects discussed with patient including risk for worsening mood, changes in behavior, thoughts of suicide or homicide, serotonin syndrome.   If any thoughts of SI or HI, worsening mood or changes in behavior, call 911 or crisis line [phone], or go to nearest ER at once. Patient agrees to notify close family/friend of new trial of antidepressants/info above. Pt.verbalizes understanding and consents to treatment with this medication.     Follow Up:   Return in about 2 weeks (around 8/9/2023) for Med Check.        COURTNEY Moreland, PMHNP-BC [facility]

## 2023-08-01 ENCOUNTER — HOSPITAL ENCOUNTER (OUTPATIENT)
Dept: CARDIOLOGY | Facility: HOSPITAL | Age: 33
Discharge: HOME | End: 2023-08-01
Admitting: NURSE PRACTITIONER
Payer: COMMERCIAL

## 2023-08-01 ENCOUNTER — OFFICE VISIT (OUTPATIENT)
Dept: BEHAVIORAL HEALTH | Facility: CLINIC | Age: 33
End: 2023-08-01
Payer: COMMERCIAL

## 2023-08-01 DIAGNOSIS — R00.2 PALPITATIONS: ICD-10-CM

## 2023-08-01 DIAGNOSIS — F32.2 CURRENT SEVERE EPISODE OF MAJOR DEPRESSIVE DISORDER WITHOUT PSYCHOTIC FEATURES, UNSPECIFIED WHETHER RECURRENT: ICD-10-CM

## 2023-08-01 DIAGNOSIS — F41.1 GENERALIZED ANXIETY DISORDER: Primary | ICD-10-CM

## 2023-08-01 DIAGNOSIS — F43.21 GRIEF: ICD-10-CM

## 2023-08-01 DIAGNOSIS — R06.02 SHORTNESS OF BREATH: ICD-10-CM

## 2023-08-01 LAB
BH CV ECHO MEAS - AO MAX PG: 7.8 MMHG
BH CV ECHO MEAS - AO MEAN PG: 4.7 MMHG
BH CV ECHO MEAS - AO ROOT DIAM: 2.7 CM
BH CV ECHO MEAS - AO V2 MAX: 139.8 CM/SEC
BH CV ECHO MEAS - AO V2 VTI: 29.1 CM
BH CV ECHO MEAS - AVA(I,D): 2.39 CM2
BH CV ECHO MEAS - EDV(CUBED): 52.3 ML
BH CV ECHO MEAS - EDV(MOD-SP2): 78.7 ML
BH CV ECHO MEAS - EDV(MOD-SP4): 144 ML
BH CV ECHO MEAS - EF(MOD-BP): 55.3 %
BH CV ECHO MEAS - EF(MOD-SP2): 56.9 %
BH CV ECHO MEAS - EF(MOD-SP4): 54.1 %
BH CV ECHO MEAS - ESV(CUBED): 12.8 ML
BH CV ECHO MEAS - ESV(MOD-SP2): 33.9 ML
BH CV ECHO MEAS - ESV(MOD-SP4): 66.1 ML
BH CV ECHO MEAS - FS: 37.4 %
BH CV ECHO MEAS - IVS/LVPW: 0.99 CM
BH CV ECHO MEAS - IVSD: 0.97 CM
BH CV ECHO MEAS - LA DIMENSION: 4.3 CM
BH CV ECHO MEAS - LAT PEAK E' VEL: 11.6 CM/SEC
BH CV ECHO MEAS - LV DIASTOLIC VOL/BSA (35-75): 58 CM2
BH CV ECHO MEAS - LV MASS(C)D: 110.6 GRAMS
BH CV ECHO MEAS - LV MAX PG: 3.3 MMHG
BH CV ECHO MEAS - LV MEAN PG: 2.09 MMHG
BH CV ECHO MEAS - LV SYSTOLIC VOL/BSA (12-30): 26.6 CM2
BH CV ECHO MEAS - LV V1 MAX: 91.3 CM/SEC
BH CV ECHO MEAS - LV V1 VTI: 21.3 CM
BH CV ECHO MEAS - LVIDD: 3.7 CM
BH CV ECHO MEAS - LVIDS: 2.34 CM
BH CV ECHO MEAS - LVOT AREA: 3.3 CM2
BH CV ECHO MEAS - LVOT DIAM: 2.04 CM
BH CV ECHO MEAS - LVPWD: 0.98 CM
BH CV ECHO MEAS - MED PEAK E' VEL: 7.8 CM/SEC
BH CV ECHO MEAS - MV A MAX VEL: 93 CM/SEC
BH CV ECHO MEAS - MV DEC SLOPE: 557.5 CM/SEC2
BH CV ECHO MEAS - MV DEC TIME: 0.2 MSEC
BH CV ECHO MEAS - MV E MAX VEL: 91.7 CM/SEC
BH CV ECHO MEAS - MV E/A: 0.99
BH CV ECHO MEAS - MV MAX PG: 6 MMHG
BH CV ECHO MEAS - MV MEAN PG: 2.7 MMHG
BH CV ECHO MEAS - MV P1/2T: 65.6 MSEC
BH CV ECHO MEAS - MV V2 VTI: 26.3 CM
BH CV ECHO MEAS - MVA(P1/2T): 3.4 CM2
BH CV ECHO MEAS - MVA(VTI): 2.7 CM2
BH CV ECHO MEAS - PA ACC TIME: 0.1 SEC
BH CV ECHO MEAS - PA V2 MAX: 126.9 CM/SEC
BH CV ECHO MEAS - RAP SYSTOLE: 3 MMHG
BH CV ECHO MEAS - RVSP: 26 MMHG
BH CV ECHO MEAS - SI(MOD-SP2): 18 ML/M2
BH CV ECHO MEAS - SI(MOD-SP4): 31.4 ML/M2
BH CV ECHO MEAS - SV(LVOT): 69.7 ML
BH CV ECHO MEAS - SV(MOD-SP2): 44.8 ML
BH CV ECHO MEAS - SV(MOD-SP4): 77.9 ML
BH CV ECHO MEAS - TAPSE (>1.6): 1.99 CM
BH CV ECHO MEAS - TR MAX PG: 17.2 MMHG
BH CV ECHO MEAS - TR MAX VEL: 198.5 CM/SEC
BH CV ECHO MEASUREMENTS AVERAGE E/E' RATIO: 9.45
BH CV XLRA - RV BASE: 3.7 CM
BH CV XLRA - RV LENGTH: 6.8 CM
BH CV XLRA - RV MID: 3.2 CM
BH CV XLRA - TDI S': 13.1 CM/SEC
LEFT ATRIUM VOLUME INDEX: 15.9 ML/M2
LV EF 2D ECHO EST: 55 %

## 2023-08-01 PROCEDURE — 93306 TTE W/DOPPLER COMPLETE: CPT | Performed by: INTERNAL MEDICINE

## 2023-08-01 PROCEDURE — 93306 TTE W/DOPPLER COMPLETE: CPT

## 2023-08-09 ENCOUNTER — OFFICE VISIT (OUTPATIENT)
Dept: BEHAVIORAL HEALTH | Facility: CLINIC | Age: 33
End: 2023-08-09
Payer: COMMERCIAL

## 2023-08-09 VITALS
BODY MASS INDEX: 47.09 KG/M2 | DIASTOLIC BLOOD PRESSURE: 70 MMHG | HEIGHT: 66 IN | HEART RATE: 83 BPM | WEIGHT: 293 LBS | SYSTOLIC BLOOD PRESSURE: 116 MMHG

## 2023-08-09 DIAGNOSIS — F43.23 ADJUSTMENT DISORDER WITH MIXED ANXIETY AND DEPRESSED MOOD: Primary | ICD-10-CM

## 2023-08-09 RX ORDER — BUSPIRONE HYDROCHLORIDE 10 MG/1
10 TABLET ORAL 3 TIMES DAILY
Qty: 90 TABLET | Refills: 0 | Status: SHIPPED | OUTPATIENT
Start: 2023-08-09

## 2023-08-09 NOTE — PROGRESS NOTES
Follow Up Office Visit      Patient Name: Eliz Eisenberg  : 1990   MRN: [id number]     Referring Provider: Allison Pham PA    Chief Complaint:      ICD-10-CM ICD-9-CM   1. Adjustment disorder with mixed anxiety and depressed mood  F43.23 309.28        History of Present Illness:   Eliz Eisenberg is a 33 y.o. female who is here today for follow up for medication management    Subjective      Patient Reports: feels like she can tell a difference. She reports that over the over the weekend she had some highs and lows. She feels like she has had a couple of panic attacks, but  has started therapy and was able to use some coping skills that she gained from her therapy session. Denies SI    Review of Systems:   Review of Systems   Constitutional:  Negative for appetite change and unexpected weight change.   Eyes:  Negative for visual disturbance.   Respiratory:  Positive for chest tightness and shortness of breath.         Not currently having these symptoms, but has had and also history of asthma   Cardiovascular:  Positive for palpitations. Negative for chest pain.        Not currently, but within the last 3 weeks   Musculoskeletal:  Positive for arthralgias. Negative for gait problem.        Diagnosed with rheumatoid arthritis   Skin:  Negative for rash and wound.        History of psoriasis   Neurological:  Negative for dizziness, tremors, seizures, weakness and light-headedness.   Psychiatric/Behavioral:  Positive for dysphoric mood and sleep disturbance. Negative for agitation, behavioral problems, confusion, decreased concentration, hallucinations, self-injury and suicidal ideas. The patient is not hyperactive.    Sleep pattern: no changes  Appetite: no changes     Screening Scores:   PHQ-9 : 21  ABIODUN-7 : 19    RISK ASSESSMENT:  Patient denies any thoughts or intent of suicide today. Patient denies any impulsive behavior today.     Medications:     Current Outpatient Medications:      albuterol sulfate  (90 Base) MCG/ACT inhaler, Inhale 2 puffs Every 4 (Four) Hours As Needed for Wheezing., Disp: 18 g, Rfl: 2    aspirin 81 MG EC tablet, Take 1 tablet by mouth Daily., Disp: , Rfl:     cetirizine (zyrTEC) 5 MG tablet, Take 1 tablet by mouth Daily., Disp: , Rfl:     fluocinonide (LIDEX) 0.05 % external solution, , Disp: , Rfl:     fluticasone (Flonase) 50 MCG/ACT nasal spray, 2 sprays into the nostril(s) as directed by provider Daily., Disp: 16 g, Rfl: 5    Fluticasone-Umeclidin-Vilant (Trelegy Ellipta) 100-62.5-25 MCG/ACT inhaler, Inhale 1 puff Daily. NDC 2089-4945-33.  Expires November 2024.  Lot #10 RV 5L, Disp: 1 each, Rfl: 0    gabapentin (NEURONTIN) 300 MG capsule, Take 1 capsule by mouth Daily., Disp: , Rfl:     hydrOXYzine (ATARAX) 25 MG tablet, Take 1 tablet by mouth 3 (Three) Times a Day As Needed for Anxiety., Disp: 30 tablet, Rfl: 0    meloxicam (MOBIC) 15 MG tablet, Take 1 tablet by mouth Daily., Disp: , Rfl:     metFORMIN (GLUCOPHAGE) 500 MG tablet, TAKE TWO TABLETS BY MOUTH EVERY MORNING AND TAKE ONE TABLET BY MOUTH EVERY EVENING WITH MEALS, Disp: 270 tablet, Rfl: 3    montelukast (SINGULAIR) 10 MG tablet, TAKE ONE TABLET BY MOUTH ONCE NIGHTLY, Disp: 90 tablet, Rfl: 1    mupirocin (BACTROBAN) 2 % ointment, APPLY TO AFFECTED AREA(S) THREE TIMES A DAY (Patient taking differently: Apply 1 application  topically to the appropriate area as directed Daily.), Disp: 22 g, Rfl: 0    nystatin (MYCOSTATIN) 865904 UNIT/GM ointment, Apply 1 application topically to the appropriate area as directed 2 (Two) Times a Day., Disp: 30 g, Rfl: 3    omeprazole (priLOSEC) 20 MG capsule, TAKE ONE CAPSULE BY MOUTH TWICE A DAY, Disp: 28 capsule, Rfl: 0    ondansetron ODT (ZOFRAN-ODT) 4 MG disintegrating tablet, Place 1 tablet on the tongue Every 6 (Six) Hours As Needed for Nausea or Vomiting., Disp: 8 tablet, Rfl: 0    promethazine (PHENERGAN) 25 MG tablet, TAKE ONE TABLET BY MOUTH EVERY 4 HOURS AS  "NEEDED FOR NAUSEA (Patient taking differently: Take 1 tablet by mouth Every 6 (Six) Hours As Needed for Nausea.), Disp: 10 tablet, Rfl: 0    rOPINIRole (REQUIP) 0.5 MG tablet, One at bedtime for restless leg, Disp: , Rfl:     SUMAtriptan (IMITREX) 100 MG tablet, Take 1 tablet by mouth As Needed., Disp: , Rfl:     topiramate (TOPAMAX) 25 MG tablet, Take 1 tablet by mouth Daily., Disp: , Rfl:     triamcinolone (KENALOG) 0.1 % ointment, , Disp: , Rfl:     vitamin D (ERGOCALCIFEROL) 1.25 MG (92561 UT) capsule capsule, TAKE ONE CAPSULE BY MOUTH ONCE WEEKLY (Patient taking differently: Take 1 capsule by mouth Every 7 (Seven) Days. sundays), Disp: 12 capsule, Rfl: 0    busPIRone (BUSPAR) 10 MG tablet, Take 1 tablet by mouth 3 (Three) Times a Day., Disp: 90 tablet, Rfl: 0    Medication Considerations:  MORGAN reviewed and appropriate.      Allergies:   Allergies   Allergen Reactions    Cimzia [Certolizumab Pegol] Hives, Unknown - Low Severity and Headache    Elastic Bandages & [Zinc] Hives    Latex Hives, Itching, Swelling and Rash     Gloves     Tape Hives    Dilantin [Phenytoin] Unknown - Low Severity    Iodine Hives, Swelling and Rash    Pollen Extract Rash    Tree Extract Other (See Comments)     CONGESTION         Objective     Physical Exam:  Vital Signs:   Vitals:    08/09/23 0808   BP: 116/70   Pulse: 83   Weight: (!) 154 kg (340 lb)   Height: 167.6 cm (66\")     Body mass index is 54.88 kg/mý.     Mental Status Exam:   Hygiene:   good  Cooperation:  Cooperative  Eye Contact:  Good  Psychomotor Behavior:  Appropriate  Affect:  Appropriate  Mood: normal  Speech:  Normal  Thought Process:  Goal directed and Linear  Thought Content:  Normal  Suicidal:  None  Homicidal:  None  Hallucinations:  None  Delusion:  None  Memory:  Intact  Orientation:  Person, Place, Time, and Situation  Reliability:  good  Insight:  Good  Judgement:  Good  Impulse Control:  Good  Physical/Medical Issues:   see problem list on file  "     Assessment / Plan      Visit Diagnosis/Orders Placed This Visit:  Diagnoses and all orders for this visit:    1. Adjustment disorder with mixed anxiety and depressed mood (Primary)  -     busPIRone (BUSPAR) 10 MG tablet; Take 1 tablet by mouth 3 (Three) Times a Day.  Dispense: 90 tablet; Refill: 0         Functional Status: No impairment    Prognosis: Good with Ongoing Treatment     Impression/Formulation:  Patient appeared alert and oriented.  Patient is voluntarily requesting to continue outpatient psychiatric treatment at [facility].  Patient is receptive to assistance with maintaining a stable lifestyle.  Patient presents with history of     ICD-10-CM ICD-9-CM   1. Adjustment disorder with mixed anxiety and depressed mood  F43.23 309.28   Reviewed patient's previous provider notes. Patient meets DSM V diagnostic criteria for diagnoses. Diagnoses may be updated as more information becomes available.      Treatment Plan:   Increase buspar 10 mg to 3 times a day  Continue individual therapy  Follow up in 1 month or sooner if needed  Patient will continue supportive psychotherapy efforts and medications as indicated. Clinic will obtain release of information for current treatment team for continuity of care as needed. Patient will contact this office, call 911 or present to the nearest emergency room should suicidal or homicidal ideations occur.  Discussed medication options and treatment plan of prescribed medication(s) as well as the risks, benefits, and potential side effects. Patient ackowledged and verbally consented to continue with current treatment plan and was educated on the importance of compliance with treatment and follow-up appointments.     Patient instructions:  Instructed will take 4-6 weeks for full therapeutic effect. Medication risks and side effects discussed with patient including risk for worsening mood, changes in behavior, thoughts of suicide or homicide.   If any  thoughts of SI or HI, worsening mood or changes in behavior, call 911 or crisis line [phone], or go to nearest ER at once. Pt.verbalizes understanding and consents to treatment with this medication.     Follow Up:   Return in about 1 month (around 9/9/2023) for Med Check.        COURTNEY Moreland, PMHNP-BC [facility]

## 2023-08-21 ENCOUNTER — OFFICE VISIT (OUTPATIENT)
Dept: CARDIOLOGY | Facility: HOSPITAL | Age: 33
End: 2023-08-21
Payer: COMMERCIAL

## 2023-08-21 VITALS
HEIGHT: 66 IN | SYSTOLIC BLOOD PRESSURE: 147 MMHG | DIASTOLIC BLOOD PRESSURE: 71 MMHG | RESPIRATION RATE: 18 BRPM | HEART RATE: 88 BPM | TEMPERATURE: 97.2 F | OXYGEN SATURATION: 100 % | WEIGHT: 293 LBS | BODY MASS INDEX: 47.09 KG/M2

## 2023-08-21 DIAGNOSIS — R07.9 CHEST PAIN, UNSPECIFIED TYPE: Primary | ICD-10-CM

## 2023-08-21 DIAGNOSIS — R03.0 ELEVATED BLOOD PRESSURE READING: ICD-10-CM

## 2023-08-21 DIAGNOSIS — E66.01 OBESITY, MORBID, BMI 50 OR HIGHER: ICD-10-CM

## 2023-08-21 DIAGNOSIS — F43.23 ADJUSTMENT DISORDER WITH MIXED ANXIETY AND DEPRESSED MOOD: ICD-10-CM

## 2023-08-21 PROCEDURE — 99215 OFFICE O/P EST HI 40 MIN: CPT | Performed by: NURSE PRACTITIONER

## 2023-08-21 RX ORDER — BUSPIRONE HYDROCHLORIDE 10 MG/1
TABLET ORAL
Qty: 60 TABLET | OUTPATIENT
Start: 2023-08-21

## 2023-08-21 NOTE — PATIENT INSTRUCTIONS
- Office will call to schedule testing  - Office will call or send message with testing results    - DASh diet    - Cardiology will call to schedule an appointment

## 2023-08-21 NOTE — PROGRESS NOTES
Chief Complaint  Shortness of Breath/chest pain    Subjective      History of Present Illness {CC  Problem List  Visit  Diagnosis   Encounters  Notes  Medications  Labs  Result Review Imaging  Media :23}     Eliz Eisenberg, 33 y.o. female with asthma presents to [facility] for chest pain and shortness of Breath.    Since previous evaluation patient completed echocardiogram that revealed normal LV systolic function, cardiac valves structurally normal.  Holter monitor with preliminary diagnostic time 12 days, 16 hours.  Preliminary data with no evidence atrial fibrillation/flutter, AV block/pauses, VT.  1 short SVT longest 6 beats.  Low burden PAC/PVC less than 1%. Patient trigerred events appear to correlate predominately with sinus tachycardia/NSR.     Presents today with still continued intermittent chest pain at rest and with activity. Still with intermittent rapid heartbeat; heart rate up to 116 during episodes, no syncope. Occasional chest pain when she is active, as well as at rest. Recent initiation of buspar helping with anxiety. -140s, heart rate generally 80-90s. Rare use of albuterol rescue inhaler. Mother with cardiac arrest at age 59, unknown cause but potential cardiac etiology reported. Planning for gastric sleeve in the fall.       Initial evaluation:  Patient was recently evaluated at [facility] emergency department for complaints of chest pain and shortness of breath.  Emergency department work-up revealed normal troponin/d-dimer/BNP, CXR with no acute cardiopulmonary findings, and ECGs with no evidence of acute coronary syndrome. She was found to be positive for  rhinovirus on respiratory panel. Patient was instructed to follow-up at [facility] heart and valve clinic for further evaluation.    Patient presents today with continued shortness of breath and chest pressure. Previously active without chest pressure before recent  "illness.   Similar symptoms as previous asthma exacerbations, but this is more severe. Did experience dizziness but no syncope. Stays well hydrated with water at work. Does note elevated heart rate on Apple Watch. PCP adjusted inhaler at recent visit approximately 2 days ago. Patient is a non-smoker, no diabetes. No immediate family history of CAD, but mother with cardiac arrest at age 59 possibly related to arrhythmia.  Recently purchased home BP cuff for home monitoring after elevated in ED.      Objective     Vital Signs:   Vitals:    08/21/23 0839   BP: 147/71   BP Location: Left arm   Patient Position: Sitting   Cuff Size: Large Adult   Pulse: 88   Resp: 18   Temp: 97.2 øF (36.2 øC)   TempSrc: Temporal   SpO2: 100%   Weight: (!) 153 kg (336 lb 12.8 oz)   Height: 167.6 cm (66\")     Body mass index is 54.36 kg/mý.  Physical Exam  Vitals and nursing note reviewed.   Constitutional:       Appearance: Normal appearance.   HENT:      Head: Normocephalic.   Eyes:      Extraocular Movements: Extraocular movements intact.   Neck:      Vascular: No carotid bruit.   Cardiovascular:      Rate and Rhythm: Normal rate and regular rhythm.      Pulses: Normal pulses.      Heart sounds: Normal heart sounds, S1 normal and S2 normal. No murmur heard.  Pulmonary:      Effort: Pulmonary effort is normal. No respiratory distress.      Breath sounds: Normal breath sounds.   Musculoskeletal:      Cervical back: Neck supple.      Right lower leg: No edema.      Left lower leg: No edema.   Skin:     General: Skin is warm and dry.   Neurological:      General: No focal deficit present.      Mental Status: She is alert.   Psychiatric:         Mood and Affect: Mood normal.         Behavior: Behavior normal.         Thought Content: Thought content normal.      Data Reviewed:{ Labs  Result Review  Imaging  Med Tab  Media :23}     High Sensitivity Troponin T 2Hr (07/04/2023 21:54)  High Sensitivity Troponin T (07/04/2023 20:00)  D-dimer, " Quantitative (07/04/2023 20:00)  BNP (07/04/2023 20:00)  Comprehensive Metabolic Panel (07/04/2023 20:00)  CBC & Differential (07/04/2023 20:00)  XR Chest 1 View (07/04/2023 19:34)   ECG 12 Lead Chest Pain (07/04/2023 18:59)   ECG 12 Lead Chest Pain (07/04/2023 18:59)       Assessment & Plan   Assessment and Plan {CC Problem List  Visit Diagnosis  ROS  Review (Popup)  Health Maintenance  Quality  BestPractice  Medications  SmartSets  SnapShot Encounters  Media :23}     1. Chest pain  -Mixed symptoms of atypical and typical chest pain. Symptom onset with viral illness  -Negative d-dimer, cardiac enzyme at previous ED evaluation  -TTE with normal LV function, normal peicardium  -Given her continued intermittent symptoms will complete PET myocardial perfusion study for ischemic evaluation  -Referral to cardiology for ongoing symptoms and family hx of SCD.     2. Shortness of breath  -Continue intermittent symptoms. Does have a history of asthma, but rare inhaler use  -BNP normal at ED  -TTE as above with normal LV function, normal cardiac valve structure  - PET myocardial perfusion study as above for ischemic evaluation.   -Continue working with PCP on asthma medication adjustment    3. Palpitations  -Elevated heart rate in setting of previous viral illness. Denies syncope  -Preliminary Holter monitor with no significant arrhythmia, low burden PAC/PVC as listed in HPI  -TTE as above    4. Elevated blood pressure  -Elevated BP at recent ED evaluation, elevated on home monitoring with -140s  -Discussed medication initiation versus diet focus; prefers to focus on dietary changes and continue to monitor for now  -Continue with plan to monitor home BP, maintain BP log prior to cardiology appointment.  -Discussed monitor for sleep apnea symptoms       Follow Up {Instructions Charge Capture  Follow-up Communications :23}     Return if symptoms worsen or fail to improve.    Time Spent: I spent 42 minutes  caring for Eliz Eisenberg on this date of service. This time includes time spent by me in the following activities: preparing for the visit, reviewing tests, obtaining and/or reviewing a separately obtained history, performing a medically appropriate examination and/or evaluation, counseling and educating the patient/family/caregiver, documenting information in the medical record and independently interpreting results and communicating that information with the patient/family/caregiver. All time noted occurred on the date of service.        Patient was given instructions and counseling regarding her condition or for health maintenance advice. Please see specific information pulled into the AVS if appropriate.  Patient was instructed to call the [facility] with any questions, concerns, or worsening symptoms.    Dictated Utilizing Dragon Dictation   Please note that portions of this note were completed with a voice recognition program.   Part of this note may be an electronic transcription/translation of spoken language to printed text using the Dragon Dictation System.

## 2023-08-23 DIAGNOSIS — R53.83 FATIGUE, UNSPECIFIED TYPE: Primary | ICD-10-CM

## 2023-08-23 DIAGNOSIS — R06.00 DYSPNEA, UNSPECIFIED TYPE: ICD-10-CM

## 2023-08-24 ENCOUNTER — OFFICE VISIT (OUTPATIENT)
Dept: BEHAVIORAL HEALTH | Facility: CLINIC | Age: 33
End: 2023-08-24
Payer: COMMERCIAL

## 2023-08-24 DIAGNOSIS — F41.1 GENERALIZED ANXIETY DISORDER: Primary | ICD-10-CM

## 2023-08-24 DIAGNOSIS — F32.2 CURRENT SEVERE EPISODE OF MAJOR DEPRESSIVE DISORDER WITHOUT PSYCHOTIC FEATURES, UNSPECIFIED WHETHER RECURRENT: ICD-10-CM

## 2023-08-24 NOTE — PROGRESS NOTES
[facility] [place]                 Follow Up Adult      Follow Up Adult Note     Date:2023   Patient Name: Eliz Eisenberg  : 1990   MRN: [id number]   Time IN: 3:06 pm       Time OUT: 3:50 pm     Referring Provider: Allison Pham PA    Chief Complaint:      ICD-10-CM ICD-9-CM   1. Generalized anxiety disorder  F41.1 300.02   2. Current severe episode of major depressive disorder without psychotic features, unspecified whether recurrent  F32.2 296.23        History of Present Illness:   Eliz Eisenberg is a 33 y.o. female who is being seen today for follow up counseling for anxiety and depression.    Subjective               Patient's Support Network Includes: extended family    Functional Status: Moderate impairment       Current Outpatient Medications:     albuterol sulfate  (90 Base) MCG/ACT inhaler, Inhale 2 puffs Every 4 (Four) Hours As Needed for Wheezing., Disp: 18 g, Rfl: 2    aspirin 81 MG EC tablet, Take 1 tablet by mouth Daily., Disp: , Rfl:     busPIRone (BUSPAR) 10 MG tablet, Take 1 tablet by mouth 3 (Three) Times a Day., Disp: 90 tablet, Rfl: 0    cetirizine (zyrTEC) 5 MG tablet, Take 1 tablet by mouth Daily., Disp: , Rfl:     fluocinonide (LIDEX) 0.05 % external solution, , Disp: , Rfl:     fluticasone (Flonase) 50 MCG/ACT nasal spray, 2 sprays into the nostril(s) as directed by provider Daily., Disp: 16 g, Rfl: 5    Fluticasone-Umeclidin-Vilant (Trelegy Ellipta) 100-62.5-25 MCG/ACT inhaler, Inhale 1 puff Daily. NDC 1638-5540-32.  Expires 2024.  Lot #[id number], Disp: 1 each, Rfl: 0    gabapentin (NEURONTIN) 300 MG capsule, Take 1 capsule by mouth Daily., Disp: , Rfl:     hydrOXYzine (ATARAX) 25 MG tablet, Take 1 tablet by mouth 3 (Three) Times a Day As Needed for Anxiety., Disp: 30 tablet, Rfl: 0    meloxicam (MOBIC) 15 MG tablet, Take 1 tablet by mouth Daily., Disp: , Rfl:     metFORMIN (GLUCOPHAGE) 500 MG tablet,  TAKE TWO TABLETS BY MOUTH EVERY MORNING AND TAKE ONE TABLET BY MOUTH EVERY EVENING WITH MEALS, Disp: 270 tablet, Rfl: 3    montelukast (SINGULAIR) 10 MG tablet, TAKE ONE TABLET BY MOUTH ONCE NIGHTLY, Disp: 90 tablet, Rfl: 1    mupirocin (BACTROBAN) 2 % ointment, APPLY TO AFFECTED AREA(S) THREE TIMES A DAY (Patient taking differently: Apply 1 application  topically to the appropriate area as directed Daily.), Disp: 22 g, Rfl: 0    nystatin (MYCOSTATIN) 946261 UNIT/GM ointment, Apply 1 application topically to the appropriate area as directed 2 (Two) Times a Day., Disp: 30 g, Rfl: 3    omeprazole (priLOSEC) 20 MG capsule, TAKE ONE CAPSULE BY MOUTH TWICE A DAY, Disp: 28 capsule, Rfl: 0    ondansetron ODT (ZOFRAN-ODT) 4 MG disintegrating tablet, Place 1 tablet on the tongue Every 6 (Six) Hours As Needed for Nausea or Vomiting., Disp: 8 tablet, Rfl: 0    promethazine (PHENERGAN) 25 MG tablet, TAKE ONE TABLET BY MOUTH EVERY 4 HOURS AS NEEDED FOR NAUSEA (Patient taking differently: Take 1 tablet by mouth Every 6 (Six) Hours As Needed for Nausea.), Disp: 10 tablet, Rfl: 0    rOPINIRole (REQUIP) 0.5 MG tablet, One at bedtime for restless leg, Disp: , Rfl:     SUMAtriptan (IMITREX) 100 MG tablet, Take 1 tablet by mouth As Needed., Disp: , Rfl:     topiramate (TOPAMAX) 25 MG tablet, Take 1 tablet by mouth Daily., Disp: , Rfl:     triamcinolone (KENALOG) 0.1 % ointment, , Disp: , Rfl:     vitamin D (ERGOCALCIFEROL) 1.25 MG (19867 UT) capsule capsule, TAKE ONE CAPSULE BY MOUTH ONCE WEEKLY (Patient taking differently: Take 1 capsule by mouth Every 7 (Seven) Days. sundays), Disp: 12 capsule, Rfl: 0    Allergies   Allergen Reactions    Cimzia [Certolizumab Pegol] Hives, Unknown - Low Severity and Headache    Elastic Bandages & [Zinc] Hives    Latex Hives, Itching, Swelling and Rash     Gloves     Tape Hives    Dilantin [Phenytoin] Unknown - Low Severity    Iodine Hives, Swelling and Rash    Pollen Extract Rash    Tree Extract Other  (See Comments)     CONGESTION       Objective     Physical Exam:  Vital Signs: There were no vitals filed for this visit.  There is no height or weight on file to calculate BMI.     Mental Status Exam:   Hygiene:   good  Cooperation:  Cooperative  Eye Contact:  Good  Psychomotor Behavior:  Appropriate  Affect:  Full range  Mood: normal  Speech:  Normal  Thought Process:  Goal directed  Thought Content:  Normal  Suicidal:  None  Homicidal:  None  Hallucinations:  None  Delusion:  None  Memory:  Intact  Orientation:  Person, Place, Time, and Situation  Reliability:  good  Insight:  Good  Judgement:  Good  Impulse Control:  Good  Physical/Medical Issues:   See problem list.      Assessment / Plan      Diagnosis:  Diagnoses and all orders for this visit:    1. Generalized anxiety disorder (Primary)    2. Current severe episode of major depressive disorder without psychotic features, unspecified whether recurrent    Patient presented for follow-up with clinician.  Patient and clinician processed patient anxiety related to their upcoming gastric bypass surgical procedure.  Patient identified fear of losing their  after the surgical procedure due to change in appearance.  Patient and clinician identified and addressed cognitive distortions related to this fear.  Patient reported increase of panic attacks in frequency.  Clinician provided mindfulness activities designed to assist patient in emotional regulation with the recommendation of daily regular use.  Patient indicated understanding.  Patient reported improved work environment.  Clinician utilized active listening and reflective response to convey empathy and support.    Progress toward goal: Patient reports little or no progress towards a long-term goal of independent management of symptoms related to anxiety and depression.    Prognosis: Prognosis for patient is good with further outpatient behavioral treatment.    PLAN:  Patient and clinician will continue  to utilize a cognitive behavioral intervention which identifies and addresses patient cognitive distortions related to low self image.  Follow-ups will occur approximately every 30 days and will last from 45 to 60 minutes in duration.    Safety: No acute safety concerns  Risk Assessment: Risk of self-harm acutely is low. Risk of self-harm chronically is also low, but could be further elevated in the event of treatment noncompliance and/or AODA.    Treatment Plan/Goals: Continue supportive psychotherapy efforts and medications as indicated. Treatment and medication options discussed during today's visit. Patient ackowledged and verbally consented to continue with current treatment plan and was educated on the importance of compliance with treatment and follow-up appointments. Patient seems reasonably able to adhere to treatment plan.      Assisted Patient in processing above session content; acknowledged and normalized patient's thoughts, feelings, and concerns.  Rationalized patient thought process regarding anxiety.      Allowed Patient to freely discuss issues  without interruption or judgement with unconditional positive regard, active listening skills, and empathy. Therapist provided a safe, confidential environment to facilitate the development of a positive therapeutic relationship and encouraged open, honest communication. Assisted Patient in identifying risk factors which would indicate the need for higher level of care including thoughts to harm self or others and/or self-harming behavior and encouraged Patient to contact this office, call 911, or present to the nearest emergency room should any of these events occur. Discussed crisis intervention services and means to access. Patient adamantly and convincingly denies current suicidal or homicidal ideation or perceptual disturbance. Assisted Patient in processing session content; acknowledged and normalized Patient's thoughts, feelings, and concerns by  utilizing a person-centered approach in efforts to build appropriate rapport and a positive therapeutic relationship with open and honest communication. .     Follow Up:   Return in about 1 month (around 9/24/2023).    Tunde Mason LCSW

## 2023-08-28 ENCOUNTER — HOSPITAL ENCOUNTER (OUTPATIENT)
Dept: CARDIOLOGY | Facility: HOSPITAL | Age: 33
Discharge: HOME | End: 2023-08-28
Admitting: NURSE PRACTITIONER
Payer: COMMERCIAL

## 2023-08-28 VITALS
HEART RATE: 84 BPM | DIASTOLIC BLOOD PRESSURE: 78 MMHG | SYSTOLIC BLOOD PRESSURE: 137 MMHG | BODY MASS INDEX: 47.09 KG/M2 | WEIGHT: 293 LBS | HEIGHT: 66 IN

## 2023-08-28 DIAGNOSIS — E66.01 OBESITY, MORBID, BMI 50 OR HIGHER: ICD-10-CM

## 2023-08-28 DIAGNOSIS — R07.9 CHEST PAIN, UNSPECIFIED TYPE: ICD-10-CM

## 2023-08-28 LAB
BH CV REST NUCLEAR ISOTOPE DOSE: 33 MCI
BH CV STRESS BP STAGE 1: NORMAL
BH CV STRESS BP STAGE 3: NORMAL
BH CV STRESS COMMENTS STAGE 1: NORMAL
BH CV STRESS DOSE REGADENOSON STAGE 1: 0.4
BH CV STRESS DURATION MIN STAGE 1: 1
BH CV STRESS DURATION MIN STAGE 2: 1
BH CV STRESS DURATION MIN STAGE 3: 1
BH CV STRESS DURATION MIN STAGE 4: 1
BH CV STRESS DURATION SEC STAGE 1: 0
BH CV STRESS DURATION SEC STAGE 2: 0
BH CV STRESS DURATION SEC STAGE 3: 0
BH CV STRESS DURATION SEC STAGE 4: 0
BH CV STRESS HR STAGE 1: 101
BH CV STRESS HR STAGE 2: 104
BH CV STRESS HR STAGE 3: 100
BH CV STRESS HR STAGE 4: 96
BH CV STRESS NUCLEAR ISOTOPE DOSE: 32.9 MCI
BH CV STRESS O2 STAGE 1: 98
BH CV STRESS O2 STAGE 2: 100
BH CV STRESS O2 STAGE 3: 100
BH CV STRESS O2 STAGE 4: 100
BH CV STRESS PROTOCOL 1: NORMAL
BH CV STRESS RECOVERY BP: NORMAL MMHG
BH CV STRESS RECOVERY HR: 89 BPM
BH CV STRESS RECOVERY O2: 98 %
BH CV STRESS STAGE 1: 1
BH CV STRESS STAGE 2: 2
BH CV STRESS STAGE 3: 3
BH CV STRESS STAGE 4: 4
LV EF NUC BP: 67 %
MAXIMAL PREDICTED HEART RATE: 187 BPM
PERCENT MAX PREDICTED HR: 55.61 %
STRESS BASELINE BP: NORMAL MMHG
STRESS BASELINE HR: 82 BPM
STRESS O2 SAT REST: 98 %
STRESS PERCENT HR: 65 %
STRESS POST ESTIMATED WORKLOAD: 1 METS
STRESS POST EXERCISE DUR MIN: 4 MIN
STRESS POST EXERCISE DUR SEC: 0 SEC
STRESS POST O2 SAT PEAK: 100 %
STRESS POST PEAK BP: NORMAL MMHG
STRESS POST PEAK HR: 104 BPM
STRESS TARGET HR: 159 BPM

## 2023-08-28 PROCEDURE — 0 RUBIDIUM CHLORIDE: Performed by: NURSE PRACTITIONER

## 2023-08-28 PROCEDURE — 93017 CV STRESS TEST TRACING ONLY: CPT

## 2023-08-28 PROCEDURE — 78431 MYOCRD IMG PET RST&STRS CT: CPT | Performed by: INTERNAL MEDICINE

## 2023-08-28 PROCEDURE — 93018 CV STRESS TEST I&R ONLY: CPT | Performed by: INTERNAL MEDICINE

## 2023-08-28 PROCEDURE — 25010000002 REGADENOSON 0.4 MG/5ML SOLUTION: Performed by: NURSE PRACTITIONER

## 2023-08-28 PROCEDURE — 78431 MYOCRD IMG PET RST&STRS CT: CPT

## 2023-08-28 PROCEDURE — A9555 RB82 RUBIDIUM: HCPCS | Performed by: NURSE PRACTITIONER

## 2023-08-28 RX ORDER — REGADENOSON 0.08 MG/ML
0.4 INJECTION, SOLUTION INTRAVENOUS ONCE
Status: COMPLETED | OUTPATIENT
Start: 2023-08-28 | End: 2023-08-28

## 2023-08-28 RX ADMIN — RUBIDIUM CHLORIDE RB-82 1 DOSE: 150 INJECTION, SOLUTION INTRAVENOUS at 09:08

## 2023-08-28 RX ADMIN — RUBIDIUM CHLORIDE RB-82 1 DOSE: 150 INJECTION, SOLUTION INTRAVENOUS at 08:57

## 2023-08-28 RX ADMIN — REGADENOSON 0.4 MG: 0.08 INJECTION, SOLUTION INTRAVENOUS at 09:03

## 2023-08-29 NOTE — PROGRESS NOTES
I have reviewed your PET stress test results.  Your results are acceptable.   There is no evidence of blockage in the arteries of your heart.  Your heart contracts normally.    Please keep scheduled appointment with Dr. Flores next month.

## 2023-09-06 DIAGNOSIS — F43.23 ADJUSTMENT DISORDER WITH MIXED ANXIETY AND DEPRESSED MOOD: ICD-10-CM

## 2023-09-07 RX ORDER — BUSPIRONE HYDROCHLORIDE 10 MG/1
TABLET ORAL
Qty: 90 TABLET | Refills: 0 | Status: SHIPPED | OUTPATIENT
Start: 2023-09-07

## 2023-09-19 NOTE — PROGRESS NOTES
[facility] CARDIOLOGY    New Patient Office Visit    Patient Name: Eliz Eisenberg  : 1990   MRN: [id number]   Care Team: Patient Care Team:  Allison Pham PA as PCP - General (Internal Medicine)  Nathan, COURTNEY Hinkle as Nurse Practitioner (Obstetrics and Gynecology)  Vishal Flores MD as Cardiologist (Cardiology)  Ran Lemon MD as Consulting Physician (Otolaryngology)  Tom Valencia APRN as Nurse Practitioner (Cardiology)    Chief Complaint   Patient presents with    Chest Pain    Hypertension    Shortness of Breath     HPI: Eliz Eisenberg is a 33 y.o. female with a history of anxiety disorder with panic attacks, biliary dyskinesia, arthritis currently taking daily NSAIDs, GERD with treatment for H. pylori earlier this year, metabolic syndrome, and obesity currently being evaluated for gastric sleeve who presents today for further evaluation and management of chest pain.  She had an episode in July of this year with chest pain and shortness of breath.  She had woken up with elevated heart rate in the 1 teens on her watch and chest tightness.  She had been walking in the store and reports that her symptoms will worsen her  told her that her lips looked blue and she went to the emergency department.  That work-up was significant for a normal ECG, undetectable D-dimer, undetectable high-sensitivity troponin, undetectable BNP.  She was referred to the heart valve clinic after that presentation.    Since then, she reports having some panic attacks with some chest discomfort but no episodes as severe as the initial one.  Currently, she does not have any chest pain with exertion but will notice her heart rate has been running higher at times.    She has been chronically taking aspirin after being placed on it while trying to conceive.  She has been on daily meloxicam for arthritis at least since February.  She is concerned about her family history  with her dad recently developing chest pain and dyspnea in March of this year.  He had been started on blood pressure, cholesterol medication, and aspirin but does not have any formal diagnosis of coronary artery disease and heart failure.  Her paternal grandfather had a MI.  Her mother passed away at the age of 59 earlier this year with a cardiac arrest during a presentation to the hospital for what is suspected to be a UTI initially.  She did have a history of MS, rheumatoid arthritis, psoriasis, chronic kidney disease, and recurrent UTI.    Subjective   Review of Systems   Respiratory:  Positive for chest tightness.    Cardiovascular:  Positive for chest pain and palpitations. Negative for leg swelling.   Psychiatric/Behavioral:  The patient is nervous/anxious.      Past Medical History:   Diagnosis Date    Abdominal pain     Acute sinusitis     Allergic     Allergic rhinitis     Ankle sprain     Anxiety     Arthritis 2017    On Mobic daily    Asthma     Atopic dermatitis     Biliary dyskinesia     Cat bite      resolved    Clotting disorder     Current tear of meniscus     Dysfunctional uterine bleeding     Eczema     Foot pain     GERD (gastroesophageal reflux disease)     Omeprazole daily. remote EGD. No hx of h pylori    Gout     Hearing loss, bilateral     R/T PSORASIS    Helicobacter pylori antibody positive     4/26/23. Rx prevpak    IBS (irritable bowel syndrome)     Influenza     Metabolic syndrome     Migraines     Muscular aches     Obesity     Panic attack     PCOS (polycystic ovarian syndrome)     Pharyngitis     Psoriasis     planning to start Taltz    Psoriasis     Restless leg syndrome     URI (upper respiratory infection)     Wears glasses      Past Surgical History:   Procedure Laterality Date    COLONOSCOPY      ENDOSCOPY N/A 5/15/2023    Procedure: ESOPHAGOGASTRODUODENOSCOPY;  Surgeon: Beatriz Sandy MD;  Location: [facility] ENDOSCOPY;  Service: Bariatric;  Laterality: N/A;     LAPAROSCOPIC CHOLECYSTECTOMY  2018    no gallstones    TONSILLECTOMY AND ADENOIDECTOMY N/A 07/09/2021    Procedure: TONSILLECTOMY;  Surgeon: Ran Lemon MD;  Location: [facility];  Service: ENT;  Laterality: N/A;    UMBILICAL HERNIA REPAIR  2018    w/ cholecystectomy    WISDOM TOOTH EXTRACTION       Social History     Socioeconomic History    Marital status:    Tobacco Use    Smoking status: Never    Smokeless tobacco: Never   Vaping Use    Vaping Use: Never used   Substance and Sexual Activity    Alcohol use: Not Currently     Comment: rare    Drug use: No    Sexual activity: Yes     Partners: Male     Birth control/protection: None     Family History   Problem Relation Age of Onset    Heart disease Mother     Multiple sclerosis Mother     Diabetes Mother     Arthritis Mother     Asthma Mother     Early death Mother     Kidney disease Mother     Hypertension Father     Obesity Father     Diabetes Father     Heart attack Father     Sleep apnea Father     No Known Problems Sister     Cancer Maternal Grandmother         Breast    Breast cancer Maternal Grandmother     Diabetes Maternal Grandmother     Asthma Maternal Grandfather     Diabetes Maternal Grandfather     Sleep apnea Maternal Grandfather     Colon cancer Paternal Grandmother     Cancer Paternal Grandmother         COLON    Heart attack Paternal Grandfather     Sleep apnea Paternal Grandfather     Obesity Paternal Grandfather     Hypertension Paternal Grandfather        Current Outpatient Medications:     albuterol sulfate  (90 Base) MCG/ACT inhaler, Inhale 2 puffs Every 4 (Four) Hours As Needed for Wheezing., Disp: 18 g, Rfl: 2    aspirin 81 MG EC tablet, Take 1 tablet by mouth Daily., Disp: , Rfl:     busPIRone (BUSPAR) 10 MG tablet, TAKE ONE TABLET BY MOUTH THREE TIMES A DAY, Disp: 90 tablet, Rfl: 0    cetirizine (zyrTEC) 5 MG tablet, Take 1 tablet by mouth Daily., Disp: , Rfl:     EPINEPHrine (EPIPEN) 0.3 MG/0.3ML solution  auto-injector injection, As Needed., Disp: , Rfl:     fluticasone (Flonase) 50 MCG/ACT nasal spray, 2 sprays into the nostril(s) as directed by provider Daily., Disp: 16 g, Rfl: 5    gabapentin (NEURONTIN) 300 MG capsule, Take 1 capsule by mouth Daily., Disp: , Rfl:     meloxicam (MOBIC) 15 MG tablet, Take 1 tablet by mouth Daily., Disp: , Rfl:     metFORMIN (GLUCOPHAGE) 500 MG tablet, TAKE TWO TABLETS BY MOUTH EVERY MORNING AND TAKE ONE TABLET BY MOUTH EVERY EVENING WITH MEALS, Disp: 270 tablet, Rfl: 3    montelukast (SINGULAIR) 10 MG tablet, TAKE ONE TABLET BY MOUTH ONCE NIGHTLY, Disp: 90 tablet, Rfl: 1    omeprazole (priLOSEC) 20 MG capsule, TAKE ONE CAPSULE BY MOUTH TWICE A DAY, Disp: 28 capsule, Rfl: 0    ondansetron ODT (ZOFRAN-ODT) 4 MG disintegrating tablet, Place 1 tablet on the tongue Every 6 (Six) Hours As Needed for Nausea or Vomiting., Disp: 8 tablet, Rfl: 0    promethazine (PHENERGAN) 25 MG tablet, TAKE ONE TABLET BY MOUTH EVERY 4 HOURS AS NEEDED FOR NAUSEA (Patient taking differently: Take 1 tablet by mouth Every 6 (Six) Hours As Needed for Nausea.), Disp: 10 tablet, Rfl: 0    rOPINIRole (REQUIP) 0.5 MG tablet, One at bedtime for restless leg, Disp: , Rfl:     SUMAtriptan (IMITREX) 100 MG tablet, Take 1 tablet by mouth As Needed., Disp: , Rfl:     topiramate (TOPAMAX) 25 MG tablet, Take 1 tablet by mouth Daily., Disp: , Rfl:     vitamin D (ERGOCALCIFEROL) 1.25 MG (22388 UT) capsule capsule, TAKE ONE CAPSULE BY MOUTH ONCE WEEKLY (Patient taking differently: Take 1 capsule by mouth Every 7 (Seven) Days. sundays), Disp: 12 capsule, Rfl: 0    Allergies   Allergen Reactions    Cimzia [Certolizumab Pegol] Hives, Unknown - Low Severity and Headache    Elastic Bandages & [Zinc] Hives    Latex Hives, Itching, Swelling and Rash     Gloves     Tape Hives    Dilantin [Phenytoin] Unknown - Low Severity    Iodine Hives, Swelling and Rash    Pollen Extract Rash    Tree Extract Other (See Comments)     CONGESTION  "    Objective     Vitals:    09/20/23 0832   BP: 128/88   BP Location: Right arm   Patient Position: Sitting   Cuff Size: Adult   Pulse: 90   SpO2: 97%   Weight: (!) 150 kg (331 lb)   Height: 167.6 cm (66\")   Body mass index is 53.42 kg/m².  Gen: well developed, sitting up on exam table, comfortable appearing  HEENT: MMM, sclera anicteric, conjunctiva normal, no carotid bruits  CV: regular rate, regular rhythm, no murmurs or rubs, normal S1, S2. 2+ radial pulses  Pulm: RA, normal work of breathing, no wheezes, rales, rhonchi  Ext: normal bulk for age, normal tone  Neuro: alert, oriented, face symmetrical, moving all extremities well  Psych: normal mood, appropriate affect    Most recent PCP note, imaging tests, and labs reviewed.    Labs:  Lab Results   Component Value Date    WBC 13.11 (H) 07/04/2023    HGB 11.2 (L) 07/04/2023    HCT 36.5 07/04/2023    MCV 78.3 (L) 07/04/2023     07/04/2023     Lab Results   Component Value Date    GLUCOSE 94 07/04/2023    BUN 11 07/04/2023    CREATININE 0.66 07/04/2023    EGFRIFNONA 121 01/14/2022    BCR 16.7 07/04/2023    K 4.0 07/04/2023    CO2 23.0 07/04/2023    CALCIUM 9.2 07/04/2023    PROTENTOTREF 7.4 04/26/2023    ALBUMIN 3.9 07/04/2023    LABIL2 1.2 04/26/2023    AST 15 07/04/2023    ALT 18 07/04/2023     Lab Results   Component Value Date    HGBA1C 5.50 04/26/2023     Lab Results   Component Value Date    CHOL 180 04/18/2022    CHLPL 191 04/26/2023    TRIG 116 04/26/2023    HDL 35 (L) 04/26/2023     (H) 04/26/2023 8/28/23 - Stress PET    No significant ST or T wave changes identified with regadenason.    Findings consistent with a normal ECG stress test.    There is no prior study available for comparison. Rest EF = 67% Stress EF = 67%.    Myocardial perfusion imaging indicates a normal myocardial perfusion study with no evidence of ischemia.    Impressions are consistent with a low risk study.    Results for orders placed during the hospital encounter " of 08/01/23    Adult Transthoracic Echo Complete W/ Cont if Necessary Per Protocol    Interpretation Summary    Left ventricular systolic function is normal. Estimated left ventricular EF = 55%    The left atrial cavity is mildly dilated.    Trace tricuspid regurgitation with normal RVSP.    7/13-7/26/23 -wearable cardiac monitor   - 1 SVT event of 6 beats   - Rare ectopy   - Average heart rate 94 bpm, 24% tachycardia events      ECG 12 Lead    Date/Time: 9/20/2023 9:16 AM  Performed by: Vishal Flores MD  Authorized by: Vishal Flores MD   Comparison: compared with previous ECG from 7/8/2023  Similar to previous ECG  Rhythm: sinus rhythm  Rate: normal  BPM: 90  Conduction: conduction normal  ST Segments: ST segments normal  T Waves: T waves normal  QRS axis: normal  Other: no other findings    Clinical impression: normal ECG        Assessment & Plan       ICD-10-CM ICD-9-CM   1. Other chest pain  R07.89 786.59   2. Iron deficiency anemia, unspecified iron deficiency anemia type  D50.9 280.9   3. Metabolic syndrome  E88.81 277.7   4. Mixed hyperlipidemia  E78.2 272.2   5. Class 3 severe obesity due to excess calories with body mass index (BMI) of 50.0 to 59.9 in adult, unspecified whether serious comorbidity present  E66.01 278.01    Z68.43 V85.43     Noncardiac chest pain   - Work-up from July ER visit reassuring with normal ECG and no ACS   - Subsequent evaluation has been benign with normal stress PET with no MI or ischemia and normal global myocardial blood flow reserve   - We will have the patient discontinue aspirin and hold meloxicam for at least 2 weeks     Hyperlipidemia   - Low 10-year risk for ASCVD, no coronary artery calcium seen on stress PET    Sinus tachycardia seen on wearable monitor   - Currently not having many symptoms of palpitations   - Having repeat labs today, will add on iron profile with her history of mild microcytic anemia   - Discussed potentially using propranolol for symptom  control however with her current symptoms and changing other medications we will defer this at this time but could be considered in the future if her heart remains elevated despite correction of anemia    Obesity   - She is currently following with a bariatric surgeon with a plan for gastric sleeve in the fall   - Based on her previous cardiac work-up there would be no cardiac contraindication to this and no additional work-up would be indicated.  She would be low risk based on her studies today    Return if symptoms worsen or fail to improve.    SANAM Flores MD  09/20/23    [facility]  [address] [place] [id number] [phone]

## 2023-09-20 ENCOUNTER — OFFICE VISIT (OUTPATIENT)
Dept: CARDIOLOGY | Facility: CLINIC | Age: 33
End: 2023-09-20
Payer: COMMERCIAL

## 2023-09-20 ENCOUNTER — LAB (OUTPATIENT)
Dept: LAB | Facility: HOSPITAL | Age: 33
End: 2023-09-20
Payer: COMMERCIAL

## 2023-09-20 VITALS
BODY MASS INDEX: 47.09 KG/M2 | DIASTOLIC BLOOD PRESSURE: 88 MMHG | OXYGEN SATURATION: 97 % | WEIGHT: 293 LBS | SYSTOLIC BLOOD PRESSURE: 128 MMHG | HEIGHT: 66 IN | HEART RATE: 90 BPM

## 2023-09-20 DIAGNOSIS — E78.2 MIXED HYPERLIPIDEMIA: ICD-10-CM

## 2023-09-20 DIAGNOSIS — R06.00 DYSPNEA, UNSPECIFIED TYPE: ICD-10-CM

## 2023-09-20 DIAGNOSIS — R07.89 OTHER CHEST PAIN: Primary | ICD-10-CM

## 2023-09-20 DIAGNOSIS — D50.9 IRON DEFICIENCY ANEMIA, UNSPECIFIED IRON DEFICIENCY ANEMIA TYPE: ICD-10-CM

## 2023-09-20 DIAGNOSIS — E88.81 METABOLIC SYNDROME: ICD-10-CM

## 2023-09-20 DIAGNOSIS — R53.83 OTHER FATIGUE: Primary | ICD-10-CM

## 2023-09-20 DIAGNOSIS — E66.01 CLASS 3 SEVERE OBESITY DUE TO EXCESS CALORIES WITH BODY MASS INDEX (BMI) OF 50.0 TO 59.9 IN ADULT, UNSPECIFIED WHETHER SERIOUS COMORBIDITY PRESENT: ICD-10-CM

## 2023-09-20 LAB
ALBUMIN SERPL-MCNC: 4 G/DL (ref 3.5–5.2)
ALBUMIN/GLOB SERPL: 1.3 G/DL
ALP SERPL-CCNC: 139 U/L (ref 39–117)
ALT SERPL W P-5'-P-CCNC: 18 U/L (ref 1–33)
ANION GAP SERPL CALCULATED.3IONS-SCNC: 12 MMOL/L (ref 5–15)
AST SERPL-CCNC: 15 U/L (ref 1–32)
BASOPHILS # BLD AUTO: 0.04 10*3/MM3 (ref 0–0.2)
BASOPHILS NFR BLD AUTO: 0.4 % (ref 0–1.5)
BILIRUB SERPL-MCNC: 0.4 MG/DL (ref 0–1.2)
BUN SERPL-MCNC: 11 MG/DL (ref 6–20)
BUN/CREAT SERPL: 15.1 (ref 7–25)
CALCIUM SPEC-SCNC: 9.7 MG/DL (ref 8.6–10.5)
CHLORIDE SERPL-SCNC: 106 MMOL/L (ref 98–107)
CO2 SERPL-SCNC: 23 MMOL/L (ref 22–29)
CREAT SERPL-MCNC: 0.73 MG/DL (ref 0.57–1)
DEPRECATED RDW RBC AUTO: 42.9 FL (ref 37–54)
EGFRCR SERPLBLD CKD-EPI 2021: 111.5 ML/MIN/1.73
EOSINOPHIL # BLD AUTO: 0.33 10*3/MM3 (ref 0–0.4)
EOSINOPHIL NFR BLD AUTO: 3 % (ref 0.3–6.2)
ERYTHROCYTE [DISTWIDTH] IN BLOOD BY AUTOMATED COUNT: 15.7 % (ref 12.3–15.4)
FERRITIN SERPL-MCNC: 43.9 NG/ML (ref 13–150)
GLOBULIN UR ELPH-MCNC: 3.2 GM/DL
GLUCOSE SERPL-MCNC: 108 MG/DL (ref 65–99)
HCT VFR BLD AUTO: 34.3 % (ref 34–46.6)
HGB BLD-MCNC: 10.9 G/DL (ref 12–15.9)
IMM GRANULOCYTES # BLD AUTO: 0.06 10*3/MM3 (ref 0–0.05)
IMM GRANULOCYTES NFR BLD AUTO: 0.6 % (ref 0–0.5)
IRON 24H UR-MRATE: 34 MCG/DL (ref 37–145)
IRON SATN MFR SERPL: 9 % (ref 20–50)
LYMPHOCYTES # BLD AUTO: 2.12 10*3/MM3 (ref 0.7–3.1)
LYMPHOCYTES NFR BLD AUTO: 19.6 % (ref 19.6–45.3)
MCH RBC QN AUTO: 24 PG (ref 26.6–33)
MCHC RBC AUTO-ENTMCNC: 31.8 G/DL (ref 31.5–35.7)
MCV RBC AUTO: 75.4 FL (ref 79–97)
MONOCYTES # BLD AUTO: 0.58 10*3/MM3 (ref 0.1–0.9)
MONOCYTES NFR BLD AUTO: 5.4 % (ref 5–12)
NEUTROPHILS NFR BLD AUTO: 7.71 10*3/MM3 (ref 1.7–7)
NEUTROPHILS NFR BLD AUTO: 71 % (ref 42.7–76)
NRBC BLD AUTO-RTO: 0 /100 WBC (ref 0–0.2)
PLATELET # BLD AUTO: 352 10*3/MM3 (ref 140–450)
PMV BLD AUTO: 9.5 FL (ref 6–12)
POTASSIUM SERPL-SCNC: 4.3 MMOL/L (ref 3.5–5.2)
PROT SERPL-MCNC: 7.2 G/DL (ref 6–8.5)
RBC # BLD AUTO: 4.55 10*6/MM3 (ref 3.77–5.28)
SODIUM SERPL-SCNC: 141 MMOL/L (ref 136–145)
TIBC SERPL-MCNC: 392 MCG/DL (ref 298–536)
TRANSFERRIN SERPL-MCNC: 263 MG/DL (ref 200–360)
WBC NRBC COR # BLD: 10.84 10*3/MM3 (ref 3.4–10.8)

## 2023-09-20 PROCEDURE — 85025 COMPLETE CBC W/AUTO DIFF WBC: CPT

## 2023-09-20 PROCEDURE — 82728 ASSAY OF FERRITIN: CPT

## 2023-09-20 PROCEDURE — 84466 ASSAY OF TRANSFERRIN: CPT

## 2023-09-20 PROCEDURE — 80053 COMPREHEN METABOLIC PANEL: CPT

## 2023-09-20 PROCEDURE — 83540 ASSAY OF IRON: CPT

## 2023-09-20 RX ORDER — EPINEPHRINE 0.3 MG/.3ML
INJECTION SUBCUTANEOUS AS NEEDED
COMMUNITY
Start: 2023-08-07

## 2023-09-20 NOTE — PATIENT INSTRUCTIONS
You can discontinue aspirin    Stop taking meloxicam for at least two weeks and continue on the omeprazole to see if your symptoms improve    Your average heart rate was high on the monitor but if you're not having significant symptoms at this point it's not necessarily something that needs treatment. If you're having more palpitations a medication like propranolol can help with that as well as some anxiety but comes with the potential side effect of worsening fatigue.     We will evaluate your anemia to check for iron deficiency

## 2023-09-21 ENCOUNTER — HOSPITAL ENCOUNTER (EMERGENCY)
Facility: HOSPITAL | Age: 33
Discharge: HOME | End: 2023-09-21
Attending: EMERGENCY MEDICINE
Payer: COMMERCIAL

## 2023-09-21 ENCOUNTER — APPOINTMENT (OUTPATIENT)
Dept: CT IMAGING | Facility: HOSPITAL | Age: 33
End: 2023-09-21
Payer: COMMERCIAL

## 2023-09-21 ENCOUNTER — TELEMEDICINE (OUTPATIENT)
Dept: BEHAVIORAL HEALTH | Facility: CLINIC | Age: 33
End: 2023-09-21
Payer: COMMERCIAL

## 2023-09-21 VITALS
TEMPERATURE: 99.8 F | OXYGEN SATURATION: 94 % | BODY MASS INDEX: 47.09 KG/M2 | WEIGHT: 293 LBS | HEART RATE: 99 BPM | HEIGHT: 66 IN | RESPIRATION RATE: 18 BRPM | DIASTOLIC BLOOD PRESSURE: 74 MMHG | SYSTOLIC BLOOD PRESSURE: 149 MMHG

## 2023-09-21 DIAGNOSIS — R07.89 ACUTE CHEST WALL PAIN: ICD-10-CM

## 2023-09-21 DIAGNOSIS — R91.1 NODULE OF LEFT LUNG: ICD-10-CM

## 2023-09-21 DIAGNOSIS — F43.23 ADJUSTMENT DISORDER WITH MIXED ANXIETY AND DEPRESSED MOOD: Primary | ICD-10-CM

## 2023-09-21 DIAGNOSIS — B34.8 RHINOVIRUS INFECTION: Primary | ICD-10-CM

## 2023-09-21 LAB
ALBUMIN SERPL-MCNC: 4.1 G/DL (ref 3.5–5.2)
ALBUMIN/GLOB SERPL: 1.1 G/DL
ALP SERPL-CCNC: 134 U/L (ref 39–117)
ALT SERPL W P-5'-P-CCNC: 15 U/L (ref 1–33)
ANION GAP SERPL CALCULATED.3IONS-SCNC: 14 MMOL/L (ref 5–15)
AST SERPL-CCNC: 16 U/L (ref 1–32)
B PARAPERT DNA SPEC QL NAA+PROBE: NOT DETECTED
B PERT DNA SPEC QL NAA+PROBE: NOT DETECTED
B-HCG UR QL: NEGATIVE
BASOPHILS # BLD AUTO: 0.06 10*3/MM3 (ref 0–0.2)
BASOPHILS NFR BLD AUTO: 0.3 % (ref 0–1.5)
BILIRUB SERPL-MCNC: 0.7 MG/DL (ref 0–1.2)
BILIRUB UR QL STRIP: NEGATIVE
BUN SERPL-MCNC: 7 MG/DL (ref 6–20)
BUN/CREAT SERPL: 10.3 (ref 7–25)
C PNEUM DNA NPH QL NAA+NON-PROBE: NOT DETECTED
CALCIUM SPEC-SCNC: 9.2 MG/DL (ref 8.6–10.5)
CHLORIDE SERPL-SCNC: 104 MMOL/L (ref 98–107)
CLARITY UR: CLEAR
CO2 SERPL-SCNC: 21 MMOL/L (ref 22–29)
COLOR UR: YELLOW
CREAT SERPL-MCNC: 0.68 MG/DL (ref 0.57–1)
DEPRECATED RDW RBC AUTO: 45.7 FL (ref 37–54)
EGFRCR SERPLBLD CKD-EPI 2021: 118.1 ML/MIN/1.73
EOSINOPHIL # BLD AUTO: 0.1 10*3/MM3 (ref 0–0.4)
EOSINOPHIL NFR BLD AUTO: 0.5 % (ref 0.3–6.2)
ERYTHROCYTE [DISTWIDTH] IN BLOOD BY AUTOMATED COUNT: 16.2 % (ref 12.3–15.4)
EXPIRATION DATE: NORMAL
FLUAV SUBTYP SPEC NAA+PROBE: NOT DETECTED
FLUBV RNA ISLT QL NAA+PROBE: NOT DETECTED
GLOBULIN UR ELPH-MCNC: 3.7 GM/DL
GLUCOSE SERPL-MCNC: 103 MG/DL (ref 65–99)
GLUCOSE UR STRIP-MCNC: NEGATIVE MG/DL
HADV DNA SPEC NAA+PROBE: NOT DETECTED
HCOV 229E RNA SPEC QL NAA+PROBE: NOT DETECTED
HCOV HKU1 RNA SPEC QL NAA+PROBE: NOT DETECTED
HCOV NL63 RNA SPEC QL NAA+PROBE: NOT DETECTED
HCOV OC43 RNA SPEC QL NAA+PROBE: NOT DETECTED
HCT VFR BLD AUTO: 36.8 % (ref 34–46.6)
HGB BLD-MCNC: 11.4 G/DL (ref 12–15.9)
HGB UR QL STRIP.AUTO: NEGATIVE
HMPV RNA NPH QL NAA+NON-PROBE: NOT DETECTED
HOLD SPECIMEN: NORMAL
HPIV1 RNA ISLT QL NAA+PROBE: NOT DETECTED
HPIV2 RNA SPEC QL NAA+PROBE: NOT DETECTED
HPIV3 RNA NPH QL NAA+PROBE: NOT DETECTED
HPIV4 P GENE NPH QL NAA+PROBE: NOT DETECTED
IMM GRANULOCYTES # BLD AUTO: 0.16 10*3/MM3 (ref 0–0.05)
IMM GRANULOCYTES NFR BLD AUTO: 0.8 % (ref 0–0.5)
INTERNAL NEGATIVE CONTROL: NORMAL
INTERNAL POSITIVE CONTROL: NORMAL
KETONES UR QL STRIP: NEGATIVE
LEUKOCYTE ESTERASE UR QL STRIP.AUTO: NEGATIVE
LIPASE SERPL-CCNC: 16 U/L (ref 13–60)
LYMPHOCYTES # BLD AUTO: 2.09 10*3/MM3 (ref 0.7–3.1)
LYMPHOCYTES NFR BLD AUTO: 10.7 % (ref 19.6–45.3)
Lab: NORMAL
M PNEUMO IGG SER IA-ACNC: NOT DETECTED
MCH RBC QN AUTO: 24 PG (ref 26.6–33)
MCHC RBC AUTO-ENTMCNC: 31 G/DL (ref 31.5–35.7)
MCV RBC AUTO: 77.5 FL (ref 79–97)
MONOCYTES # BLD AUTO: 0.97 10*3/MM3 (ref 0.1–0.9)
MONOCYTES NFR BLD AUTO: 5 % (ref 5–12)
NEUTROPHILS NFR BLD AUTO: 16.2 10*3/MM3 (ref 1.7–7)
NEUTROPHILS NFR BLD AUTO: 82.7 % (ref 42.7–76)
NITRITE UR QL STRIP: NEGATIVE
NRBC BLD AUTO-RTO: 0 /100 WBC (ref 0–0.2)
PH UR STRIP.AUTO: 8.5 [PH] (ref 5–8)
PLATELET # BLD AUTO: 373 10*3/MM3 (ref 140–450)
PMV BLD AUTO: 9.5 FL (ref 6–12)
POTASSIUM SERPL-SCNC: 4.9 MMOL/L (ref 3.5–5.2)
PROT SERPL-MCNC: 7.8 G/DL (ref 6–8.5)
PROT UR QL STRIP: NEGATIVE
QT INTERVAL: 354 MS
QTC INTERVAL: 449 MS
RBC # BLD AUTO: 4.75 10*6/MM3 (ref 3.77–5.28)
RHINOVIRUS RNA SPEC NAA+PROBE: DETECTED
RSV RNA NPH QL NAA+NON-PROBE: NOT DETECTED
SARS-COV-2 RNA NPH QL NAA+NON-PROBE: NOT DETECTED
SODIUM SERPL-SCNC: 139 MMOL/L (ref 136–145)
SP GR UR STRIP: 1.02 (ref 1–1.03)
UROBILINOGEN UR QL STRIP: ABNORMAL
WBC NRBC COR # BLD: 19.58 10*3/MM3 (ref 3.4–10.8)
WHOLE BLOOD HOLD COAG: NORMAL
WHOLE BLOOD HOLD SPECIMEN: NORMAL

## 2023-09-21 PROCEDURE — 96375 TX/PRO/DX INJ NEW DRUG ADDON: CPT

## 2023-09-21 PROCEDURE — 25010000002 DIPHENHYDRAMINE PER 50 MG: Performed by: EMERGENCY MEDICINE

## 2023-09-21 PROCEDURE — 25510000001 IOPAMIDOL PER 1 ML: Performed by: EMERGENCY MEDICINE

## 2023-09-21 PROCEDURE — 71275 CT ANGIOGRAPHY CHEST: CPT

## 2023-09-21 PROCEDURE — 81025 URINE PREGNANCY TEST: CPT

## 2023-09-21 PROCEDURE — 25010000002 ONDANSETRON PER 1 MG: Performed by: EMERGENCY MEDICINE

## 2023-09-21 PROCEDURE — 81025 URINE PREGNANCY TEST: CPT | Performed by: EMERGENCY MEDICINE

## 2023-09-21 PROCEDURE — 93005 ELECTROCARDIOGRAM TRACING: CPT | Performed by: EMERGENCY MEDICINE

## 2023-09-21 PROCEDURE — 85025 COMPLETE CBC W/AUTO DIFF WBC: CPT | Performed by: EMERGENCY MEDICINE

## 2023-09-21 PROCEDURE — 0202U NFCT DS 22 TRGT SARS-COV-2: CPT | Performed by: EMERGENCY MEDICINE

## 2023-09-21 PROCEDURE — 25010000002 MORPHINE PER 10 MG: Performed by: EMERGENCY MEDICINE

## 2023-09-21 PROCEDURE — 99285 EMERGENCY DEPT VISIT HI MDM: CPT

## 2023-09-21 PROCEDURE — 25010000002 METHYLPREDNISOLONE PER 125 MG: Performed by: EMERGENCY MEDICINE

## 2023-09-21 PROCEDURE — 96374 THER/PROPH/DIAG INJ IV PUSH: CPT

## 2023-09-21 PROCEDURE — 83690 ASSAY OF LIPASE: CPT | Performed by: EMERGENCY MEDICINE

## 2023-09-21 PROCEDURE — 74177 CT ABD & PELVIS W/CONTRAST: CPT

## 2023-09-21 PROCEDURE — 81003 URINALYSIS AUTO W/O SCOPE: CPT | Performed by: EMERGENCY MEDICINE

## 2023-09-21 PROCEDURE — 80053 COMPREHEN METABOLIC PANEL: CPT | Performed by: EMERGENCY MEDICINE

## 2023-09-21 RX ORDER — TRAMADOL HYDROCHLORIDE 50 MG/1
50 TABLET ORAL EVERY 6 HOURS PRN
Qty: 12 TABLET | Refills: 0 | Status: SHIPPED | OUTPATIENT
Start: 2023-09-21

## 2023-09-21 RX ORDER — MORPHINE SULFATE 4 MG/ML
4 INJECTION, SOLUTION INTRAMUSCULAR; INTRAVENOUS ONCE
Status: COMPLETED | OUTPATIENT
Start: 2023-09-21 | End: 2023-09-21

## 2023-09-21 RX ORDER — SODIUM CHLORIDE 0.9 % (FLUSH) 0.9 %
10 SYRINGE (ML) INJECTION AS NEEDED
Status: DISCONTINUED | OUTPATIENT
Start: 2023-09-21 | End: 2023-09-21 | Stop reason: HOSPADM

## 2023-09-21 RX ORDER — AZITHROMYCIN 250 MG/1
250 TABLET, FILM COATED ORAL DAILY
Qty: 6 TABLET | Refills: 0 | Status: SHIPPED | OUTPATIENT
Start: 2023-09-21 | End: 2023-09-25

## 2023-09-21 RX ORDER — ONDANSETRON 2 MG/ML
4 INJECTION INTRAMUSCULAR; INTRAVENOUS ONCE
Status: COMPLETED | OUTPATIENT
Start: 2023-09-21 | End: 2023-09-21

## 2023-09-21 RX ORDER — ACETAMINOPHEN 500 MG
1000 TABLET ORAL ONCE
Status: DISCONTINUED | OUTPATIENT
Start: 2023-09-21 | End: 2023-09-21 | Stop reason: HOSPADM

## 2023-09-21 RX ORDER — METHYLPREDNISOLONE SODIUM SUCCINATE 125 MG/2ML
125 INJECTION, POWDER, LYOPHILIZED, FOR SOLUTION INTRAMUSCULAR; INTRAVENOUS ONCE
Status: COMPLETED | OUTPATIENT
Start: 2023-09-21 | End: 2023-09-21

## 2023-09-21 RX ORDER — DIPHENHYDRAMINE HYDROCHLORIDE 50 MG/ML
25 INJECTION INTRAMUSCULAR; INTRAVENOUS ONCE
Status: COMPLETED | OUTPATIENT
Start: 2023-09-21 | End: 2023-09-21

## 2023-09-21 RX ORDER — SODIUM CHLORIDE 9 MG/ML
10 INJECTION INTRAVENOUS AS NEEDED
Status: DISCONTINUED | OUTPATIENT
Start: 2023-09-21 | End: 2023-09-21 | Stop reason: HOSPADM

## 2023-09-21 RX ORDER — BUSPIRONE HYDROCHLORIDE 10 MG/1
10 TABLET ORAL 3 TIMES DAILY
Qty: 90 TABLET | Refills: 2 | Status: SHIPPED | OUTPATIENT
Start: 2023-09-21

## 2023-09-21 RX ADMIN — MORPHINE SULFATE 4 MG: 4 INJECTION, SOLUTION INTRAMUSCULAR; INTRAVENOUS at 11:22

## 2023-09-21 RX ADMIN — METHYLPREDNISOLONE SODIUM SUCCINATE 125 MG: 125 INJECTION INTRAMUSCULAR; INTRAVENOUS at 11:22

## 2023-09-21 RX ADMIN — IOPAMIDOL 175 ML: 755 INJECTION, SOLUTION INTRAVENOUS at 12:44

## 2023-09-21 RX ADMIN — SODIUM CHLORIDE 1000 ML: 9 INJECTION, SOLUTION INTRAVENOUS at 11:22

## 2023-09-21 RX ADMIN — ONDANSETRON 4 MG: 2 INJECTION INTRAMUSCULAR; INTRAVENOUS at 11:21

## 2023-09-21 RX ADMIN — DIPHENHYDRAMINE HYDROCHLORIDE 25 MG: 50 INJECTION INTRAMUSCULAR; INTRAVENOUS at 11:22

## 2023-09-21 NOTE — PROGRESS NOTES
Video Visit      Patient Name: Eliz Eisenberg  : 1990   MRN: [id number]     Referring Provider: Allison Pham PA    Chief Complaint:      ICD-10-CM ICD-9-CM   1. Adjustment disorder with mixed anxiety and depressed mood  F43.23 309.28        This provider is located at the [facility] (through [facility]), [address] [place] [address] using a secure [employer] Video Visit through [employer]. Patient is being seen remotely via telehealth video visit at their home address in [place], and stated they are in a secure environment for this session. The patient's condition being diagnosed/treated is appropriate for telemedicine. The provider identified herself as well as her credentials. The patient, and/or patients guardian, consent to be seen remotely, and when consent is given they understand that the consent allows for patient identifiable information to be sent to a third party as needed. They may refuse to be seen remotely at any time. The electronic data is encrypted and password protected, and the patient and/or guardian has been advised of the potential risks to privacy not withstanding such measures.    The patient has chosen to receive care today through a telehealth video visit. Do you consent to use a video/audio connection for your medical care today? Yes    History of Present Illness:   Eliz Eisenberg is a 33 y.o. female who is being seen by a video visit today for evaluation and medication management. Patient reports that things are going well with the increase to buspar 30 mg three times a day. She reports that she has had decreased anxiety. She reports that she is not feeling well today and has a cough. She reports that her  is going to take her to the doctor this morning. Denies SI/HI/AVH.    Subjective      Review of Systems:   Review of Systems   Constitutional:  Negative for appetite change and unexpected weight change.    Eyes:  Negative for visual disturbance.   Respiratory:  Positive for chest tightness and shortness of breath.         Feeling sick today,  is going to take her to the doctor   Cardiovascular:  Negative for chest pain.   Musculoskeletal:  Positive for arthralgias. Negative for gait problem.        Diagnosed with rheumatoid arthritis   Skin:  Negative for rash and wound.        History of psoriasis   Neurological:  Negative for dizziness, tremors, seizures, weakness and light-headedness.   Psychiatric/Behavioral:  Positive for dysphoric mood and sleep disturbance. Negative for agitation, behavioral problems, confusion, decreased concentration, hallucinations, self-injury and suicidal ideas. The patient is not hyperactive.    Sleep pattern: sleep maybe slightly better  Appetite:no changes     Screening Scores:   PHQ-9 : 10  ABIODUN-7 : 10    RISK ASSESSMENT:  Patient denies any thoughts of suicide or intent today. Patient denies any suicidal or homicidal ideation today. Patient denies any high risk factors today.     Medications:     Current Outpatient Medications:     busPIRone (BUSPAR) 10 MG tablet, Take 1 tablet by mouth 3 (Three) Times a Day., Disp: 90 tablet, Rfl: 2    albuterol sulfate  (90 Base) MCG/ACT inhaler, Inhale 2 puffs Every 4 (Four) Hours As Needed for Wheezing., Disp: 18 g, Rfl: 2    cetirizine (zyrTEC) 5 MG tablet, Take 1 tablet by mouth Daily., Disp: , Rfl:     EPINEPHrine (EPIPEN) 0.3 MG/0.3ML solution auto-injector injection, As Needed., Disp: , Rfl:     fluticasone (Flonase) 50 MCG/ACT nasal spray, 2 sprays into the nostril(s) as directed by provider Daily., Disp: 16 g, Rfl: 5    gabapentin (NEURONTIN) 300 MG capsule, Take 1 capsule by mouth Daily., Disp: , Rfl:     metFORMIN (GLUCOPHAGE) 500 MG tablet, TAKE TWO TABLETS BY MOUTH EVERY MORNING AND TAKE ONE TABLET BY MOUTH EVERY EVENING WITH MEALS, Disp: 270 tablet, Rfl: 3    montelukast (SINGULAIR) 10 MG tablet, TAKE ONE TABLET BY MOUTH  ONCE NIGHTLY, Disp: 90 tablet, Rfl: 1    omeprazole (priLOSEC) 20 MG capsule, TAKE ONE CAPSULE BY MOUTH TWICE A DAY, Disp: 28 capsule, Rfl: 0    ondansetron ODT (ZOFRAN-ODT) 4 MG disintegrating tablet, Place 1 tablet on the tongue Every 6 (Six) Hours As Needed for Nausea or Vomiting., Disp: 8 tablet, Rfl: 0    promethazine (PHENERGAN) 25 MG tablet, TAKE ONE TABLET BY MOUTH EVERY 4 HOURS AS NEEDED FOR NAUSEA (Patient taking differently: Take 1 tablet by mouth Every 6 (Six) Hours As Needed for Nausea.), Disp: 10 tablet, Rfl: 0    rOPINIRole (REQUIP) 0.5 MG tablet, One at bedtime for restless leg, Disp: , Rfl:     SUMAtriptan (IMITREX) 100 MG tablet, Take 1 tablet by mouth As Needed., Disp: , Rfl:     topiramate (TOPAMAX) 25 MG tablet, Take 1 tablet by mouth Daily., Disp: , Rfl:     vitamin D (ERGOCALCIFEROL) 1.25 MG (10577 UT) capsule capsule, TAKE ONE CAPSULE BY MOUTH ONCE WEEKLY (Patient taking differently: Take 1 capsule by mouth Every 7 (Seven) Days. sundays), Disp: 12 capsule, Rfl: 0    Medication Considerations:  MORGAN reviewed and appropriate.      Allergies:   Allergies   Allergen Reactions    Cimzia [Certolizumab Pegol] Hives, Unknown - Low Severity and Headache    Elastic Bandages & [Zinc] Hives    Latex Hives, Itching, Swelling and Rash     Gloves     Tape Hives    Dilantin [Phenytoin] Unknown - Low Severity    Iodine Hives, Swelling and Rash    Pollen Extract Rash    Tree Extract Other (See Comments)     CONGESTION       Objective     Physical Exam:  Vital Signs: There were no vitals filed for this visit.  There is no height or weight on file to calculate BMI.   Telehealth visit    Mental Status Exam:   Hygiene:   good  Cooperation:  Cooperative  Eye Contact:  Good  Psychomotor Behavior:  Appropriate  Affect:  Appropriate  Mood: normal  Speech:  Normal  Thought Process:  Goal directed and Linear  Thought Content:  Normal  Suicidal:  None  Homicidal:  None  Hallucinations:  None  Delusion:  None  Memory:   Intact  Orientation:  Person, Place, Time, and Situation  Reliability:  good  Insight:  Good  Judgement:  Good  Impulse Control:  Good  Physical/Medical Issues:   yes, see problem list      Assessment / Plan      Visit Diagnosis/Orders Placed This Visit:  Diagnoses and all orders for this visit:    1. Adjustment disorder with mixed anxiety and depressed mood (Primary)  -     busPIRone (BUSPAR) 10 MG tablet; Take 1 tablet by mouth 3 (Three) Times a Day.  Dispense: 90 tablet; Refill: 2         Functional Status: No impairment    Prognosis: Good with Ongoing Treatment     Impression/Formulation:  Patient appeared alert and oriented.  Patient is voluntarily requesting to begin outpatient therapy at [facility].  Patient is receptive to assistance with maintaining a stable lifestyle.  Patient presents with history of     ICD-10-CM ICD-9-CM   1. Adjustment disorder with mixed anxiety and depressed mood  F43.23 309.28   Reviewed patient's previous provider notes.  Patient meets DSM V diagnostic criteria for diagnoses. Diagnoses may be updated as more information becomes available.   Treatment Plan:   Continue with buspar three times a day  Follow up in 2 months or sooner if needed  Patient will continue supportive psychotherapy efforts and medications as indicated. Clinic will obtain release of information for current treatment team for continuity of care as needed. Patient will contact this office, call 911 or present to the nearest emergency room should suicidal or homicidal ideations occur. Discussed medication options and treatment plan of prescribed medication(s) as well as the risks, benefits, and potential side effects. Patient ackowledged and verbally consented to continue with current treatment plan and was educated on the importance of compliance with treatment and follow-up appointments.     Follow Up:   Staff to make follow up appt on 11/13 at 0800        COURTNEY Moreland,  PMHNP-BC [facility]

## 2023-09-21 NOTE — Clinical Note
[facility] EMERGENCY DEPARTMENT  [address]  [facility] [address]  Phone: [phone]    Eliz Eisenberg was seen and treated in our emergency department on 9/21/2023.  She may return to work on 09/25/2023.         Thank you for choosing [facility].    Noel Cuellar MD

## 2023-09-22 NOTE — ED PROVIDER NOTES
Subjective   History of Present Illness  33-year-old female who presents for evaluation of right lower anterior chest pain/right upper quadrant abdominal pain.  This does radiate around to the right flank.  She denies urinary symptoms include no dysuria, frequency, or urgency.  She has had a previous cholecystectomy.  No diarrhea or blood in the stool.  No runny nose, congestion, sore throat.  She has had nausea and decreased oral intake.  No new medications.  No suspicious food intake.  No other acute complaints.    Review of Systems   Constitutional:  Negative for chills, fatigue and fever.   HENT:  Negative for congestion, ear pain, postnasal drip, sinus pressure and sore throat.    Eyes:  Negative for pain, redness and visual disturbance.   Respiratory:  Negative for cough, chest tightness and shortness of breath.    Cardiovascular:  Negative for chest pain, palpitations and leg swelling.   Gastrointestinal:  Positive for abdominal pain and nausea. Negative for anal bleeding, blood in stool, diarrhea and vomiting.   Endocrine: Negative for polydipsia and polyuria.   Genitourinary:  Negative for difficulty urinating, dysuria, frequency and urgency.   Musculoskeletal:  Negative for arthralgias, back pain and neck pain.   Skin:  Negative for pallor and rash.   Allergic/Immunologic: Negative for environmental allergies and immunocompromised state.   Neurological:  Negative for dizziness, weakness and headaches.   Hematological:  Negative for adenopathy.   Psychiatric/Behavioral:  Negative for confusion, self-injury and suicidal ideas. The patient is not nervous/anxious.    All other systems reviewed and are negative.    Past Medical History:   Diagnosis Date    Abdominal pain     Acute sinusitis     Allergic     Allergic rhinitis     Ankle sprain     Anxiety     Arthritis 2017    On Mobic daily    Asthma     Atopic dermatitis     Biliary dyskinesia     Cat bite      resolved    Clotting disorder     Current tear of  meniscus     Dysfunctional uterine bleeding     Eczema     Foot pain     GERD (gastroesophageal reflux disease)     Omeprazole daily. remote EGD. No hx of h pylori    Gout     Hearing loss, bilateral     R/T PSORASIS    Helicobacter pylori antibody positive     4/26/23. Rx prevpak    IBS (irritable bowel syndrome)     Influenza     Metabolic syndrome     Migraines     Muscular aches     Obesity     Panic attack     PCOS (polycystic ovarian syndrome)     Pharyngitis     Psoriasis     planning to start Taltz    Psoriasis     Restless leg syndrome     URI (upper respiratory infection)     Wears glasses        Allergies   Allergen Reactions    Cimzia [Certolizumab Pegol] Hives, Unknown - Low Severity and Headache    Elastic Bandages & [Zinc] Hives    Latex Hives, Itching, Swelling and Rash     Gloves     Tape Hives    Dilantin [Phenytoin] Unknown - Low Severity    Iodine Hives, Swelling and Rash    Pollen Extract Rash    Tree Extract Other (See Comments)     CONGESTION       Past Surgical History:   Procedure Laterality Date    COLONOSCOPY      ENDOSCOPY N/A 5/15/2023    Procedure: ESOPHAGOGASTRODUODENOSCOPY;  Surgeon: Beatriz Sandy MD;  Location:  [facility];  Service: Bariatric;  Laterality: N/A;    LAPAROSCOPIC CHOLECYSTECTOMY  2018    no gallstones    TONSILLECTOMY AND ADENOIDECTOMY N/A 07/09/2021    Procedure: TONSILLECTOMY;  Surgeon: Ran Lemon MD;  Location:  [facility];  Service: ENT;  Laterality: N/A;    UMBILICAL HERNIA REPAIR  2018    w/ cholecystectomy    WISDOM TOOTH EXTRACTION         Family History   Problem Relation Age of Onset    Heart disease Mother     Multiple sclerosis Mother     Diabetes Mother     Arthritis Mother     Asthma Mother     Early death Mother     Kidney disease Mother     Hypertension Father     Obesity Father     Diabetes Father     Heart attack Father     Sleep apnea Father     No Known Problems Sister     Cancer Maternal Grandmother         Breast    Breast  cancer Maternal Grandmother     Diabetes Maternal Grandmother     Asthma Maternal Grandfather     Diabetes Maternal Grandfather     Sleep apnea Maternal Grandfather     Colon cancer Paternal Grandmother     Cancer Paternal Grandmother         COLON    Heart attack Paternal Grandfather     Sleep apnea Paternal Grandfather     Obesity Paternal Grandfather     Hypertension Paternal Grandfather        Social History     Socioeconomic History    Marital status:    Tobacco Use    Smoking status: Never    Smokeless tobacco: Never   Vaping Use    Vaping Use: Never used   Substance and Sexual Activity    Alcohol use: Not Currently     Comment: rare    Drug use: No    Sexual activity: Yes     Partners: Male     Birth control/protection: None           Objective   Physical Exam  Vitals and nursing note reviewed.   Constitutional:       General: She is not in acute distress.     Appearance: Normal appearance. She is well-developed. She is not toxic-appearing or diaphoretic.   HENT:      Head: Normocephalic and atraumatic.      Right Ear: External ear normal.      Left Ear: External ear normal.      Nose: Nose normal.   Eyes:      General: Lids are normal.      Pupils: Pupils are equal, round, and reactive to light.   Neck:      Trachea: No tracheal deviation.   Cardiovascular:      Rate and Rhythm: Normal rate and regular rhythm.      Pulses: No decreased pulses.      Heart sounds: Normal heart sounds. No murmur heard.    No friction rub. No gallop.   Pulmonary:      Effort: Pulmonary effort is normal. No respiratory distress.      Breath sounds: Normal breath sounds. No decreased breath sounds, wheezing, rhonchi or rales.   Abdominal:      General: Bowel sounds are normal.      Palpations: Abdomen is soft.      Tenderness: There is no abdominal tenderness. There is no guarding or rebound.      Comments: Tenderness in the right lower anterior chest and the right upper abdomen.  Normal bowel sounds, no peritoneal signs.    Musculoskeletal:         General: No deformity. Normal range of motion.      Cervical back: Normal range of motion and neck supple.   Lymphadenopathy:      Cervical: No cervical adenopathy.   Skin:     General: Skin is warm and dry.      Findings: No rash.   Neurological:      Mental Status: She is alert and oriented to person, place, and time.      Cranial Nerves: No cranial nerve deficit.      Sensory: No sensory deficit.   Psychiatric:         Speech: Speech normal.         Behavior: Behavior normal.         Thought Content: Thought content normal.         Judgment: Judgment normal.       Procedures           ED Course                                           Medical Decision Making  Differential diagnosis includes diverticulitis, bowel obstruction, constipation, muscle strain, gastritis, pancreatitis, viral illness, other unspecified etiology.    Lab evaluation positive for rhinovirus.    CT scan of the abdomen which shows no acute abnormalities.    Viral respiratory panel positive for rhinovirus    Labs show normal kidney function, no significant electrolyte's, normal axis, elevated white blood cell count, urine that shows no signs of infection CT angiogram of the chest shows suboptimal opacification of pulmonary arteries but no obvious pulmonary embolism.  Irregular nodular subpleural density at the left lung base suspicious for infectious or inflammatory focus.  With the linear densities and mild subpleural groundglass in the dependent aspects of the bilateral lower lobes which may reflect atelectasis or infectious/inflammatory process.  Hepatic steatosis. Spleenomegaly.    Given the reported findings I will cover for pneumonia with a course of antibiotics.    Given the lung nodule the patient will be referred to the pulmonary lung nodule clinic for outpatient follow-up.    Discharged home appearing well.    Problems Addressed:  Acute chest wall pain: complicated acute illness or injury  Nodule of left  lung: complicated acute illness or injury  Rhinovirus infection: complicated acute illness or injury    Amount and/or Complexity of Data Reviewed  Independent Historian: spouse  External Data Reviewed: labs, radiology, ECG and notes.  Labs: ordered. Decision-making details documented in ED Course.  Radiology: ordered and independent interpretation performed. Decision-making details documented in ED Course.  ECG/medicine tests: ordered and independent interpretation performed. Decision-making details documented in ED Course.    Risk  Prescription drug management.        Final diagnoses:   Rhinovirus infection   Nodule of left lung   Acute chest wall pain       ED Disposition  ED Disposition       ED Disposition   Discharge    Condition   Stable    Comment   --               Allison Pham PA  [address] [place] [address]  [phone]    In 1 week           Medication List        New Prescriptions      azithromycin 250 MG tablet  Commonly known as: ZITHROMAX  Take 1 tablet by mouth Daily. Take 2 tablets the first day, then 1 tablet daily for 4 days.     traMADol 50 MG tablet  Commonly known as: ULTRAM  Take 1 tablet by mouth Every 6 (Six) Hours As Needed for Moderate Pain or Severe Pain for up to 12 doses.            Changed      promethazine 25 MG tablet  Commonly known as: PHENERGAN  TAKE ONE TABLET BY MOUTH EVERY 4 HOURS AS NEEDED FOR NAUSEA  What changed: See the new instructions.     vitamin D 1.25 MG (17668 UT) capsule capsule  Commonly known as: ERGOCALCIFEROL  TAKE ONE CAPSULE BY MOUTH ONCE WEEKLY  What changed:   when to take this  additional instructions               Where to Get Your Medications        These medications were sent to [facility] [id number] - [place], [place] - [address] - [phone]  - [phone]   [address] [address]      Phone: [phone]   azithromycin 250 MG tablet  traMADol 50 MG tablet            [place]  Noel Butler MD  09/22/23 [id number]

## 2023-09-23 ENCOUNTER — TELEPHONE (OUTPATIENT)
Dept: INTERNAL MEDICINE | Facility: CLINIC | Age: 33
End: 2023-09-23
Payer: COMMERCIAL

## 2023-09-23 RX ORDER — CYCLOBENZAPRINE HCL 10 MG
10 TABLET ORAL 3 TIMES DAILY PRN
Qty: 20 TABLET | Refills: 0 | Status: SHIPPED | OUTPATIENT
Start: 2023-09-23

## 2023-09-23 NOTE — TELEPHONE ENCOUNTER
Pt's  called after hours and said pt is still having significant pain after ER visit yesterday. Diagnosed with URI, pulled muscle and possible pneumonia. On  azithromycin, tramadol, ibuprofen, tylenol. Pain is severe with certain movements. Will send in muscle relaxer for her to try. Can come in next week if not back to normal or to ER if worse or still struggling with pain.

## 2023-09-25 ENCOUNTER — CONSULT (OUTPATIENT)
Dept: BARIATRICS/WEIGHT MGMT | Facility: CLINIC | Age: 33
End: 2023-09-25

## 2023-09-25 VITALS
BODY MASS INDEX: 47.09 KG/M2 | OXYGEN SATURATION: 94 % | HEIGHT: 66 IN | TEMPERATURE: 97 F | SYSTOLIC BLOOD PRESSURE: 120 MMHG | HEART RATE: 106 BPM | DIASTOLIC BLOOD PRESSURE: 74 MMHG | WEIGHT: 293 LBS

## 2023-09-25 DIAGNOSIS — E66.01 MORBID OBESITY WITH BMI OF 50.0-59.9, ADULT: Primary | ICD-10-CM

## 2023-09-25 DIAGNOSIS — J45.909 ASTHMA, UNSPECIFIED ASTHMA SEVERITY, UNSPECIFIED WHETHER COMPLICATED, UNSPECIFIED WHETHER PERSISTENT: ICD-10-CM

## 2023-09-25 DIAGNOSIS — R94.2 ABNORMAL PFTS: ICD-10-CM

## 2023-09-25 DIAGNOSIS — R76.8 HELICOBACTER PYLORI ANTIBODY POSITIVE: ICD-10-CM

## 2023-09-25 DIAGNOSIS — R53.83 FATIGUE, UNSPECIFIED TYPE: ICD-10-CM

## 2023-09-25 DIAGNOSIS — K21.9 GASTROESOPHAGEAL REFLUX DISEASE, UNSPECIFIED WHETHER ESOPHAGITIS PRESENT: ICD-10-CM

## 2023-09-25 DIAGNOSIS — K29.60 BILE REFLUX GASTRITIS: ICD-10-CM

## 2023-09-25 PROCEDURE — 99215 OFFICE O/P EST HI 40 MIN: CPT | Performed by: SURGERY

## 2023-09-25 RX ORDER — PANTOPRAZOLE SODIUM 40 MG/10ML
40 INJECTION, POWDER, LYOPHILIZED, FOR SOLUTION INTRAVENOUS ONCE
OUTPATIENT
Start: 2023-09-25 | End: 2023-09-25

## 2023-09-25 RX ORDER — CHLORHEXIDINE GLUCONATE ORAL RINSE 1.2 MG/ML
15 SOLUTION DENTAL
OUTPATIENT
Start: 2023-09-25 | End: 2023-09-25

## 2023-09-25 RX ORDER — GABAPENTIN 100 MG/1
600 CAPSULE ORAL ONCE
OUTPATIENT
Start: 2023-09-25 | End: 2023-09-25

## 2023-09-25 RX ORDER — ENOXAPARIN SODIUM 100 MG/ML
40 INJECTION SUBCUTANEOUS ONCE
OUTPATIENT
Start: 2023-09-25 | End: 2023-09-25

## 2023-09-25 RX ORDER — SCOLOPAMINE TRANSDERMAL SYSTEM 1 MG/1
1 PATCH, EXTENDED RELEASE TRANSDERMAL ONCE
OUTPATIENT
Start: 2023-09-25 | End: 2023-09-25

## 2023-09-25 RX ORDER — SODIUM CHLORIDE 9 MG/ML
150 INJECTION, SOLUTION INTRAVENOUS CONTINUOUS
OUTPATIENT
Start: 2023-09-25

## 2023-09-25 NOTE — PROGRESS NOTES
"[facility] BARIATRIC SURGERY  [address]  [address]  [facility] [id number] [phone]      Patient  Name:  Eliz Eisenberg  :  1990      Date of Visit: 2023      Chief Complaint:  weight gain; unable to maintain weight loss    History of Present Illness:  Eliz Eisenberg is a 33 y.o. female who presents today for evaluation, education and consultation regarding weight loss surgery.     Patient has been overweight for many years, with numerous failed dietary/weight loss attempts.  She now has obesity related comorbidities of OA, GERD, migraines, PCOS and as such has decided to pursue weight loss surgery.    From intake:  \"GI: History of GERD on daily PPI.  Remote EGD several years ago.  No prior history of H. pylori.  She is status post laparoscopic cholecystectomy in 2018 for biliary dyskinesia.  She also had an umbilical hernia repair at that time.  She has a history of IBS and also has some intermittent chronic nausea.    She is currently seeing rheumatology for psoriasis and arthritis.  They are contemplating starting a biologic in the near future pending insurance approval.  She takes Mobic daily.    She is currently taking a daily baby aspirin because she was instructed to do so while undergoing hormonal therapy for infertility.  She is no longer undergoing treatment for infertility.    Other past medical history significant for asthma, PCOS, and anxiety.\"    2023 Update:    Hx of JACQUES from menorrhagia.  To start po iron soon.  Psoriatic arthritis, takes meloxicam and gabapentin.  Previously on Stelara, skipped last dose in case she had to hold for surgery.    Takes omeprazole QHS, supposed to take bid but doesn't need the AM dose.  It is controlled on QHS PPI.      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    No personal or family hx of VTE or clotting d/o.  No liver, lung, heart, or renal disease    Recovering from PNA, took Z pack " only.  Went to ED for RLQ pain last week, CT benign.  Dx with pulled muscle from coughing associated with recent PNA.  Splenomegaly, hepatomegaly on my review of history.  Tiny fascial defect at umbilicus.    CT:  IMPRESSION:  Impression:  Suboptimal opacification of the pulmonary arteries severely limits assessment for pulmonary embolism. Within this limitation, no evidence of large central pulmonary embolism or evidence of right heart strain. The segmental and subsegmental branches are   poorly evaluated.     Irregular nodular subpleural density at the left lung base suspicious for infectious or inflammatory focus. There is background wispy linear densities and mild subpleural groundglass in the dependent aspects of the bilateral lower lobes which may reflect   background atelectasis with additional sites of infectious or inflammatory process not entirely excluded.     Perifissural consolidative density in the right middle lobe may reflect segmental atelectasis or possibly additional site of infection.     No acute findings in the abdomen or pelvis.     Hepatic steatosis.     Nonobstructing left nephrolithiasis.     Splenomegaly measuring 15.0 cm in craniocaudal length.              Review of data:    MORGAN: monthly gabapentin  CBC: iron def anemia (hx of menorrhagia), Hgb 10.9.  CMP: alk phos 139.  HP negative on recheck         EGD: PQ 5/15/23, no hiatal hernia, bile throughout the stomach, GEJ 40 cm.  Path-reflux esophagitis    Cardiac clearance:low risk       PFTs: FEV1 86%       Reviewed rheum note.        Last tobacco: n/a  Last NSAIDs: Mobic last night  Last ASA: last night  Last steroids: several months ago.  Last Stelara (-mab) was 6/2023.  Last hormones: nothing, does not     Past Medical History:   Diagnosis Date    Abdominal pain     Acute sinusitis     Allergic     Allergic rhinitis     Ankle sprain     Anxiety     Arthritis 2017    On Mobic daily    Asthma     Atopic dermatitis     Biliary  dyskinesia     Cat bite      resolved    Clotting disorder     Current tear of meniscus     Dysfunctional uterine bleeding     Eczema     Foot pain     GERD (gastroesophageal reflux disease)     Omeprazole daily. remote EGD. No hx of h pylori    Gout     Hearing loss, bilateral     R/T PSORASIS    Helicobacter pylori antibody positive     4/26/23. Rx prevpak    IBS (irritable bowel syndrome)     Influenza     Metabolic syndrome     Migraines     Muscular aches     Obesity     Panic attack     PCOS (polycystic ovarian syndrome)     Pharyngitis     Psoriasis     planning to start Taltz    Psoriasis     Restless leg syndrome     URI (upper respiratory infection)     Wears glasses      Past Surgical History:   Procedure Laterality Date    COLONOSCOPY      ENDOSCOPY N/A 5/15/2023    Procedure: ESOPHAGOGASTRODUODENOSCOPY;  Surgeon: Beatriz Sandy MD;  Location:  [facility];  Service: Bariatric;  Laterality: N/A;    LAPAROSCOPIC CHOLECYSTECTOMY  2018    no gallstones    TONSILLECTOMY AND ADENOIDECTOMY N/A 07/09/2021    Procedure: TONSILLECTOMY;  Surgeon: Ran Lemon MD;  Location:  [facility];  Service: ENT;  Laterality: N/A;    UMBILICAL HERNIA REPAIR  2018    w/ cholecystectomy    WISDOM TOOTH EXTRACTION         Allergies   Allergen Reactions    Cimzia [Certolizumab Pegol] Hives, Unknown - Low Severity and Headache    Elastic Bandages & [Zinc] Hives    Latex Hives, Itching, Swelling and Rash     Gloves     Tape Hives    Dilantin [Phenytoin] Unknown - Low Severity     Skin crawling    Iodine Hives, Swelling and Rash    Pollen Extract Rash    Tree Extract Other (See Comments)     CONGESTION       Current Outpatient Medications:     albuterol sulfate  (90 Base) MCG/ACT inhaler, Inhale 2 puffs Every 4 (Four) Hours As Needed for Wheezing., Disp: 18 g, Rfl: 2    busPIRone (BUSPAR) 10 MG tablet, Take 1 tablet by mouth 3 (Three) Times a Day. (Patient taking differently: Take 1 tablet by mouth Daily.),  Disp: 90 tablet, Rfl: 2    cetirizine (zyrTEC) 5 MG tablet, Take 1 tablet by mouth Daily., Disp: , Rfl:     cyclobenzaprine (FLEXERIL) 10 MG tablet, Take 1 tablet by mouth 3 (Three) Times a Day As Needed for Muscle Spasms., Disp: 20 tablet, Rfl: 0    EPINEPHrine (EPIPEN) 0.3 MG/0.3ML solution auto-injector injection, As Needed., Disp: , Rfl:     fluticasone (Flonase) 50 MCG/ACT nasal spray, 2 sprays into the nostril(s) as directed by provider Daily., Disp: 16 g, Rfl: 5    gabapentin (NEURONTIN) 300 MG capsule, Take 1 capsule by mouth Daily., Disp: , Rfl:     metFORMIN (GLUCOPHAGE) 500 MG tablet, TAKE TWO TABLETS BY MOUTH EVERY MORNING AND TAKE ONE TABLET BY MOUTH EVERY EVENING WITH MEALS, Disp: 270 tablet, Rfl: 3    montelukast (SINGULAIR) 10 MG tablet, TAKE ONE TABLET BY MOUTH ONCE NIGHTLY, Disp: 90 tablet, Rfl: 1    omeprazole (priLOSEC) 20 MG capsule, TAKE ONE CAPSULE BY MOUTH TWICE A DAY, Disp: 28 capsule, Rfl: 0    ondansetron ODT (ZOFRAN-ODT) 4 MG disintegrating tablet, Place 1 tablet on the tongue Every 6 (Six) Hours As Needed for Nausea or Vomiting., Disp: 8 tablet, Rfl: 0    promethazine (PHENERGAN) 25 MG tablet, TAKE ONE TABLET BY MOUTH EVERY 4 HOURS AS NEEDED FOR NAUSEA (Patient taking differently: Take 1 tablet by mouth Every 6 (Six) Hours As Needed for Nausea.), Disp: 10 tablet, Rfl: 0    rOPINIRole (REQUIP) 0.5 MG tablet, One at bedtime for restless leg, Disp: , Rfl:     SUMAtriptan (IMITREX) 100 MG tablet, Take 1 tablet by mouth As Needed., Disp: , Rfl:     topiramate (TOPAMAX) 25 MG tablet, Take 1 tablet by mouth Daily., Disp: , Rfl:     traMADol (ULTRAM) 50 MG tablet, Take 1 tablet by mouth Every 6 (Six) Hours As Needed for Moderate Pain or Severe Pain for up to 12 doses., Disp: 12 tablet, Rfl: 0    vitamin D (ERGOCALCIFEROL) 1.25 MG (88486 UT) capsule capsule, TAKE ONE CAPSULE BY MOUTH ONCE WEEKLY (Patient taking differently: Take 1 capsule by mouth Every 7 (Seven) Days. sundays), Disp: 12 capsule,  Rfl: 0    apixaban (Eliquis) 2.5 MG tablet tablet, Take 1 tablet by mouth 2 (Two) Times a Day., Disp: 42 tablet, Rfl: 0    Social History     Socioeconomic History    Marital status:    Tobacco Use    Smoking status: Never    Smokeless tobacco: Never   Vaping Use    Vaping Use: Never used   Substance and Sexual Activity    Alcohol use: Not Currently     Comment: rare    Drug use: No    Sexual activity: Yes     Partners: Male     Birth control/protection: None     Social History     Social History Narrative    Patient lives in [place], [place].   She is  with no children and works full time as the  of [employer].        Family History   Problem Relation Age of Onset    Heart disease Mother     Multiple sclerosis Mother     Diabetes Mother     Arthritis Mother     Asthma Mother     Early death Mother     Kidney disease Mother     Hypertension Father     Obesity Father     Diabetes Father     Heart attack Father     Sleep apnea Father     No Known Problems Sister     Cancer Maternal Grandmother         Breast    Breast cancer Maternal Grandmother     Diabetes Maternal Grandmother     Asthma Maternal Grandfather     Diabetes Maternal Grandfather     Sleep apnea Maternal Grandfather     Colon cancer Paternal Grandmother     Cancer Paternal Grandmother         COLON    Heart attack Paternal Grandfather     Sleep apnea Paternal Grandfather     Obesity Paternal Grandfather     Hypertension Paternal Grandfather        Review of Systems   Constitutional:  Positive for fatigue and unexpected weight gain. Negative for chills, diaphoresis, fever and unexpected weight loss.   HENT:  Negative for congestion and facial swelling.    Eyes:  Negative for blurred vision, double vision and discharge.   Respiratory:  Negative for chest tightness, shortness of breath and stridor.    Cardiovascular:  Negative for chest pain, palpitations and leg swelling.   Gastrointestinal:   Negative for blood in stool.   Endocrine: Negative for polydipsia.   Genitourinary:  Negative for hematuria.   Musculoskeletal:  Positive for arthralgias.   Skin:  Negative for color change.   Allergic/Immunologic: Negative for immunocompromised state.   Neurological:  Negative for confusion.   Psychiatric/Behavioral:  Negative for self-injury.      Physical Exam:  Vital Signs:  Weight: (!) 159 kg (350 lb 3.2 oz)   Body mass index is 56.52 kg/m².  Temp: 97 °F (36.1 °C)   Heart Rate: 106   BP: 120/74     Physical Exam  Vitals reviewed.   Constitutional:       Appearance: She is well-developed.   HENT:      Head: Normocephalic and atraumatic.      Nose: Nose normal.   Eyes:      Conjunctiva/sclera: Conjunctivae normal.      Pupils: Pupils are equal, round, and reactive to light.   Neck:      Thyroid: No thyromegaly.      Vascular: No carotid bruit.      Trachea: No tracheal deviation.   Cardiovascular:      Rate and Rhythm: Normal rate and regular rhythm.   Pulmonary:      Effort: Pulmonary effort is normal. No respiratory distress.   Abdominal:      General: There is no distension.      Palpations: Abdomen is soft.      Tenderness: There is no abdominal tenderness.      Comments: Lap scars   Musculoskeletal:         General: No deformity. Normal range of motion.      Cervical back: Normal range of motion and neck supple.   Skin:     General: Skin is warm and dry.      Findings: No rash.   Neurological:      Mental Status: She is alert and oriented to person, place, and time.      Cranial Nerves: No cranial nerve deficit.      Coordination: Coordination normal.   Psychiatric:         Behavior: Behavior normal.         Thought Content: Thought content normal.         Judgment: Judgment normal.     Problem List Items Addressed This Visit       Asthma    GERD (gastroesophageal reflux disease)    Overview     Omeprazole daily. remote EGD. No hx of h pylori         Helicobacter pylori antibody positive    Overview      4/26/23. Rx prevpak          Other Visit Diagnoses       Morbid obesity with BMI of 50.0-59.9, adult    -  Primary    Relevant Orders    Case Request (Completed)    MRSA Screen Culture (Outpatient) - Swab, Nares    Abnormal PFTs        Fatigue, unspecified type        Bile reflux gastritis                Assessment:    Eliz Eisenberg is a 33 y.o. year old female with medically complicated obesity.    Weight loss surgery is deemed medically necessary given the following obesity related comorbidities including OA, GERD, migraines, PCOS with current Weight: (!) 159 kg (350 lb 3.2 oz) and Body mass index is 56.52 kg/m²..    Patient is aware that surgery is a tool, and that weight loss is not guaranteed but only seen in the context of appropriate use, follow up and exercise.    The patient was present for an approximately a 2.5 hour discussion of the purpose of weight loss surgery, how WLS is a tool to assist in achieving weight loss goals, the most common complications and how best to avoid them, and the strategies for short and long term weight loss.  Ample opportunity to discuss questions was available both in group and during the time of individual examination.    I reviewed all available preop labs, Xrays, tests, clearances, etc and signed off on these in the record.  All of this in addition to the patient's unique history and exam has been taken into consideration in determining their appropriate candidacy for weight loss surgery.    Complications  of laparoscopic/possible robotic gastric sleeve were discussed. The patient is well aware of the potential complications of surgery that include but not limited to bleeding, infections, deep venous thrombosis, pulmonary embolism, pulmonary complications such as pneumonia, cardiac events, hernias, small bowel obstruction, damage to the spleen or other organs, bowel injury, disfiguring scars, failure to lose weight, need for additional surgery, conversion to an open  "procedure, and death. Patient is also aware of complications which apply in this particular procedure that can include but are not limited to a \"leak\" at the staple line which in some instances may require conversion to gastric bypass.    The patient is aware if a hiatal hernia is encountered, it likely will be repaired.  R/B/A Rx to hiatal hernia repair were discussed as outlined in our long consent form.  Briefly risks in addition to those for LSG include recurrent hernia, MICAH, dysphagia, esophageal injury, pneumothorax, injury to the vagus nerves, injury to the thoracic duct, aorta or vena cava.    Greater than 3 minutes was spent with the patient discussing avoiding all tobacco products and second hand smoke at least 2 weeks pre-operatively and 6 weeks post-operatively to minimize the risk of sleeve leak.  This included discussing the importance of avoiding even secondhand smoke as the risk of leak is increased.  Examples discussed:  I made it very clear that the patient understands they should avoid even riding in a car where someone has previously smoked in the last 2 weeks, living in a house where someone smokes (even if it's in a separate room/patio/attached garage, etc.) we discussed that they should not have a conversation with a group of people who are smoking even if it's outside.  They can be around wood burning fires and barbecue.  I told them I do not know if marijuana has a same effects but my overall recommendation is to avoid it for 2 weeks prior in 6 weeks after surgery.  They also are aware that nicotine may also increase the risk of leak and I strongly encouraged him to avoid that as well for 2 weeks prior in 6 weeks after surgery.    Discussed the risks, benefits and alternative therapies at great length as outlined in our extensive consent forms, consent videos, and educational teaching process under the direction of the center's .    A copy of the patient's signed informed " consent is on file.    Plan:  Rbotic assisted sleeve gastrectomy at [facility]    Discussed monoclonal ab (Stelara) for psoriatic arthritis, no evidence that monoclonal ab lead to leaks.  Ok to take.    Discussed findings of bile in stomach at EGD, possibility of bile acid reflux in future requiring RYGB.  I am encouraged that her reflux is controlled on PPI.    R/B/A Rx discussed to postop anticoagulation including but not limited to bleeding, drug reaction, venothromboembolic events, etc. and she is agreeable to taking 3 weeks of Eliquis.    MDM high:  Elective procedure with the following risk factors: morbid obesity.  4+ chronic medical problems reviewed.      Thank you Allison Pham PA for allowing me to share in the care of our mutual patient.             Beatriz Sandy MD

## 2023-09-25 NOTE — LETTER
"2023       No Recipients    Patient: Eliz Eisenberg   YOB: 1990   Date of Visit: 2023     Dear VINNIE Godinez:       Thank you for referring Eliz Eisenberg to me for evaluation. Below are the relevant portions of my assessment and plan of care.    If you have questions, please do not hesitate to call me. I look forward to following Eliz along with you.         Sincerely,        Beatriz Sandy MD        CC:   No Recipients    Beatriz Sandy MD  [phone]  Sign when Signing Visit  [facility] BARIATRIC SURGERY  [address]  [address]  [facility] [address] [phone]      Patient  Name:  Eliz Eisenberg  :  1990      Date of Visit: 2023      Chief Complaint:  weight gain; unable to maintain weight loss    History of Present Illness:  Eliz Eisenberg is a 33 y.o. female who presents today for evaluation, education and consultation regarding weight loss surgery.     Patient has been overweight for many years, with numerous failed dietary/weight loss attempts.  She now has obesity related comorbidities of OA, GERD, migraines, PCOS and as such has decided to pursue weight loss surgery.    From intake:  \"GI: History of GERD on daily PPI.  Remote EGD several years ago.  No prior history of H. pylori.  She is status post laparoscopic cholecystectomy in 2018 for biliary dyskinesia.  She also had an umbilical hernia repair at that time.  She has a history of IBS and also has some intermittent chronic nausea.    She is currently seeing rheumatology for psoriasis and arthritis.  They are contemplating starting a biologic in the near future pending insurance approval.  She takes Mobic daily.    She is currently taking a daily baby aspirin because she was instructed to do so while undergoing hormonal therapy for infertility.  She is no longer undergoing treatment for infertility.    Other past medical history " "significant for asthma, PCOS, and anxiety.\"    9/25/2023 Update:    Hx of JACQUES from menorrhagia.  To start po iron soon.  Psoriatic arthritis, takes meloxicam and gabapentin.  Previously on Stelara, skipped last dose in case she had to hold for surgery.    Takes omeprazole QHS, supposed to take bid but doesn't need the AM dose.  It is controlled on QHS PPI.      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    No personal or family hx of VTE or clotting d/o.  No liver, lung, heart, or renal disease    Recovering from PNA, took Z pack only.  Went to ED for RLQ pain last week, CT benign.  Dx with pulled muscle from coughing associated with recent PNA.  Splenomegaly, hepatomegaly on my review of history.  Tiny fascial defect at umbilicus.    CT:  IMPRESSION:  Impression:  Suboptimal opacification of the pulmonary arteries severely limits assessment for pulmonary embolism. Within this limitation, no evidence of large central pulmonary embolism or evidence of right heart strain. The segmental and subsegmental branches are   poorly evaluated.     Irregular nodular subpleural density at the left lung base suspicious for infectious or inflammatory focus. There is background wispy linear densities and mild subpleural groundglass in the dependent aspects of the bilateral lower lobes which may reflect   background atelectasis with additional sites of infectious or inflammatory process not entirely excluded.     Perifissural consolidative density in the right middle lobe may reflect segmental atelectasis or possibly additional site of infection.     No acute findings in the abdomen or pelvis.     Hepatic steatosis.     Nonobstructing left nephrolithiasis.     Splenomegaly measuring 15.0 cm in craniocaudal length.              Review of data:    MORGAN: monthly gabapentin  CBC: iron def anemia (hx of menorrhagia), Hgb 10.9.  CMP: alk phos 139.  HP negative on recheck         EGD: PQ 5/15/23, no hiatal hernia, bile " throughout the stomach, GEJ 40 cm.  Path-reflux esophagitis    Cardiac clearance:low risk       PFTs: FEV1 86%       Reviewed rheum note.        Last tobacco: n/a  Last NSAIDs: Mobic last night  Last ASA: last night  Last steroids: several months ago.  Last Stelara (-mab) was 6/2023.  Last hormones: nothing, does not     Past Medical History:   Diagnosis Date   • Abdominal pain    • Acute sinusitis    • Allergic    • Allergic rhinitis    • Ankle sprain    • Anxiety    • Arthritis 2017    On Mobic daily   • Asthma    • Atopic dermatitis    • Biliary dyskinesia    • Cat bite      resolved   • Clotting disorder    • Current tear of meniscus    • Dysfunctional uterine bleeding    • Eczema    • Foot pain    • GERD (gastroesophageal reflux disease)     Omeprazole daily. remote EGD. No hx of h pylori   • Gout    • Hearing loss, bilateral     R/T PSORASIS   • Helicobacter pylori antibody positive     4/26/23. Rx prevpak   • IBS (irritable bowel syndrome)    • Influenza    • Metabolic syndrome    • Migraines    • Muscular aches    • Obesity    • Panic attack    • PCOS (polycystic ovarian syndrome)    • Pharyngitis    • Psoriasis     planning to start Taltz   • Psoriasis    • Restless leg syndrome    • URI (upper respiratory infection)    • Wears glasses      Past Surgical History:   Procedure Laterality Date   • COLONOSCOPY     • ENDOSCOPY N/A 5/15/2023    Procedure: ESOPHAGOGASTRODUODENOSCOPY;  Surgeon: Beatriz Sandy MD;  Location: [facility] ENDOSCOPY;  Service: Bariatric;  Laterality: N/A;   • LAPAROSCOPIC CHOLECYSTECTOMY  2018    no gallstones   • TONSILLECTOMY AND ADENOIDECTOMY N/A 07/09/2021    Procedure: TONSILLECTOMY;  Surgeon: Ran Lemon MD;  Location: [facility] OR;  Service: ENT;  Laterality: N/A;   • UMBILICAL HERNIA REPAIR  2018    w/ cholecystectomy   • WISDOM TOOTH EXTRACTION         Allergies   Allergen Reactions   • Cimzia [Certolizumab Pegol] Hives, Unknown - Low Severity and Headache   • Elastic  Bandages & [Zinc] Hives   • Latex Hives, Itching, Swelling and Rash     Gloves    • Tape Hives   • Dilantin [Phenytoin] Unknown - Low Severity     Skin crawling   • Iodine Hives, Swelling and Rash   • Pollen Extract Rash   • Tree Extract Other (See Comments)     CONGESTION       Current Outpatient Medications:   •  albuterol sulfate  (90 Base) MCG/ACT inhaler, Inhale 2 puffs Every 4 (Four) Hours As Needed for Wheezing., Disp: 18 g, Rfl: 2  •  busPIRone (BUSPAR) 10 MG tablet, Take 1 tablet by mouth 3 (Three) Times a Day. (Patient taking differently: Take 1 tablet by mouth Daily.), Disp: 90 tablet, Rfl: 2  •  cetirizine (zyrTEC) 5 MG tablet, Take 1 tablet by mouth Daily., Disp: , Rfl:   •  cyclobenzaprine (FLEXERIL) 10 MG tablet, Take 1 tablet by mouth 3 (Three) Times a Day As Needed for Muscle Spasms., Disp: 20 tablet, Rfl: 0  •  EPINEPHrine (EPIPEN) 0.3 MG/0.3ML solution auto-injector injection, As Needed., Disp: , Rfl:   •  fluticasone (Flonase) 50 MCG/ACT nasal spray, 2 sprays into the nostril(s) as directed by provider Daily., Disp: 16 g, Rfl: 5  •  gabapentin (NEURONTIN) 300 MG capsule, Take 1 capsule by mouth Daily., Disp: , Rfl:   •  metFORMIN (GLUCOPHAGE) 500 MG tablet, TAKE TWO TABLETS BY MOUTH EVERY MORNING AND TAKE ONE TABLET BY MOUTH EVERY EVENING WITH MEALS, Disp: 270 tablet, Rfl: 3  •  montelukast (SINGULAIR) 10 MG tablet, TAKE ONE TABLET BY MOUTH ONCE NIGHTLY, Disp: 90 tablet, Rfl: 1  •  omeprazole (priLOSEC) 20 MG capsule, TAKE ONE CAPSULE BY MOUTH TWICE A DAY, Disp: 28 capsule, Rfl: 0  •  ondansetron ODT (ZOFRAN-ODT) 4 MG disintegrating tablet, Place 1 tablet on the tongue Every 6 (Six) Hours As Needed for Nausea or Vomiting., Disp: 8 tablet, Rfl: 0  •  promethazine (PHENERGAN) 25 MG tablet, TAKE ONE TABLET BY MOUTH EVERY 4 HOURS AS NEEDED FOR NAUSEA (Patient taking differently: Take 1 tablet by mouth Every 6 (Six) Hours As Needed for Nausea.), Disp: 10 tablet, Rfl: 0  •  rOPINIRole (REQUIP)  0.5 MG tablet, One at bedtime for restless leg, Disp: , Rfl:   •  SUMAtriptan (IMITREX) 100 MG tablet, Take 1 tablet by mouth As Needed., Disp: , Rfl:   •  topiramate (TOPAMAX) 25 MG tablet, Take 1 tablet by mouth Daily., Disp: , Rfl:   •  traMADol (ULTRAM) 50 MG tablet, Take 1 tablet by mouth Every 6 (Six) Hours As Needed for Moderate Pain or Severe Pain for up to 12 doses., Disp: 12 tablet, Rfl: 0  •  vitamin D (ERGOCALCIFEROL) 1.25 MG (94938 UT) capsule capsule, TAKE ONE CAPSULE BY MOUTH ONCE WEEKLY (Patient taking differently: Take 1 capsule by mouth Every 7 (Seven) Days. sundays), Disp: 12 capsule, Rfl: 0  •  apixaban (Eliquis) 2.5 MG tablet tablet, Take 1 tablet by mouth 2 (Two) Times a Day., Disp: 42 tablet, Rfl: 0    Social History     Socioeconomic History   • Marital status:    Tobacco Use   • Smoking status: Never   • Smokeless tobacco: Never   Vaping Use   • Vaping Use: Never used   Substance and Sexual Activity   • Alcohol use: Not Currently     Comment: rare   • Drug use: No   • Sexual activity: Yes     Partners: Male     Birth control/protection: None     Social History     Social History Narrative    Patient lives in [place], [place].   She is  with no children and works full time as the  of [employer].        Family History   Problem Relation Age of Onset   • Heart disease Mother    • Multiple sclerosis Mother    • Diabetes Mother    • Arthritis Mother    • Asthma Mother    • Early death Mother    • Kidney disease Mother    • Hypertension Father    • Obesity Father    • Diabetes Father    • Heart attack Father    • Sleep apnea Father    • No Known Problems Sister    • Cancer Maternal Grandmother         Breast   • Breast cancer Maternal Grandmother    • Diabetes Maternal Grandmother    • Asthma Maternal Grandfather    • Diabetes Maternal Grandfather    • Sleep apnea Maternal Grandfather    • Colon cancer Paternal Grandmother    • Cancer  Paternal Grandmother         COLON   • Heart attack Paternal Grandfather    • Sleep apnea Paternal Grandfather    • Obesity Paternal Grandfather    • Hypertension Paternal Grandfather        Review of Systems   Constitutional:  Positive for fatigue and unexpected weight gain. Negative for chills, diaphoresis, fever and unexpected weight loss.   HENT:  Negative for congestion and facial swelling.    Eyes:  Negative for blurred vision, double vision and discharge.   Respiratory:  Negative for chest tightness, shortness of breath and stridor.    Cardiovascular:  Negative for chest pain, palpitations and leg swelling.   Gastrointestinal:  Negative for blood in stool.   Endocrine: Negative for polydipsia.   Genitourinary:  Negative for hematuria.   Musculoskeletal:  Positive for arthralgias.   Skin:  Negative for color change.   Allergic/Immunologic: Negative for immunocompromised state.   Neurological:  Negative for confusion.   Psychiatric/Behavioral:  Negative for self-injury.      Physical Exam:  Vital Signs:  Weight: (!) 159 kg (350 lb 3.2 oz)   Body mass index is 56.52 kg/m².  Temp: 97 °F (36.1 °C)   Heart Rate: 106   BP: 120/74     Physical Exam  Vitals reviewed.   Constitutional:       Appearance: She is well-developed.   HENT:      Head: Normocephalic and atraumatic.      Nose: Nose normal.   Eyes:      Conjunctiva/sclera: Conjunctivae normal.      Pupils: Pupils are equal, round, and reactive to light.   Neck:      Thyroid: No thyromegaly.      Vascular: No carotid bruit.      Trachea: No tracheal deviation.   Cardiovascular:      Rate and Rhythm: Normal rate and regular rhythm.   Pulmonary:      Effort: Pulmonary effort is normal. No respiratory distress.   Abdominal:      General: There is no distension.      Palpations: Abdomen is soft.      Tenderness: There is no abdominal tenderness.      Comments: Lap scars   Musculoskeletal:         General: No deformity. Normal range of motion.      Cervical back:  Normal range of motion and neck supple.   Skin:     General: Skin is warm and dry.      Findings: No rash.   Neurological:      Mental Status: She is alert and oriented to person, place, and time.      Cranial Nerves: No cranial nerve deficit.      Coordination: Coordination normal.   Psychiatric:         Behavior: Behavior normal.         Thought Content: Thought content normal.         Judgment: Judgment normal.     Problem List Items Addressed This Visit       Asthma    GERD (gastroesophageal reflux disease)    Overview     Omeprazole daily. remote EGD. No hx of h pylori         Helicobacter pylori antibody positive    Overview     4/26/23. Rx prevpak          Other Visit Diagnoses       Morbid obesity with BMI of 50.0-59.9, adult    -  Primary    Relevant Orders    Case Request (Completed)    MRSA Screen Culture (Outpatient) - Swab, Nares    Abnormal PFTs        Fatigue, unspecified type        Bile reflux gastritis                Assessment:    Eliz Eisenberg is a 33 y.o. year old female with medically complicated obesity.    Weight loss surgery is deemed medically necessary given the following obesity related comorbidities including OA, GERD, migraines, PCOS with current Weight: (!) 159 kg (350 lb 3.2 oz) and Body mass index is 56.52 kg/m²..    Patient is aware that surgery is a tool, and that weight loss is not guaranteed but only seen in the context of appropriate use, follow up and exercise.    The patient was present for an approximately a 2.5 hour discussion of the purpose of weight loss surgery, how WLS is a tool to assist in achieving weight loss goals, the most common complications and how best to avoid them, and the strategies for short and long term weight loss.  Ample opportunity to discuss questions was available both in group and during the time of individual examination.    I reviewed all available preop labs, Xrays, tests, clearances, etc and signed off on these in the record.  All of  "this in addition to the patient's unique history and exam has been taken into consideration in determining their appropriate candidacy for weight loss surgery.    Complications  of laparoscopic/possible robotic gastric sleeve were discussed. The patient is well aware of the potential complications of surgery that include but not limited to bleeding, infections, deep venous thrombosis, pulmonary embolism, pulmonary complications such as pneumonia, cardiac events, hernias, small bowel obstruction, damage to the spleen or other organs, bowel injury, disfiguring scars, failure to lose weight, need for additional surgery, conversion to an open procedure, and death. Patient is also aware of complications which apply in this particular procedure that can include but are not limited to a \"leak\" at the staple line which in some instances may require conversion to gastric bypass.    The patient is aware if a hiatal hernia is encountered, it likely will be repaired.  R/B/A Rx to hiatal hernia repair were discussed as outlined in our long consent form.  Briefly risks in addition to those for LSG include recurrent hernia, MICAH, dysphagia, esophageal injury, pneumothorax, injury to the vagus nerves, injury to the thoracic duct, aorta or vena cava.    Greater than 3 minutes was spent with the patient discussing avoiding all tobacco products and second hand smoke at least 2 weeks pre-operatively and 6 weeks post-operatively to minimize the risk of sleeve leak.  This included discussing the importance of avoiding even secondhand smoke as the risk of leak is increased.  Examples discussed:  I made it very clear that the patient understands they should avoid even riding in a car where someone has previously smoked in the last 2 weeks, living in a house where someone smokes (even if it's in a separate room/patio/attached garage, etc.) we discussed that they should not have a conversation with a group of people who are smoking even if " it's outside.  They can be around wood burning fires and barbecue.  I told them I do not know if marijuana has a same effects but my overall recommendation is to avoid it for 2 weeks prior in 6 weeks after surgery.  They also are aware that nicotine may also increase the risk of leak and I strongly encouraged him to avoid that as well for 2 weeks prior in 6 weeks after surgery.    Discussed the risks, benefits and alternative therapies at great length as outlined in our extensive consent forms, consent videos, and educational teaching process under the direction of the center's .    A copy of the patient's signed informed consent is on file.    Plan:  Rbotic assisted sleeve gastrectomy at [facility]    Discussed monoclonal ab (Stelara) for psoriatic arthritis, no evidence that monoclonal ab lead to leaks.  Ok to take.    Discussed findings of bile in stomach at EGD, possibility of bile acid reflux in future requiring RYGB.  I am encouraged that her reflux is controlled on PPI.    R/B/A Rx discussed to postop anticoagulation including but not limited to bleeding, drug reaction, venothromboembolic events, etc. and she is agreeable to taking 3 weeks of Eliquis.    MDM high:  Elective procedure with the following risk factors: morbid obesity.  4+ chronic medical problems reviewed.      Thank you Allison Pham PA for allowing me to share in the care of our mutual patient.             Beatriz Sandy MD

## 2023-09-27 ENCOUNTER — OFFICE VISIT (OUTPATIENT)
Dept: INTERNAL MEDICINE | Facility: CLINIC | Age: 33
End: 2023-09-27
Payer: COMMERCIAL

## 2023-09-27 VITALS
OXYGEN SATURATION: 95 % | BODY MASS INDEX: 47.09 KG/M2 | DIASTOLIC BLOOD PRESSURE: 82 MMHG | HEIGHT: 66 IN | SYSTOLIC BLOOD PRESSURE: 126 MMHG | RESPIRATION RATE: 18 BRPM | HEART RATE: 94 BPM | WEIGHT: 293 LBS

## 2023-09-27 DIAGNOSIS — J18.9 PNEUMONIA OF BOTH LUNGS DUE TO INFECTIOUS ORGANISM, UNSPECIFIED PART OF LUNG: ICD-10-CM

## 2023-09-27 DIAGNOSIS — R07.89 RIGHT-SIDED CHEST WALL PAIN: Primary | ICD-10-CM

## 2023-09-27 PROCEDURE — 99213 OFFICE O/P EST LOW 20 MIN: CPT | Performed by: PHYSICIAN ASSISTANT

## 2023-09-27 RX ORDER — METHYLPREDNISOLONE 4 MG/1
TABLET ORAL
Qty: 21 TABLET | Refills: 0 | Status: SHIPPED | OUTPATIENT
Start: 2023-09-27

## 2023-09-27 RX ORDER — FLUCONAZOLE 150 MG/1
150 TABLET ORAL
Qty: 2 TABLET | Refills: 0 | Status: SHIPPED | OUTPATIENT
Start: 2023-09-27

## 2023-09-27 RX ORDER — CEFDINIR 300 MG/1
300 CAPSULE ORAL 2 TIMES DAILY
Qty: 20 CAPSULE | Refills: 0 | Status: SHIPPED | OUTPATIENT
Start: 2023-09-27

## 2023-09-27 NOTE — PROGRESS NOTES
Chief Complaint   Patient presents with    Hospital Follow Up Visit     Pneumonia         Subjective     Eliz Eisenberg is a 33 y.o. female.        History of Present Illness         Ms. Eliz Eisenberg is a 33-year-old female who presents today for a follow-up.     She went to the emergency room on 09/21/2023. She started with a cough. She thought it was allergies. She took a home COVID-19 test, and it was negative. She treated it with cold medicine at home. On 09/21/2023, she woke up with pain radiating from her neck down her chest into her abdomen on the right side. She experienced pain while taking a deep breath. She left work and went to the emergency room. The staff at the emergency room thought her appendix was rupturing. They completed several tests and told her there was a nodule on the left side of her lungs. They told her she had pulled a muscle in her right chest wall. She was given azithromycin and tramadol. She feels like the azithromycin has been working for a few days. She finished the azithromycin on 09/25/2023. She experiences dyspnea when she coughs. It is almost dry 9 times out of 10. She has a dry cough. She is unable to lay flat or complete basic movements without discomfort.  She sleeps with her bed propped up at a 45-degree angle to be able to sleep. She can usually manage the pain with ibuprofen. She has been taking muscle relaxers at night. Breathing, twisting, and moving her right arm makes the pain worse. She denies taking any steroids. She is scheduled for bariatric surgery on 11/06/2023, and they do not want her to have any steroids. She is taking 400 to 800 mg of ibuprofen twice a day.         She had a fever when she went to the emergency room. She took her  temperature on Monday, and it was above 100 degrees Fahrenheit. She went home and took Tylenol. It took a while to come down. She has not had a fever since then. She has been taking nebulizer treatments. It does help relieve  the pain. She can raise her arm up, but it is just pain. It usually starts at the base of her neck and goes down her shoulder blade to her lower back. If she tries to twist, it is in her chest. It helps if she holds her chest while coughing. She has increased her fluids.      She was tested for COVID-19 and influenza, and they were all negative.     She had a CT scan of her chest and abdomen.     She is allergic to most antibiotics. They usually give her a yeast infection.         Current Outpatient Medications:     albuterol sulfate  (90 Base) MCG/ACT inhaler, Inhale 2 puffs Every 4 (Four) Hours As Needed for Wheezing., Disp: 18 g, Rfl: 2    apixaban (Eliquis) 2.5 MG tablet tablet, Take 1 tablet by mouth 2 (Two) Times a Day., Disp: 42 tablet, Rfl: 0    busPIRone (BUSPAR) 10 MG tablet, Take 1 tablet by mouth 3 (Three) Times a Day. (Patient taking differently: Take 1 tablet by mouth Daily.), Disp: 90 tablet, Rfl: 2    cetirizine (zyrTEC) 5 MG tablet, Take 1 tablet by mouth Daily., Disp: , Rfl:     cyclobenzaprine (FLEXERIL) 10 MG tablet, Take 1 tablet by mouth 3 (Three) Times a Day As Needed for Muscle Spasms., Disp: 20 tablet, Rfl: 0    EPINEPHrine (EPIPEN) 0.3 MG/0.3ML solution auto-injector injection, As Needed., Disp: , Rfl:     fluticasone (Flonase) 50 MCG/ACT nasal spray, 2 sprays into the nostril(s) as directed by provider Daily., Disp: 16 g, Rfl: 5    gabapentin (NEURONTIN) 300 MG capsule, Take 1 capsule by mouth Daily., Disp: , Rfl:     metFORMIN (GLUCOPHAGE) 500 MG tablet, TAKE TWO TABLETS BY MOUTH EVERY MORNING AND TAKE ONE TABLET BY MOUTH EVERY EVENING WITH MEALS, Disp: 270 tablet, Rfl: 3    montelukast (SINGULAIR) 10 MG tablet, TAKE ONE TABLET BY MOUTH ONCE NIGHTLY, Disp: 90 tablet, Rfl: 1    omeprazole (priLOSEC) 20 MG capsule, TAKE ONE CAPSULE BY MOUTH TWICE A DAY, Disp: 28 capsule, Rfl: 0    ondansetron ODT (ZOFRAN-ODT) 4 MG disintegrating tablet, Place 1 tablet on the tongue Every 6 (Six)  Hours As Needed for Nausea or Vomiting., Disp: 8 tablet, Rfl: 0    promethazine (PHENERGAN) 25 MG tablet, TAKE ONE TABLET BY MOUTH EVERY 4 HOURS AS NEEDED FOR NAUSEA (Patient taking differently: Take 1 tablet by mouth Every 6 (Six) Hours As Needed for Nausea.), Disp: 10 tablet, Rfl: 0    rOPINIRole (REQUIP) 0.5 MG tablet, One at bedtime for restless leg, Disp: , Rfl:     SUMAtriptan (IMITREX) 100 MG tablet, Take 1 tablet by mouth As Needed., Disp: , Rfl:     topiramate (TOPAMAX) 25 MG tablet, Take 1 tablet by mouth Daily., Disp: , Rfl:     traMADol (ULTRAM) 50 MG tablet, Take 1 tablet by mouth Every 6 (Six) Hours As Needed for Moderate Pain or Severe Pain for up to 12 doses., Disp: 12 tablet, Rfl: 0    vitamin D (ERGOCALCIFEROL) 1.25 MG (37315 UT) capsule capsule, TAKE ONE CAPSULE BY MOUTH ONCE WEEKLY (Patient taking differently: Take 1 capsule by mouth Every 7 (Seven) Days. sundays), Disp: 12 capsule, Rfl: 0    cefdinir (OMNICEF) 300 MG capsule, Take 1 capsule by mouth 2 (Two) Times a Day., Disp: 20 capsule, Rfl: 0    fluconazole (Diflucan) 150 MG tablet, Take 1 tablet by mouth Every 3 (Three) Days., Disp: 2 tablet, Rfl: 0    methylPREDNISolone (MEDROL) 4 MG dose pack, Take as directed on package instructions., Disp: 21 tablet, Rfl: 0     PMFSH  The following portions of the patient's history were reviewed and updated as appropriate: allergies, current medications, past family history, past medical history, past social history, past surgical history, and problem list.    Review of Systems   Constitutional:  Negative for activity change, appetite change and fatigue.   HENT:  Negative for congestion and rhinorrhea.    Respiratory:  Negative for chest tightness and shortness of breath.    Cardiovascular:  Positive for chest pain. Negative for palpitations.   Gastrointestinal:  Negative for abdominal pain.   Genitourinary:  Negative for dysuria.   Musculoskeletal:  Positive for myalgias and neck pain. Negative for  "arthralgias.   Neurological:  Negative for dizziness, weakness, light-headedness and headaches.   Psychiatric/Behavioral:  Negative for dysphoric mood. The patient is not nervous/anxious.      Objective   /82   Pulse 94   Resp 18   Ht 167.6 cm (66\")   Wt (!) 159 kg (350 lb)   SpO2 95%   BMI 56.49 kg/m²     Physical Exam  Vitals and nursing note reviewed.   Constitutional:       Appearance: She is well-developed.   HENT:      Head: Normocephalic and atraumatic.      Right Ear: External ear normal.      Left Ear: External ear normal.   Eyes:      Conjunctiva/sclera: Conjunctivae normal.   Cardiovascular:      Rate and Rhythm: Normal rate and regular rhythm.   Pulmonary:      Effort: Pulmonary effort is normal.      Breath sounds: Normal breath sounds.   Chest:      Chest wall: Tenderness (right, lower, lateral chest wall) present. No deformity or swelling.   Musculoskeletal:         General: Normal range of motion.      Cervical back: Normal range of motion.   Skin:     General: Skin is warm and dry.   Psychiatric:         Behavior: Behavior normal.            ASSESSMENT/PLAN    Diagnoses and all orders for this visit:    1. Right-sided chest wall pain (Primary)  Comments:  Due to persistent pain and inflammation with NSAIDs and muscle relaxer. Start medrol dose pack as ordered.  Orders:  -     methylPREDNISolone (MEDROL) 4 MG dose pack; Take as directed on package instructions.  Dispense: 21 tablet; Refill: 0    2. Pneumonia of both lungs due to infectious organism, unspecified part of lung  Comments:  Start omnicef as directed.  Orders:  -     cefdinir (OMNICEF) 300 MG capsule; Take 1 capsule by mouth 2 (Two) Times a Day.  Dispense: 20 capsule; Refill: 0  -     fluconazole (Diflucan) 150 MG tablet; Take 1 tablet by mouth Every 3 (Three) Days.  Dispense: 2 tablet; Refill: 0      Medication prescriptions were managed at this visit.       No follow-ups on file.    Transcribed from ambient dictation for " VINNIE James by Marilyn Palomo.  09/27/23   15:05 EDT    Patient or patient representative verbalized consent to the visit recording.  I have personally performed the services described in this document as transcribed by the above individual, and it is both accurate and complete.

## 2023-09-27 NOTE — LETTER
September 27, 2023     Patient: Eliz Eisenberg   YOB: 1990   Date of Visit: 9/27/2023       To Whom it May Concern:    Eliz Eisenberg was seen in my clinic on 9/27/2023. She may return to work on 10/2/2023 .    If you have any questions or concerns, please don't hesitate to call.         Sincerely,          VINNIE James        CC:   No Recipients

## 2023-10-11 PROBLEM — E66.01 MORBID OBESITY WITH BMI OF 50.0-59.9, ADULT: Status: ACTIVE | Noted: 2023-09-25

## 2023-10-16 LAB
QT INTERVAL: 354 MS
QTC INTERVAL: 449 MS

## 2023-10-17 ENCOUNTER — OFFICE VISIT (OUTPATIENT)
Dept: BEHAVIORAL HEALTH | Facility: CLINIC | Age: 33
End: 2023-10-17
Payer: COMMERCIAL

## 2023-10-17 DIAGNOSIS — F43.21 GRIEF: ICD-10-CM

## 2023-10-17 DIAGNOSIS — F32.2 CURRENT SEVERE EPISODE OF MAJOR DEPRESSIVE DISORDER WITHOUT PSYCHOTIC FEATURES, UNSPECIFIED WHETHER RECURRENT: ICD-10-CM

## 2023-10-17 DIAGNOSIS — F41.1 GENERALIZED ANXIETY DISORDER: Primary | ICD-10-CM

## 2023-10-17 NOTE — PROGRESS NOTES
[facility]                 Follow Up Adult      Follow Up Adult Note     Date:10/17/2023   Patient Name: Eliz Eisenberg  : 1990   MRN: [id number]   Time IN: 12:00 pm    Time OUT: 12:57 pm     Referring Provider: Allison Pham PA    Chief Complaint:      ICD-10-CM ICD-9-CM   1. Generalized anxiety disorder  F41.1 300.02   2. Current severe episode of major depressive disorder without psychotic features, unspecified whether recurrent  F32.2 296.23   3. Grief  F43.21 309.0        History of Present Illness:   Eliz Eisenberg is a 33 y.o. female who is being seen today for follow up counseling for anxiety depression and grief.    Subjective               Patient's Support Network Includes: extended family    Functional Status: Moderate impairment       Current Outpatient Medications:     albuterol sulfate  (90 Base) MCG/ACT inhaler, Inhale 2 puffs Every 4 (Four) Hours As Needed for Wheezing., Disp: 18 g, Rfl: 2    apixaban (Eliquis) 2.5 MG tablet tablet, Take 1 tablet by mouth 2 (Two) Times a Day., Disp: 42 tablet, Rfl: 0    busPIRone (BUSPAR) 10 MG tablet, Take 1 tablet by mouth 3 (Three) Times a Day. (Patient taking differently: Take 1 tablet by mouth Daily.), Disp: 90 tablet, Rfl: 2    cefdinir (OMNICEF) 300 MG capsule, Take 1 capsule by mouth 2 (Two) Times a Day., Disp: 20 capsule, Rfl: 0    cetirizine (zyrTEC) 5 MG tablet, Take 1 tablet by mouth Daily., Disp: , Rfl:     cyclobenzaprine (FLEXERIL) 10 MG tablet, Take 1 tablet by mouth 3 (Three) Times a Day As Needed for Muscle Spasms., Disp: 20 tablet, Rfl: 0    EPINEPHrine (EPIPEN) 0.3 MG/0.3ML solution auto-injector injection, As Needed., Disp: , Rfl:     fluconazole (Diflucan) 150 MG tablet, Take 1 tablet by mouth Every 3 (Three) Days., Disp: 2 tablet, Rfl: 0    fluticasone (Flonase) 50 MCG/ACT nasal spray, 2 sprays into the nostril(s) as directed by provider Daily., Disp: 16 g, Rfl:  5    gabapentin (NEURONTIN) 300 MG capsule, Take 1 capsule by mouth Daily., Disp: , Rfl:     metFORMIN (GLUCOPHAGE) 500 MG tablet, TAKE TWO TABLETS BY MOUTH EVERY MORNING AND TAKE ONE TABLET BY MOUTH EVERY EVENING WITH MEALS, Disp: 270 tablet, Rfl: 3    methylPREDNISolone (MEDROL) 4 MG dose pack, Take as directed on package instructions., Disp: 21 tablet, Rfl: 0    montelukast (SINGULAIR) 10 MG tablet, TAKE ONE TABLET BY MOUTH ONCE NIGHTLY, Disp: 90 tablet, Rfl: 1    omeprazole (priLOSEC) 20 MG capsule, TAKE ONE CAPSULE BY MOUTH TWICE A DAY, Disp: 28 capsule, Rfl: 0    ondansetron ODT (ZOFRAN-ODT) 4 MG disintegrating tablet, Place 1 tablet on the tongue Every 6 (Six) Hours As Needed for Nausea or Vomiting., Disp: 8 tablet, Rfl: 0    promethazine (PHENERGAN) 25 MG tablet, TAKE ONE TABLET BY MOUTH EVERY 4 HOURS AS NEEDED FOR NAUSEA (Patient taking differently: Take 1 tablet by mouth Every 6 (Six) Hours As Needed for Nausea.), Disp: 10 tablet, Rfl: 0    rOPINIRole (REQUIP) 0.5 MG tablet, One at bedtime for restless leg, Disp: , Rfl:     SUMAtriptan (IMITREX) 100 MG tablet, Take 1 tablet by mouth As Needed., Disp: , Rfl:     topiramate (TOPAMAX) 25 MG tablet, Take 1 tablet by mouth Daily., Disp: , Rfl:     traMADol (ULTRAM) 50 MG tablet, Take 1 tablet by mouth Every 6 (Six) Hours As Needed for Moderate Pain or Severe Pain for up to 12 doses., Disp: 12 tablet, Rfl: 0    vitamin D (ERGOCALCIFEROL) 1.25 MG (60448 UT) capsule capsule, TAKE ONE CAPSULE BY MOUTH ONCE WEEKLY (Patient taking differently: Take 1 capsule by mouth Every 7 (Seven) Days. sundays), Disp: 12 capsule, Rfl: 0    Allergies   Allergen Reactions    Cimzia [Certolizumab Pegol] Hives, Unknown - Low Severity and Headache    Elastic Bandages & [Zinc] Hives    Latex Hives, Itching, Swelling and Rash     Gloves     Tape Hives    Dilantin [Phenytoin] Unknown - Low Severity     Skin crawling    Iodine Hives, Swelling and Rash    Pollen Extract Rash    Tree Extract  "Other (See Comments)     CONGESTION       Objective     Physical Exam:  Vital Signs: There were no vitals filed for this visit.  There is no height or weight on file to calculate BMI.     Mental Status Exam:   Hygiene:   good  Cooperation:  Cooperative  Eye Contact:  Good  Psychomotor Behavior:  Appropriate  Affect:  Full range  Mood: normal  Speech:  Normal  Thought Process:  Goal directed  Thought Content:  Normal  Suicidal:  None  Homicidal:  None  Hallucinations:  None  Delusion:  None  Memory:  Intact  Orientation:  Person, Place, Time, and Situation  Reliability:  good  Insight:  Good  Judgement:  Good  Impulse Control:  Good  Physical/Medical Issues:   See problem list      Assessment / Plan      Diagnosis:  Diagnoses and all orders for this visit:    1. Generalized anxiety disorder (Primary)    2. Current severe episode of major depressive disorder without psychotic features, unspecified whether recurrent    3. Grief    Patient presented for follow-up with clinician.  Patient and clinician processed patient frustration with ongoing conflict with spouse.  Patient disclosed that they felt as though their spouse had grown increasingly emotionally distant.  Patient reported that her  had asked them when they would be \"fixed\".  Patient reported worsening depressive symptoms as a result.  Clinician and patient processed patient resulting fear of abandonment and the possible dissolution of their marriage.  Patient and clinician identified and addressed cognitive distortions related to depression, anxiety and grief.  Patient was receptive to interventions.  Clinician utilized active listening and reflective response to convey empathy and support.    Progress toward goal: Patient reports worsening depressive and symptoms despite utilizing coping mechanisms.  Environmental factors identified during session.    Prognosis: Prognosis for patient is good with further outpatient behavioral " treatment.    PLAN:  Patient and clinician will continue to utilize a cognitive behavioral intervention which identifies and addresses patient cognitive distortions related to anxiety, depression and grief.  Follow-ups will occur approximately every 30 days and will last for 45 to 60 minutes in duration.    Safety: No acute safety concerns  Risk Assessment: Risk of self-harm acutely is low. Risk of self-harm chronically is also low, but could be further elevated in the event of treatment noncompliance and/or AODA.    Treatment Plan/Goals: Continue supportive psychotherapy efforts and medications as indicated. Treatment and medication options discussed during today's visit. Patient ackowledged and verbally consented to continue with current treatment plan and was educated on the importance of compliance with treatment and follow-up appointments. Patient seems reasonably able to adhere to treatment plan.      Assisted Patient in processing above session content; acknowledged and normalized patient’s thoughts, feelings, and concerns.  Rationalized patient thought process regarding depression and grief.      Allowed Patient to freely discuss issues  without interruption or judgement with unconditional positive regard, active listening skills, and empathy. Therapist provided a safe, confidential environment to facilitate the development of a positive therapeutic relationship and encouraged open, honest communication. Assisted Patient in identifying risk factors which would indicate the need for higher level of care including thoughts to harm self or others and/or self-harming behavior and encouraged Patient to contact this office, call 911, or present to the nearest emergency room should any of these events occur. Discussed crisis intervention services and means to access. Patient adamantly and convincingly denies current suicidal or homicidal ideation or perceptual disturbance. Assisted Patient in processing session  content; acknowledged and normalized Patient’s thoughts, feelings, and concerns by utilizing a person-centered approach in efforts to build appropriate rapport and a positive therapeutic relationship with open and honest communication. .     Part of this note may be an electronic transcription/translation of spoken language to printed text using the Dragon Dictation System.       Follow Up:   No follow-ups on file.    Tunde Mason, LCSW

## 2023-10-30 ENCOUNTER — TELEMEDICINE (OUTPATIENT)
Dept: BARIATRICS/WEIGHT MGMT | Facility: CLINIC | Age: 33
End: 2023-10-30
Payer: COMMERCIAL

## 2023-10-30 ENCOUNTER — PRE-ADMISSION TESTING (OUTPATIENT)
Dept: PREADMISSION TESTING | Facility: HOSPITAL | Age: 33
End: 2023-10-30
Payer: COMMERCIAL

## 2023-10-30 VITALS — WEIGHT: 293 LBS | HEIGHT: 66 IN | BODY MASS INDEX: 47.09 KG/M2

## 2023-10-30 DIAGNOSIS — E66.01 MORBID OBESITY WITH BMI OF 50.0-59.9, ADULT: Primary | ICD-10-CM

## 2023-10-30 DIAGNOSIS — E66.01 MORBID OBESITY WITH BMI OF 50.0-59.9, ADULT: ICD-10-CM

## 2023-10-30 LAB
DEPRECATED RDW RBC AUTO: 49.3 FL (ref 37–54)
ERYTHROCYTE [DISTWIDTH] IN BLOOD BY AUTOMATED COUNT: 17.2 % (ref 12.3–15.4)
HBA1C MFR BLD: 5.8 % (ref 4.8–5.6)
HCT VFR BLD AUTO: 37.2 % (ref 34–46.6)
HGB BLD-MCNC: 11 G/DL (ref 12–15.9)
MCH RBC QN AUTO: 23.7 PG (ref 26.6–33)
MCHC RBC AUTO-ENTMCNC: 29.6 G/DL (ref 31.5–35.7)
MCV RBC AUTO: 80.2 FL (ref 79–97)
PLATELET # BLD AUTO: 355 10*3/MM3 (ref 140–450)
PMV BLD AUTO: 9.7 FL (ref 6–12)
RBC # BLD AUTO: 4.64 10*6/MM3 (ref 3.77–5.28)
WBC NRBC COR # BLD: 12.38 10*3/MM3 (ref 3.4–10.8)

## 2023-10-30 PROCEDURE — 87081 CULTURE SCREEN ONLY: CPT

## 2023-10-30 PROCEDURE — 83036 HEMOGLOBIN GLYCOSYLATED A1C: CPT

## 2023-10-30 PROCEDURE — 85027 COMPLETE CBC AUTOMATED: CPT

## 2023-10-30 PROCEDURE — 36415 COLL VENOUS BLD VENIPUNCTURE: CPT

## 2023-10-30 PROCEDURE — 99214 OFFICE O/P EST MOD 30 MIN: CPT | Performed by: PHYSICIAN ASSISTANT

## 2023-10-30 NOTE — PROGRESS NOTES
"[facility] BARIATRIC SURGERY  [address]  [address]  formerly [facility] [id number] [phone]      Patient  Name:  Eliz Eisenberg  :  1990      Date of Visit: 10/30/2023      Chief Complaint:  weight gain; unable to maintain weight loss    History of Present Illness:  Eliz Eisenberg is a 33 y.o. female pursuing LSG w/ Dr. Sandy.    From Consult w/ Dr. Sandy 23: \"Patient has been overweight for many years, with numerous failed dietary/weight loss attempts.  She now has obesity related comorbidities of OA, GERD, migraines, PCOS and as such has decided to pursue weight loss surgery.    From intake:  \"GI: History of GERD on daily PPI.  Remote EGD several years ago.  No prior history of H. pylori.  She is status post laparoscopic cholecystectomy in 2018 for biliary dyskinesia.  She also had an umbilical hernia repair at that time.  She has a history of IBS and also has some intermittent chronic nausea.    She is currently seeing rheumatology for psoriasis and arthritis.  They are contemplating starting a biologic in the near future pending insurance approval.  She takes Mobic daily.    She is currently taking a daily baby aspirin because she was instructed to do so while undergoing hormonal therapy for infertility.  She is no longer undergoing treatment for infertility.    Other past medical history significant for asthma, PCOS, and anxiety.\"    23 Update:    Hx of JACQUES from menorrhagia.  To start po iron soon.  Psoriatic arthritis, takes meloxicam and gabapentin.  Previously on Stelara, skipped last dose in case she had to hold for surgery.    Takes omeprazole QHS, supposed to take bid but doesn't need the AM dose.  It is controlled on QHS PPI.      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    No personal or family hx of VTE or clotting d/o.  No liver, lung, heart, or renal disease    Recovering from PNA, took Z pack only.  Went to ED for RLQ " "pain last week, CT benign.  Dx with pulled muscle from coughing associated with recent PNA.  Splenomegaly, hepatomegaly on my review of history.  Tiny fascial defect at umbilicus.    CT:  IMPRESSION:  Impression:  Suboptimal opacification of the pulmonary arteries severely limits assessment for pulmonary embolism. Within this limitation, no evidence of large central pulmonary embolism or evidence of right heart strain. The segmental and subsegmental branches are   poorly evaluated.     Irregular nodular subpleural density at the left lung base suspicious for infectious or inflammatory focus. There is background wispy linear densities and mild subpleural groundglass in the dependent aspects of the bilateral lower lobes which may reflect   background atelectasis with additional sites of infectious or inflammatory process not entirely excluded.     Perifissural consolidative density in the right middle lobe may reflect segmental atelectasis or possibly additional site of infection.     No acute findings in the abdomen or pelvis.     Hepatic steatosis.     Nonobstructing left nephrolithiasis.     Splenomegaly measuring 15.0 cm in craniocaudal length.      Review of data:    MORGAN: monthly gabapentin  CBC: iron def anemia (hx of menorrhagia), Hgb 10.9.  CMP: alk phos 139.  HP negative on recheck       EGD: PQ 5/15/23, no hiatal hernia, bile throughout the stomach, GEJ 40 cm.  Path-reflux esophagitis    Cardiac clearance:low risk       PFTs: FEV1 86%       Reviewed rheum note.      Last tobacco: n/a  Last NSAIDs: Mobic last night  Last ASA: last night  Last steroids: several months ago.  Last Stelara (-mab) was 6/2023.  Last hormones: nothing, does not \"    10/30/23 Update:  No new issues/concerns.  On preop diet.  Denies ASA/NSAIDS/tramadol/steroids - finished Medrol 10/2, stopped Mobic after LOV.  Was holding Gabapentin and Topamax, thought she was supposed to avoid.  Has Eliquis postop RX.        Past Medical History: "   Diagnosis Date    Abdominal pain     Acute sinusitis     Allergic     Allergic rhinitis     Ankle sprain     Anxiety     Arthritis 2017    On Mobic daily    Asthma     Atopic dermatitis     Biliary dyskinesia     Cat bite      resolved    Clotting disorder     Current tear of meniscus     Dysfunctional uterine bleeding     Eczema     Foot pain     GERD (gastroesophageal reflux disease)     Omeprazole daily. remote EGD. No hx of h pylori    Gout     Hearing loss, bilateral     R/T PSORASIS    Helicobacter pylori antibody positive     4/26/23. Rx prevpak    IBS (irritable bowel syndrome)     Influenza     Metabolic syndrome     Migraines     Muscular aches     Obesity     Panic attack     PCOS (polycystic ovarian syndrome)     Pharyngitis     Psoriasis     planning to start Taltz    Psoriasis     Restless leg syndrome     URI (upper respiratory infection)     Wears glasses      Past Surgical History:   Procedure Laterality Date    COLONOSCOPY      ENDOSCOPY N/A 5/15/2023    Procedure: ESOPHAGOGASTRODUODENOSCOPY;  Surgeon: Beatriz Sandy MD;  Location:  [facility] ENDOSCOPY;  Service: Bariatric;  Laterality: N/A;    LAPAROSCOPIC CHOLECYSTECTOMY  2018    no gallstones    TONSILLECTOMY AND ADENOIDECTOMY N/A 07/09/2021    Procedure: TONSILLECTOMY;  Surgeon: Ran Lemon MD;  Location:  [facility] OR;  Service: ENT;  Laterality: N/A;    UMBILICAL HERNIA REPAIR  2018    w/ cholecystectomy    WISDOM TOOTH EXTRACTION         Allergies   Allergen Reactions    Cimzia [Certolizumab Pegol] Hives, Unknown - Low Severity and Headache    Elastic Bandages & [Zinc] Hives    Latex Hives, Itching, Swelling and Rash     Gloves     Tape Hives    Dilantin [Phenytoin] Unknown - Low Severity     Skin crawling    Iodine Hives, Swelling and Rash    Pollen Extract Rash    Tree Extract Other (See Comments)     CONGESTION       Current Outpatient Medications:     albuterol sulfate  (90 Base) MCG/ACT inhaler, Inhale 2 puffs Every 4  (Four) Hours As Needed for Wheezing., Disp: 18 g, Rfl: 2    cetirizine (zyrTEC) 5 MG tablet, Take 1 tablet by mouth Daily., Disp: , Rfl:     fluticasone (Flonase) 50 MCG/ACT nasal spray, 2 sprays into the nostril(s) as directed by provider Daily., Disp: 16 g, Rfl: 5    gabapentin (NEURONTIN) 300 MG capsule, Take 1 capsule by mouth Daily., Disp: , Rfl:     metFORMIN (GLUCOPHAGE) 500 MG tablet, TAKE TWO TABLETS BY MOUTH EVERY MORNING AND TAKE ONE TABLET BY MOUTH EVERY EVENING WITH MEALS, Disp: 270 tablet, Rfl: 3    montelukast (SINGULAIR) 10 MG tablet, TAKE ONE TABLET BY MOUTH ONCE NIGHTLY, Disp: 90 tablet, Rfl: 1    omeprazole (priLOSEC) 20 MG capsule, TAKE ONE CAPSULE BY MOUTH TWICE A DAY, Disp: 28 capsule, Rfl: 0    rOPINIRole (REQUIP) 0.5 MG tablet, One at bedtime for restless leg, Disp: , Rfl:     vitamin D (ERGOCALCIFEROL) 1.25 MG (07690 UT) capsule capsule, TAKE ONE CAPSULE BY MOUTH ONCE WEEKLY (Patient taking differently: Take 1 capsule by mouth Every 7 (Seven) Days. sundays), Disp: 12 capsule, Rfl: 0    apixaban (Eliquis) 2.5 MG tablet tablet, Take 1 tablet by mouth 2 (Two) Times a Day. (Patient not taking: Reported on 10/30/2023), Disp: 42 tablet, Rfl: 0    busPIRone (BUSPAR) 10 MG tablet, Take 1 tablet by mouth 3 (Three) Times a Day. (Patient taking differently: Take 1 tablet by mouth Daily.), Disp: 90 tablet, Rfl: 2    cyclobenzaprine (FLEXERIL) 10 MG tablet, Take 1 tablet by mouth 3 (Three) Times a Day As Needed for Muscle Spasms. (Patient not taking: Reported on 10/30/2023), Disp: 20 tablet, Rfl: 0    EPINEPHrine (EPIPEN) 0.3 MG/0.3ML solution auto-injector injection, As Needed., Disp: , Rfl:     ondansetron ODT (ZOFRAN-ODT) 4 MG disintegrating tablet, Place 1 tablet on the tongue Every 6 (Six) Hours As Needed for Nausea or Vomiting. (Patient not taking: Reported on 10/30/2023), Disp: 8 tablet, Rfl: 0    promethazine (PHENERGAN) 25 MG tablet, TAKE ONE TABLET BY MOUTH EVERY 4 HOURS AS NEEDED FOR NAUSEA  (Patient not taking: Reported on 10/30/2023), Disp: 10 tablet, Rfl: 0    SUMAtriptan (IMITREX) 100 MG tablet, Take 1 tablet by mouth As Needed. (Patient not taking: Reported on 10/30/2023), Disp: , Rfl:     topiramate (TOPAMAX) 25 MG tablet, Take 1 tablet by mouth Daily. (Patient not taking: Reported on 10/30/2023), Disp: , Rfl:     traMADol (ULTRAM) 50 MG tablet, Take 1 tablet by mouth Every 6 (Six) Hours As Needed for Moderate Pain or Severe Pain for up to 12 doses. (Patient not taking: Reported on 10/30/2023), Disp: 12 tablet, Rfl: 0    Social History     Socioeconomic History    Marital status:    Tobacco Use    Smoking status: Never    Smokeless tobacco: Never   Vaping Use    Vaping Use: Never used   Substance and Sexual Activity    Alcohol use: Not Currently     Comment: rare    Drug use: No    Sexual activity: Yes     Partners: Male     Birth control/protection: None     Social History     Social History Narrative    Patient lives in [place], [place].   She is  with no children and works full time as the  of [employer].        Family History   Problem Relation Age of Onset    Heart disease Mother     Multiple sclerosis Mother     Diabetes Mother     Arthritis Mother     Asthma Mother     Early death Mother     Kidney disease Mother     Hypertension Father     Obesity Father     Diabetes Father     Heart attack Father     Sleep apnea Father     No Known Problems Sister     Cancer Maternal Grandmother         Breast    Breast cancer Maternal Grandmother     Diabetes Maternal Grandmother     Asthma Maternal Grandfather     Diabetes Maternal Grandfather     Sleep apnea Maternal Grandfather     Colon cancer Paternal Grandmother     Cancer Paternal Grandmother         COLON    Heart attack Paternal Grandfather     Sleep apnea Paternal Grandfather     Obesity Paternal Grandfather     Hypertension Paternal Grandfather        Review of Systems    Constitutional:  Positive for fatigue and unexpected weight gain. Negative for chills, diaphoresis, fever and unexpected weight loss.   HENT:  Negative for congestion and facial swelling.    Eyes:  Negative for blurred vision, double vision and discharge.   Respiratory:  Negative for chest tightness, shortness of breath and stridor.    Cardiovascular:  Negative for chest pain, palpitations and leg swelling.   Gastrointestinal:  Negative for blood in stool.   Endocrine: Negative for polydipsia.   Genitourinary:  Negative for hematuria.   Musculoskeletal:  Positive for arthralgias.   Skin:  Negative for color change.   Allergic/Immunologic: Negative for immunocompromised state.   Neurological:  Negative for confusion.   Psychiatric/Behavioral:  Negative for self-injury.      ROS reviewed.     Physical Exam:  Vital Signs:  Weight: (!) 159 kg (350 lb)   Body mass index is 56.49 kg/m².              From Consult w/ Dr. Sandy 9/25/23:  Physical Exam  Vitals reviewed.   Constitutional:       Appearance: She is well-developed.   HENT:      Head: Normocephalic and atraumatic.      Nose: Nose normal.   Eyes:      Conjunctiva/sclera: Conjunctivae normal.      Pupils: Pupils are equal, round, and reactive to light.   Neck:      Thyroid: No thyromegaly.      Vascular: No carotid bruit.      Trachea: No tracheal deviation.   Cardiovascular:      Rate and Rhythm: Normal rate and regular rhythm.   Pulmonary:      Effort: Pulmonary effort is normal. No respiratory distress.   Abdominal:      General: There is no distension.      Palpations: Abdomen is soft.      Tenderness: There is no abdominal tenderness.      Comments: Lap scars   Musculoskeletal:         General: No deformity. Normal range of motion.      Cervical back: Normal range of motion and neck supple.   Skin:     General: Skin is warm and dry.      Findings: No rash.   Neurological:      Mental Status: She is alert and oriented to person, place, and time.       "Cranial Nerves: No cranial nerve deficit.      Coordination: Coordination normal.   Psychiatric:         Behavior: Behavior normal.         Thought Content: Thought content normal.         Judgment: Judgment normal.       Problem List Items Addressed This Visit          Endocrine and Metabolic    Morbid obesity with BMI of 50.0-59.9, adult - Primary         Assessment:  Eliz Eisenberg is a 33 y.o. female with medically complicated obesity.    From Consult w/ Dr. Sandy 9/25/23:  \"Weight loss surgery is deemed medically necessary given the following obesity related comorbidities including OA, GERD, migraines, PCOS with current Weight: (!) 159 kg (350 lb) and Body mass index is 56.49 kg/m².    Patient is aware that surgery is a tool, and that weight loss is not guaranteed but only seen in the context of appropriate use, follow up and exercise.    The patient was present for an approximately a 2.5 hour discussion of the purpose of weight loss surgery, how WLS is a tool to assist in achieving weight loss goals, the most common complications and how best to avoid them, and the strategies for short and long term weight loss.  Ample opportunity to discuss questions was available both in group and during the time of individual examination.    I reviewed all available preop labs, Xrays, tests, clearances, etc and signed off on these in the record.  All of this in addition to the patient's unique history and exam has been taken into consideration in determining their appropriate candidacy for weight loss surgery.    Complications  of laparoscopic/possible robotic gastric sleeve were discussed. The patient is well aware of the potential complications of surgery that include but not limited to bleeding, infections, deep venous thrombosis, pulmonary embolism, pulmonary complications such as pneumonia, cardiac events, hernias, small bowel obstruction, damage to the spleen or other organs, bowel injury, disfiguring scars, " "failure to lose weight, need for additional surgery, conversion to an open procedure, and death. Patient is also aware of complications which apply in this particular procedure that can include but are not limited to a \"leak\" at the staple line which in some instances may require conversion to gastric bypass.    The patient is aware if a hiatal hernia is encountered, it likely will be repaired.  R/B/A Rx to hiatal hernia repair were discussed as outlined in our long consent form.  Briefly risks in addition to those for LSG include recurrent hernia, MICAH, dysphagia, esophageal injury, pneumothorax, injury to the vagus nerves, injury to the thoracic duct, aorta or vena cava.    Greater than 3 minutes was spent with the patient discussing avoiding all tobacco products and second hand smoke at least 2 weeks pre-operatively and 6 weeks post-operatively to minimize the risk of sleeve leak.  This included discussing the importance of avoiding even secondhand smoke as the risk of leak is increased.  Examples discussed:  I made it very clear that the patient understands they should avoid even riding in a car where someone has previously smoked in the last 2 weeks, living in a house where someone smokes (even if it's in a separate room/patio/attached garage, etc.) we discussed that they should not have a conversation with a group of people who are smoking even if it's outside.  They can be around wood burning fires and barbecue.  I told them I do not know if marijuana has a same effects but my overall recommendation is to avoid it for 2 weeks prior in 6 weeks after surgery.  They also are aware that nicotine may also increase the risk of leak and I strongly encouraged him to avoid that as well for 2 weeks prior in 6 weeks after surgery.    Discussed the risks, benefits and alternative therapies at great length as outlined in our extensive consent forms, consent videos, and educational teaching process under the direction of " "the center's .    A copy of the patient's signed informed consent is on file.    Plan:  Robotic assisted sleeve gastrectomy at [facility]    Discussed monoclonal ab (Stelara) for psoriatic arthritis, no evidence that monoclonal ab lead to leaks.  Ok to take.    Discussed findings of bile in stomach at EGD, possibility of bile acid reflux in future requiring RYGB.  I am encouraged that her reflux is controlled on PPI.    R/B/A Rx discussed to postop anticoagulation including but not limited to bleeding, drug reaction, venothromboembolic events, etc. and she is agreeable to taking 3 weeks of Eliquis.    MDM high:  Elective procedure with the following risk factors: morbid obesity.  4+ chronic medical problems reviewed.\"      ---- As above, will proceed w/ Robotic Sleeve Gastrectomy @[facility] w/ Dr. Sandy.  Periop instructions reviewed.  All questions answered.  Call w/ issues/concerns.        Note: This was an audio and video enabled telemedicine encounter conducted via [employer].  Patient is located in [place].  Provider is at her office.  Consent was obtained prior to the visit.                                         "

## 2023-10-30 NOTE — PAT
An arrival time for procedure was not provided during PAT visit. If patient had any questions or concerns about their arrival time, they were instructed to contact their surgeon/physician.  Additionally, if the patient referred to an arrival time that was acquired from their my chart account, patient was encouraged to verify that time with their surgeon/physician. Arrival times are NOT provided in Pre Admission Testing Department.    Patient viewed general PAT education video as instructed in their preoperative information received from their surgeon.  Patient stated the general PAT education video was viewed in its entirety and survey completed.  Copies of PAT general education handouts (Incentive Spirometry, Meds to Beds Program, Patient Belongings, Pre-op skin preparation instructions, Blood Glucose testing, Visitor policy, Surgery FAQ, Code H) distributed to patient if not printed. Education related to the PAT pass and skin preparation for surgery (if applicable) completed in PAT as a reinforcement to PAT education video. Patient instructed to return PAT pass provided today as well as completed skin preparation sheet (if applicable) on the day of procedure.     Additionally if patient had not viewed video yet but intended to view it at home or in our waiting area, then referred them to the handout with QR code/link provided during PAT visit.  Instructed patient to complete survey after viewing the video in its entirety.  Encouraged patient/family to read PAT general education handouts thoroughly and notify PAT staff with any questions or concerns. Patient verbalized understanding of all information and priority content.    Patient denies any current skin issues.     Patient to apply Chlorhexadine wipes  to surgical area (as instructed) the night before procedure and the AM of procedure. Wipes provided.    Patient instructed to drink 20 ounces of Gatorade or Gatorlyte (if diabetic) and it needs to be completed 1  hour (for Main OR patients) or 2 hours (scheduled  section & [facility] patients) before given arrival time for procedure (NO RED Gatorade and NO Gatorade Zero).    Patient verbalized understanding.    EKG FROM 23 ON CHART. PT DENIES CP/SOA.

## 2023-10-30 NOTE — H&P (VIEW-ONLY)
"[facility] BARIATRIC SURGERY  [address]  [address]  [facility] [address] [phone]      Patient  Name:  Eliz Eisenberg  :  1990      Date of Visit: 10/30/2023      Chief Complaint:  weight gain; unable to maintain weight loss    History of Present Illness:  Eliz Eisenberg is a 33 y.o. female pursuing LSG w/ Dr. Sandy.    From Consult w/ Dr. Sandy 23: \"Patient has been overweight for many years, with numerous failed dietary/weight loss attempts.  She now has obesity related comorbidities of OA, GERD, migraines, PCOS and as such has decided to pursue weight loss surgery.    From intake:  \"GI: History of GERD on daily PPI.  Remote EGD several years ago.  No prior history of H. pylori.  She is status post laparoscopic cholecystectomy in 2018 for biliary dyskinesia.  She also had an umbilical hernia repair at that time.  She has a history of IBS and also has some intermittent chronic nausea.    She is currently seeing rheumatology for psoriasis and arthritis.  They are contemplating starting a biologic in the near future pending insurance approval.  She takes Mobic daily.    She is currently taking a daily baby aspirin because she was instructed to do so while undergoing hormonal therapy for infertility.  She is no longer undergoing treatment for infertility.    Other past medical history significant for asthma, PCOS, and anxiety.\"    23 Update:    Hx of JACQUES from menorrhagia.  To start po iron soon.  Psoriatic arthritis, takes meloxicam and gabapentin.  Previously on Stelara, skipped last dose in case she had to hold for surgery.    Takes omeprazole QHS, supposed to take bid but doesn't need the AM dose.  It is controlled on QHS PPI.      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    No personal or family hx of VTE or clotting d/o.  No liver, lung, heart, or renal disease    Recovering from PNA, took Z pack only.  Went to ED for RLQ " "pain last week, CT benign.  Dx with pulled muscle from coughing associated with recent PNA.  Splenomegaly, hepatomegaly on my review of history.  Tiny fascial defect at umbilicus.    CT:  IMPRESSION:  Impression:  Suboptimal opacification of the pulmonary arteries severely limits assessment for pulmonary embolism. Within this limitation, no evidence of large central pulmonary embolism or evidence of right heart strain. The segmental and subsegmental branches are   poorly evaluated.     Irregular nodular subpleural density at the left lung base suspicious for infectious or inflammatory focus. There is background wispy linear densities and mild subpleural groundglass in the dependent aspects of the bilateral lower lobes which may reflect   background atelectasis with additional sites of infectious or inflammatory process not entirely excluded.     Perifissural consolidative density in the right middle lobe may reflect segmental atelectasis or possibly additional site of infection.     No acute findings in the abdomen or pelvis.     Hepatic steatosis.     Nonobstructing left nephrolithiasis.     Splenomegaly measuring 15.0 cm in craniocaudal length.      Review of data:    MORGAN: monthly gabapentin  CBC: iron def anemia (hx of menorrhagia), Hgb 10.9.  CMP: alk phos 139.  HP negative on recheck       EGD: PQ 5/15/23, no hiatal hernia, bile throughout the stomach, GEJ 40 cm.  Path-reflux esophagitis    Cardiac clearance:low risk       PFTs: FEV1 86%       Reviewed rheum note.      Last tobacco: n/a  Last NSAIDs: Mobic last night  Last ASA: last night  Last steroids: several months ago.  Last Stelara (-mab) was 6/2023.  Last hormones: nothing, does not \"    10/30/23 Update:  No new issues/concerns.  On preop diet.  Denies ASA/NSAIDS/tramadol/steroids - finished Medrol 10/2, stopped Mobic after LOV.  Was holding Gabapentin and Topamax, thought she was supposed to avoid.  Has Eliquis postop RX.        Past Medical History: "   Diagnosis Date    Abdominal pain     Acute sinusitis     Allergic     Allergic rhinitis     Ankle sprain     Anxiety     Arthritis 2017    On Mobic daily    Asthma     Atopic dermatitis     Biliary dyskinesia     Cat bite      resolved    Clotting disorder     Current tear of meniscus     Dysfunctional uterine bleeding     Eczema     Foot pain     GERD (gastroesophageal reflux disease)     Omeprazole daily. remote EGD. No hx of h pylori    Gout     Hearing loss, bilateral     R/T PSORASIS    Helicobacter pylori antibody positive     4/26/23. Rx prevpak    IBS (irritable bowel syndrome)     Influenza     Metabolic syndrome     Migraines     Muscular aches     Obesity     Panic attack     PCOS (polycystic ovarian syndrome)     Pharyngitis     Psoriasis     planning to start Taltz    Psoriasis     Restless leg syndrome     URI (upper respiratory infection)     Wears glasses      Past Surgical History:   Procedure Laterality Date    COLONOSCOPY      ENDOSCOPY N/A 5/15/2023    Procedure: ESOPHAGOGASTRODUODENOSCOPY;  Surgeon: Beatriz Sandy MD;  Location:  [facility];  Service: Bariatric;  Laterality: N/A;    LAPAROSCOPIC CHOLECYSTECTOMY  2018    no gallstones    TONSILLECTOMY AND ADENOIDECTOMY N/A 07/09/2021    Procedure: TONSILLECTOMY;  Surgeon: Ran Lemon MD;  Location:  [facility];  Service: ENT;  Laterality: N/A;    UMBILICAL HERNIA REPAIR  2018    w/ cholecystectomy    WISDOM TOOTH EXTRACTION         Allergies   Allergen Reactions    Cimzia [Certolizumab Pegol] Hives, Unknown - Low Severity and Headache    Elastic Bandages & [Zinc] Hives    Latex Hives, Itching, Swelling and Rash     Gloves     Tape Hives    Dilantin [Phenytoin] Unknown - Low Severity     Skin crawling    Iodine Hives, Swelling and Rash    Pollen Extract Rash    Tree Extract Other (See Comments)     CONGESTION       Current Outpatient Medications:     albuterol sulfate  (90 Base) MCG/ACT inhaler, Inhale 2 puffs Every 4  (Four) Hours As Needed for Wheezing., Disp: 18 g, Rfl: 2    cetirizine (zyrTEC) 5 MG tablet, Take 1 tablet by mouth Daily., Disp: , Rfl:     fluticasone (Flonase) 50 MCG/ACT nasal spray, 2 sprays into the nostril(s) as directed by provider Daily., Disp: 16 g, Rfl: 5    gabapentin (NEURONTIN) 300 MG capsule, Take 1 capsule by mouth Daily., Disp: , Rfl:     metFORMIN (GLUCOPHAGE) 500 MG tablet, TAKE TWO TABLETS BY MOUTH EVERY MORNING AND TAKE ONE TABLET BY MOUTH EVERY EVENING WITH MEALS, Disp: 270 tablet, Rfl: 3    montelukast (SINGULAIR) 10 MG tablet, TAKE ONE TABLET BY MOUTH ONCE NIGHTLY, Disp: 90 tablet, Rfl: 1    omeprazole (priLOSEC) 20 MG capsule, TAKE ONE CAPSULE BY MOUTH TWICE A DAY, Disp: 28 capsule, Rfl: 0    rOPINIRole (REQUIP) 0.5 MG tablet, One at bedtime for restless leg, Disp: , Rfl:     vitamin D (ERGOCALCIFEROL) 1.25 MG (97034 UT) capsule capsule, TAKE ONE CAPSULE BY MOUTH ONCE WEEKLY (Patient taking differently: Take 1 capsule by mouth Every 7 (Seven) Days. sundays), Disp: 12 capsule, Rfl: 0    apixaban (Eliquis) 2.5 MG tablet tablet, Take 1 tablet by mouth 2 (Two) Times a Day. (Patient not taking: Reported on 10/30/2023), Disp: 42 tablet, Rfl: 0    busPIRone (BUSPAR) 10 MG tablet, Take 1 tablet by mouth 3 (Three) Times a Day. (Patient taking differently: Take 1 tablet by mouth Daily.), Disp: 90 tablet, Rfl: 2    cyclobenzaprine (FLEXERIL) 10 MG tablet, Take 1 tablet by mouth 3 (Three) Times a Day As Needed for Muscle Spasms. (Patient not taking: Reported on 10/30/2023), Disp: 20 tablet, Rfl: 0    EPINEPHrine (EPIPEN) 0.3 MG/0.3ML solution auto-injector injection, As Needed., Disp: , Rfl:     ondansetron ODT (ZOFRAN-ODT) 4 MG disintegrating tablet, Place 1 tablet on the tongue Every 6 (Six) Hours As Needed for Nausea or Vomiting. (Patient not taking: Reported on 10/30/2023), Disp: 8 tablet, Rfl: 0    promethazine (PHENERGAN) 25 MG tablet, TAKE ONE TABLET BY MOUTH EVERY 4 HOURS AS NEEDED FOR NAUSEA  (Patient not taking: Reported on 10/30/2023), Disp: 10 tablet, Rfl: 0    SUMAtriptan (IMITREX) 100 MG tablet, Take 1 tablet by mouth As Needed. (Patient not taking: Reported on 10/30/2023), Disp: , Rfl:     topiramate (TOPAMAX) 25 MG tablet, Take 1 tablet by mouth Daily. (Patient not taking: Reported on 10/30/2023), Disp: , Rfl:     traMADol (ULTRAM) 50 MG tablet, Take 1 tablet by mouth Every 6 (Six) Hours As Needed for Moderate Pain or Severe Pain for up to 12 doses. (Patient not taking: Reported on 10/30/2023), Disp: 12 tablet, Rfl: 0    Social History     Socioeconomic History    Marital status:    Tobacco Use    Smoking status: Never    Smokeless tobacco: Never   Vaping Use    Vaping Use: Never used   Substance and Sexual Activity    Alcohol use: Not Currently     Comment: rare    Drug use: No    Sexual activity: Yes     Partners: Male     Birth control/protection: None     Social History     Social History Narrative    Patient lives in [place], [place].   She is  with no children and works full time as the  of [employer].        Family History   Problem Relation Age of Onset    Heart disease Mother     Multiple sclerosis Mother     Diabetes Mother     Arthritis Mother     Asthma Mother     Early death Mother     Kidney disease Mother     Hypertension Father     Obesity Father     Diabetes Father     Heart attack Father     Sleep apnea Father     No Known Problems Sister     Cancer Maternal Grandmother         Breast    Breast cancer Maternal Grandmother     Diabetes Maternal Grandmother     Asthma Maternal Grandfather     Diabetes Maternal Grandfather     Sleep apnea Maternal Grandfather     Colon cancer Paternal Grandmother     Cancer Paternal Grandmother         COLON    Heart attack Paternal Grandfather     Sleep apnea Paternal Grandfather     Obesity Paternal Grandfather     Hypertension Paternal Grandfather        Review of Systems    Constitutional:  Positive for fatigue and unexpected weight gain. Negative for chills, diaphoresis, fever and unexpected weight loss.   HENT:  Negative for congestion and facial swelling.    Eyes:  Negative for blurred vision, double vision and discharge.   Respiratory:  Negative for chest tightness, shortness of breath and stridor.    Cardiovascular:  Negative for chest pain, palpitations and leg swelling.   Gastrointestinal:  Negative for blood in stool.   Endocrine: Negative for polydipsia.   Genitourinary:  Negative for hematuria.   Musculoskeletal:  Positive for arthralgias.   Skin:  Negative for color change.   Allergic/Immunologic: Negative for immunocompromised state.   Neurological:  Negative for confusion.   Psychiatric/Behavioral:  Negative for self-injury.      ROS reviewed.     Physical Exam:  Vital Signs:  Weight: (!) 159 kg (350 lb)   Body mass index is 56.49 kg/m².              From Consult w/ Dr. Sandy 9/25/23:  Physical Exam  Vitals reviewed.   Constitutional:       Appearance: She is well-developed.   HENT:      Head: Normocephalic and atraumatic.      Nose: Nose normal.   Eyes:      Conjunctiva/sclera: Conjunctivae normal.      Pupils: Pupils are equal, round, and reactive to light.   Neck:      Thyroid: No thyromegaly.      Vascular: No carotid bruit.      Trachea: No tracheal deviation.   Cardiovascular:      Rate and Rhythm: Normal rate and regular rhythm.   Pulmonary:      Effort: Pulmonary effort is normal. No respiratory distress.   Abdominal:      General: There is no distension.      Palpations: Abdomen is soft.      Tenderness: There is no abdominal tenderness.      Comments: Lap scars   Musculoskeletal:         General: No deformity. Normal range of motion.      Cervical back: Normal range of motion and neck supple.   Skin:     General: Skin is warm and dry.      Findings: No rash.   Neurological:      Mental Status: She is alert and oriented to person, place, and time.       "Cranial Nerves: No cranial nerve deficit.      Coordination: Coordination normal.   Psychiatric:         Behavior: Behavior normal.         Thought Content: Thought content normal.         Judgment: Judgment normal.       Problem List Items Addressed This Visit          Endocrine and Metabolic    Morbid obesity with BMI of 50.0-59.9, adult - Primary         Assessment:  Eliz Eisenberg is a 33 y.o. female with medically complicated obesity.    From Consult w/ Dr. Sandy 9/25/23:  \"Weight loss surgery is deemed medically necessary given the following obesity related comorbidities including OA, GERD, migraines, PCOS with current Weight: (!) 159 kg (350 lb) and Body mass index is 56.49 kg/m².    Patient is aware that surgery is a tool, and that weight loss is not guaranteed but only seen in the context of appropriate use, follow up and exercise.    The patient was present for an approximately a 2.5 hour discussion of the purpose of weight loss surgery, how WLS is a tool to assist in achieving weight loss goals, the most common complications and how best to avoid them, and the strategies for short and long term weight loss.  Ample opportunity to discuss questions was available both in group and during the time of individual examination.    I reviewed all available preop labs, Xrays, tests, clearances, etc and signed off on these in the record.  All of this in addition to the patient's unique history and exam has been taken into consideration in determining their appropriate candidacy for weight loss surgery.    Complications  of laparoscopic/possible robotic gastric sleeve were discussed. The patient is well aware of the potential complications of surgery that include but not limited to bleeding, infections, deep venous thrombosis, pulmonary embolism, pulmonary complications such as pneumonia, cardiac events, hernias, small bowel obstruction, damage to the spleen or other organs, bowel injury, disfiguring scars, " "failure to lose weight, need for additional surgery, conversion to an open procedure, and death. Patient is also aware of complications which apply in this particular procedure that can include but are not limited to a \"leak\" at the staple line which in some instances may require conversion to gastric bypass.    The patient is aware if a hiatal hernia is encountered, it likely will be repaired.  R/B/A Rx to hiatal hernia repair were discussed as outlined in our long consent form.  Briefly risks in addition to those for LSG include recurrent hernia, MICAH, dysphagia, esophageal injury, pneumothorax, injury to the vagus nerves, injury to the thoracic duct, aorta or vena cava.    Greater than 3 minutes was spent with the patient discussing avoiding all tobacco products and second hand smoke at least 2 weeks pre-operatively and 6 weeks post-operatively to minimize the risk of sleeve leak.  This included discussing the importance of avoiding even secondhand smoke as the risk of leak is increased.  Examples discussed:  I made it very clear that the patient understands they should avoid even riding in a car where someone has previously smoked in the last 2 weeks, living in a house where someone smokes (even if it's in a separate room/patio/attached garage, etc.) we discussed that they should not have a conversation with a group of people who are smoking even if it's outside.  They can be around wood burning fires and barbecue.  I told them I do not know if marijuana has a same effects but my overall recommendation is to avoid it for 2 weeks prior in 6 weeks after surgery.  They also are aware that nicotine may also increase the risk of leak and I strongly encouraged him to avoid that as well for 2 weeks prior in 6 weeks after surgery.    Discussed the risks, benefits and alternative therapies at great length as outlined in our extensive consent forms, consent videos, and educational teaching process under the direction of " "the center's .    A copy of the patient's signed informed consent is on file.    Plan:  Robotic assisted sleeve gastrectomy at [facility]    Discussed monoclonal ab (Stelara) for psoriatic arthritis, no evidence that monoclonal ab lead to leaks.  Ok to take.    Discussed findings of bile in stomach at EGD, possibility of bile acid reflux in future requiring RYGB.  I am encouraged that her reflux is controlled on PPI.    R/B/A Rx discussed to postop anticoagulation including but not limited to bleeding, drug reaction, venothromboembolic events, etc. and she is agreeable to taking 3 weeks of Eliquis.    MDM high:  Elective procedure with the following risk factors: morbid obesity.  4+ chronic medical problems reviewed.\"      ---- As above, will proceed w/ Robotic Sleeve Gastrectomy @[facility] w/ Dr. Sandy.  Periop instructions reviewed.  All questions answered.  Call w/ issues/concerns.        Note: This was an audio and video enabled telemedicine encounter conducted via [employer].  Patient is located in [place].  Provider is at her office.  Consent was obtained prior to the visit.                                         "

## 2023-10-31 LAB — MRSA SPEC QL CULT: ABNORMAL

## 2023-10-31 RX ORDER — VANCOMYCIN HYDROCHLORIDE 1 G/200ML
1000 INJECTION, SOLUTION INTRAVENOUS
Status: ACTIVE | OUTPATIENT
Start: 2023-11-01 | End: 2023-11-02

## 2023-11-01 NOTE — PAT
Positive MRSA reported to PAT staff from microbiology staff.  Paged Samira [place] and reported result.  No further orders.

## 2023-11-06 ENCOUNTER — ANESTHESIA (OUTPATIENT)
Dept: PERIOP | Facility: HOSPITAL | Age: 33
End: 2023-11-06
Payer: COMMERCIAL

## 2023-11-06 ENCOUNTER — HOSPITAL ENCOUNTER (INPATIENT)
Facility: HOSPITAL | Age: 33
LOS: 2 days | Discharge: HOME | End: 2023-11-08
Attending: SURGERY | Admitting: SURGERY
Payer: COMMERCIAL

## 2023-11-06 ENCOUNTER — ANESTHESIA EVENT (OUTPATIENT)
Dept: PERIOP | Facility: HOSPITAL | Age: 33
End: 2023-11-06
Payer: COMMERCIAL

## 2023-11-06 ENCOUNTER — ANESTHESIA EVENT CONVERTED (OUTPATIENT)
Dept: ANESTHESIOLOGY | Facility: HOSPITAL | Age: 33
End: 2023-11-06
Payer: COMMERCIAL

## 2023-11-06 DIAGNOSIS — E66.01 MORBID OBESITY WITH BMI OF 50.0-59.9, ADULT: ICD-10-CM

## 2023-11-06 DIAGNOSIS — E66.01 MORBID OBESITY: Primary | ICD-10-CM

## 2023-11-06 PROCEDURE — 25810000003 SODIUM CHLORIDE 0.9 % SOLUTION: Performed by: NURSE ANESTHETIST, CERTIFIED REGISTERED

## 2023-11-06 PROCEDURE — 43775 LAP SLEEVE GASTRECTOMY: CPT | Performed by: PHYSICIAN ASSISTANT

## 2023-11-06 PROCEDURE — 25010000002 DEXAMETHASONE SODIUM PHOSPHATE 10 MG/ML SOLUTION: Performed by: NURSE ANESTHETIST, CERTIFIED REGISTERED

## 2023-11-06 PROCEDURE — 25010000002 CEFAZOLIN PER 500 MG: Performed by: SURGERY

## 2023-11-06 PROCEDURE — 81025 URINE PREGNANCY TEST: CPT | Performed by: ANESTHESIOLOGY

## 2023-11-06 PROCEDURE — 25010000002 PROPOFOL 10 MG/ML EMULSION: Performed by: NURSE ANESTHETIST, CERTIFIED REGISTERED

## 2023-11-06 PROCEDURE — 25010000002 THIAMINE PER 100 MG: Performed by: SURGERY

## 2023-11-06 PROCEDURE — 25010000002 HYDROMORPHONE PER 4 MG: Performed by: SURGERY

## 2023-11-06 PROCEDURE — 25010000002 GLUCAGON (RDNA) PER 1 MG: Performed by: NURSE ANESTHETIST, CERTIFIED REGISTERED

## 2023-11-06 PROCEDURE — 25010000002 AMISULPRIDE (ANTIEMETIC) 10 MG/4ML SOLUTION: Performed by: NURSE ANESTHETIST, CERTIFIED REGISTERED

## 2023-11-06 PROCEDURE — 25010000002 HYDROMORPHONE 1 MG/ML SOLUTION

## 2023-11-06 PROCEDURE — 25010000002 VANCOMYCIN 10 G RECONSTITUTED SOLUTION: Performed by: SURGERY

## 2023-11-06 PROCEDURE — 25010000002 ONDANSETRON PER 1 MG: Performed by: SURGERY

## 2023-11-06 PROCEDURE — 25810000003 LACTATED RINGERS PER 1000 ML: Performed by: SURGERY

## 2023-11-06 PROCEDURE — 25010000002 FENTANYL CITRATE (PF) 100 MCG/2ML SOLUTION: Performed by: NURSE ANESTHETIST, CERTIFIED REGISTERED

## 2023-11-06 PROCEDURE — 43775 LAP SLEEVE GASTRECTOMY: CPT | Performed by: SURGERY

## 2023-11-06 PROCEDURE — 88307 TISSUE EXAM BY PATHOLOGIST: CPT | Performed by: SURGERY

## 2023-11-06 PROCEDURE — 0DB64Z3 EXCISION OF STOMACH, PERCUTANEOUS ENDOSCOPIC APPROACH, VERTICAL: ICD-10-PCS | Performed by: SURGERY

## 2023-11-06 PROCEDURE — 25010000002 DROPERIDOL PER 5 MG

## 2023-11-06 PROCEDURE — 8E0W4CZ ROBOTIC ASSISTED PROCEDURE OF TRUNK REGION, PERCUTANEOUS ENDOSCOPIC APPROACH: ICD-10-PCS | Performed by: SURGERY

## 2023-11-06 PROCEDURE — 25010000002 FENTANYL CITRATE (PF) 50 MCG/ML SOLUTION

## 2023-11-06 PROCEDURE — 25010000002 BUPIVACAINE (PF) 0.25 % SOLUTION: Performed by: NURSE ANESTHETIST, CERTIFIED REGISTERED

## 2023-11-06 PROCEDURE — 25010000002 ENOXAPARIN PER 10 MG: Performed by: SURGERY

## 2023-11-06 PROCEDURE — C9153 AMISULPRIDE (ANTIEMETIC) 10 MG/4ML SOLUTION: HCPCS | Performed by: NURSE ANESTHETIST, CERTIFIED REGISTERED

## 2023-11-06 PROCEDURE — 25810000003 SODIUM CHLORIDE 0.9 % SOLUTION: Performed by: SURGERY

## 2023-11-06 PROCEDURE — 25010000002 SUGAMMADEX 500 MG/5ML SOLUTION: Performed by: NURSE ANESTHETIST, CERTIFIED REGISTERED

## 2023-11-06 PROCEDURE — 25010000002 ONDANSETRON PER 1 MG: Performed by: NURSE ANESTHETIST, CERTIFIED REGISTERED

## 2023-11-06 PROCEDURE — 0DJ08ZZ INSPECTION OF UPPER INTESTINAL TRACT, VIA NATURAL OR ARTIFICIAL OPENING ENDOSCOPIC: ICD-10-PCS | Performed by: SURGERY

## 2023-11-06 DEVICE — STAPLER 60 RELOAD BLUE
Type: IMPLANTABLE DEVICE | Site: STOMACH | Status: FUNCTIONAL
Brand: SUREFORM

## 2023-11-06 DEVICE — STAPLER 60 RELOAD WHITE
Type: IMPLANTABLE DEVICE | Site: STOMACH | Status: FUNCTIONAL
Brand: SUREFORM

## 2023-11-06 RX ORDER — DIPHENHYDRAMINE HYDROCHLORIDE 50 MG/ML
25 INJECTION INTRAMUSCULAR; INTRAVENOUS EVERY 4 HOURS PRN
Status: DISCONTINUED | OUTPATIENT
Start: 2023-11-06 | End: 2023-11-08 | Stop reason: HOSPADM

## 2023-11-06 RX ORDER — SODIUM CHLORIDE 9 MG/ML
40 INJECTION, SOLUTION INTRAVENOUS AS NEEDED
Status: DISCONTINUED | OUTPATIENT
Start: 2023-11-06 | End: 2023-11-06 | Stop reason: HOSPADM

## 2023-11-06 RX ORDER — FIBRINOGEN HUMAN, HUMAN THROMBIN 10 ML
KIT TOPICAL AS NEEDED
Status: DISCONTINUED | OUTPATIENT
Start: 2023-11-06 | End: 2023-11-06 | Stop reason: HOSPADM

## 2023-11-06 RX ORDER — ONDANSETRON 4 MG/1
4 TABLET, FILM COATED ORAL EVERY 6 HOURS PRN
Status: DISCONTINUED | OUTPATIENT
Start: 2023-11-10 | End: 2023-11-08 | Stop reason: HOSPADM

## 2023-11-06 RX ORDER — METOCLOPRAMIDE HYDROCHLORIDE 5 MG/ML
10 INJECTION INTRAMUSCULAR; INTRAVENOUS EVERY 6 HOURS PRN
Status: DISCONTINUED | OUTPATIENT
Start: 2023-11-06 | End: 2023-11-08 | Stop reason: HOSPADM

## 2023-11-06 RX ORDER — ALPRAZOLAM 0.25 MG/1
0.25 TABLET ORAL 2 TIMES DAILY PRN
Status: DISCONTINUED | OUTPATIENT
Start: 2023-11-06 | End: 2023-11-08 | Stop reason: HOSPADM

## 2023-11-06 RX ORDER — SODIUM CHLORIDE 0.9 % (FLUSH) 0.9 %
10 SYRINGE (ML) INJECTION EVERY 12 HOURS SCHEDULED
Status: DISCONTINUED | OUTPATIENT
Start: 2023-11-06 | End: 2023-11-08 | Stop reason: HOSPADM

## 2023-11-06 RX ORDER — BUPIVACAINE HYDROCHLORIDE 2.5 MG/ML
INJECTION, SOLUTION EPIDURAL; INFILTRATION; INTRACAUDAL
Status: COMPLETED | OUTPATIENT
Start: 2023-11-06 | End: 2023-11-06

## 2023-11-06 RX ORDER — FENTANYL CITRATE 50 UG/ML
INJECTION, SOLUTION INTRAMUSCULAR; INTRAVENOUS
Status: COMPLETED
Start: 2023-11-06 | End: 2023-11-06

## 2023-11-06 RX ORDER — ROPINIROLE 0.5 MG/1
0.5 TABLET, FILM COATED ORAL NIGHTLY
Status: DISCONTINUED | OUTPATIENT
Start: 2023-11-06 | End: 2023-11-08 | Stop reason: HOSPADM

## 2023-11-06 RX ORDER — FLUTICASONE PROPIONATE 50 MCG
2 SPRAY, SUSPENSION (ML) NASAL DAILY
Status: DISCONTINUED | OUTPATIENT
Start: 2023-11-07 | End: 2023-11-08 | Stop reason: HOSPADM

## 2023-11-06 RX ORDER — CEFAZOLIN SODIUM IN 0.9 % NACL 3 G/100 ML
3000 INTRAVENOUS SOLUTION, PIGGYBACK (ML) INTRAVENOUS EVERY 8 HOURS
Status: COMPLETED | OUTPATIENT
Start: 2023-11-06 | End: 2023-11-07

## 2023-11-06 RX ORDER — LIDOCAINE HYDROCHLORIDE 10 MG/ML
INJECTION, SOLUTION EPIDURAL; INFILTRATION; INTRACAUDAL; PERINEURAL AS NEEDED
Status: DISCONTINUED | OUTPATIENT
Start: 2023-11-06 | End: 2023-11-06 | Stop reason: SURG

## 2023-11-06 RX ORDER — ACETAMINOPHEN 160 MG/5ML
1000 SOLUTION ORAL EVERY 8 HOURS SCHEDULED
Status: COMPLETED | OUTPATIENT
Start: 2023-11-06 | End: 2023-11-08

## 2023-11-06 RX ORDER — ACETAMINOPHEN 500 MG
1000 TABLET ORAL EVERY 8 HOURS SCHEDULED
Status: COMPLETED | OUTPATIENT
Start: 2023-11-06 | End: 2023-11-08

## 2023-11-06 RX ORDER — ENOXAPARIN SODIUM 100 MG/ML
40 INJECTION SUBCUTANEOUS DAILY
Status: DISCONTINUED | OUTPATIENT
Start: 2023-11-07 | End: 2023-11-08 | Stop reason: HOSPADM

## 2023-11-06 RX ORDER — SODIUM CHLORIDE 0.9 % (FLUSH) 0.9 %
1-10 SYRINGE (ML) INJECTION AS NEEDED
Status: DISCONTINUED | OUTPATIENT
Start: 2023-11-06 | End: 2023-11-08 | Stop reason: HOSPADM

## 2023-11-06 RX ORDER — TOPIRAMATE 25 MG/1
25 TABLET ORAL DAILY
Status: DISCONTINUED | OUTPATIENT
Start: 2023-11-07 | End: 2023-11-08 | Stop reason: HOSPADM

## 2023-11-06 RX ORDER — BUSPIRONE HYDROCHLORIDE 10 MG/1
10 TABLET ORAL DAILY
Status: DISCONTINUED | OUTPATIENT
Start: 2023-11-07 | End: 2023-11-08 | Stop reason: HOSPADM

## 2023-11-06 RX ORDER — DEXAMETHASONE SODIUM PHOSPHATE 10 MG/ML
INJECTION, SOLUTION INTRAMUSCULAR; INTRAVENOUS
Status: COMPLETED | OUTPATIENT
Start: 2023-11-06 | End: 2023-11-06

## 2023-11-06 RX ORDER — SODIUM CHLORIDE 9 MG/ML
INJECTION, SOLUTION INTRAVENOUS CONTINUOUS PRN
Status: DISCONTINUED | OUTPATIENT
Start: 2023-11-06 | End: 2023-11-06 | Stop reason: SURG

## 2023-11-06 RX ORDER — PANTOPRAZOLE SODIUM 40 MG/10ML
40 INJECTION, POWDER, LYOPHILIZED, FOR SOLUTION INTRAVENOUS ONCE
Status: COMPLETED | OUTPATIENT
Start: 2023-11-06 | End: 2023-11-06

## 2023-11-06 RX ORDER — PROMETHAZINE HYDROCHLORIDE 25 MG/1
25 TABLET ORAL ONCE AS NEEDED
Status: DISCONTINUED | OUTPATIENT
Start: 2023-11-06 | End: 2023-11-06 | Stop reason: HOSPADM

## 2023-11-06 RX ORDER — GABAPENTIN 250 MG/5ML
100 SOLUTION ORAL 3 TIMES DAILY
Status: COMPLETED | OUTPATIENT
Start: 2023-11-06 | End: 2023-11-08

## 2023-11-06 RX ORDER — MAGNESIUM HYDROXIDE 1200 MG/15ML
LIQUID ORAL AS NEEDED
Status: DISCONTINUED | OUTPATIENT
Start: 2023-11-06 | End: 2023-11-06 | Stop reason: HOSPADM

## 2023-11-06 RX ORDER — SODIUM CHLORIDE 0.9 % (FLUSH) 0.9 %
3 SYRINGE (ML) INJECTION EVERY 12 HOURS SCHEDULED
Status: DISCONTINUED | OUTPATIENT
Start: 2023-11-06 | End: 2023-11-06 | Stop reason: HOSPADM

## 2023-11-06 RX ORDER — SIMETHICONE 80 MG
80 TABLET,CHEWABLE ORAL 4 TIMES DAILY PRN
Status: DISCONTINUED | OUTPATIENT
Start: 2023-11-06 | End: 2023-11-08 | Stop reason: HOSPADM

## 2023-11-06 RX ORDER — PANTOPRAZOLE SODIUM 40 MG/10ML
40 INJECTION, POWDER, LYOPHILIZED, FOR SOLUTION INTRAVENOUS ONCE
Status: DISCONTINUED | OUTPATIENT
Start: 2023-11-06 | End: 2023-11-06

## 2023-11-06 RX ORDER — ALBUTEROL SULFATE 2.5 MG/3ML
2.5 SOLUTION RESPIRATORY (INHALATION) EVERY 4 HOURS PRN
Status: DISCONTINUED | OUTPATIENT
Start: 2023-11-06 | End: 2023-11-08 | Stop reason: HOSPADM

## 2023-11-06 RX ORDER — HYDROMORPHONE HYDROCHLORIDE 1 MG/ML
0.5 INJECTION, SOLUTION INTRAMUSCULAR; INTRAVENOUS; SUBCUTANEOUS
Status: DISCONTINUED | OUTPATIENT
Start: 2023-11-06 | End: 2023-11-06 | Stop reason: HOSPADM

## 2023-11-06 RX ORDER — MIDAZOLAM HYDROCHLORIDE 1 MG/ML
1 INJECTION INTRAMUSCULAR; INTRAVENOUS
Status: DISCONTINUED | OUTPATIENT
Start: 2023-11-06 | End: 2023-11-06 | Stop reason: HOSPADM

## 2023-11-06 RX ORDER — PROCHLORPERAZINE EDISYLATE 5 MG/ML
10 INJECTION INTRAMUSCULAR; INTRAVENOUS EVERY 6 HOURS PRN
Status: DISCONTINUED | OUTPATIENT
Start: 2023-11-06 | End: 2023-11-08 | Stop reason: HOSPADM

## 2023-11-06 RX ORDER — MONTELUKAST SODIUM 10 MG/1
10 TABLET ORAL NIGHTLY
Status: DISCONTINUED | OUTPATIENT
Start: 2023-11-06 | End: 2023-11-08 | Stop reason: HOSPADM

## 2023-11-06 RX ORDER — LIDOCAINE HYDROCHLORIDE 10 MG/ML
0.5 INJECTION, SOLUTION EPIDURAL; INFILTRATION; INTRACAUDAL; PERINEURAL ONCE AS NEEDED
Status: COMPLETED | OUTPATIENT
Start: 2023-11-06 | End: 2023-11-06

## 2023-11-06 RX ORDER — SODIUM CHLORIDE 0.9 % (FLUSH) 0.9 %
10 SYRINGE (ML) INJECTION EVERY 12 HOURS SCHEDULED
Status: CANCELLED | OUTPATIENT
Start: 2023-11-06

## 2023-11-06 RX ORDER — HYDROMORPHONE HYDROCHLORIDE 1 MG/ML
0.5 INJECTION, SOLUTION INTRAMUSCULAR; INTRAVENOUS; SUBCUTANEOUS
Status: DISCONTINUED | OUTPATIENT
Start: 2023-11-06 | End: 2023-11-08 | Stop reason: HOSPADM

## 2023-11-06 RX ORDER — SODIUM CHLORIDE 9 MG/ML
40 INJECTION, SOLUTION INTRAVENOUS AS NEEDED
Status: DISCONTINUED | OUTPATIENT
Start: 2023-11-06 | End: 2023-11-08 | Stop reason: HOSPADM

## 2023-11-06 RX ORDER — PROPOFOL 10 MG/ML
VIAL (ML) INTRAVENOUS AS NEEDED
Status: DISCONTINUED | OUTPATIENT
Start: 2023-11-06 | End: 2023-11-06 | Stop reason: SURG

## 2023-11-06 RX ORDER — FENTANYL CITRATE 50 UG/ML
50 INJECTION, SOLUTION INTRAMUSCULAR; INTRAVENOUS
Status: DISCONTINUED | OUTPATIENT
Start: 2023-11-06 | End: 2023-11-06 | Stop reason: HOSPADM

## 2023-11-06 RX ORDER — ONDANSETRON 2 MG/ML
4 INJECTION INTRAMUSCULAR; INTRAVENOUS ONCE AS NEEDED
Status: DISCONTINUED | OUTPATIENT
Start: 2023-11-06 | End: 2023-11-06 | Stop reason: HOSPADM

## 2023-11-06 RX ORDER — PROCHLORPERAZINE MALEATE 10 MG
10 TABLET ORAL EVERY 6 HOURS PRN
Status: DISCONTINUED | OUTPATIENT
Start: 2023-11-06 | End: 2023-11-08 | Stop reason: HOSPADM

## 2023-11-06 RX ORDER — SUMATRIPTAN 50 MG/1
100 TABLET, FILM COATED ORAL AS NEEDED
Status: DISCONTINUED | OUTPATIENT
Start: 2023-11-06 | End: 2023-11-08 | Stop reason: HOSPADM

## 2023-11-06 RX ORDER — HYDRALAZINE HYDROCHLORIDE 20 MG/ML
5 INJECTION INTRAMUSCULAR; INTRAVENOUS
Status: DISCONTINUED | OUTPATIENT
Start: 2023-11-06 | End: 2023-11-06 | Stop reason: HOSPADM

## 2023-11-06 RX ORDER — IBUPROFEN 600 MG/1
TABLET ORAL AS NEEDED
Status: DISCONTINUED | OUTPATIENT
Start: 2023-11-06 | End: 2023-11-06 | Stop reason: SURG

## 2023-11-06 RX ORDER — LABETALOL HYDROCHLORIDE 5 MG/ML
5 INJECTION, SOLUTION INTRAVENOUS
Status: DISCONTINUED | OUTPATIENT
Start: 2023-11-06 | End: 2023-11-06 | Stop reason: HOSPADM

## 2023-11-06 RX ORDER — ALBUTEROL SULFATE 90 UG/1
AEROSOL, METERED RESPIRATORY (INHALATION) AS NEEDED
Status: DISCONTINUED | OUTPATIENT
Start: 2023-11-06 | End: 2023-11-06 | Stop reason: SURG

## 2023-11-06 RX ORDER — ONDANSETRON 2 MG/ML
4 INJECTION INTRAMUSCULAR; INTRAVENOUS EVERY 6 HOURS PRN
Status: DISCONTINUED | OUTPATIENT
Start: 2023-11-06 | End: 2023-11-08 | Stop reason: HOSPADM

## 2023-11-06 RX ORDER — DEXAMETHASONE SODIUM PHOSPHATE 10 MG/ML
INJECTION, SOLUTION INTRAMUSCULAR; INTRAVENOUS AS NEEDED
Status: DISCONTINUED | OUTPATIENT
Start: 2023-11-06 | End: 2023-11-06 | Stop reason: SURG

## 2023-11-06 RX ORDER — FENTANYL CITRATE 50 UG/ML
INJECTION, SOLUTION INTRAMUSCULAR; INTRAVENOUS
Status: DISCONTINUED
Start: 2023-11-06 | End: 2023-11-06 | Stop reason: WASHOUT

## 2023-11-06 RX ORDER — NALOXONE HCL 0.4 MG/ML
0.4 VIAL (ML) INJECTION AS NEEDED
Status: DISCONTINUED | OUTPATIENT
Start: 2023-11-06 | End: 2023-11-06 | Stop reason: HOSPADM

## 2023-11-06 RX ORDER — IPRATROPIUM BROMIDE AND ALBUTEROL SULFATE 2.5; .5 MG/3ML; MG/3ML
3 SOLUTION RESPIRATORY (INHALATION) ONCE AS NEEDED
Status: DISCONTINUED | OUTPATIENT
Start: 2023-11-06 | End: 2023-11-06 | Stop reason: HOSPADM

## 2023-11-06 RX ORDER — SCOLOPAMINE TRANSDERMAL SYSTEM 1 MG/1
1 PATCH, EXTENDED RELEASE TRANSDERMAL ONCE
Status: DISCONTINUED | OUTPATIENT
Start: 2023-11-06 | End: 2023-11-06

## 2023-11-06 RX ORDER — SODIUM CHLORIDE 0.9 % (FLUSH) 0.9 %
10 SYRINGE (ML) INJECTION AS NEEDED
Status: CANCELLED | OUTPATIENT
Start: 2023-11-06

## 2023-11-06 RX ORDER — SODIUM CHLORIDE, SODIUM LACTATE, POTASSIUM CHLORIDE, CALCIUM CHLORIDE 600; 310; 30; 20 MG/100ML; MG/100ML; MG/100ML; MG/100ML
150 INJECTION, SOLUTION INTRAVENOUS CONTINUOUS
Status: ACTIVE | OUTPATIENT
Start: 2023-11-06 | End: 2023-11-07

## 2023-11-06 RX ORDER — DROPERIDOL 2.5 MG/ML
0.62 INJECTION, SOLUTION INTRAMUSCULAR; INTRAVENOUS
Status: DISCONTINUED | OUTPATIENT
Start: 2023-11-06 | End: 2023-11-06 | Stop reason: HOSPADM

## 2023-11-06 RX ORDER — HYDROMORPHONE HYDROCHLORIDE 2 MG/1
2 TABLET ORAL EVERY 4 HOURS PRN
Status: DISCONTINUED | OUTPATIENT
Start: 2023-11-06 | End: 2023-11-08 | Stop reason: HOSPADM

## 2023-11-06 RX ORDER — CHLORHEXIDINE GLUCONATE ORAL RINSE 1.2 MG/ML
15 SOLUTION DENTAL
Status: COMPLETED | OUTPATIENT
Start: 2023-11-06 | End: 2023-11-06

## 2023-11-06 RX ORDER — DROPERIDOL 2.5 MG/ML
0.62 INJECTION, SOLUTION INTRAMUSCULAR; INTRAVENOUS ONCE AS NEEDED
Status: DISCONTINUED | OUTPATIENT
Start: 2023-11-06 | End: 2023-11-06 | Stop reason: HOSPADM

## 2023-11-06 RX ORDER — ROCURONIUM BROMIDE 10 MG/ML
INJECTION, SOLUTION INTRAVENOUS AS NEEDED
Status: DISCONTINUED | OUTPATIENT
Start: 2023-11-06 | End: 2023-11-06 | Stop reason: SURG

## 2023-11-06 RX ORDER — ENOXAPARIN SODIUM 100 MG/ML
40 INJECTION SUBCUTANEOUS ONCE
Status: DISCONTINUED | OUTPATIENT
Start: 2023-11-06 | End: 2023-11-06 | Stop reason: HOSPADM

## 2023-11-06 RX ORDER — PANTOPRAZOLE SODIUM 40 MG/1
40 TABLET, DELAYED RELEASE ORAL EVERY MORNING
Status: DISCONTINUED | OUTPATIENT
Start: 2023-11-07 | End: 2023-11-08 | Stop reason: HOSPADM

## 2023-11-06 RX ORDER — SIMETHICONE 80 MG
TABLET,CHEWABLE ORAL
Status: COMPLETED
Start: 2023-11-06 | End: 2023-11-06

## 2023-11-06 RX ORDER — THIAMINE HYDROCHLORIDE 100 MG/ML
100 INJECTION, SOLUTION INTRAMUSCULAR; INTRAVENOUS ONCE
Status: COMPLETED | OUTPATIENT
Start: 2023-11-06 | End: 2023-11-06

## 2023-11-06 RX ORDER — CETIRIZINE HYDROCHLORIDE 10 MG/1
5 TABLET ORAL DAILY
Status: DISCONTINUED | OUTPATIENT
Start: 2023-11-07 | End: 2023-11-08 | Stop reason: HOSPADM

## 2023-11-06 RX ORDER — ONDANSETRON 2 MG/ML
INJECTION INTRAMUSCULAR; INTRAVENOUS AS NEEDED
Status: DISCONTINUED | OUTPATIENT
Start: 2023-11-06 | End: 2023-11-06 | Stop reason: SURG

## 2023-11-06 RX ORDER — DROPERIDOL 2.5 MG/ML
INJECTION, SOLUTION INTRAMUSCULAR; INTRAVENOUS
Status: COMPLETED
Start: 2023-11-06 | End: 2023-11-06

## 2023-11-06 RX ORDER — PANTOPRAZOLE SODIUM 40 MG/1
40 TABLET, DELAYED RELEASE ORAL EVERY MORNING
Status: DISCONTINUED | OUTPATIENT
Start: 2023-11-07 | End: 2023-11-06

## 2023-11-06 RX ORDER — CEFAZOLIN SODIUM IN 0.9 % NACL 3 G/100 ML
3 INTRAVENOUS SOLUTION, PIGGYBACK (ML) INTRAVENOUS ONCE
Status: COMPLETED | OUTPATIENT
Start: 2023-11-06 | End: 2023-11-06

## 2023-11-06 RX ORDER — FENTANYL CITRATE 50 UG/ML
INJECTION, SOLUTION INTRAMUSCULAR; INTRAVENOUS AS NEEDED
Status: DISCONTINUED | OUTPATIENT
Start: 2023-11-06 | End: 2023-11-06 | Stop reason: SURG

## 2023-11-06 RX ORDER — GABAPENTIN 300 MG/1
600 CAPSULE ORAL ONCE
Status: COMPLETED | OUTPATIENT
Start: 2023-11-06 | End: 2023-11-06

## 2023-11-06 RX ORDER — ENOXAPARIN SODIUM 100 MG/ML
INJECTION SUBCUTANEOUS AS NEEDED
Status: DISCONTINUED | OUTPATIENT
Start: 2023-11-06 | End: 2023-11-06 | Stop reason: HOSPADM

## 2023-11-06 RX ORDER — DEXMEDETOMIDINE HYDROCHLORIDE 100 UG/ML
INJECTION, SOLUTION INTRAVENOUS AS NEEDED
Status: DISCONTINUED | OUTPATIENT
Start: 2023-11-06 | End: 2023-11-06 | Stop reason: SURG

## 2023-11-06 RX ORDER — OXYCODONE HYDROCHLORIDE 5 MG/1
5 TABLET ORAL EVERY 6 HOURS PRN
Status: DISCONTINUED | OUTPATIENT
Start: 2023-11-06 | End: 2023-11-08 | Stop reason: HOSPADM

## 2023-11-06 RX ORDER — SODIUM CHLORIDE AND POTASSIUM CHLORIDE 150; 450 MG/100ML; MG/100ML
100 INJECTION, SOLUTION INTRAVENOUS CONTINUOUS
Status: DISCONTINUED | OUTPATIENT
Start: 2023-11-07 | End: 2023-11-08 | Stop reason: HOSPADM

## 2023-11-06 RX ORDER — SODIUM CHLORIDE 9 MG/ML
150 INJECTION, SOLUTION INTRAVENOUS CONTINUOUS
Status: DISCONTINUED | OUTPATIENT
Start: 2023-11-06 | End: 2023-11-08 | Stop reason: HOSPADM

## 2023-11-06 RX ORDER — PROMETHAZINE HYDROCHLORIDE 25 MG/1
25 SUPPOSITORY RECTAL ONCE AS NEEDED
Status: DISCONTINUED | OUTPATIENT
Start: 2023-11-06 | End: 2023-11-06 | Stop reason: HOSPADM

## 2023-11-06 RX ORDER — HYDROCODONE BITARTRATE AND ACETAMINOPHEN 5; 325 MG/1; MG/1
1 TABLET ORAL ONCE AS NEEDED
Status: DISCONTINUED | OUTPATIENT
Start: 2023-11-06 | End: 2023-11-06 | Stop reason: HOSPADM

## 2023-11-06 RX ORDER — MEPERIDINE HYDROCHLORIDE 25 MG/ML
12.5 INJECTION INTRAMUSCULAR; INTRAVENOUS; SUBCUTANEOUS
Status: DISCONTINUED | OUTPATIENT
Start: 2023-11-06 | End: 2023-11-06 | Stop reason: HOSPADM

## 2023-11-06 RX ORDER — METOCLOPRAMIDE 10 MG/1
10 TABLET ORAL EVERY 6 HOURS PRN
Status: DISCONTINUED | OUTPATIENT
Start: 2023-11-06 | End: 2023-11-08 | Stop reason: HOSPADM

## 2023-11-06 RX ORDER — GABAPENTIN 100 MG/1
100 CAPSULE ORAL 3 TIMES DAILY
Status: COMPLETED | OUTPATIENT
Start: 2023-11-06 | End: 2023-11-08

## 2023-11-06 RX ORDER — SODIUM CHLORIDE 0.9 % (FLUSH) 0.9 %
3-10 SYRINGE (ML) INJECTION AS NEEDED
Status: DISCONTINUED | OUTPATIENT
Start: 2023-11-06 | End: 2023-11-06 | Stop reason: HOSPADM

## 2023-11-06 RX ORDER — NALOXONE HCL 0.4 MG/ML
0.1 VIAL (ML) INJECTION
Status: DISCONTINUED | OUTPATIENT
Start: 2023-11-06 | End: 2023-11-08 | Stop reason: HOSPADM

## 2023-11-06 RX ORDER — PROMETHAZINE HYDROCHLORIDE 12.5 MG/1
12.5 TABLET ORAL EVERY 6 HOURS PRN
Status: DISCONTINUED | OUTPATIENT
Start: 2023-11-06 | End: 2023-11-08 | Stop reason: HOSPADM

## 2023-11-06 RX ORDER — CYANOCOBALAMIN 1000 UG/ML
1000 INJECTION, SOLUTION INTRAMUSCULAR; SUBCUTANEOUS ONCE
Status: COMPLETED | OUTPATIENT
Start: 2023-11-07 | End: 2023-11-07

## 2023-11-06 RX ORDER — SODIUM CHLORIDE 9 MG/ML
40 INJECTION, SOLUTION INTRAVENOUS AS NEEDED
Status: CANCELLED | OUTPATIENT
Start: 2023-11-06

## 2023-11-06 RX ORDER — SODIUM CHLORIDE, SODIUM LACTATE, POTASSIUM CHLORIDE, CALCIUM CHLORIDE 600; 310; 30; 20 MG/100ML; MG/100ML; MG/100ML; MG/100ML
9 INJECTION, SOLUTION INTRAVENOUS CONTINUOUS
Status: DISCONTINUED | OUTPATIENT
Start: 2023-11-06 | End: 2023-11-08 | Stop reason: HOSPADM

## 2023-11-06 RX ORDER — ALBUTEROL SULFATE 90 UG/1
2 AEROSOL, METERED RESPIRATORY (INHALATION) EVERY 4 HOURS PRN
Status: DISCONTINUED | OUTPATIENT
Start: 2023-11-06 | End: 2023-11-06 | Stop reason: CLARIF

## 2023-11-06 RX ADMIN — THIAMINE HYDROCHLORIDE 100 MG: 100 INJECTION, SOLUTION INTRAMUSCULAR; INTRAVENOUS at 13:46

## 2023-11-06 RX ADMIN — ONDANSETRON 4 MG: 2 INJECTION INTRAMUSCULAR; INTRAVENOUS at 11:11

## 2023-11-06 RX ADMIN — ALBUTEROL SULFATE 4 PUFF: 90 AEROSOL, METERED RESPIRATORY (INHALATION) at 10:12

## 2023-11-06 RX ADMIN — ROCURONIUM BROMIDE 100 MG: 10 SOLUTION INTRAVENOUS at 10:07

## 2023-11-06 RX ADMIN — SCOPOLAMINE 1 PATCH: 1.5 PATCH, EXTENDED RELEASE TRANSDERMAL at 09:44

## 2023-11-06 RX ADMIN — PROPOFOL 220 MG: 10 INJECTION, EMULSION INTRAVENOUS at 10:07

## 2023-11-06 RX ADMIN — LIDOCAINE HYDROCHLORIDE 0.5 ML: 10 INJECTION, SOLUTION EPIDURAL; INFILTRATION; INTRACAUDAL; PERINEURAL at 09:45

## 2023-11-06 RX ADMIN — HYDROMORPHONE HYDROCHLORIDE 0.5 MG: 1 INJECTION, SOLUTION INTRAMUSCULAR; INTRAVENOUS; SUBCUTANEOUS at 13:09

## 2023-11-06 RX ADMIN — PANTOPRAZOLE SODIUM 40 MG: 40 INJECTION, POWDER, LYOPHILIZED, FOR SOLUTION INTRAVENOUS at 20:47

## 2023-11-06 RX ADMIN — GABAPENTIN 600 MG: 300 CAPSULE ORAL at 09:43

## 2023-11-06 RX ADMIN — FENTANYL CITRATE 100 MCG: 50 INJECTION, SOLUTION INTRAMUSCULAR; INTRAVENOUS at 10:04

## 2023-11-06 RX ADMIN — VANCOMYCIN HYDROCHLORIDE 2500 MG: 10 INJECTION, POWDER, LYOPHILIZED, FOR SOLUTION INTRAVENOUS at 09:50

## 2023-11-06 RX ADMIN — BUPIVACAINE HYDROCHLORIDE 60 ML: 2.5 INJECTION, SOLUTION EPIDURAL; INFILTRATION; INTRACAUDAL; PERINEURAL at 10:08

## 2023-11-06 RX ADMIN — DROPERIDOL 0.62 MG: 2.5 INJECTION, SOLUTION INTRAMUSCULAR; INTRAVENOUS at 12:20

## 2023-11-06 RX ADMIN — GABAPENTIN 100 MG: 100 CAPSULE ORAL at 20:48

## 2023-11-06 RX ADMIN — CHLORHEXIDINE GLUCONATE 15 ML: 1.2 SOLUTION ORAL at 09:43

## 2023-11-06 RX ADMIN — Medication 10 ML: at 20:48

## 2023-11-06 RX ADMIN — DEXMEDETOMIDINE HYDROCHLORIDE 20 MCG: 100 INJECTION, SOLUTION INTRAVENOUS at 10:04

## 2023-11-06 RX ADMIN — MONTELUKAST 10 MG: 10 TABLET, FILM COATED ORAL at 20:47

## 2023-11-06 RX ADMIN — HYDROMORPHONE HYDROCHLORIDE 0.5 MG: 1 INJECTION, SOLUTION INTRAMUSCULAR; INTRAVENOUS; SUBCUTANEOUS at 11:55

## 2023-11-06 RX ADMIN — Medication 1 MG: at 10:47

## 2023-11-06 RX ADMIN — SIMETHICONE 80 MG: 80 TABLET, CHEWABLE ORAL at 12:27

## 2023-11-06 RX ADMIN — PANTOPRAZOLE SODIUM 40 MG: 40 INJECTION, POWDER, FOR SOLUTION INTRAVENOUS at 09:44

## 2023-11-06 RX ADMIN — SODIUM CHLORIDE 1000 ML: 9 INJECTION, SOLUTION INTRAVENOUS at 09:40

## 2023-11-06 RX ADMIN — CEFAZOLIN 3 G: 10 INJECTION, POWDER, FOR SOLUTION INTRAVENOUS at 10:10

## 2023-11-06 RX ADMIN — SODIUM CHLORIDE 3000 MG: 9 INJECTION, SOLUTION INTRAVENOUS at 18:28

## 2023-11-06 RX ADMIN — FENTANYL CITRATE 50 MCG: 50 INJECTION, SOLUTION INTRAMUSCULAR; INTRAVENOUS at 12:06

## 2023-11-06 RX ADMIN — SUGAMMADEX 300 MG: 100 INJECTION, SOLUTION INTRAVENOUS at 11:13

## 2023-11-06 RX ADMIN — PROPOFOL 25 MCG/KG/MIN: 10 INJECTION, EMULSION INTRAVENOUS at 10:13

## 2023-11-06 RX ADMIN — DEXAMETHASONE SODIUM PHOSPHATE 4 MG: 10 INJECTION, SOLUTION INTRAMUSCULAR; INTRAVENOUS at 10:08

## 2023-11-06 RX ADMIN — ROPINIROLE 0.5 MG: 0.5 TABLET, FILM COATED ORAL at 20:47

## 2023-11-06 RX ADMIN — LIDOCAINE HYDROCHLORIDE 50 MG: 10 INJECTION, SOLUTION EPIDURAL; INFILTRATION; INTRACAUDAL; PERINEURAL at 10:07

## 2023-11-06 RX ADMIN — ACETAMINOPHEN 1000 MG: 500 TABLET ORAL at 13:46

## 2023-11-06 RX ADMIN — ACETAMINOPHEN 1000 MG: 500 TABLET ORAL at 20:47

## 2023-11-06 RX ADMIN — ONDANSETRON 4 MG: 2 INJECTION INTRAMUSCULAR; INTRAVENOUS at 13:09

## 2023-11-06 RX ADMIN — GABAPENTIN 100 MG: 100 CAPSULE ORAL at 15:12

## 2023-11-06 RX ADMIN — AMISULPRIDE 10 MG: 2.5 INJECTION, SOLUTION INTRAVENOUS at 11:11

## 2023-11-06 RX ADMIN — CHLORHEXIDINE GLUCONATE 15 ML: 1.2 SOLUTION ORAL at 09:30

## 2023-11-06 RX ADMIN — HYDROMORPHONE HYDROCHLORIDE 0.5 MG: 1 INJECTION, SOLUTION INTRAMUSCULAR; INTRAVENOUS; SUBCUTANEOUS at 20:48

## 2023-11-06 RX ADMIN — SODIUM CHLORIDE, POTASSIUM CHLORIDE, SODIUM LACTATE AND CALCIUM CHLORIDE 150 ML/HR: 600; 310; 30; 20 INJECTION, SOLUTION INTRAVENOUS at 13:09

## 2023-11-06 RX ADMIN — SODIUM CHLORIDE: 9 INJECTION, SOLUTION INTRAVENOUS at 10:02

## 2023-11-06 RX ADMIN — Medication 80 MG: at 12:27

## 2023-11-06 RX ADMIN — DEXAMETHASONE SODIUM PHOSPHATE 6 MG: 10 INJECTION, SOLUTION INTRAMUSCULAR; INTRAVENOUS at 10:07

## 2023-11-06 NOTE — ANESTHESIA PROCEDURE NOTES
"Peripheral Block      Patient reassessed immediately prior to procedure    Patient location during procedure: OR  Start time: 11/6/2023 12:08 AM  Reason for block: at surgeon's request and post-op pain management  Performed by  Anesthesiologist: Junito Lim MD  Preanesthetic Checklist  Completed: patient identified, IV checked, site marked, risks and benefits discussed, surgical consent, monitors and equipment checked, pre-op evaluation and timeout performed  Prep:  Pt Position: supine  Sterile barriers:cap, gloves, mask and washed/disinfected hands  Prep: ChloraPrep  Patient monitoring: blood pressure monitoring, continuous pulse oximetry and EKG  Procedure    Sedation: yes  Performed under: general  Guidance:ultrasound guided  Images:still images obtained, printed/placed on chart    Laterality:Bilateral  Block Type:TAP  Injection Technique:single-shot  Needle Type:short-bevel and echogenic  Needle Gauge:20 G  Resistance on Injection: none    Medications Used: bupivacaine PF (MARCAINE) 0.25 % injection - Injection, Abdominal Tissue   60 mL - 11/6/2023 10:08:00 AM  dexamethasone sodium phosphate injection - Injection, Abdominal Tissue   4 mg - 11/6/2023 10:08:00 AM      Medications  Comment:Block Injection:  LA dose divided between Right and Left block        Post Assessment  Injection Assessment: negative aspiration for heme, incremental injection and no paresthesia on injection  Patient Tolerance:comfortable throughout block  Complications:no  Additional Notes    Subcostal TAPs    A high-frequency linear transducer, with sterile cover, was placed sub-xiphoid to identify Linea Alba, right and left Rectus Abdominus Muscles (EDWIGE). The transducer was moved either right or left subcostally to identify the EDWIGE and the Transverse Abdominus Muscle (CAMPOVERDE). The insertion site was prepped in sterile fashion and then localized with 2-5 ml of 1% Lidocaine. Using ultrasound-guidance, a 20-gauge B-Bernard 4\" Ultraplex 360 " "non-stimulating echogenic needle was advanced in plane, from medial to lateral, until the tip of the needle was in the fascial plane between the EDWIGE and CAMPOVERDE. 1-3ml of preservative free normal saline was used to hydro-dissect the fascial planes. After the fascial plane was verified, the local anesthetic (LA) was injected. The procedure was repeated on the opposite side for bilateral coverage. Aspiration every 5 ml to prevent intravascular injection. Injection was completed with negative aspiration of blood and negative intravascular injection. Injection pressures were normal with minimal resistance. The subcostal approach to the TAP nerve block ideally anesthetizes the intercostal nerves T6-T9.     Mid-Axillary/Lateral TAPs    A high-frequency linear transducer, with sterile cover, was placed in the midaxillary line between the ASIS and costal margin. The External Oblique Muscle (EOM), Internal Oblique Muscle (IOM), Transverse Abdominus Muscle (CAMPOVERDE), and Peritoneum were identified. The insertion site was prepped in sterile fashion and then localized with 2-5 ml of 1% Lidocaine. Using ultrasound-guidance, a 20-gauge B-Bernard 4\" Ultraplex 360 non-stimulating echogenic needle was advanced in plane, from medial to lateral, until the tip of the needle was in the fascial plane between the IOM and CAMPOVERDE. 1-3ml of preservative free normal saline was used to hydro-dissect the fascial planes. After the fascial plane was verified, the local anesthetic (LA) was injected. The procedure was repeated on the opposite side for bilateral coverage. Aspiration every 5 ml to prevent intravascular injection. Injection was completed with negative aspiration of blood and negative intravascular injection. Injection pressures were normal with minimal resistance. Midaxillary TAPs should reach intercostal nerves T10- T11 and the subcostal nerve T12.              "

## 2023-11-06 NOTE — OP NOTE
OPERATIVE REPORT    DATE: 11/06/23    PATIENT: Eliz Eisenberg    PREOPERATIVE DIAGNOSIS:    1. Morbid obesity with comorbidities    POSTOPERATIVE DIAGNOSIS:    1. Morbid obesity with multiple comorbidities.    PROCEDURES PERFORMED:  1. Robotic assisted laparoscopic sleeve gastrectomy (85% subtotal vertical gastrectomy)    2.  Esophagogastroduodenoscopy    SURGEON:  Beatriz Sandy M.D.    ASSISTANT: VINNIE Bass was instrumental in the success of the case and provided retraction, suction, camera holding, and skin closure.    ANESTHESIA:  General endotracheal with TAP block    ESTIMATED BLOOD LOSS:   minimal    FLUIDS:  Crystalloids.    SPECIMENS:  Subtotal gastrectomy.    DRAINS:  None.    COUNTS:  Correct.    COMPLICATIONS:  None.    FINDINGS: No hiatal hernia.  Omental adhesions to umbilicus.      INDICATIONS:   Eliz Eisenberg is a 33 y.o. female with morbid obesity and associated comorbidities who presents for elective laparoscopic sleeve gastrectomy. The patient has undergone our extensive preoperative education, teaching, and consent process. Everything is in order and they wish to proceed.         DESCRIPTION OF PROCEDURE:   The patient was brought to the operating room and placed supine upon the operating room table. SCDs were placed. The patient underwent uneventful general endotracheal anesthesia and bilateral TAP blocks per the anesthesiology staff.  She received subcutaneous Lovenox and was given Ancef and vancomycin. The abdomen was prepped with ChloraPrep and draped in the usual sterile fashion. An Ioban was used as well.  Timeout was performed.     A small transverse incision was made a few centimeters above and to the left of the umbilicus and the peritoneal cavity entered under direct camera visualization using an 11 mm 0° laparoscope and an OptiView trocar.  The abdomen was then insufflated to a pressure of 16 mmHg with CO2 gas. Exploratory laparoscopy revealed no evidence of  injury from the entrance technique.  The liver had a uniform capsule and was large in size without evidence of gross fibrosis.  Two 8 mm ports were placed to the patient's left, lateral of the  first port, and a 12 mm port was placed in the right upper quadrant.  The robot was docked, all safety checks were performed, and I moved to the robot console.     A 2-0 Stratafix suture was placed to make a liver sling with bites of the peritoneum and the right charles of the diaphragm, and the left lobe of the liver was elevated. The stomach was decompressed with an 18 Portuguese orogastric tube, which was then positioned in the antrum. The greater curvature vessels were taken down with the vessel sealer energy device beginning at the midpoint of the stomach and continued up to the angle of His, including the short gastrics. The left charles was completely exposed and there was no sign of hiatal hernia here or anteriorly. This was photodocumented. The GE junction fat pad was elevated to make a clear landing zone for the stapler later. The greater curvature vessels were taken down with the energy device extending distally within 3 cm of the pylorus. Filmy adhesions to the stomach were taken down posteriorly.      A 36 Portuguese bougie was inserted and positioned along the lesser curvature of the stomach.  The stomach was marked at 1 cm from the angle of His, 3 cm from the incisura, and 5 cm from the pylorus using an ink saturated Kittner.  1 mg of IV glucagon was given to relax gastric smooth muscle.  Using the bougie as a template, a vertical sleeve gastrectomy was fashioned with successive staple loads: the first load was blue, and the rest of the loads were white.   The staple line was examined and was hemostatic. The gastrectomy specimen was removed through the 12 mm port site. The specimen was later examined, and the staples were well-formed. Palpation of the specimen revealed no masses. The staple line of the sleeve gastrectomy  revealed staples that were well-formed. The upper abdomen was flooded with saline, and endoscopy was performed.     The flexible endoscope was passed transorally down to the pylorus easily. The sleeve extended to within 6 cm proximal to the pylorus and was hemostatic. The sleeve was not narrow or twisted. There was no hiatal hernia or distal esophagitis. The rest of the esophagus was normal. The scope was removed. The leak test was normal.        I rescrubbed and suctioned the irrigation fluid from the upper abdomen, making sure it was dry. The staple line on the stomach was hemostatic.  The staple line was treated with 4 ml of aerosolized Tisseel fibrin glue. The liver stitch was removed easily. The 12 mm port was removed under direct visualization and there was no bleeding. This incision was closed with 0-Vicryl transfascial suture using a suture passer under direct visualization in a horizontal mattress fashion. All other ports were removed and then replaced under direct visualization and all of these were hemostatic. The instruments were removed and the abdomen was desufflated. The ports were removed.  3-0 Monocryl plus was used to close skin incisions with interrupted sutures. Skin glue was placed. 10 mg of IV Barhemsys was given at the end of the case.  The patient was awakened and taken to recovery in good condition, having tolerated the procedure well. All sponge, needle, and instrument counts were correct.

## 2023-11-06 NOTE — ANESTHESIA POSTPROCEDURE EVALUATION
Patient: Eliz Dailey Goosey    Procedure Summary       Date: 11/06/23 Room / Location:  [facility]  [facility]    Anesthesia Start: 1002 Anesthesia Stop: 1131    Procedure: GASTRIC SLEEVE LAPAROSCOPIC WITH DAVINCI ROBOT, ESOPHAGOGASTRODUODENOSCOPY (Abdomen) Diagnosis:       Morbid obesity with BMI of 50.0-59.9, adult      (Morbid obesity with BMI of 50.0-59.9, adult [E66.01, Z68.43])    Surgeons: Beatriz Sandy MD Provider: Junito Lim MD    Anesthesia Type: general ASA Status: 3            Anesthesia Type: general    Vitals  Vitals Value Taken Time   BP     Temp     Pulse 83 11/06/23 1130   Resp     SpO2     Vitals shown include unfiled device data.        Post Anesthesia Care and Evaluation    Patient location during evaluation: PACU  Patient participation: waiting for patient participation  Level of consciousness: sleepy but conscious  Pain management: adequate    Airway patency: patent  Anesthetic complications: No anesthetic complications  PONV Status: none  Cardiovascular status: hemodynamically stable and acceptable  Respiratory status: nonlabored ventilation, acceptable and nasal cannula  Hydration status: acceptable

## 2023-11-06 NOTE — ANESTHESIA PROCEDURE NOTES
Airway  Urgency: elective    Date/Time: 11/6/2023 10:09 AM  Airway not difficult    General Information and Staff    Patient location during procedure: OR  CRNA/CAA: Asia Torres CRNA    Indications and Patient Condition  Indications for airway management: airway protection    Preoxygenated: yes  MILS not maintained throughout  Mask difficulty assessment: 2 - vent by mask + OA or adjuvant +/- NMBA    Final Airway Details  Final airway type: endotracheal airway      Successful airway: ETT  Cuffed: yes   Successful intubation technique: video laryngoscopy  Facilitating devices/methods: intubating stylet  Endotracheal tube insertion site: oral  Blade: Torres  Blade size: 3  ETT size (mm): 7.5  Cormack-Lehane Classification: grade I - full view of glottis  Placement verified by: chest auscultation and capnometry   Measured from: lips  ETT/EBT  to lips (cm): 20  Number of attempts at approach: 1  Assessment: lips, teeth, and gum same as pre-op and atraumatic intubation    Additional Comments  Negative epigastric sounds, Breath sound equal bilaterally with symmetric chest rise and fall

## 2023-11-06 NOTE — BRIEF OP NOTE
GASTRIC SLEEVE LAPAROSCOPIC WITH DAVINCI ROBOT AND ESOPHAGOGASTRODUODENOSCOPY  Progress Note    Eliz Eisenberg  11/6/2023    Pre-op Diagnosis:   Morbid obesity with BMI of 50.0-59.9, adult [E66.01, Z68.43]       Post-Op Diagnosis Codes:     * Morbid obesity with BMI of 50.0-59.9, adult [E66.01, Z68.43]    Procedure/CPT® Codes:  NV LAPS GSTRC RSTRICTIV PX LONGITUDINAL GASTRECTOMY [22685]  NV ESOPHAGOGASTRODUODENOSCOPY TRANSORAL DIAGNOSTIC [70389]      Procedure(s):  GASTRIC SLEEVE LAPAROSCOPIC WITH DAVINCI ROBOT, ESOPHAGOGASTRODUODENOSCOPY              Surgeon(s):  Beatriz Sandy MD    Anesthesia: General with Block    Staff:   Circulator: Yvonne Bravo RN  Scrub Person: Brandon Palomo Vanetta  Nursing Assistant: Flavia Stafford CNA  Assistant: Simon Scott PA-C  Assistant: Simon Scott PA-C      Estimated Blood Loss: minimal    Urine Voided: * No values recorded between 11/6/2023 10:01 AM and 11/6/2023 11:14 AM *    Specimens:                Specimens       ID Source Type Tests Collected By Collected At Frozen?    A Stomach Tissue TISSUE PATHOLOGY EXAM   Beatriz Sandy MD 11/6/23 1036 No    Description: SUB-TOTAL GASTRECTOMY    This specimen was not marked as sent.                  Drains: * No LDAs found *    Findings: No hiatal hernia.  Omental adhesions to umbilicus.          Complications: None immediate    Assistant: Simon Scott PA-C  was responsible for performing the following activities: Retraction, Suction, Irrigation, Suturing, Closing, Placing Dressing, and Held/Positioned Camera and their skilled assistance was necessary for the success of this case.    Beatriz Sandy MD     Date: 11/6/2023  Time: 11:25 EST

## 2023-11-06 NOTE — PLAN OF CARE
Goal Outcome Evaluation:  Plan of Care Reviewed With: patient        Progress: improving     VSS, 2LNC, A/Ox4. Lap sites CDI. PRNs given for complaints of pain and nausea. IVF infusing. SCDs in place. Will continue plan of care.   Problem: Adult Inpatient Plan of Care  Goal: Plan of Care Review  Outcome: Ongoing, Progressing  Flowsheets (Taken 11/6/2023 1452)  Progress: improving  Plan of Care Reviewed With: patient  Goal: Patient-Specific Goal (Individualized)  Outcome: Ongoing, Progressing  Goal: Absence of Hospital-Acquired Illness or Injury  Outcome: Ongoing, Progressing  Intervention: Identify and Manage Fall Risk  Recent Flowsheet Documentation  Taken 11/6/2023 1400 by Iesha Craft RN  Safety Promotion/Fall Prevention:   clutter free environment maintained   assistive device/personal items within reach   activity supervised   fall prevention program maintained   room organization consistent   safety round/check completed   toileting scheduled   nonskid shoes/slippers when out of bed   lighting adjusted  Taken 11/6/2023 1309 by Iesha Craft RN  Safety Promotion/Fall Prevention:   clutter free environment maintained   activity supervised   assistive device/personal items within reach   fall prevention program maintained   toileting scheduled   room organization consistent   safety round/check completed   nonskid shoes/slippers when out of bed   lighting adjusted  Intervention: Prevent Skin Injury  Recent Flowsheet Documentation  Taken 11/6/2023 1400 by Iesha Craft RN  Body Position: position changed independently  Taken 11/6/2023 1309 by Iesha Craft RN  Body Position:   position changed independently   supine  Intervention: Prevent and Manage VTE (Venous Thromboembolism) Risk  Recent Flowsheet Documentation  Taken 11/6/2023 1400 by Iesha Craft RN  Activity Management: activity encouraged  Taken 11/6/2023 1309 by Iesha Craft RN  Activity Management: activity encouraged  VTE  Prevention/Management:   bilateral   sequential compression devices on  Intervention: Prevent Infection  Recent Flowsheet Documentation  Taken 11/6/2023 1400 by Iesha Craft RN  Infection Prevention:   rest/sleep promoted   single patient room provided   visitors restricted/screened   hand hygiene promoted  Taken 11/6/2023 1309 by Iesha Craft RN  Infection Prevention:   rest/sleep promoted   single patient room provided   visitors restricted/screened   hand hygiene promoted  Goal: Optimal Comfort and Wellbeing  Outcome: Ongoing, Progressing  Intervention: Monitor Pain and Promote Comfort  Recent Flowsheet Documentation  Taken 11/6/2023 1309 by Iesha Craft RN  Pain Management Interventions: see MAR  Intervention: Provide Person-Centered Care  Recent Flowsheet Documentation  Taken 11/6/2023 1309 by Iesha Craft RN  Trust Relationship/Rapport:   care explained   choices provided  Goal: Readiness for Transition of Care  Outcome: Ongoing, Progressing  Intervention: Mutually Develop Transition Plan  Recent Flowsheet Documentation  Taken 11/6/2023 1300 by Iesha Craft RN  Transportation Anticipated: family or friend will provide  Patient/Family Anticipated Services at Transition: none  Patient/Family Anticipates Transition to: home with family     Problem: Bariatric Care Environmental Safety (Bariatric Surgery)  Goal: Safety Maintained with Care  Outcome: Ongoing, Progressing     Problem: Bleeding (Bariatric Surgery)  Goal: Absence of Bleeding  Outcome: Ongoing, Progressing     Problem: Bowel Motility Impaired (Bariatric Surgery)  Goal: Effective Bowel Motility and Elimination  Outcome: Ongoing, Progressing  Intervention: Enhance Bowel Motility and Elimination  Recent Flowsheet Documentation  Taken 11/6/2023 1309 by Iesha Craft RN  Bowel Motility Enhancement:   fluid intake encouraged   ambulation promoted   oral intake encouraged     Problem: Fluid and Electrolyte Imbalance (Bariatric Surgery)  Goal: Fluid  and Electrolyte Balance  Outcome: Ongoing, Progressing     Problem: Glycemic Control Impaired (Bariatric Surgery)  Goal: Blood Glucose Level Within Desired Range  Outcome: Ongoing, Progressing     Problem: Infection (Bariatric Surgery)  Goal: Absence of Infection Signs and Symptoms  Outcome: Ongoing, Progressing     Problem: Ongoing Anesthesia Effects (Bariatric Surgery)  Goal: Anesthesia/Sedation Recovery  Outcome: Ongoing, Progressing  Intervention: Optimize Anesthesia Recovery  Recent Flowsheet Documentation  Taken 11/6/2023 1400 by Iesha Craft RN  Safety Promotion/Fall Prevention:   clutter free environment maintained   assistive device/personal items within reach   activity supervised   fall prevention program maintained   room organization consistent   safety round/check completed   toileting scheduled   nonskid shoes/slippers when out of bed   lighting adjusted  Taken 11/6/2023 1309 by Iesha Craft, RN  Safety Promotion/Fall Prevention:   clutter free environment maintained   activity supervised   assistive device/personal items within reach   fall prevention program maintained   toileting scheduled   room organization consistent   safety round/check completed   nonskid shoes/slippers when out of bed   lighting adjusted     Problem: Pain (Bariatric Surgery)  Goal: Acceptable Pain Control  Outcome: Ongoing, Progressing  Intervention: Prevent or Manage Pain  Recent Flowsheet Documentation  Taken 11/6/2023 1309 by Iesha Craft, RN  Pain Management Interventions: see MAR  Diversional Activities:   television   smartphone     Problem: Postoperative Nausea and Vomiting (Bariatric Surgery)  Goal: Nausea and Vomiting Relief  Outcome: Ongoing, Progressing     Problem: Postoperative Urinary Retention (Bariatric Surgery)  Goal: Effective Urinary Elimination  Outcome: Ongoing, Progressing     Problem: Respiratory Compromise (Bariatric Surgery)  Goal: Effective Oxygenation and Ventilation  Outcome: Ongoing,  Progressing  Intervention: Optimize Oxygenation and Ventilation  Recent Flowsheet Documentation  Taken 11/6/2023 1400 by Iesha Craft, RN  Head of Bed ([facility]) Positioning: HOB elevated  Taken 11/6/2023 1309 by Iesha Craft, RN  Head of Bed ([facility]) Positioning: [facility] elevated

## 2023-11-06 NOTE — ANESTHESIA PREPROCEDURE EVALUATION
Anesthesia Evaluation     Patient summary reviewed and Nursing notes reviewed                Airway   Mallampati: II  TM distance: >3 FB  Neck ROM: full  No difficulty expected  Dental - normal exam     Pulmonary - negative pulmonary ROS and normal exam   (+) asthma,recent URI, sleep apnea  Cardiovascular - negative cardio ROS and normal exam        Neuro/Psych- negative ROS  (+) headaches, psychiatric history Anxiety  GI/Hepatic/Renal/Endo - negative ROS   (+) morbid obesity, GERD    Musculoskeletal (-) negative ROS    Abdominal  - normal exam    Bowel sounds: normal.   Substance History - negative use     OB/GYN negative ob/gyn ROS         Other   arthritis,                 Anesthesia Plan    ASA 3     general     (TAPS FOR POSTOP PAIN)  intravenous induction     Anesthetic plan, risks, benefits, and alternatives have been provided, discussed and informed consent has been obtained with: patient.    Plan discussed with CRNA.    CODE STATUS:

## 2023-11-07 ENCOUNTER — APPOINTMENT (OUTPATIENT)
Dept: GENERAL RADIOLOGY | Facility: HOSPITAL | Age: 33
End: 2023-11-07
Payer: COMMERCIAL

## 2023-11-07 LAB
ALBUMIN SERPL-MCNC: 3.6 G/DL (ref 3.5–5.2)
ALBUMIN/GLOB SERPL: 1.2 G/DL
ALP SERPL-CCNC: 96 U/L (ref 39–117)
ALT SERPL W P-5'-P-CCNC: 18 U/L (ref 1–33)
ANION GAP SERPL CALCULATED.3IONS-SCNC: 12 MMOL/L (ref 5–15)
AST SERPL-CCNC: 18 U/L (ref 1–32)
BASOPHILS # BLD AUTO: 0.03 10*3/MM3 (ref 0–0.2)
BASOPHILS NFR BLD AUTO: 0.2 % (ref 0–1.5)
BILIRUB SERPL-MCNC: 0.3 MG/DL (ref 0–1.2)
BUN SERPL-MCNC: 6 MG/DL (ref 6–20)
BUN/CREAT SERPL: 8.7 (ref 7–25)
CALCIUM SPEC-SCNC: 8.3 MG/DL (ref 8.6–10.5)
CHLORIDE SERPL-SCNC: 106 MMOL/L (ref 98–107)
CO2 SERPL-SCNC: 22 MMOL/L (ref 22–29)
CREAT SERPL-MCNC: 0.69 MG/DL (ref 0.57–1)
CYTO UR: NORMAL
DEPRECATED RDW RBC AUTO: 48.2 FL (ref 37–54)
EGFRCR SERPLBLD CKD-EPI 2021: 117.7 ML/MIN/1.73
EOSINOPHIL # BLD AUTO: 0.01 10*3/MM3 (ref 0–0.4)
EOSINOPHIL NFR BLD AUTO: 0.1 % (ref 0.3–6.2)
ERYTHROCYTE [DISTWIDTH] IN BLOOD BY AUTOMATED COUNT: 16.8 % (ref 12.3–15.4)
GLOBULIN UR ELPH-MCNC: 2.9 GM/DL
GLUCOSE SERPL-MCNC: 98 MG/DL (ref 65–99)
HCT VFR BLD AUTO: 32.3 % (ref 34–46.6)
HGB BLD-MCNC: 9.7 G/DL (ref 12–15.9)
IMM GRANULOCYTES # BLD AUTO: 0.08 10*3/MM3 (ref 0–0.05)
IMM GRANULOCYTES NFR BLD AUTO: 0.6 % (ref 0–0.5)
IRON 24H UR-MRATE: 32 MCG/DL (ref 37–145)
LAB AP CASE REPORT: NORMAL
LAB AP CLINICAL INFORMATION: NORMAL
LYMPHOCYTES # BLD AUTO: 2.27 10*3/MM3 (ref 0.7–3.1)
LYMPHOCYTES NFR BLD AUTO: 16 % (ref 19.6–45.3)
MCH RBC QN AUTO: 24.1 PG (ref 26.6–33)
MCHC RBC AUTO-ENTMCNC: 30 G/DL (ref 31.5–35.7)
MCV RBC AUTO: 80.3 FL (ref 79–97)
MONOCYTES # BLD AUTO: 0.78 10*3/MM3 (ref 0.1–0.9)
MONOCYTES NFR BLD AUTO: 5.5 % (ref 5–12)
NEUTROPHILS NFR BLD AUTO: 11 10*3/MM3 (ref 1.7–7)
NEUTROPHILS NFR BLD AUTO: 77.6 % (ref 42.7–76)
NRBC BLD AUTO-RTO: 0 /100 WBC (ref 0–0.2)
PATH REPORT.FINAL DX SPEC: NORMAL
PATH REPORT.GROSS SPEC: NORMAL
PLATELET # BLD AUTO: 367 10*3/MM3 (ref 140–450)
PMV BLD AUTO: 9.8 FL (ref 6–12)
POTASSIUM SERPL-SCNC: 4.6 MMOL/L (ref 3.5–5.2)
PROT SERPL-MCNC: 6.5 G/DL (ref 6–8.5)
RBC # BLD AUTO: 4.02 10*6/MM3 (ref 3.77–5.28)
SODIUM SERPL-SCNC: 140 MMOL/L (ref 136–145)
WBC NRBC COR # BLD: 14.17 10*3/MM3 (ref 3.4–10.8)

## 2023-11-07 PROCEDURE — 25010000002 POTASSIUM CHLORIDE PER 2 MEQ: Performed by: SURGERY

## 2023-11-07 PROCEDURE — 99024 POSTOP FOLLOW-UP VISIT: CPT | Performed by: SURGERY

## 2023-11-07 PROCEDURE — 25810000003 SODIUM CHLORIDE 0.9 % SOLUTION 1,000 ML FLEX CONT: Performed by: SURGERY

## 2023-11-07 PROCEDURE — 25010000002 ONDANSETRON PER 1 MG: Performed by: SURGERY

## 2023-11-07 PROCEDURE — 25010000002 CYANOCOBALAMIN PER 1000 MCG: Performed by: SURGERY

## 2023-11-07 PROCEDURE — 25010000002 CEFAZOLIN PER 500 MG: Performed by: SURGERY

## 2023-11-07 PROCEDURE — 25010000002 THIAMINE HCL 200 MG/2ML SOLUTION 2 ML VIAL: Performed by: SURGERY

## 2023-11-07 PROCEDURE — 85025 COMPLETE CBC W/AUTO DIFF WBC: CPT | Performed by: SURGERY

## 2023-11-07 PROCEDURE — 83540 ASSAY OF IRON: CPT | Performed by: SURGERY

## 2023-11-07 PROCEDURE — 74240 X-RAY XM UPR GI TRC 1CNTRST: CPT

## 2023-11-07 PROCEDURE — 25010000002 NA FERRIC GLUC CPLX PER 12.5 MG: Performed by: SURGERY

## 2023-11-07 PROCEDURE — 80053 COMPREHEN METABOLIC PANEL: CPT | Performed by: SURGERY

## 2023-11-07 PROCEDURE — 25010000002 ENOXAPARIN PER 10 MG: Performed by: SURGERY

## 2023-11-07 PROCEDURE — 0 DIATRIZOATE MEGLUMINE & SODIUM PER 1 ML: Performed by: SURGERY

## 2023-11-07 PROCEDURE — 63710000001 PROCHLORPERAZINE MALEATE PER 10 MG: Performed by: SURGERY

## 2023-11-07 RX ADMIN — SODIUM CHLORIDE 125 MG: 9 INJECTION, SOLUTION INTRAVENOUS at 10:58

## 2023-11-07 RX ADMIN — POTASSIUM CHLORIDE AND SODIUM CHLORIDE 100 ML/HR: 450; 150 INJECTION, SOLUTION INTRAVENOUS at 15:15

## 2023-11-07 RX ADMIN — CYANOCOBALAMIN 1000 MCG: 1000 INJECTION, SOLUTION INTRAMUSCULAR; SUBCUTANEOUS at 08:12

## 2023-11-07 RX ADMIN — CETIRIZINE HYDROCHLORIDE 5 MG: 10 TABLET, FILM COATED ORAL at 08:12

## 2023-11-07 RX ADMIN — GABAPENTIN 100 MG: 100 CAPSULE ORAL at 15:15

## 2023-11-07 RX ADMIN — MONTELUKAST 10 MG: 10 TABLET, FILM COATED ORAL at 20:03

## 2023-11-07 RX ADMIN — HYDROMORPHONE HYDROCHLORIDE 2 MG: 2 TABLET ORAL at 17:33

## 2023-11-07 RX ADMIN — ACETAMINOPHEN 1000 MG: 500 TABLET ORAL at 20:01

## 2023-11-07 RX ADMIN — ROPINIROLE 0.5 MG: 0.5 TABLET, FILM COATED ORAL at 20:01

## 2023-11-07 RX ADMIN — SODIUM CHLORIDE 3000 MG: 9 INJECTION, SOLUTION INTRAVENOUS at 02:23

## 2023-11-07 RX ADMIN — THIAMINE HYDROCHLORIDE 200 ML/HR: 100 INJECTION, SOLUTION INTRAMUSCULAR; INTRAVENOUS at 03:29

## 2023-11-07 RX ADMIN — ONDANSETRON 4 MG: 2 INJECTION INTRAMUSCULAR; INTRAVENOUS at 13:26

## 2023-11-07 RX ADMIN — OXYCODONE HYDROCHLORIDE 5 MG: 5 TABLET ORAL at 11:05

## 2023-11-07 RX ADMIN — BUSPIRONE HYDROCHLORIDE 10 MG: 10 TABLET ORAL at 08:12

## 2023-11-07 RX ADMIN — TOPIRAMATE 25 MG: 25 TABLET, FILM COATED ORAL at 08:12

## 2023-11-07 RX ADMIN — ACETAMINOPHEN 1000 MG: 500 TABLET ORAL at 05:06

## 2023-11-07 RX ADMIN — ENOXAPARIN SODIUM 40 MG: 100 INJECTION SUBCUTANEOUS at 08:12

## 2023-11-07 RX ADMIN — HYDROMORPHONE HYDROCHLORIDE 2 MG: 2 TABLET ORAL at 08:12

## 2023-11-07 RX ADMIN — PANTOPRAZOLE SODIUM 40 MG: 40 TABLET, DELAYED RELEASE ORAL at 05:06

## 2023-11-07 RX ADMIN — PROCHLORPERAZINE MALEATE 10 MG: 10 TABLET ORAL at 11:05

## 2023-11-07 RX ADMIN — GABAPENTIN 100 MG: 100 CAPSULE ORAL at 20:02

## 2023-11-07 RX ADMIN — ACETAMINOPHEN 1000 MG: 500 TABLET ORAL at 13:23

## 2023-11-07 RX ADMIN — GABAPENTIN 100 MG: 100 CAPSULE ORAL at 08:12

## 2023-11-07 RX ADMIN — OXYCODONE HYDROCHLORIDE 5 MG: 5 TABLET ORAL at 05:06

## 2023-11-07 NOTE — PLAN OF CARE
VSS, RA, A/Ox4. IS and ambulation encouraged. PRNs administered for complaints of nausea and pain. Lap sites CDI. Iron replaced. Will continue plan of care.   Problem: Adult Inpatient Plan of Care  Goal: Plan of Care Review  Outcome: Ongoing, Progressing  Flowsheets (Taken 11/7/2023 1507)  Progress: improving  Plan of Care Reviewed With: patient  Goal: Patient-Specific Goal (Individualized)  Outcome: Ongoing, Progressing  Goal: Absence of Hospital-Acquired Illness or Injury  Outcome: Ongoing, Progressing  Intervention: Identify and Manage Fall Risk  Recent Flowsheet Documentation  Taken 11/7/2023 1400 by Iesha Craft, RN  Safety Promotion/Fall Prevention:   clutter free environment maintained   assistive device/personal items within reach   activity supervised   fall prevention program maintained   room organization consistent   safety round/check completed   toileting scheduled   nonskid shoes/slippers when out of bed   lighting adjusted  Taken 11/7/2023 1200 by Iesha Craft, RN  Safety Promotion/Fall Prevention:   clutter free environment maintained   assistive device/personal items within reach   activity supervised   fall prevention program maintained   room organization consistent   safety round/check completed   toileting scheduled   nonskid shoes/slippers when out of bed   lighting adjusted  Taken 11/7/2023 1000 by Iesha Craft, RN  Safety Promotion/Fall Prevention:   assistive device/personal items within reach   clutter free environment maintained   activity supervised   fall prevention program maintained   room organization consistent   safety round/check completed   toileting scheduled   nonskid shoes/slippers when out of bed   lighting adjusted  Taken 11/7/2023 0812 by Iesha Craft, RN  Safety Promotion/Fall Prevention:   clutter free environment maintained   assistive device/personal items within reach   activity supervised   fall prevention program maintained   room organization consistent   safety  round/check completed   toileting scheduled   nonskid shoes/slippers when out of bed   lighting adjusted  Intervention: Prevent Skin Injury  Recent Flowsheet Documentation  Taken 11/7/2023 1400 by Iesha Craft RN  Body Position: position changed independently  Taken 11/7/2023 1200 by Iesha Craft RN  Body Position: legs elevated  Taken 11/7/2023 1000 by Iesha Craft RN  Body Position: position changed independently  Taken 11/7/2023 0812 by Iesha Craft RN  Body Position: position changed independently  Intervention: Prevent and Manage VTE (Venous Thromboembolism) Risk  Recent Flowsheet Documentation  Taken 11/7/2023 1400 by Iesha Craft RN  Activity Management: activity encouraged  Taken 11/7/2023 1200 by Iesha Craft RN  Activity Management: up in chair  Taken 11/7/2023 1000 by Iesha Craft RN  Activity Management: activity encouraged  Taken 11/7/2023 0812 by Iesha Craft RN  Activity Management: activity encouraged  VTE Prevention/Management:   bilateral   sequential compression devices off  Intervention: Prevent Infection  Recent Flowsheet Documentation  Taken 11/7/2023 1400 by Iesha Craft RN  Infection Prevention:   visitors restricted/screened   single patient room provided   rest/sleep promoted   hand hygiene promoted  Taken 11/7/2023 1200 by Iesha Craft RN  Infection Prevention:   rest/sleep promoted   hand hygiene promoted   single patient room provided   visitors restricted/screened  Taken 11/7/2023 1000 by Iesha Craft RN  Infection Prevention:   single patient room provided   rest/sleep promoted   visitors restricted/screened   hand hygiene promoted  Taken 11/7/2023 0812 by Iesha Craft RN  Infection Prevention:   rest/sleep promoted   single patient room provided   visitors restricted/screened   hand hygiene promoted  Goal: Optimal Comfort and Wellbeing  Outcome: Ongoing, Progressing  Intervention: Monitor Pain and Promote Comfort  Recent Flowsheet Documentation  Taken 11/7/2023  0812 by Iesha Craft, RN  Pain Management Interventions: see MAR  Intervention: Provide Person-Centered Care  Recent Flowsheet Documentation  Taken 11/7/2023 0812 by Iesha Craft RN  Trust Relationship/Rapport:   care explained   choices provided  Goal: Readiness for Transition of Care  Outcome: Ongoing, Progressing     Problem: Bariatric Care Environmental Safety (Bariatric Surgery)  Goal: Safety Maintained with Care  Outcome: Ongoing, Progressing     Problem: Bleeding (Bariatric Surgery)  Goal: Absence of Bleeding  Outcome: Ongoing, Progressing     Problem: Bowel Motility Impaired (Bariatric Surgery)  Goal: Effective Bowel Motility and Elimination  Outcome: Ongoing, Progressing     Problem: Fluid and Electrolyte Imbalance (Bariatric Surgery)  Goal: Fluid and Electrolyte Balance  Outcome: Ongoing, Progressing     Problem: Glycemic Control Impaired (Bariatric Surgery)  Goal: Blood Glucose Level Within Desired Range  Outcome: Ongoing, Progressing     Problem: Infection (Bariatric Surgery)  Goal: Absence of Infection Signs and Symptoms  Outcome: Ongoing, Progressing     Problem: Ongoing Anesthesia Effects (Bariatric Surgery)  Goal: Anesthesia/Sedation Recovery  Outcome: Ongoing, Progressing  Intervention: Optimize Anesthesia Recovery  Recent Flowsheet Documentation  Taken 11/7/2023 1400 by Iesha Craft, RN  Safety Promotion/Fall Prevention:   clutter free environment maintained   assistive device/personal items within reach   activity supervised   fall prevention program maintained   room organization consistent   safety round/check completed   toileting scheduled   nonskid shoes/slippers when out of bed   lighting adjusted  Taken 11/7/2023 1200 by Iesha Craft, RN  Safety Promotion/Fall Prevention:   clutter free environment maintained   assistive device/personal items within reach   activity supervised   fall prevention program maintained   room organization consistent   safety round/check completed   toileting  scheduled   nonskid shoes/slippers when out of bed   lighting adjusted  Taken 11/7/2023 1000 by Iesha Craft RN  Safety Promotion/Fall Prevention:   assistive device/personal items within reach   clutter free environment maintained   activity supervised   fall prevention program maintained   room organization consistent   safety round/check completed   toileting scheduled   nonskid shoes/slippers when out of bed   lighting adjusted  Taken 11/7/2023 0812 by Iesha Crfat RN  Safety Promotion/Fall Prevention:   clutter free environment maintained   assistive device/personal items within reach   activity supervised   fall prevention program maintained   room organization consistent   safety round/check completed   toileting scheduled   nonskid shoes/slippers when out of bed   lighting adjusted     Problem: Pain (Bariatric Surgery)  Goal: Acceptable Pain Control  Outcome: Ongoing, Progressing  Intervention: Prevent or Manage Pain  Recent Flowsheet Documentation  Taken 11/7/2023 0812 by Iesha Craft RN  Pain Management Interventions: see MAR  Diversional Activities:   smartphone   television     Problem: Postoperative Nausea and Vomiting (Bariatric Surgery)  Goal: Nausea and Vomiting Relief  Outcome: Ongoing, Progressing     Problem: Postoperative Urinary Retention (Bariatric Surgery)  Goal: Effective Urinary Elimination  Outcome: Ongoing, Progressing     Problem: Respiratory Compromise (Bariatric Surgery)  Goal: Effective Oxygenation and Ventilation  Outcome: Ongoing, Progressing  Intervention: Optimize Oxygenation and Ventilation  Recent Flowsheet Documentation  Taken 11/7/2023 1400 by Iesha Craft RN  Head of Bed (HOB) Positioning: HOB elevated  Taken 11/7/2023 1200 by Iesha Craft RN  Head of Bed (HOB) Positioning: HOB elevated  Taken 11/7/2023 1000 by Iesha Craft RN  Head of Bed (HOB) Positioning: HOB elevated  Taken 11/7/2023 0812 by Iesha Craft RN  Head of Bed (HOB) Positioning: HOB elevated      Problem: Fall Injury Risk  Goal: Absence of Fall and Fall-Related Injury  Outcome: Ongoing, Progressing  Intervention: Identify and Manage Contributors  Recent Flowsheet Documentation  Taken 11/7/2023 1400 by Iesha Craft RN  Medication Review/Management: medications reviewed  Taken 11/7/2023 1200 by Iesha Craft RN  Medication Review/Management: medications reviewed  Taken 11/7/2023 1000 by Iesha Craft RN  Medication Review/Management: medications reviewed  Taken 11/7/2023 0812 by Iesha Craft RN  Medication Review/Management: medications reviewed  Intervention: Promote Injury-Free Environment  Recent Flowsheet Documentation  Taken 11/7/2023 1400 by Iesha Craft RN  Safety Promotion/Fall Prevention:   clutter free environment maintained   assistive device/personal items within reach   activity supervised   fall prevention program maintained   room organization consistent   safety round/check completed   toileting scheduled   nonskid shoes/slippers when out of bed   lighting adjusted  Taken 11/7/2023 1200 by Iesha Craft RN  Safety Promotion/Fall Prevention:   clutter free environment maintained   assistive device/personal items within reach   activity supervised   fall prevention program maintained   room organization consistent   safety round/check completed   toileting scheduled   nonskid shoes/slippers when out of bed   lighting adjusted  Taken 11/7/2023 1000 by Iesha Craft RN  Safety Promotion/Fall Prevention:   assistive device/personal items within reach   clutter free environment maintained   activity supervised   fall prevention program maintained   room organization consistent   safety round/check completed   toileting scheduled   nonskid shoes/slippers when out of bed   lighting adjusted  Taken 11/7/2023 0812 by Iesha Craft, RN  Safety Promotion/Fall Prevention:   clutter free environment maintained   assistive device/personal items within reach   activity supervised   fall prevention  program maintained   room organization consistent   safety round/check completed   toileting scheduled   nonskid shoes/slippers when out of bed   lighting adjusted   Goal Outcome Evaluation:  Plan of Care Reviewed With: patient        Progress: improving

## 2023-11-07 NOTE — CASE MANAGEMENT/SOCIAL WORK
Discharge Planning Assessment  [facility]     Patient Name: Eliz Eisenberg  MRN: [id number]  Today's Date: 11/7/2023    Admit Date: 11/6/2023    Plan: Home with family   Discharge Needs Assessment       Row Name 11/07/23 1029       Living Environment    People in Home spouse    Name(s) of People in Home Wilmar (spouse)[phone]    Current Living Arrangements home    Potentially Unsafe Housing Conditions none    Primary Care Provided by self    Provides Primary Care For no one    Family Caregiver if Needed spouse    Able to Return to Prior Arrangements yes       Resource/Environmental Concerns    Resource/Environmental Concerns none    Transportation Concerns none       Transition Planning    Patient/Family Anticipates Transition to home with family    Patient/Family Anticipated Services at Transition none    Transportation Anticipated family or friend will provide       Discharge Needs Assessment    Readmission Within the Last 30 Days no previous admission in last 30 days    Equipment Currently Used at Home bp cuff;nebulizer    Concerns to be Addressed denies needs/concerns at this time    Anticipated Changes Related to Illness none    Equipment Needed After Discharge none                   Discharge Plan       Row Name 11/07/23 1037       Plan    Plan Home with family    Patient/Family in Agreement with Plan yes    Plan Comments Spoke with patient at bedside. Lives Wilmar (spouse) [phone] in [place]. Is independent with ADL's. No problems with [place] or medications. Uses no DME at home. Has no advanced directives. PCP is VINNIE Roa. Plan is home with family. CM will continue to follow.    Final Discharge Disposition Code 01 - home or self-care                  Continued Care and Services - Admitted Since 11/6/2023    Coordination has not been started for this encounter.          Demographic Summary       Row Name 11/07/23 1029       General Information    Admission Type inpatient    Arrived From  PACU/recovery room    Referral Source admission list    Reason for Consult discharge planning    Preferred Language English       Contact Information    Permission Granted to Share Info With     Contact Information Obtained for                    Functional Status       Row Name 11/07/23 1029       Functional Status    Usual Activity Tolerance good    Current Activity Tolerance good       Functional Status, IADL    Medications independent    Meal Preparation independent    Housekeeping independent    Laundry independent    Shopping independent       Mental Status    General Appearance WDL WDL       Mental Status Summary    Recent Changes in Mental Status/Cognitive Functioning no changes       Employment/    Employment Status employed full-time                   Psychosocial    No documentation.                  Abuse/Neglect    No documentation.                  Legal    No documentation.                  Substance Abuse    No documentation.                  Patient Forms    No documentation.                     Henri Beltran RN

## 2023-11-07 NOTE — PROGRESS NOTES
"Bariatric Surgery     LOS: 1 day   Patient Care Team:  Allison Pham PA as PCP - General (Internal Medicine)  Nathan, COURTNEY Hinkle as Nurse Practitioner (Obstetrics and Gynecology)  Vishal Flores MD as Cardiologist (Cardiology)  Ran Lemon MD as Consulting Physician (Otolaryngology)  Tom Valencia APRN as Nurse Practitioner (Cardiology)    Chief Complaint:  post op    Subjective     Interval History:  Doing okay.  Had a small amount of protein shake and feels very nauseated.   No fevers.  Pain controlled.  Ambulating.  Voiding.  IS [id number].    Objective     Vital Signs  Blood pressure 133/74, pulse 83, temperature 99.3 °F (37.4 °C), temperature source Axillary, resp. rate 18, height 167.6 cm (65.98\"), weight (!) 159 kg (350 lb 8.5 oz), last menstrual period 11/03/2023, SpO2 98%, not currently breastfeeding.    Physical Exam:  General: Alert, NAD  Abdomen: soft, appropriate, incisions okay  Extremities: warm, (+) SCDs     Results Review:     I reviewed the patient's new clinical results.  I reviewed the patient's new imaging results and agree with the interpretation.    Labs:  Lab Results (last 24 hours)       Procedure Component Value Units Date/Time    Comprehensive Metabolic Panel [[id number]]  (Abnormal) Collected: 11/07/23 0547    Specimen: Blood Updated: 11/07/23 0730     Glucose 98 mg/dL      BUN 6 mg/dL      Creatinine 0.69 mg/dL      Sodium 140 mmol/L      Potassium 4.6 mmol/L      Chloride 106 mmol/L      CO2 22.0 mmol/L      Calcium 8.3 mg/dL      Total Protein 6.5 g/dL      Albumin 3.6 g/dL      ALT (SGPT) 18 U/L      AST (SGOT) 18 U/L      Alkaline Phosphatase 96 U/L      Total Bilirubin 0.3 mg/dL      Globulin 2.9 gm/dL      Comment: Calculated Result        A/G Ratio 1.2 g/dL      BUN/Creatinine Ratio 8.7     Anion Gap 12.0 mmol/L      eGFR 117.7 mL/min/1.73     Narrative:      GFR Normal >60  Chronic Kidney Disease <60  Kidney Failure <15      Iron [[id number]]  (Abnormal) " Collected: 11/07/23 0547    Specimen: Blood Updated: 11/07/23 0726     Iron 32 mcg/dL     CBC & Differential [[id number]]  (Abnormal) Collected: 11/07/23 0547    Specimen: Blood Updated: 11/07/23 0659    Narrative:      The following orders were created for panel order CBC & Differential.  Procedure                               Abnormality         Status                     ---------                               -----------         ------                     CBC Auto Differential[[id number]]        Abnormal            Final result                 Please view results for these tests on the individual orders.    CBC Auto Differential [[id number]]  (Abnormal) Collected: 11/07/23 0547    Specimen: Blood Updated: 11/07/23 0659     WBC 14.17 10*3/mm3      RBC 4.02 10*6/mm3      Hemoglobin 9.7 g/dL      Hematocrit 32.3 %      MCV 80.3 fL      MCH 24.1 pg      MCHC 30.0 g/dL      RDW 16.8 %      RDW-SD 48.2 fl      MPV 9.8 fL      Platelets 367 10*3/mm3      Neutrophil % 77.6 %      Lymphocyte % 16.0 %      Monocyte % 5.5 %      Eosinophil % 0.1 %      Basophil % 0.2 %      Immature Grans % 0.6 %      Neutrophils, Absolute 11.00 10*3/mm3      Lymphocytes, Absolute 2.27 10*3/mm3      Monocytes, Absolute 0.78 10*3/mm3      Eosinophils, Absolute 0.01 10*3/mm3      Basophils, Absolute 0.03 10*3/mm3      Immature Grans, Absolute 0.08 10*3/mm3      nRBC 0.0 /100 WBC             Imaging:  Imaging Results (Last 24 Hours)       Procedure Component Value Units Date/Time    FL Upper GI Water Soluble [[id number]] Resulted: 11/07/23 1037     Updated: 11/07/23 1051              Assessment & Plan     POD # 1 s/p robotic sleeve.    Doing well except for poor oral intake.  Suggested drinking on a schedule and taking advantage of antiemetics.  Hopefully home tomorrow.  IV iron was given for low iron without evidence of bleeding on Lovenox.  UGI images reviewed: no leak or obstruction.    Continue OOB/ PT/ DVT prophx.          Beatriz Ramirez  MD Du  11/07/23  14:03 EST    ADDENDUM:    Having menorraghia on Lovenox: will hold AM dose.    Electronically signed by Beatriz Sandy MD, 11/07/23, 5:38 PM EST.

## 2023-11-08 ENCOUNTER — READMISSION MANAGEMENT (OUTPATIENT)
Dept: CALL CENTER | Facility: HOSPITAL | Age: 33
End: 2023-11-08
Payer: COMMERCIAL

## 2023-11-08 VITALS
BODY MASS INDEX: 47.09 KG/M2 | DIASTOLIC BLOOD PRESSURE: 76 MMHG | WEIGHT: 293 LBS | SYSTOLIC BLOOD PRESSURE: 127 MMHG | OXYGEN SATURATION: 98 % | TEMPERATURE: 99.5 F | HEART RATE: 87 BPM | HEIGHT: 66 IN | RESPIRATION RATE: 18 BRPM

## 2023-11-08 DIAGNOSIS — E66.01 MORBID OBESITY: ICD-10-CM

## 2023-11-08 LAB
ALBUMIN SERPL-MCNC: 3.7 G/DL (ref 3.5–5.2)
ALBUMIN/GLOB SERPL: 1.3 G/DL
ALP SERPL-CCNC: 91 U/L (ref 39–117)
ALT SERPL W P-5'-P-CCNC: 14 U/L (ref 1–33)
ANION GAP SERPL CALCULATED.3IONS-SCNC: 12 MMOL/L (ref 5–15)
AST SERPL-CCNC: 13 U/L (ref 1–32)
BASOPHILS # BLD AUTO: 0.04 10*3/MM3 (ref 0–0.2)
BASOPHILS NFR BLD AUTO: 0.5 % (ref 0–1.5)
BILIRUB SERPL-MCNC: 0.3 MG/DL (ref 0–1.2)
BUN SERPL-MCNC: 8 MG/DL (ref 6–20)
BUN/CREAT SERPL: 13.6 (ref 7–25)
CALCIUM SPEC-SCNC: 8.2 MG/DL (ref 8.6–10.5)
CHLORIDE SERPL-SCNC: 104 MMOL/L (ref 98–107)
CO2 SERPL-SCNC: 22 MMOL/L (ref 22–29)
CREAT SERPL-MCNC: 0.59 MG/DL (ref 0.57–1)
DEPRECATED RDW RBC AUTO: 48.4 FL (ref 37–54)
EGFRCR SERPLBLD CKD-EPI 2021: 122.2 ML/MIN/1.73
EOSINOPHIL # BLD AUTO: 0.07 10*3/MM3 (ref 0–0.4)
EOSINOPHIL NFR BLD AUTO: 0.8 % (ref 0.3–6.2)
ERYTHROCYTE [DISTWIDTH] IN BLOOD BY AUTOMATED COUNT: 16.9 % (ref 12.3–15.4)
GLOBULIN UR ELPH-MCNC: 2.8 GM/DL
GLUCOSE SERPL-MCNC: 82 MG/DL (ref 65–99)
HCT VFR BLD AUTO: 31.6 % (ref 34–46.6)
HGB BLD-MCNC: 9.6 G/DL (ref 12–15.9)
IMM GRANULOCYTES # BLD AUTO: 0.05 10*3/MM3 (ref 0–0.05)
IMM GRANULOCYTES NFR BLD AUTO: 0.6 % (ref 0–0.5)
LYMPHOCYTES # BLD AUTO: 2.28 10*3/MM3 (ref 0.7–3.1)
LYMPHOCYTES NFR BLD AUTO: 25.8 % (ref 19.6–45.3)
MCH RBC QN AUTO: 24.3 PG (ref 26.6–33)
MCHC RBC AUTO-ENTMCNC: 30.4 G/DL (ref 31.5–35.7)
MCV RBC AUTO: 80 FL (ref 79–97)
MONOCYTES # BLD AUTO: 0.58 10*3/MM3 (ref 0.1–0.9)
MONOCYTES NFR BLD AUTO: 6.6 % (ref 5–12)
NEUTROPHILS NFR BLD AUTO: 5.83 10*3/MM3 (ref 1.7–7)
NEUTROPHILS NFR BLD AUTO: 65.7 % (ref 42.7–76)
NRBC BLD AUTO-RTO: 0 /100 WBC (ref 0–0.2)
PLATELET # BLD AUTO: 323 10*3/MM3 (ref 140–450)
PMV BLD AUTO: 9.6 FL (ref 6–12)
POTASSIUM SERPL-SCNC: 4.2 MMOL/L (ref 3.5–5.2)
PROT SERPL-MCNC: 6.5 G/DL (ref 6–8.5)
RBC # BLD AUTO: 3.95 10*6/MM3 (ref 3.77–5.28)
SODIUM SERPL-SCNC: 138 MMOL/L (ref 136–145)
WBC NRBC COR # BLD: 8.85 10*3/MM3 (ref 3.4–10.8)

## 2023-11-08 PROCEDURE — 80053 COMPREHEN METABOLIC PANEL: CPT | Performed by: SURGERY

## 2023-11-08 PROCEDURE — 25010000002 ONDANSETRON PER 1 MG: Performed by: SURGERY

## 2023-11-08 PROCEDURE — 85025 COMPLETE CBC W/AUTO DIFF WBC: CPT | Performed by: SURGERY

## 2023-11-08 PROCEDURE — 25010000002 POTASSIUM CHLORIDE PER 2 MEQ: Performed by: SURGERY

## 2023-11-08 PROCEDURE — 99024 POSTOP FOLLOW-UP VISIT: CPT | Performed by: SURGERY

## 2023-11-08 RX ORDER — OXYCODONE HYDROCHLORIDE 5 MG/1
5 TABLET ORAL EVERY 4 HOURS PRN
Qty: 10 TABLET | Refills: 0 | Status: SHIPPED | OUTPATIENT
Start: 2023-11-08

## 2023-11-08 RX ORDER — OXYCODONE HYDROCHLORIDE 5 MG/1
5 TABLET ORAL EVERY 4 HOURS PRN
Qty: 10 TABLET | Refills: 0 | Status: SHIPPED | OUTPATIENT
Start: 2023-11-08 | End: 2023-11-08 | Stop reason: SDUPTHER

## 2023-11-08 RX ORDER — ONDANSETRON 4 MG/1
4 TABLET, ORALLY DISINTEGRATING ORAL EVERY 6 HOURS PRN
Qty: 8 TABLET | Refills: 0 | Status: SHIPPED | OUTPATIENT
Start: 2023-11-08

## 2023-11-08 RX ORDER — ONDANSETRON 4 MG/1
4 TABLET, ORALLY DISINTEGRATING ORAL EVERY 6 HOURS PRN
Qty: 8 TABLET | Refills: 0 | Status: SHIPPED | OUTPATIENT
Start: 2023-11-08 | End: 2023-11-08 | Stop reason: SDUPTHER

## 2023-11-08 RX ADMIN — FLUTICASONE PROPIONATE 2 SPRAY: 50 SPRAY, METERED NASAL at 08:57

## 2023-11-08 RX ADMIN — PANTOPRAZOLE SODIUM 40 MG: 40 TABLET, DELAYED RELEASE ORAL at 05:31

## 2023-11-08 RX ADMIN — GABAPENTIN 100 MG: 100 CAPSULE ORAL at 08:57

## 2023-11-08 RX ADMIN — ONDANSETRON 4 MG: 2 INJECTION INTRAMUSCULAR; INTRAVENOUS at 06:05

## 2023-11-08 RX ADMIN — OXYCODONE HYDROCHLORIDE 5 MG: 5 TABLET ORAL at 13:40

## 2023-11-08 RX ADMIN — CETIRIZINE HYDROCHLORIDE 5 MG: 10 TABLET, FILM COATED ORAL at 08:57

## 2023-11-08 RX ADMIN — BUSPIRONE HYDROCHLORIDE 10 MG: 10 TABLET ORAL at 08:57

## 2023-11-08 RX ADMIN — TOPIRAMATE 25 MG: 25 TABLET, FILM COATED ORAL at 08:57

## 2023-11-08 RX ADMIN — ACETAMINOPHEN 1000 MG: 500 TABLET ORAL at 05:31

## 2023-11-08 RX ADMIN — HYDROMORPHONE HYDROCHLORIDE 2 MG: 2 TABLET ORAL at 05:39

## 2023-11-08 RX ADMIN — OXYCODONE HYDROCHLORIDE 5 MG: 5 TABLET ORAL at 00:23

## 2023-11-08 RX ADMIN — POTASSIUM CHLORIDE AND SODIUM CHLORIDE 100 ML/HR: 450; 150 INJECTION, SOLUTION INTRAVENOUS at 00:23

## 2023-11-08 NOTE — PROGRESS NOTES
"Cc: POD#2  RSG  \"sore\"    She is sitting up in bed alone in the room.  Complains of soreness but wants to go home.  Tolerating her diet and keeping things down, feels she can stay hydrated.  Nausea improved.  Passing flatus.  Ambulating and voiding okay.  She is having her period which usually last 3 days and right now it is excessive and her Lovenox has been held.  It started on Friday, today is Wednesday.    No fever or tachycardia pulse 84 respirations 18 blood pressure 123/69 saturation 94%.  She is no apparent distress.  Cheeks flushed.  Abdomen soft, nontender/appropriate, nondistended, bowel sounds hypoactive.  Wounds look okay.    CMP normal except for calcium of 8.2.  White count 8.9 with an unremarkable differential H&H 9.6 and 31.6.  Formal upper GI report unremarkable    Impression: Postoperative #2 robotic sleeve gastrectomy.  Doing okay clinically, would like to go home and does meet discharge criteria.  Heavy.  On Lovenox.  She is ambulating and voiding okay.  Iron deficiency anemia without evidence of bleeding and stable H&H.    Plan: Discharge home.  Discharge instructions discussed.  Follow-up in the office in 1 to 2 weeks and call in the meantime with any problems questions or concerns.  Reiterated avoiding ulcerogenic agents for the next 6 to 8 weeks.  Instructed to hold her Eliquis until her period has resolved.  Voiced understanding of ongoing regular ambulation especially in the few days she will be off Eliquis.  Prescriptions for Roxicodone 5 mg #10 and Zofran 4 mg #10.  See orders.   "

## 2023-11-08 NOTE — CASE MANAGEMENT/SOCIAL WORK
Continued Stay Note  [facility]     Patient Name: Eliz Eisenberg  MRN: [id number]  Today's Date: 11/8/2023    Admit Date: 11/6/2023    Plan: Home at discharge   Discharge Plan       Row Name 11/08/23 1424       Plan    Plan Home at discharge    Plan Comments Plan remains for patient to return home upon discharge. No new discharge needs identified - shelby [phone]    Final Discharge Disposition Code 01 - home or self-care                   Discharge Codes    No documentation.                       Shelby Lyons RN

## 2023-11-09 ENCOUNTER — TRANSITIONAL CARE MANAGEMENT TELEPHONE ENCOUNTER (OUTPATIENT)
Dept: CALL CENTER | Facility: HOSPITAL | Age: 33
End: 2023-11-09
Payer: COMMERCIAL

## 2023-11-09 NOTE — OUTREACH NOTE
Call Center TCM Note      Flowsheet Row Responses   [facility] patient discharged from? [place]   Does the patient have one of the following disease processes/diagnoses(primary or secondary)? General Surgery   TCM attempt successful? Yes   Call start time 1652   Call end time 1653   Discharge diagnosis GASTRIC SLEEVE LAPAROSCOPIC WITH DAVINCI ROBOT, ESOPHAGOGASTRODUODENOSCOPY   Person spoke with today (if not patient) and relationship patient   Meds reviewed with patient/caregiver? Yes   Is the patient having any side effects they believe may be caused by any medication additions or changes? No   Does the patient have all medications related to this admission filled (includes all antibiotics, pain medications, etc.) Yes   Is the patient taking all medications as directed (includes completed medication regime)? Yes   Comments Patient has a followup scheduled on 11/14/2023 with surgeon.   Does the patient have an appointment with their PCP within 7-14 days of discharge? Other   Psychosocial issues? No   Did the patient receive a copy of their discharge instructions? Yes   Nursing interventions Reviewed instructions with patient   What is the patient's perception of their health status since discharge? Improving   Nursing interventions Nurse provided patient education   Is the patient/caregiver able to teach back signs and symptoms of incisional infection? Increased redness, swelling or pain at the incisonal site, Increased drainage or bleeding, Incisional warmth, Pus or odor from incision, Fever   Is the patient/caregiver able to teach back steps to recovery at home? Set small, achievable goals for return to baseline health, Rest and rebuild strength, gradually increase activity   If the patient is a current smoker, are they able to teach back resources for cessation? Not a smoker   Is the patient/caregiver able to teach back the hierarchy of who to call/visit for symptoms/problems? PCP, Specialist, Home  health nurse, Urgent Care, ED, 911 Yes   TCM call completed? Yes   Wrap up additional comments Patient is doing ok. No needs at this time.   Call end time 1853   Would this patient benefit from a Referral to [facility] Social Work? No   Is the patient interested in additional calls from an ambulatory ? No            Brett Philip RN    11/9/2023, 16:53 EST

## 2023-11-09 NOTE — OUTREACH NOTE
Prep Survey      Flowsheet Row Responses   [facility] patient discharged from? [place]   Is LACE score < 7 ? Yes   Eligibility [facility]   Date of Admission 11/06/23   Date of Discharge 11/08/23   Discharge Disposition Home or Self Care   Discharge diagnosis GASTRIC SLEEVE LAPAROSCOPIC WITH DAVINCI ROBOT, ESOPHAGOGASTRODUODENOSCOPY   Does the patient have one of the following disease processes/diagnoses(primary or secondary)? General Surgery   Does the patient have Home health ordered? No   Is there a DME ordered? No   Prep survey completed? Yes            Sarai JOHNSON - Registered Nurse

## 2023-11-12 NOTE — DISCHARGE SUMMARY
Discharge Summary    Patient name: Eliz Eisenberg    Medical record number: [id number]    Admission date: 11/6/2023  Discharge date:  11/8/2023    Attending physician: Beatriz Sandy MD    Primary care physician: Allison Pham PA    Condition on discharge: Stable    Primary Diagnoses:  Morbid obesity with co-morbidities    Operative Procedure:  11/6/2023 robotic sleeve gastrectomy    Hospital Course: The patient is a very pleasant 33 y.o. female that was admitted to the hospital after elective robotic sleeve gastrectomy.  On POD#1, UGI was without obstruction or leak. The patient had some initially poorly controlled pain, but by POD#2, was ambulating well, tolerating stage 1 diet, and felt ready to be discharged.  Please see daily progress notes for full details of hospital stay.  Eliz Eisenberg was discharged home in good condition with instructions to follow up in the office in 1 week.      Discharge medications:      Discharge Medications        Changes to Medications        Instructions Start Date   apixaban 2.5 MG tablet tablet  Commonly known as: Eliquis  What changed: additional instructions   2.5 mg, Oral, 2 Times Daily      vitamin D 1.25 MG (88242 UT) capsule capsule  Commonly known as: ERGOCALCIFEROL  What changed:   when to take this  additional instructions   TAKE ONE CAPSULE BY MOUTH ONCE WEEKLY             Continue These Medications        Instructions Start Date   albuterol sulfate  (90 Base) MCG/ACT inhaler  Commonly known as: PROVENTIL HFA;VENTOLIN HFA;PROAIR HFA   2 puffs, Inhalation, Every 4 Hours PRN      busPIRone 10 MG tablet  Commonly known as: BUSPAR   10 mg, Oral, 3 Times Daily      cetirizine 5 MG tablet  Commonly known as: zyrTEC   5 mg, Oral, Daily      EPINEPHrine 0.3 MG/0.3ML solution auto-injector injection  Commonly known as: EPIPEN   As Needed      fluticasone 50 MCG/ACT nasal spray  Commonly known as: Flonase   2 sprays, Nasal, Daily      gabapentin  300 MG capsule  Commonly known as: NEURONTIN   300 mg, Oral, Daily      metFORMIN 500 MG tablet  Commonly known as: GLUCOPHAGE   TAKE TWO TABLETS BY MOUTH EVERY MORNING AND TAKE ONE TABLET BY MOUTH EVERY EVENING WITH MEALS      montelukast 10 MG tablet  Commonly known as: SINGULAIR   TAKE ONE TABLET BY MOUTH ONCE NIGHTLY      omeprazole 20 MG capsule  Commonly known as: priLOSEC   TAKE ONE CAPSULE BY MOUTH TWICE A DAY      promethazine 25 MG tablet  Commonly known as: PHENERGAN   TAKE ONE TABLET BY MOUTH EVERY 4 HOURS AS NEEDED FOR NAUSEA      rOPINIRole 0.5 MG tablet  Commonly known as: REQUIP   One at bedtime for restless leg      SUMAtriptan 100 MG tablet  Commonly known as: IMITREX   100 mg, Oral, As Needed      topiramate 25 MG tablet  Commonly known as: TOPAMAX   25 mg, Oral, Daily             ASK your doctor about these medications        Instructions Start Date   ondansetron ODT 4 MG disintegrating tablet  Commonly known as: ZOFRAN-ODT  Ask about: Which instructions should I use?   4 mg, Translingual, Every 6 Hours PRN      oxyCODONE 5 MG immediate release tablet  Commonly known as: Roxicodone   5 mg, Oral, Every 4 Hours PRN               Discharge instructions:  Per Bariatric manual      Follow-up appointment: Follow up with Bariatric PA in the office in 1 week.

## 2023-11-14 ENCOUNTER — OFFICE VISIT (OUTPATIENT)
Dept: BARIATRICS/WEIGHT MGMT | Facility: CLINIC | Age: 33
End: 2023-11-14
Payer: COMMERCIAL

## 2023-11-14 VITALS
OXYGEN SATURATION: 98 % | RESPIRATION RATE: 18 BRPM | BODY MASS INDEX: 49.58 KG/M2 | SYSTOLIC BLOOD PRESSURE: 130 MMHG | DIASTOLIC BLOOD PRESSURE: 96 MMHG | HEART RATE: 102 BPM | WEIGHT: 293 LBS | TEMPERATURE: 97.8 F

## 2023-11-14 DIAGNOSIS — Z98.84 S/P BARIATRIC SURGERY: Primary | ICD-10-CM

## 2023-11-14 NOTE — PROGRESS NOTES
[facility] Bariatric Surgery  [address]  [address]  [facility] [id number] [phone]      Patient Name:  Eliz Eisenberg  :  1990      Date of Visit: 2023      Reason for Visit:  POD #8    HPI:  Eliz Eisenberg is a 33 y.o. female s/p robotic LSG 23 w/ Dr. Sandy    Discharged on POD#2.  Doing well.  No issues/concerns.  Denies dysphagia, reflux, nausea, vomiting, abdominal pain, pulmonary issues, and fevers.  Tolerating diet progression - on stage 1.  Getting 80-100g prot/day.  Drinking 64 fluid oz/day.  Voiding well.  Wearing MVI, B12, iron, Vit D patchAID vitamins.  On Omeprazole  and Eliquis (resumed once menstrual cycle finished, but only taking once daily).  Ambulating frequently, already back to work.     Presurgery weight: 350 pounds.  Today's weight is (!) 139 kg (307 lb) pounds, today's  Body mass index is 49.58 kg/m²., and weight loss since surgery is 43 pounds.       Final Diagnosis   SUBTOTAL GASTRECTOMY:                Benign gastric mucosa, negative for neoplasia/malignancy       Past Medical History:   Diagnosis Date    Abdominal pain     Acute sinusitis     Allergic     Allergic rhinitis     Ankle sprain     Anxiety     Arthritis 2017    On Mobic daily    Asthma     Atopic dermatitis     Biliary dyskinesia     Cat bite      resolved    Clotting disorder     Current tear of meniscus     Dysfunctional uterine bleeding     Eczema     Foot pain     GERD (gastroesophageal reflux disease)     Omeprazole daily. remote EGD. No hx of h pylori    Gout     Hearing loss, bilateral     R/T PSORASIS    Helicobacter pylori antibody positive     23. Rx prevpak    IBS (irritable bowel syndrome)     Influenza     Metabolic syndrome     Migraines     Muscular aches     Obesity     JENN (obstructive sleep apnea) - possible 2021    Panic attack     PCOS (polycystic ovarian syndrome)     Pharyngitis     Psoriasis     planning to start Taltz     Restless leg syndrome     URI (upper respiratory infection)     Wears glasses      Past Surgical History:   Procedure Laterality Date    COLONOSCOPY      ENDOSCOPY N/A 5/15/2023    Procedure: ESOPHAGOGASTRODUODENOSCOPY;  Surgeon: Beatriz Sandy MD;  Location:  [facility];  Service: Bariatric;  Laterality: N/A;    GASTRIC SLEEVE LAPAROSCOPIC N/A 11/6/2023    Procedure: GASTRIC SLEEVE LAPAROSCOPIC WITH DAVINCI ROBOT, ESOPHAGOGASTRODUODENOSCOPY;  Surgeon: Beatriz Sandy MD;  Location:  [facility];  Service: Robotics - DaVinci;  Laterality: N/A;  scope ID: [id number]    LAPAROSCOPIC CHOLECYSTECTOMY  2018    no gallstones    TONSILLECTOMY AND ADENOIDECTOMY N/A 07/09/2021    Procedure: TONSILLECTOMY;  Surgeon: Ran Lemon MD;  Location:  [facility];  Service: ENT;  Laterality: N/A;    UMBILICAL HERNIA REPAIR  2018    w/ cholecystectomy    WISDOM TOOTH EXTRACTION       Outpatient Medications Marked as Taking for the 11/14/23 encounter (Office Visit) with Nina Cox PA   Medication Sig Dispense Refill    albuterol sulfate  (90 Base) MCG/ACT inhaler Inhale 2 puffs Every 4 (Four) Hours As Needed for Wheezing. 18 g 2    apixaban (Eliquis) 2.5 MG tablet tablet Take 1 tablet by mouth 2 (Two) Times a Day. 42 tablet 0    busPIRone (BUSPAR) 10 MG tablet Take 1 tablet by mouth 3 (Three) Times a Day. (Patient taking differently: Take 1 tablet by mouth Daily.) 90 tablet 2    cetirizine (zyrTEC) 5 MG tablet Take 1 tablet by mouth Daily.      EPINEPHrine (EPIPEN) 0.3 MG/0.3ML solution auto-injector injection As Needed.      fluticasone (Flonase) 50 MCG/ACT nasal spray 2 sprays into the nostril(s) as directed by provider Daily. 16 g 5    gabapentin (NEURONTIN) 300 MG capsule Take 1 capsule by mouth Daily.      metFORMIN (GLUCOPHAGE) 500 MG tablet TAKE TWO TABLETS BY MOUTH EVERY MORNING AND TAKE ONE TABLET BY MOUTH EVERY EVENING WITH MEALS 270 tablet 3    montelukast (SINGULAIR) 10 MG tablet TAKE ONE  TABLET BY MOUTH ONCE NIGHTLY 90 tablet 1    omeprazole (priLOSEC) 20 MG capsule TAKE ONE CAPSULE BY MOUTH TWICE A DAY 28 capsule 0    ondansetron ODT (ZOFRAN-ODT) 4 MG disintegrating tablet Place 1 tablet on the tongue Every 6 (Six) Hours As Needed for Nausea or Vomiting. 8 tablet 0    promethazine (PHENERGAN) 25 MG tablet TAKE ONE TABLET BY MOUTH EVERY 4 HOURS AS NEEDED FOR NAUSEA 10 tablet 0    rOPINIRole (REQUIP) 0.5 MG tablet One at bedtime for restless leg      SUMAtriptan (IMITREX) 100 MG tablet Take 1 tablet by mouth As Needed.      topiramate (TOPAMAX) 25 MG tablet Take 1 tablet by mouth Daily.      [DISCONTINUED] oxyCODONE (Roxicodone) 5 MG immediate release tablet Take 1 tablet by mouth Every 4 (Four) Hours As Needed for Moderate Pain. 10 tablet 0    [DISCONTINUED] vitamin D (ERGOCALCIFEROL) 1.25 MG (99758 UT) capsule capsule TAKE ONE CAPSULE BY MOUTH ONCE WEEKLY (Patient taking differently: Take 1 capsule by mouth Every 7 (Seven) Days. sundays) 12 capsule 0     Allergies   Allergen Reactions    Iodine Hives, Shortness Of Breath, Swelling and Rash    Cimzia [Certolizumab Pegol] Hives, GI Intolerance and Headache    Elastic Bandages & [Zinc] Hives    Latex Hives, Itching, Swelling and Rash     Gloves     Tape Hives     PAPER TAPE    Dilantin [Phenytoin] Unknown - Low Severity     Skin crawling    Pollen Extract Rash    Tree Extract Other (See Comments)     CONGESTION       Social History     Socioeconomic History    Marital status:    Tobacco Use    Smoking status: Never    Smokeless tobacco: Never   Vaping Use    Vaping Use: Never used   Substance and Sexual Activity    Alcohol use: Not Currently     Comment: rare    Drug use: No    Sexual activity: Defer     Social History     Social History Narrative    Patient lives in [place], [place].   She is  with no children and works full time as the  of [employer].       /96 (BP Location: Left  arm, Patient Position: Sitting)   Pulse 102   Temp 97.8 °F (36.6 °C) (Temporal)   Resp 18   Wt (!) 139 kg (307 lb)   LMP 11/03/2023   SpO2 98%   BMI 49.58 kg/m²   Physical Exam  Constitutional:       Appearance: She is well-developed. She is not ill-appearing.   Eyes:      General: No scleral icterus.  Cardiovascular:      Rate and Rhythm: Tachycardia present.   Pulmonary:      Effort: Pulmonary effort is normal.   Abdominal:      Palpations: Abdomen is soft.      Tenderness: There is no abdominal tenderness.      Comments: incisions healing well   Musculoskeletal:         General: Normal range of motion.   Skin:     General: Skin is warm and dry.      Findings: No rash.   Neurological:      Mental Status: She is alert.   Psychiatric:         Behavior: Behavior is cooperative.         Judgment: Judgment normal.           Assessment:   POD #8 s/p robotic LSG 11/6/23 w/ Dr. Sandy           Plan:  Doing well.  Continue to advance diet per manual.  Continue protein 100g/day.  Increase exercise/activity as tolerated.  Reviewed lifting restrictions, nothing >25 lbs x 2 more weeks.  Continue vitamins as discussed.  Continue Eliquis as prescribed - take BID.  Continue PPI.  Continue to avoid ASA/NSAIDs/tobacco x 6 weeks postop, steroids x 8 weeks postop.  Call w/ problems/concerns.    The patient was instructed to follow up in 3 weeks, sooner if needed.

## 2023-12-07 ENCOUNTER — OFFICE VISIT (OUTPATIENT)
Dept: BARIATRICS/WEIGHT MGMT | Facility: CLINIC | Age: 33
End: 2023-12-07
Payer: COMMERCIAL

## 2023-12-07 VITALS
BODY MASS INDEX: 47.09 KG/M2 | SYSTOLIC BLOOD PRESSURE: 112 MMHG | DIASTOLIC BLOOD PRESSURE: 76 MMHG | OXYGEN SATURATION: 98 % | WEIGHT: 293 LBS | HEART RATE: 92 BPM | HEIGHT: 66 IN | TEMPERATURE: 97.5 F

## 2023-12-07 DIAGNOSIS — K91.2 POSTGASTRECTOMY MALABSORPTION: ICD-10-CM

## 2023-12-07 DIAGNOSIS — Z13.21 MALNUTRITION SCREEN: ICD-10-CM

## 2023-12-07 DIAGNOSIS — Z98.84 STATUS POST BARIATRIC SURGERY: ICD-10-CM

## 2023-12-07 DIAGNOSIS — R53.83 FATIGUE, UNSPECIFIED TYPE: ICD-10-CM

## 2023-12-07 DIAGNOSIS — Z90.3 POSTGASTRECTOMY MALABSORPTION: ICD-10-CM

## 2023-12-07 DIAGNOSIS — E55.9 HYPOVITAMINOSIS D: ICD-10-CM

## 2023-12-07 DIAGNOSIS — Z13.0 SCREENING, IRON DEFICIENCY ANEMIA: ICD-10-CM

## 2023-12-07 DIAGNOSIS — E66.01 OBESITY, CLASS III, BMI 40-49.9 (MORBID OBESITY): Primary | ICD-10-CM

## 2023-12-07 NOTE — PROGRESS NOTES
[facility] Bariatric Surgery  [address]  [address]  [facility] [id number] [phone]      Patient Name:  Eliz Eisenberg.  :  1990      Reason for Visit:  1 month postop    HPI:  Eliz Eisenberg is a 33 y.o. female  s/p robotic LSG 23 w/ Dr. Sandy       Doing well.  Pleased with weightloss. Says she had a stomach bug a couple weeks ago. Has some nausea since.  Not daily. Seesm to be related to protein shakes. No other issues/concerns. A ouple episodes of food feeling like not going down great. Denies reflux, vomiting, and abdominal pain.  Tolerating diet progression - on stage 4.  Drinks 2 shakes and 2 meals.  Protein shakes maker her nauseous. Getting 75-80g prot/day.  Drinking 64+ fluid oz/day.  Taking Patches: MVI + D + B12 + iron .  On Omeprazole .  Still holding ASA , NSAIDs , Tramadol, Hormones, Diuretics , Steroids, and Immunologics.  Exercising- walking. Chronic anemia- has never taken iron.  Has irregular menses. Last was pretty heavy.        Presurgery weight:  350 pounds. Today's weight is 136 kg (299 lb 8 oz) pounds, today's Body mass index is 48.34 kg/m²., andHIS@ weight loss since surgery is 51 pounds.       Past Medical History:   Diagnosis Date    Abdominal pain     Acute sinusitis     Allergic     Allergic rhinitis     Ankle sprain     Anxiety     Arthritis 2017    On Mobic daily    Asthma     Atopic dermatitis     Biliary dyskinesia     Cat bite      resolved    Clotting disorder     Current tear of meniscus     Dysfunctional uterine bleeding     Eczema     Foot pain     GERD (gastroesophageal reflux disease)     Omeprazole daily. remote EGD. No hx of h pylori    Gout     Hearing loss, bilateral     R/T PSORASIS    Helicobacter pylori antibody positive     23. Rx prevpak    IBS (irritable bowel syndrome)     Influenza     Metabolic syndrome     Migraines     Muscular aches     Obesity     JENN (obstructive sleep apnea) - possible  01/28/2021    Panic attack     PCOS (polycystic ovarian syndrome)     Pharyngitis     Psoriasis     planning to start Taltz    Restless leg syndrome     URI (upper respiratory infection)     Wears glasses      Past Surgical History:   Procedure Laterality Date    COLONOSCOPY      ENDOSCOPY N/A 5/15/2023    Procedure: ESOPHAGOGASTRODUODENOSCOPY;  Surgeon: Beatriz Sandy MD;  Location:  [facility];  Service: Bariatric;  Laterality: N/A;    GASTRIC SLEEVE LAPAROSCOPIC N/A 11/6/2023    Procedure: GASTRIC SLEEVE LAPAROSCOPIC WITH DAVINCI ROBOT, ESOPHAGOGASTRODUODENOSCOPY;  Surgeon: Beatriz Sandy MD;  Location:  [facility];  Service: Robotics - DaVinci;  Laterality: N/A;  scope ID: [id number]    LAPAROSCOPIC CHOLECYSTECTOMY  2018    no gallstones    TONSILLECTOMY AND ADENOIDECTOMY N/A 07/09/2021    Procedure: TONSILLECTOMY;  Surgeon: Ran Lemon MD;  Location:  [facility];  Service: ENT;  Laterality: N/A;    UMBILICAL HERNIA REPAIR  2018    w/ cholecystectomy    WISDOM TOOTH EXTRACTION       Outpatient Medications Marked as Taking for the 12/7/23 encounter (Office Visit) with Vidhi Mallory PA-C   Medication Sig Dispense Refill    albuterol sulfate  (90 Base) MCG/ACT inhaler Inhale 2 puffs Every 4 (Four) Hours As Needed for Wheezing. 18 g 2    busPIRone (BUSPAR) 10 MG tablet Take 1 tablet by mouth 3 (Three) Times a Day. (Patient taking differently: Take 1 tablet by mouth Daily.) 90 tablet 2    cetirizine (zyrTEC) 5 MG tablet Take 1 tablet by mouth Daily.      EPINEPHrine (EPIPEN) 0.3 MG/0.3ML solution auto-injector injection As Needed.      fluticasone (Flonase) 50 MCG/ACT nasal spray 2 sprays into the nostril(s) as directed by provider Daily. 16 g 5    gabapentin (NEURONTIN) 300 MG capsule Take 1 capsule by mouth Daily.      metFORMIN (GLUCOPHAGE) 500 MG tablet TAKE TWO TABLETS BY MOUTH EVERY MORNING AND TAKE ONE TABLET BY MOUTH EVERY EVENING WITH MEALS 270 tablet 3    montelukast  "(SINGULAIR) 10 MG tablet TAKE ONE TABLET BY MOUTH ONCE NIGHTLY 90 tablet 1    omeprazole (priLOSEC) 20 MG capsule TAKE ONE CAPSULE BY MOUTH TWICE A DAY 28 capsule 0    SUMAtriptan (IMITREX) 100 MG tablet Take 1 tablet by mouth As Needed.      topiramate (TOPAMAX) 25 MG tablet Take 1 tablet by mouth Daily.      [DISCONTINUED] apixaban (Eliquis) 2.5 MG tablet tablet Take 1 tablet by mouth 2 (Two) Times a Day. 42 tablet 0     Allergies   Allergen Reactions    Iodine Hives, Shortness Of Breath, Swelling and Rash    Cimzia [Certolizumab Pegol] Hives, GI Intolerance and Headache    Elastic Bandages & [Zinc] Hives    Latex Hives, Itching, Swelling and Rash     Gloves     Tape Hives     PAPER TAPE    Dilantin [Phenytoin] Unknown - Low Severity     Skin crawling    Pollen Extract Rash    Tree Extract Other (See Comments)     CONGESTION       Social History     Socioeconomic History    Marital status:    Tobacco Use    Smoking status: Never    Smokeless tobacco: Never   Vaping Use    Vaping Use: Never used   Substance and Sexual Activity    Alcohol use: Not Currently     Comment: rare    Drug use: No    Sexual activity: Defer       /76 (BP Location: Left arm, Patient Position: Sitting, Cuff Size: Large Adult)   Pulse 92   Temp 97.5 °F (36.4 °C) (Infrared)   Ht 167.6 cm (66\")   Wt 136 kg (299 lb 8 oz)   LMP 11/03/2023   SpO2 98%   BMI 48.34 kg/m²     Physical Exam  Constitutional:       Appearance: She is well-developed. She is obese.   HENT:      Head: Normocephalic and atraumatic.   Cardiovascular:      Rate and Rhythm: Normal rate and regular rhythm.   Pulmonary:      Effort: Pulmonary effort is normal.      Breath sounds: Normal breath sounds.   Abdominal:      General: Bowel sounds are normal.      Palpations: Abdomen is soft.      Comments: Incisions healing well   Skin:     General: Skin is warm and dry.   Neurological:      Mental Status: She is alert.   Psychiatric:         Mood and Affect: Mood " normal.         Behavior: Behavior normal.         Thought Content: Thought content normal.         Judgment: Judgment normal.           Assessment:  1 month s/p robotic LSG 11/6/23 w/ Dr. Sandy     ICD-10-CM ICD-9-CM   1. Obesity, Class III, BMI 40-49.9 (morbid obesity)  E66.01 278.01   2. Fatigue, unspecified type  R53.83 780.79   3. Screening, iron deficiency anemia  Z13.0 V78.0   4. Malnutrition screen  Z13.21 V77.2   5. Postgastrectomy malabsorption  K91.2 579.3    Z90.3    6. Hypovitaminosis D  E55.9 268.9   7. Status post bariatric surgery  Z98.84 V45.86         Plan:  Doing well.  Continue to advance diet per manual. Can take 40mg qam rather than 20mg BID PPI.  Trial off protein shakes if not tolerated, discussed alternatives. Should nuasea persist, can consider imaging eval, labs today.   Continue protein 70-100g/day.  Encouraged good food choices - high protein, low carb.  Continue fluids 64+oz per day. Continue routine exercise, lifting restrictions lifted.  Continue PPI. Continue to avoid NSAIDS, ASA, tramadol, tobacco x 6 weeks postop, steroids x 8 weeks postop.  Routine bariatric labs ordered.  Continue vitamins w/ adjustments pending lab results.  Call w/ problems/concerns.             The patient was instructed to follow up in 2 months, sooner if needed.

## 2023-12-14 LAB
25(OH)D3+25(OH)D2 SERPL-MCNC: 22.6 NG/ML (ref 30–100)
ALBUMIN SERPL-MCNC: 4.1 G/DL (ref 3.9–4.9)
ALBUMIN/GLOB SERPL: 1.4 {RATIO} (ref 1.2–2.2)
ALP SERPL-CCNC: 104 IU/L (ref 44–121)
ALT SERPL-CCNC: 30 IU/L (ref 0–32)
AST SERPL-CCNC: 23 IU/L (ref 0–40)
BASOPHILS # BLD AUTO: 0 X10E3/UL (ref 0–0.2)
BASOPHILS NFR BLD AUTO: 1 %
BILIRUB SERPL-MCNC: 0.8 MG/DL (ref 0–1.2)
BUN SERPL-MCNC: 9 MG/DL (ref 6–20)
BUN/CREAT SERPL: 12 (ref 9–23)
CALCIUM SERPL-MCNC: 9 MG/DL (ref 8.7–10.2)
CHLORIDE SERPL-SCNC: 109 MMOL/L (ref 96–106)
CO2 SERPL-SCNC: 20 MMOL/L (ref 20–29)
CREAT SERPL-MCNC: 0.76 MG/DL (ref 0.57–1)
EGFRCR SERPLBLD CKD-EPI 2021: 106 ML/MIN/1.73
EOSINOPHIL # BLD AUTO: 0.1 X10E3/UL (ref 0–0.4)
EOSINOPHIL NFR BLD AUTO: 2 %
ERYTHROCYTE [DISTWIDTH] IN BLOOD BY AUTOMATED COUNT: 17.3 % (ref 11.7–15.4)
FERRITIN SERPL-MCNC: 54 NG/ML (ref 15–150)
FOLATE SERPL-MCNC: 4.5 NG/ML
GLOBULIN SER CALC-MCNC: 2.9 G/DL (ref 1.5–4.5)
GLUCOSE SERPL-MCNC: 86 MG/DL (ref 70–99)
HCT VFR BLD AUTO: 37.8 % (ref 34–46.6)
HGB BLD-MCNC: 11.6 G/DL (ref 11.1–15.9)
IMM GRANULOCYTES # BLD AUTO: 0 X10E3/UL (ref 0–0.1)
IMM GRANULOCYTES NFR BLD AUTO: 0 %
IRON SERPL-MCNC: 36 UG/DL (ref 27–159)
LYMPHOCYTES # BLD AUTO: 1.7 X10E3/UL (ref 0.7–3.1)
LYMPHOCYTES NFR BLD AUTO: 22 %
MCH RBC QN AUTO: 24.2 PG (ref 26.6–33)
MCHC RBC AUTO-ENTMCNC: 30.7 G/DL (ref 31.5–35.7)
MCV RBC AUTO: 79 FL (ref 79–97)
METHYLMALONATE SERPL-SCNC: 74 NMOL/L (ref 0–378)
MONOCYTES # BLD AUTO: 0.5 X10E3/UL (ref 0.1–0.9)
MONOCYTES NFR BLD AUTO: 6 %
NEUTROPHILS # BLD AUTO: 5.3 X10E3/UL (ref 1.4–7)
NEUTROPHILS NFR BLD AUTO: 69 %
PLATELET # BLD AUTO: 289 X10E3/UL (ref 150–450)
POTASSIUM SERPL-SCNC: 3.9 MMOL/L (ref 3.5–5.2)
PREALB SERPL-MCNC: 20 MG/DL (ref 14–35)
PROT SERPL-MCNC: 7 G/DL (ref 6–8.5)
RBC # BLD AUTO: 4.8 X10E6/UL (ref 3.77–5.28)
SODIUM SERPL-SCNC: 144 MMOL/L (ref 134–144)
VIT B1 BLD-SCNC: 86.7 NMOL/L (ref 66.5–200)
WBC # BLD AUTO: 7.7 X10E3/UL (ref 3.4–10.8)

## 2023-12-18 RX ORDER — ONDANSETRON 4 MG/1
TABLET, ORALLY DISINTEGRATING ORAL
Qty: 8 TABLET | Refills: 0 | Status: SHIPPED | OUTPATIENT
Start: 2023-12-18

## 2023-12-18 NOTE — TELEPHONE ENCOUNTER
Rx Refill Note  Requested Prescriptions     Pending Prescriptions Disp Refills    ondansetron ODT (ZOFRAN-ODT) 4 MG disintegrating tablet [Pharmacy Med Name: ONDANSETRON ODT 4 MG TABLET] 8 tablet 0     Sig: DISSOLVE ONE TABLET BY MOUTH EVERY 6 HOURS AS NEEDED FOR NAUSEA AND/OR VOMITING      Last office visit with prescribing clinician: 12/7/2023   Last telemedicine visit with prescribing clinician: 10/30/2023   Next office visit with prescribing clinician: 3/7/2024                         Would you like a call back once the refill request has been completed: [] Yes [] No    If the office needs to give you a call back, can they leave a voicemail: [] Yes [] No    Tatum Brito MA  12/18/23, 08:29 EST

## 2023-12-21 ENCOUNTER — OFFICE VISIT (OUTPATIENT)
Dept: BEHAVIORAL HEALTH | Facility: CLINIC | Age: 33
End: 2023-12-21
Payer: COMMERCIAL

## 2023-12-21 DIAGNOSIS — F32.2 CURRENT SEVERE EPISODE OF MAJOR DEPRESSIVE DISORDER WITHOUT PSYCHOTIC FEATURES, UNSPECIFIED WHETHER RECURRENT: ICD-10-CM

## 2023-12-21 DIAGNOSIS — F43.21 GRIEF: ICD-10-CM

## 2023-12-21 DIAGNOSIS — F41.1 GENERALIZED ANXIETY DISORDER: Primary | ICD-10-CM
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
## 2024-01-23 ENCOUNTER — OFFICE VISIT (OUTPATIENT)
Dept: BEHAVIORAL HEALTH | Facility: CLINIC | Age: 34
End: 2024-01-23
Payer: COMMERCIAL

## 2024-01-23 DIAGNOSIS — F41.1 GENERALIZED ANXIETY DISORDER: Primary | ICD-10-CM

## 2024-01-23 DIAGNOSIS — F32.2 CURRENT SEVERE EPISODE OF MAJOR DEPRESSIVE DISORDER WITHOUT PSYCHOTIC FEATURES, UNSPECIFIED WHETHER RECURRENT: ICD-10-CM

## 2024-01-23 DIAGNOSIS — F43.21 GRIEF: ICD-10-CM

## 2024-01-23 PROCEDURE — 90834 PSYTX W PT 45 MINUTES: CPT | Performed by: SOCIAL WORKER

## 2024-01-23 NOTE — PROGRESS NOTES
[facility] [place]                 Follow Up Adult      Follow Up Adult Note     Date:2024   Patient Name: Eliz Eisenberg  : 1990   MRN: [id number]   Time IN: 11:04 am    Time OUT: 11:53 am     Referring Provider: No primary care provider on file.    Chief Complaint:      ICD-10-CM ICD-9-CM   1. Generalized anxiety disorder  F41.1 300.02   2. Current severe episode of major depressive disorder without psychotic features, unspecified whether recurrent  F32.2 296.23   3. Grief  F43.21 309.0        History of Present Illness:   Eliz Eisenberg is a 33 y.o. female who is being seen today for follow up counseling for anxiety depression and grief.    Subjective               Patient's Support Network Includes: extended family    Functional Status: Mild impairment       Current Outpatient Medications:     albuterol sulfate  (90 Base) MCG/ACT inhaler, Inhale 2 puffs Every 4 (Four) Hours As Needed for Wheezing., Disp: 18 g, Rfl: 2    busPIRone (BUSPAR) 10 MG tablet, Take 1 tablet by mouth 3 (Three) Times a Day. (Patient taking differently: Take 1 tablet by mouth Daily.), Disp: 90 tablet, Rfl: 2    cetirizine (zyrTEC) 5 MG tablet, Take 1 tablet by mouth Daily., Disp: , Rfl:     Cholecalciferol 1.25 MG (02922 UT) tablet, Take 1 tablet by mouth Every 7 (Seven) Days for 12 doses., Disp: 12 tablet, Rfl: 0    EPINEPHrine (EPIPEN) 0.3 MG/0.3ML solution auto-injector injection, As Needed., Disp: , Rfl:     fluticasone (Flonase) 50 MCG/ACT nasal spray, 2 sprays into the nostril(s) as directed by provider Daily., Disp: 16 g, Rfl: 5    gabapentin (NEURONTIN) 300 MG capsule, Take 1 capsule by mouth Daily., Disp: , Rfl:     metFORMIN (GLUCOPHAGE) 500 MG tablet, TAKE TWO TABLETS BY MOUTH EVERY MORNING AND TAKE ONE TABLET BY MOUTH EVERY EVENING WITH MEALS, Disp: 270 tablet, Rfl: 3    montelukast (SINGULAIR) 10 MG tablet, TAKE ONE TABLET BY MOUTH ONCE NIGHTLY,  Disp: 90 tablet, Rfl: 1    omeprazole (priLOSEC) 20 MG capsule, TAKE ONE CAPSULE BY MOUTH TWICE A DAY, Disp: 28 capsule, Rfl: 0    ondansetron ODT (ZOFRAN-ODT) 4 MG disintegrating tablet, DISSOLVE ONE TABLET BY MOUTH EVERY 6 HOURS AS NEEDED FOR NAUSEA AND/OR VOMITING, Disp: 8 tablet, Rfl: 0    promethazine (PHENERGAN) 25 MG tablet, TAKE ONE TABLET BY MOUTH EVERY 4 HOURS AS NEEDED FOR NAUSEA (Patient not taking: Reported on 12/7/2023), Disp: 10 tablet, Rfl: 0    rOPINIRole (REQUIP) 0.5 MG tablet, One at bedtime for restless leg, Disp: , Rfl:     SUMAtriptan (IMITREX) 100 MG tablet, Take 1 tablet by mouth As Needed., Disp: , Rfl:     topiramate (TOPAMAX) 25 MG tablet, Take 1 tablet by mouth Daily., Disp: , Rfl:     Allergies   Allergen Reactions    Iodine Hives, Shortness Of Breath, Swelling and Rash    Cimzia [Certolizumab Pegol] Hives, GI Intolerance and Headache    Elastic Bandages & [Zinc] Hives    Latex Hives, Itching, Swelling and Rash     Gloves     Tape Hives     PAPER TAPE    Dilantin [Phenytoin] Unknown - Low Severity     Skin crawling    Pollen Extract Rash    Tree Extract Other (See Comments)     CONGESTION       Objective     Physical Exam:  Vital Signs: There were no vitals filed for this visit.  There is no height or weight on file to calculate BMI.     Mental Status Exam:   Hygiene:   good  Cooperation:  Cooperative  Eye Contact:  Good  Psychomotor Behavior:  Appropriate  Affect:  Full range  Mood: normal  Speech:  Normal  Thought Process:  Goal directed  Thought Content:  Normal  Suicidal:  None  Homicidal:  None  Hallucinations:  None  Delusion:  None  Memory:  Intact  Orientation:  Person, Place, Time, and Situation  Reliability:  good  Insight:  Good  Judgement:  Good  Impulse Control:  Good  Physical/Medical Issues:   See problem list      Assessment / Plan      Diagnosis:  Diagnoses and all orders for this visit:    1. Generalized anxiety disorder (Primary)    2. Current severe episode of major  depressive disorder without psychotic features, unspecified whether recurrent    3. Grief    Patient presented for follow-up with clinician.  Patient reported being in a state of nearly perpetual anxiousness for the past several days.  Patient identified workplace stressors as their main trigger.  Patient reported that they had had to close their  after initially calling for a 2-hour delay due to inclement weather.  Patient stated that they will inundated with several negative messages from their board of directors.  Patient stated that they responded by sending positive messages received from parents about their decision to the board.  Patient stated that they were subsequently called into the board and told of a concerning pattern of behavior by the patient marked by emotional instability.  Patient stated they could provide specific examples of such behavior.  Patient stated they believe that their assertiveness advocating for themselves had been misunderstood as emotional instability.  Patient and clinician weighed the pros and cons of patient staying or leaving their current position due to the toxic environment.  Clinician utilized active listening and reflective response of empathy and support.    Progress toward goal: Patient reports regression symptoms due to workplace stressors.    Prognosis: Good with further outpatient treatment.    PLAN:  Patient and clinician will continue to utilize a cognitive behavioral intervention which identifies and addresses patient cognitive distortions related to anxiety, depression and grief.  Follow-ups will occur monthly and will last from 45 to 60 minutes in duration.    Safety: No acute safety concerns  Risk Assessment: Risk of self-harm acutely is low. Risk of self-harm chronically is also low, but could be further elevated in the event of treatment noncompliance and/or AODA.    Treatment Plan/Goals: Continue supportive psychotherapy efforts and medications as  indicated. Treatment and medication options discussed during today's visit. Patient ackowledged and verbally consented to continue with current treatment plan and was educated on the importance of compliance with treatment and follow-up appointments. Patient seems reasonably able to adhere to treatment plan.      Assisted Patient in processing above session content; acknowledged and normalized patient’s thoughts, feelings, and concerns.  Rationalized patient thought process regarding anxiety and depression and grief.      Allowed Patient to freely discuss issues  without interruption or judgement with unconditional positive regard, active listening skills, and empathy. Therapist provided a safe, confidential environment to facilitate the development of a positive therapeutic relationship and encouraged open, honest communication. Assisted Patient in identifying risk factors which would indicate the need for higher level of care including thoughts to harm self or others and/or self-harming behavior and encouraged Patient to contact this office, call 911, or present to the nearest emergency room should any of these events occur. Discussed crisis intervention services and means to access. Patient adamantly and convincingly denies current suicidal or homicidal ideation or perceptual disturbance. Assisted Patient in processing session content; acknowledged and normalized Patient’s thoughts, feelings, and concerns by utilizing a person-centered approach in efforts to build appropriate rapport and a positive therapeutic relationship with open and honest communication. .     Part of this note may be an electronic transcription/translation of spoken language to printed text using the Dragon Dictation System.       Follow Up:   Return in about 1 month (around 2/23/2024).    Tunde Mason LCSW

## 2024-02-22 ENCOUNTER — OFFICE VISIT (OUTPATIENT)
Dept: BEHAVIORAL HEALTH | Facility: CLINIC | Age: 34
End: 2024-02-22
Payer: COMMERCIAL

## 2024-02-22 DIAGNOSIS — F41.1 GENERALIZED ANXIETY DISORDER: Primary | ICD-10-CM

## 2024-02-22 DIAGNOSIS — F43.21 GRIEF: ICD-10-CM

## 2024-02-22 DIAGNOSIS — F32.2 CURRENT SEVERE EPISODE OF MAJOR DEPRESSIVE DISORDER WITHOUT PSYCHOTIC FEATURES, UNSPECIFIED WHETHER RECURRENT: ICD-10-CM

## 2024-02-22 NOTE — PROGRESS NOTES
[facility] [place]                 Follow Up Adult      Follow Up Adult Note     Date:2024   Patient Name: Eliz Eisenberg  : 1990   MRN: [id number]   Time IN: 10:03 am    Time OUT: 10:50 am     Referring Provider: No primary care provider on file.    Chief Complaint:      ICD-10-CM ICD-9-CM   1. Generalized anxiety disorder  F41.1 300.02   2. Current severe episode of major depressive disorder without psychotic features, unspecified whether recurrent  F32.2 296.23   3. Grief  F43.21 309.0        History of Present Illness:   Eliz Eisenberg is a 33 y.o. female who is being seen today for follow up counseling for ABIODUN and grief.    Subjective               Patient's Support Network Includes: extended family    Functional Status: Mild impairment       Current Outpatient Medications:     albuterol sulfate  (90 Base) MCG/ACT inhaler, Inhale 2 puffs Every 4 (Four) Hours As Needed for Wheezing., Disp: 18 g, Rfl: 2    busPIRone (BUSPAR) 10 MG tablet, Take 1 tablet by mouth 3 (Three) Times a Day. (Patient taking differently: Take 1 tablet by mouth Daily.), Disp: 90 tablet, Rfl: 2    cetirizine (zyrTEC) 5 MG tablet, Take 1 tablet by mouth Daily., Disp: , Rfl:     Cholecalciferol 1.25 MG (87032 UT) tablet, Take 1 tablet by mouth Every 7 (Seven) Days for 12 doses., Disp: 12 tablet, Rfl: 0    EPINEPHrine (EPIPEN) 0.3 MG/0.3ML solution auto-injector injection, As Needed., Disp: , Rfl:     fluticasone (Flonase) 50 MCG/ACT nasal spray, 2 sprays into the nostril(s) as directed by provider Daily., Disp: 16 g, Rfl: 5    gabapentin (NEURONTIN) 300 MG capsule, Take 1 capsule by mouth Daily., Disp: , Rfl:     metFORMIN (GLUCOPHAGE) 500 MG tablet, TAKE TWO TABLETS BY MOUTH EVERY MORNING AND TAKE ONE TABLET BY MOUTH EVERY EVENING WITH MEALS, Disp: 270 tablet, Rfl: 3    montelukast (SINGULAIR) 10 MG tablet, TAKE ONE TABLET BY MOUTH ONCE NIGHTLY, Disp: 90 tablet,  Rfl: 1    omeprazole (priLOSEC) 20 MG capsule, TAKE ONE CAPSULE BY MOUTH TWICE A DAY, Disp: 28 capsule, Rfl: 0    ondansetron ODT (ZOFRAN-ODT) 4 MG disintegrating tablet, DISSOLVE ONE TABLET BY MOUTH EVERY 6 HOURS AS NEEDED FOR NAUSEA AND/OR VOMITING, Disp: 8 tablet, Rfl: 0    promethazine (PHENERGAN) 25 MG tablet, TAKE ONE TABLET BY MOUTH EVERY 4 HOURS AS NEEDED FOR NAUSEA (Patient not taking: Reported on 12/7/2023), Disp: 10 tablet, Rfl: 0    rOPINIRole (REQUIP) 0.5 MG tablet, One at bedtime for restless leg, Disp: , Rfl:     SUMAtriptan (IMITREX) 100 MG tablet, Take 1 tablet by mouth As Needed., Disp: , Rfl:     topiramate (TOPAMAX) 25 MG tablet, Take 1 tablet by mouth Daily., Disp: , Rfl:     Allergies   Allergen Reactions    Iodine Hives, Shortness Of Breath, Swelling and Rash    Cimzia [Certolizumab Pegol] Hives, GI Intolerance and Headache    Elastic Bandages & [Zinc] Hives    Latex Hives, Itching, Swelling and Rash     Gloves     Tape Hives     PAPER TAPE    Dilantin [Phenytoin] Unknown - Low Severity     Skin crawling    Pollen Extract Rash    Tree Extract Other (See Comments)     CONGESTION       Objective     Physical Exam:  Vital Signs: There were no vitals filed for this visit.  There is no height or weight on file to calculate BMI.     Mental Status Exam:   Hygiene:   good  Cooperation:  Cooperative  Eye Contact:  Good  Psychomotor Behavior:  Appropriate  Affect:  Full range  Mood: normal  Speech:  Normal  Thought Process:  Goal directed  Thought Content:  Normal  Suicidal:  None  Homicidal:  None  Hallucinations:  None  Delusion:  None  Memory:  Intact  Orientation:  Person, Place, Time, and Situation  Reliability:  good  Insight:  Good  Judgement:  Good  Impulse Control:  Good  Physical/Medical Issues:   See problem list      Assessment / Plan      Diagnosis:  Diagnoses and all orders for this visit:    1. Generalized anxiety disorder (Primary)    2. Current severe episode of major depressive  disorder without psychotic features, unspecified whether recurrent    3. Grief    Patient presented for follow-up with clinician.  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  Patient and clinician processed patient's stress over a mixup involving their mother's grave stone at the cemetery.  Patient and clinician discussed patient ongoing estrangement from grandfather.  Patient disclosed that the grandfather had avoided all communication with patient since the death of their mother.  Patient stated that other family members had given them explanations which were unsatisfactory.  Patient and clinician weighed pros and cons of confrontation with the grandfather.  Patient and clinician identified and addressed patient cognitive distortions regarding anxiety due to the developments discussed during session.  Clinician emphasized exercises and activities that would shift perspective and alleviate physiological symptoms of anxiety.  Patient was receptive to intervention.  Clinician utilized active listening and reflective response to convey empathy and support.    Progress toward goal: Not at goal of independent management of symptoms related to anxiety, depression and grief.    Prognosis: Good with further outpatient treatment.    PLAN:  Patient and clinician will continue to utilize a cognitive behavioral intervention which identifies and addresses patient cognitive distortions related to anxiety and depression and grief.  Follow-ups will occur monthly and will last from 45 to 60 minutes in duration.    Safety: No acute safety concerns  Risk Assessment: Risk of self-harm acutely is low. Risk of self-harm chronically is also low, but could be further elevated in the event of treatment noncompliance and/or AODA.    Treatment Plan/Goals: Continue supportive psychotherapy efforts and medications as indicated. Treatment and  medication options discussed during today's visit. Patient ackowledged and verbally consented to continue with current treatment plan and was educated on the importance of compliance with treatment and follow-up appointments. Patient seems reasonably able to adhere to treatment plan.      Assisted Patient in processing above session content; acknowledged and normalized patient’s thoughts, feelings, and concerns.  Rationalized patient thought process regarding anxiety, depression and grief.      Allowed Patient to freely discuss issues  without interruption or judgement with unconditional positive regard, active listening skills, and empathy. Therapist provided a safe, confidential environment to facilitate the development of a positive therapeutic relationship and encouraged open, honest communication. Assisted Patient in identifying risk factors which would indicate the need for higher level of care including thoughts to harm self or others and/or self-harming behavior and encouraged Patient to contact this office, call 911, or present to the nearest emergency room should any of these events occur. Discussed crisis intervention services and means to access. Patient adamantly and convincingly denies current suicidal or homicidal ideation or perceptual disturbance. Assisted Patient in processing session content; acknowledged and normalized Patient’s thoughts, feelings, and concerns by utilizing a person-centered approach in efforts to build appropriate rapport and a positive therapeutic relationship with open and honest communication. .     Part of this note may be an electronic transcription/translation of spoken language to printed text using the Dragon Dictation System.       Follow Up:   Return in about 1 month (around 3/22/2024).    Tunde Mason LCSW

## 2024-03-06 ENCOUNTER — OFFICE VISIT (OUTPATIENT)
Dept: INTERNAL MEDICINE | Facility: CLINIC | Age: 34
End: 2024-03-06
Payer: COMMERCIAL

## 2024-03-06 VITALS
OXYGEN SATURATION: 98 % | TEMPERATURE: 97.6 F | HEART RATE: 70 BPM | HEIGHT: 66 IN | RESPIRATION RATE: 14 BRPM | DIASTOLIC BLOOD PRESSURE: 74 MMHG | WEIGHT: 268.4 LBS | BODY MASS INDEX: 43.13 KG/M2 | SYSTOLIC BLOOD PRESSURE: 118 MMHG

## 2024-03-06 DIAGNOSIS — N92.0 MENSTRUAL SPOTTING: ICD-10-CM

## 2024-03-06 DIAGNOSIS — Z76.89 ENCOUNTER TO ESTABLISH CARE WITH NEW DOCTOR: Primary | ICD-10-CM

## 2024-03-06 PROBLEM — R73.01 ELEVATED FASTING BLOOD SUGAR: Status: ACTIVE | Noted: 2024-03-06

## 2024-03-06 PROBLEM — A08.4 VIRAL GASTROENTERITIS: Status: RESOLVED | Noted: 2017-02-09 | Resolved: 2024-03-06

## 2024-03-06 PROBLEM — F41.1 GENERALIZED ANXIETY DISORDER: Status: ACTIVE | Noted: 2024-03-06

## 2024-03-06 PROBLEM — G47.33 OSA (OBSTRUCTIVE SLEEP APNEA): Status: RESOLVED | Noted: 2021-01-28 | Resolved: 2024-03-06

## 2024-03-06 PROBLEM — E66.01 CLASS 3 SEVERE OBESITY DUE TO EXCESS CALORIES WITH SERIOUS COMORBIDITY AND BODY MASS INDEX (BMI) OF 40.0 TO 44.9 IN ADULT: Status: ACTIVE | Noted: 2024-03-06

## 2024-03-06 PROBLEM — R11.0 NAUSEA: Status: RESOLVED | Noted: 2017-02-09 | Resolved: 2024-03-06

## 2024-03-06 PROBLEM — Z01.811 PREOPERATIVE RESPIRATORY EXAMINATION: Status: RESOLVED | Noted: 2023-06-27 | Resolved: 2024-03-06

## 2024-03-06 PROBLEM — E28.2 PCOS (POLYCYSTIC OVARIAN SYNDROME): Status: ACTIVE | Noted: 2024-03-06

## 2024-03-06 PROBLEM — E66.01 MORBID OBESITY WITH BMI OF 50.0-59.9, ADULT: Status: RESOLVED | Noted: 2023-09-25 | Resolved: 2024-03-06

## 2024-03-06 PROBLEM — E66.813 CLASS 3 SEVERE OBESITY DUE TO EXCESS CALORIES WITH SERIOUS COMORBIDITY AND BODY MASS INDEX (BMI) OF 40.0 TO 44.9 IN ADULT: Status: ACTIVE | Noted: 2024-03-06

## 2024-03-06 PROBLEM — Z98.84 HISTORY OF BARIATRIC SURGERY: Status: ACTIVE | Noted: 2024-03-06

## 2024-03-06 PROBLEM — G47.19 EXCESSIVE DAYTIME SLEEPINESS: Status: RESOLVED | Noted: 2021-01-28 | Resolved: 2024-03-06

## 2024-03-06 PROBLEM — Z00.00 HEALTH CARE MAINTENANCE: Status: RESOLVED | Noted: 2019-01-24 | Resolved: 2024-03-06

## 2024-03-06 PROBLEM — R19.7 DIARRHEA: Status: RESOLVED | Noted: 2017-02-27 | Resolved: 2024-03-06

## 2024-03-06 PROBLEM — E66.01 MORBID OBESITY: Status: RESOLVED | Noted: 2023-09-25 | Resolved: 2024-03-06

## 2024-03-06 LAB
B-HCG UR QL: NEGATIVE
EXPIRATION DATE: NORMAL
INTERNAL NEGATIVE CONTROL: NORMAL
INTERNAL POSITIVE CONTROL: NORMAL
Lab: NORMAL

## 2024-03-06 RX ORDER — USTEKINUMAB 90 MG/ML
INJECTION, SOLUTION SUBCUTANEOUS
COMMUNITY
Start: 2024-01-31

## 2024-03-06 NOTE — PROGRESS NOTES
Office Note     Name: Eliz Eisenberg    : 1990     MRN: [id number]     Chief Complaint  Establish Care and Vaginal Bleeding    Subjective     History of Present Illness:  Eliz Eisenberg is a 33 y.o. female who presents today to establish care     No major issues or concerns- prediabetes   Has lost 80lbs  Some spotting- . Lasted 3-4 days   LMP- 2024  Possible pregnancy. Is trying to conceive   Is due for PAP     Follows with Neurology-  Follows with ENT/Allergy   Follows with GYN- [facility]   Follows with Rheumatology   Follows with Bariatrics     PMH-  Metabolic syndrome   PCOS   Obesity   IBS- diarrhea  GERD   Hx of H.Pylori   Arthritis   Migraines   Asthma   Psoriasis   Hx of bariatric surgery 2023  Anxiety     Meds-  Albuterol inhaler as needed   Buspar 10mg daily   Zyrtec 5mg daily   Flonase daily   Vitamin D 17263 IU weekly   Epi pen as needed   Gabapentin 300mg daily- not taking- prescribed by Orthopedics   Metformin 500mg daily   Montelukast 10mg daily   Omeprazole 20mg daily   Zofran as needed   Promethazine as needed   Requip 0.5mg nightly  Stelara 90mg every 3 months   Imitrex 100mg as needed  Topamax 25mg daily     Family Hx-   Maternal GM- alive - breast cancer, DM, dementia   Maternal GF- - asthma, sleep apnea, DM, arthritis, heart disease   Paternal GM- alive- colon cancer, dementia   Paternal GF- alive- DM, Heart attack, sleep apnea   Mother- - MS, Heart disease, psoriasis, Rheum, - Heart condition?   Father- alive- DM, HTN, sleep apnea   Sister- alive- Healthy     Alcohol - not currently very rare   Smoking - none   Drug use - none   Job -    Stress - 6/10    Sun Exposure - sunscreen- follows with Dermatology- skin checks    Dental/Eye exams - up to date. Vision is stable   Diet/Exercise- Diet- Healthy moderate. Minimal sugar.   Exercise - walking- daily     [facility] - [facility]   PAP - will schedule   Mammo - not yet   DEXA - not  yet   BC/Hormone replacement - none   Vitamin D - supplement weekly        Review of Systems:   Review of Systems   Constitutional:  Negative for chills and fever.        Recently bariatric surgery- lost 80lbs since 11/2023     HENT:  Negative for dental problem, trouble swallowing and voice change.    Eyes:  Negative for visual disturbance.   Respiratory:  Negative for cough, chest tightness, shortness of breath and wheezing.    Cardiovascular:  Negative for chest pain, palpitations and leg swelling.   Gastrointestinal:  Positive for diarrhea. Negative for abdominal pain, blood in stool, constipation, nausea, vomiting, GERD and indigestion.   Genitourinary:  Positive for menstrual problem. Negative for dysuria and vaginal pain.        Menstrual spotting   Musculoskeletal:  Positive for arthralgias. Negative for gait problem.   Neurological:  Positive for headache. Negative for dizziness, seizures, speech difficulty, weakness and light-headedness.   Hematological:  Does not bruise/bleed easily.       Past Medical History:   Past Medical History:   Diagnosis Date    Abdominal pain     Acute sinusitis     Allergic     Allergic rhinitis     Ankle sprain     Anxiety     Arthritis 2017    On Mobic daily    Asthma     Atopic dermatitis     Biliary dyskinesia     Cat bite      resolved    Clotting disorder     Current tear of meniscus     Dysfunctional uterine bleeding     Eczema     Foot pain     GERD (gastroesophageal reflux disease)     Omeprazole daily. remote EGD. No hx of h pylori    Gout     Hearing loss, bilateral     R/T PSORASIS    Helicobacter pylori antibody positive     4/26/23. Rx prevpak    IBS (irritable bowel syndrome)     Influenza     Metabolic syndrome     Migraines     Muscular aches     Obesity     JENN (obstructive sleep apnea) - possible 01/28/2021    Panic attack     PCOS (polycystic ovarian syndrome)     Pharyngitis     Psoriasis     planning to start Taltz    Restless leg syndrome     URI (upper  respiratory infection)     Wears glasses        Past Surgical History:   Past Surgical History:   Procedure Laterality Date    BARIATRIC SURGERY  11/06/2023    COLONOSCOPY      ENDOSCOPY N/A 05/15/2023    Procedure: ESOPHAGOGASTRODUODENOSCOPY;  Surgeon: Beatriz Sandy MD;  Location:  [facility];  Service: Bariatric;  Laterality: N/A;    GASTRIC SLEEVE LAPAROSCOPIC N/A 11/06/2023    Procedure: GASTRIC SLEEVE LAPAROSCOPIC WITH DAVINCI ROBOT, ESOPHAGOGASTRODUODENOSCOPY;  Surgeon: Beatriz Sandy MD;  Location:  [facility];  Service: Robotics - DaVinci;  Laterality: N/A;  scope ID: [id number]    LAPAROSCOPIC CHOLECYSTECTOMY  2018    no gallstones    TONSILLECTOMY AND ADENOIDECTOMY N/A 07/09/2021    Procedure: TONSILLECTOMY;  Surgeon: Ran Lemon MD;  Location:  [facility];  Service: ENT;  Laterality: N/A;    UMBILICAL HERNIA REPAIR  2018    w/ cholecystectomy    WISDOM TOOTH EXTRACTION         Immunizations:   Immunization History   Administered Date(s) Administered    COVID-19 (MODERNA) 1st,2nd,3rd Dose Monovalent 08/30/2021, 09/27/2021    COVID-19 (PFIZER) BIVALENT 12+YRS 10/03/2022, 12/07/2022    COVID-19 F23 (PFIZER) 12YRS+ (COMIRNATY) 12/18/2023    Covid-19 (Pfizer) Gray Cap Monovalent 04/18/2022    Fluzone (or Fluarix & Flulaval for VFC) >6mos 10/03/2022    Hepatitis A 11/23/2018    Influenza Injectable Mdck Pf Quad 12/18/2023    Influenza, Unspecified 11/24/2018, 10/30/2019, 11/17/2020, 10/30/2021    Pneumococcal Conjugate 20-Valent (PCV20) 06/07/2023    Pneumococcal Polysaccharide (PPSV23) 04/06/2021    Tdap 01/24/2019, 10/25/2022    flucelvax quad pfs =>4 YRS 10/30/2021        Medications:     Current Outpatient Medications:     albuterol sulfate  (90 Base) MCG/ACT inhaler, Inhale 2 puffs Every 4 (Four) Hours As Needed for Wheezing., Disp: 18 g, Rfl: 2    busPIRone (BUSPAR) 10 MG tablet, Take 1 tablet by mouth 3 (Three) Times a Day. (Patient taking differently: Take 1 tablet by mouth  Daily.), Disp: 90 tablet, Rfl: 2    cetirizine (zyrTEC) 5 MG tablet, Take 1 tablet by mouth Daily., Disp: , Rfl:     Cholecalciferol 1.25 MG (47906 UT) tablet, Take 1 tablet by mouth Every 7 (Seven) Days for 12 doses., Disp: 12 tablet, Rfl: 0    EPINEPHrine (EPIPEN) 0.3 MG/0.3ML solution auto-injector injection, As Needed., Disp: , Rfl:     fluticasone (Flonase) 50 MCG/ACT nasal spray, 2 sprays into the nostril(s) as directed by provider Daily., Disp: 16 g, Rfl: 5    gabapentin (NEURONTIN) 300 MG capsule, Take 1 capsule by mouth Daily., Disp: , Rfl:     metFORMIN (GLUCOPHAGE) 500 MG tablet, TAKE TWO TABLETS BY MOUTH EVERY MORNING AND TAKE ONE TABLET BY MOUTH EVERY EVENING WITH MEALS, Disp: 270 tablet, Rfl: 3    montelukast (SINGULAIR) 10 MG tablet, TAKE ONE TABLET BY MOUTH ONCE NIGHTLY, Disp: 90 tablet, Rfl: 1    omeprazole (priLOSEC) 20 MG capsule, TAKE ONE CAPSULE BY MOUTH TWICE A DAY, Disp: 28 capsule, Rfl: 0    ondansetron ODT (ZOFRAN-ODT) 4 MG disintegrating tablet, DISSOLVE ONE TABLET BY MOUTH EVERY 6 HOURS AS NEEDED FOR NAUSEA AND/OR VOMITING, Disp: 8 tablet, Rfl: 0    promethazine (PHENERGAN) 25 MG tablet, TAKE ONE TABLET BY MOUTH EVERY 4 HOURS AS NEEDED FOR NAUSEA, Disp: 10 tablet, Rfl: 0    rOPINIRole (REQUIP) 0.5 MG tablet, One at bedtime for restless leg, Disp: , Rfl:     Stelara 90 MG/ML solution prefilled syringe Injection, INJECT 90MG SUBCUTANEOUSLY AT WEEK 0 & 4 (LOADING DOSES) [L40.50], Disp: , Rfl:     SUMAtriptan (IMITREX) 100 MG tablet, Take 1 tablet by mouth As Needed., Disp: , Rfl:     topiramate (TOPAMAX) 25 MG tablet, Take 1 tablet by mouth Daily., Disp: , Rfl:     Allergies:   Allergies   Allergen Reactions    Iodine Hives, Shortness Of Breath, Swelling and Rash    Cimzia [Certolizumab Pegol] Hives, GI Intolerance and Headache    Elastic Bandages & [Zinc] Hives    Latex Hives, Itching, Swelling and Rash     Gloves     Tape Hives     PAPER TAPE    Dilantin [Phenytoin] Unknown - Low Severity  "    Skin crawling    Pollen Extract Rash    Tree Extract Other (See Comments)     CONGESTION       Family History:   Family History   Problem Relation Age of Onset    Heart disease Mother     Multiple sclerosis Mother     Diabetes Mother     Arthritis Mother     Asthma Mother     Early death Mother     Kidney disease Mother     Hypertension Father     Obesity Father     Diabetes Father     Sleep apnea Father     No Known Problems Sister     Breast cancer Maternal Grandmother     Diabetes Maternal Grandmother     Dementia Maternal Grandmother     Asthma Maternal Grandfather     Diabetes Maternal Grandfather     Sleep apnea Maternal Grandfather     Arthritis Maternal Grandfather     Heart disease Maternal Grandfather     Colon cancer Paternal Grandmother     Dementia Paternal Grandmother     Heart attack Paternal Grandfather     Sleep apnea Paternal Grandfather     Obesity Paternal Grandfather     Hypertension Paternal Grandfather     Diabetes Paternal Grandfather        Social History:   Social History     Socioeconomic History    Marital status:    Tobacco Use    Smoking status: Never    Smokeless tobacco: Never   Vaping Use    Vaping status: Never Used   Substance and Sexual Activity    Alcohol use: Not Currently     Comment: rare    Drug use: No    Sexual activity: Defer         Objective     Vital Signs  /74   Pulse 70   Temp 97.6 °F (36.4 °C) (Temporal)   Resp 14   Ht 167.6 cm (65.98\")   Wt 122 kg (268 lb 6.4 oz)   SpO2 98%   BMI 43.34 kg/m²   Estimated body mass index is 43.34 kg/m² as calculated from the following:    Height as of this encounter: 167.6 cm (65.98\").    Weight as of this encounter: 122 kg (268 lb 6.4 oz).            Physical Exam  Vitals and nursing note reviewed.   Constitutional:       General: She is not in acute distress.     Appearance: Normal appearance. She is obese.   HENT:      Head: Normocephalic and atraumatic.      Right Ear: Tympanic membrane normal.      Left " Ear: Tympanic membrane normal.      Nose: Nose normal.      Mouth/Throat:      Mouth: Mucous membranes are moist.   Eyes:      Extraocular Movements: Extraocular movements intact.      Conjunctiva/sclera: Conjunctivae normal.      Pupils: Pupils are equal, round, and reactive to light.   Cardiovascular:      Rate and Rhythm: Normal rate and regular rhythm.      Heart sounds: Normal heart sounds.   Pulmonary:      Effort: Pulmonary effort is normal. No respiratory distress.      Breath sounds: Normal breath sounds.   Abdominal:      General: Bowel sounds are normal.      Palpations: Abdomen is soft.   Musculoskeletal:         General: Normal range of motion.      Cervical back: Normal range of motion.      Comments: Moving all extremities    Skin:     General: Skin is warm and dry.   Neurological:      General: No focal deficit present.      Mental Status: She is alert and oriented to person, place, and time.   Psychiatric:         Mood and Affect: Mood normal.         Behavior: Behavior normal.         Thought Content: Thought content normal.         Judgment: Judgment normal.          Procedures     Assessment and Plan   Diagnosis Discussed   Continue to monitor   Plenty of fluids, monitor diet and exercise   Follow up with GYN as directed- need for PAP   Follow up with Neurology   Follow up with Rheumatology   Follow up with Bariatrics   Take medications as instructed  Follow up as directed   If symptoms worsen or persist please seek further evaluation     1. Encounter to establish care with new doctor  Reviewed PMH, medications and specialists     2. Menstrual spotting  - POCT pregnancy, urine- negative in office today   Due for PAP- will schedule with GYN   Recent significant weight loss s/p bariatric surgery   Hormone related?   Will continue to monitor and follow up with GYN as directed     Follow Up  Return in about 3 months (around 6/6/2024), or if symptoms worsen or fail to improve, for Annual, fasting  labs.    Noni Lopez MD  [facility]  [address] [place] [address] [phone]

## 2024-03-06 NOTE — PATIENT INSTRUCTIONS
Diagnosis Discussed   Continue to monitor   Plenty of fluids, monitor diet and exercise   Follow up with GYN as directed- need for PAP   Follow up with Neurology   Follow up with Rheumatology   Follow up with Bariatrics   Take medications as instructed  Follow up as directed   If symptoms worsen or persist please seek further evaluation

## 2024-03-07 ENCOUNTER — DOCUMENTATION (OUTPATIENT)
Dept: BARIATRICS/WEIGHT MGMT | Facility: CLINIC | Age: 34
End: 2024-03-07
Payer: COMMERCIAL

## 2024-03-07 ENCOUNTER — OFFICE VISIT (OUTPATIENT)
Dept: OBSTETRICS AND GYNECOLOGY | Facility: CLINIC | Age: 34
End: 2024-03-07
Payer: COMMERCIAL

## 2024-03-07 ENCOUNTER — LAB (OUTPATIENT)
Dept: LAB | Facility: HOSPITAL | Age: 34
End: 2024-03-07
Payer: COMMERCIAL

## 2024-03-07 ENCOUNTER — OFFICE VISIT (OUTPATIENT)
Dept: BARIATRICS/WEIGHT MGMT | Facility: CLINIC | Age: 34
End: 2024-03-07
Payer: COMMERCIAL

## 2024-03-07 VITALS
BODY MASS INDEX: 42.59 KG/M2 | HEART RATE: 83 BPM | WEIGHT: 265 LBS | TEMPERATURE: 97.3 F | HEIGHT: 66 IN | SYSTOLIC BLOOD PRESSURE: 132 MMHG | DIASTOLIC BLOOD PRESSURE: 88 MMHG | OXYGEN SATURATION: 98 % | RESPIRATION RATE: 16 BRPM

## 2024-03-07 VITALS
HEIGHT: 66 IN | WEIGHT: 268.96 LBS | DIASTOLIC BLOOD PRESSURE: 80 MMHG | SYSTOLIC BLOOD PRESSURE: 122 MMHG | BODY MASS INDEX: 43.23 KG/M2

## 2024-03-07 DIAGNOSIS — N92.6 IRREGULAR PERIODS: ICD-10-CM

## 2024-03-07 DIAGNOSIS — Z90.3 POSTGASTRECTOMY MALABSORPTION: ICD-10-CM

## 2024-03-07 DIAGNOSIS — R53.83 FATIGUE, UNSPECIFIED TYPE: ICD-10-CM

## 2024-03-07 DIAGNOSIS — E66.01 OBESITY, CLASS III, BMI 40-49.9 (MORBID OBESITY): Primary | ICD-10-CM

## 2024-03-07 DIAGNOSIS — K91.2 POSTGASTRECTOMY MALABSORPTION: ICD-10-CM

## 2024-03-07 DIAGNOSIS — Z13.21 MALNUTRITION SCREEN: ICD-10-CM

## 2024-03-07 DIAGNOSIS — N92.6 IRREGULAR PERIODS: Primary | ICD-10-CM

## 2024-03-07 DIAGNOSIS — Z13.0 SCREENING, IRON DEFICIENCY ANEMIA: ICD-10-CM

## 2024-03-07 PROCEDURE — 36415 COLL VENOUS BLD VENIPUNCTURE: CPT

## 2024-03-07 PROCEDURE — 84702 CHORIONIC GONADOTROPIN TEST: CPT

## 2024-03-07 RX ORDER — OMEPRAZOLE 20 MG/1
20 CAPSULE, DELAYED RELEASE ORAL DAILY
Qty: 90 CAPSULE | Refills: 1 | Status: SHIPPED | OUTPATIENT
Start: 2024-03-07

## 2024-03-07 NOTE — PROGRESS NOTES
Bariatric Nutrition Consult     Name: Eliz Eisenberg   YOB: 1990   Age: 33 y.o.   MRN: [id number]    Consult Date:   3/7/24    Surgery:     sleeve                              Date of surgery:11/6/23    Patient is 3 months post op.     Height: 167.6cm             Weight: 265lbs       Pre Op weight: 350lbs              Current BMI: 42.77    Weight change:  lost 85 lbs, lost muscle mass, c/o hair loss    Diet history reveals:  4-5 small meals daily, 70-80g protein    Breakfast: overnight oats with protein    Lunch: chicken or fish (red meat is not well tolerated, can tolerate ground beef), 2 bites of veg or salad    Dinner: as at lunch    Snacks: protein shake/greek yogurt/pretzels/1/2 apple/raw veg dipped in yogurt mixed with ranch seasoning/frozen sf popsicles      Protein sources:  chicken, fish, ground beef, greek yogurt, protein drinks    Drinks:  water, diet soda twice a week    Exercise/activity:  walking 2 miles 5/7    Main bariatric nutrition principles discussed and explained and queries answered. Encouraged patient to focus on       Increase protein to 100-120g a day  Add in biotin and increase protein to help hair loss  Meal and snack ideas discussed and queries answered          Jessica Vega RD, LD

## 2024-03-07 NOTE — PROGRESS NOTES
[facility]  [address]  [address]  [facility] [id number] [phone]        Patient Name:  Eliz Eisenberg  :  1990      Date of Visit: 2024      Reason for Visit:   3 months postop      HPI: Eliz Eisenberg is a 33 y.o. female s/p robotic LSG 23 w/ Dr. Sandy      Doing well. Feels great! Very concerned about her hair loss. Will have reflux if she misses a dose of Omeprazole. No other issues/concerns. Denies dysphagia, nausea, vomiting, abdominal pain, constipation, memory loss, and vision changes.  Getting 60-80g prot/day. Eating 3-4 meals a day. Still drinking one shake a day. Does not do well with red meat. Focusing on protein for every meal. Drinking 64 fluid oz/day.  1 month labs advised to start Vitamin B1, weekly Vit D, Folic acid. Taking Patches: MVI, B12+, Iron, Vit D 10k weekly .  On Omeprazole 20mg daily.  Physical activity: works out 5 times a week.     Presurgery weight: 350 pounds.  Today's weight is 120 kg (265 lb) pounds, today's  Body mass index is 42.77 kg/m²., and weight loss since surgery is 85 pounds.      Past Medical History:   Diagnosis Date    Abdominal pain     Acute sinusitis     Allergic     Allergic rhinitis     Ankle sprain     Anxiety     Arthritis 2017    On Mobic daily    Asthma     Atopic dermatitis     Biliary dyskinesia     Cat bite      resolved    Clotting disorder     Current tear of meniscus     Dysfunctional uterine bleeding     Eczema     Foot pain     GERD (gastroesophageal reflux disease)     Omeprazole daily. remote EGD. No hx of h pylori    Gout     Hearing loss, bilateral     R/T PSORASIS    Helicobacter pylori antibody positive     23. Rx prevpak    IBS (irritable bowel syndrome)     Influenza     Metabolic syndrome     Migraines     Muscular aches     Obesity     JENN (obstructive sleep apnea) - possible 2021    Panic attack     PCOS (polycystic ovarian syndrome)     Pharyngitis      Psoriasis     planning to start Taltz    Restless leg syndrome     URI (upper respiratory infection)     Wears glasses      Past Surgical History:   Procedure Laterality Date    BARIATRIC SURGERY  11/06/2023    COLONOSCOPY      ENDOSCOPY N/A 05/15/2023    Procedure: ESOPHAGOGASTRODUODENOSCOPY;  Surgeon: Beatriz Sandy MD;  Location:  [facility];  Service: Bariatric;  Laterality: N/A;    GASTRIC SLEEVE LAPAROSCOPIC N/A 11/06/2023    Procedure: GASTRIC SLEEVE LAPAROSCOPIC WITH DAVINCI ROBOT, ESOPHAGOGASTRODUODENOSCOPY;  Surgeon: Beatriz Sandy MD;  Location:  [facility];  Service: Robotics - DaVinci;  Laterality: N/A;  scope ID: [id number]    LAPAROSCOPIC CHOLECYSTECTOMY  2018    no gallstones    TONSILLECTOMY AND ADENOIDECTOMY N/A 07/09/2021    Procedure: TONSILLECTOMY;  Surgeon: Ran Lemon MD;  Location:  [facility];  Service: ENT;  Laterality: N/A;    UMBILICAL HERNIA REPAIR  2018    w/ cholecystectomy    WISDOM TOOTH EXTRACTION       Outpatient Medications Marked as Taking for the 3/7/24 encounter (Office Visit) with Samira Drew PA   Medication Sig Dispense Refill    albuterol sulfate  (90 Base) MCG/ACT inhaler Inhale 2 puffs Every 4 (Four) Hours As Needed for Wheezing. 18 g 2    busPIRone (BUSPAR) 10 MG tablet Take 1 tablet by mouth 3 (Three) Times a Day. (Patient taking differently: Take 1 tablet by mouth Daily.) 90 tablet 2    cetirizine (zyrTEC) 5 MG tablet Take 1 tablet by mouth Daily.      Cholecalciferol 1.25 MG (26182 UT) tablet Take 1 tablet by mouth Every 7 (Seven) Days for 12 doses. 12 tablet 0    fluticasone (Flonase) 50 MCG/ACT nasal spray 2 sprays into the nostril(s) as directed by provider Daily. 16 g 5    metFORMIN (GLUCOPHAGE) 500 MG tablet TAKE TWO TABLETS BY MOUTH EVERY MORNING AND TAKE ONE TABLET BY MOUTH EVERY EVENING WITH MEALS 270 tablet 3    montelukast (SINGULAIR) 10 MG tablet TAKE ONE TABLET BY MOUTH ONCE NIGHTLY 90 tablet 1    omeprazole (priLOSEC) 20  "MG capsule Take 1 capsule by mouth Daily. 90 capsule 1    ondansetron ODT (ZOFRAN-ODT) 4 MG disintegrating tablet DISSOLVE ONE TABLET BY MOUTH EVERY 6 HOURS AS NEEDED FOR NAUSEA AND/OR VOMITING 8 tablet 0    promethazine (PHENERGAN) 25 MG tablet TAKE ONE TABLET BY MOUTH EVERY 4 HOURS AS NEEDED FOR NAUSEA 10 tablet 0    rOPINIRole (REQUIP) 0.5 MG tablet One at bedtime for restless leg      Stelara 90 MG/ML solution prefilled syringe Injection INJECT 90MG SUBCUTANEOUSLY AT WEEK 0 & 4 (LOADING DOSES) [L40.50]      SUMAtriptan (IMITREX) 100 MG tablet Take 1 tablet by mouth As Needed.      topiramate (TOPAMAX) 25 MG tablet Take 1 tablet by mouth Daily.      [DISCONTINUED] omeprazole (priLOSEC) 20 MG capsule TAKE ONE CAPSULE BY MOUTH TWICE A DAY 28 capsule 0       Allergies   Allergen Reactions    Iodine Hives, Shortness Of Breath, Swelling and Rash    Cimzia [Certolizumab Pegol] Hives, GI Intolerance and Headache    Elastic Bandages & [Zinc] Hives    Latex Hives, Itching, Swelling and Rash     Gloves     Tape Hives     PAPER TAPE    Dilantin [Phenytoin] Unknown - Low Severity     Skin crawling    Pollen Extract Rash    Tree Extract Other (See Comments)     CONGESTION       Social History     Socioeconomic History    Marital status:    Tobacco Use    Smoking status: Never    Smokeless tobacco: Never   Vaping Use    Vaping status: Never Used   Substance and Sexual Activity    Alcohol use: Not Currently     Comment: rare    Drug use: No    Sexual activity: Yes     Partners: Male     Birth control/protection: None     Social History     Social History Narrative    Patient lives in [place], [place].   She is  with no children and works full time as the  of [employer].       /88 (BP Location: Right arm, Patient Position: Sitting)   Pulse 83   Temp 97.3 °F (36.3 °C)   Resp 16   Ht 167.6 cm (66\")   Wt 120 kg (265 lb)   SpO2 98%   BMI 42.77 kg/m² "     Physical Exam  Constitutional:       Appearance: She is obese.   HENT:      Head: Normocephalic and atraumatic.   Cardiovascular:      Rate and Rhythm: Normal rate and regular rhythm.   Pulmonary:      Effort: Pulmonary effort is normal.      Breath sounds: Normal breath sounds.   Abdominal:      General: Bowel sounds are normal. There is no distension.      Palpations: Abdomen is soft. There is no mass.      Tenderness: There is no abdominal tenderness.   Skin:     General: Skin is warm and dry.   Neurological:      General: No focal deficit present.      Mental Status: She is alert and oriented to person, place, and time.   Psychiatric:         Mood and Affect: Mood normal.         Behavior: Behavior normal.           Assessment:  3 months s/p robotic LSG 11/6/23 w/ Dr. Sandy      ICD-10-CM ICD-9-CM   1. Obesity, Class III, BMI 40-49.9 (morbid obesity)  E66.01 278.01   2. Fatigue, unspecified type  R53.83 780.79   3. Screening, iron deficiency anemia  Z13.0 V78.0   4. Malnutrition screen  Z13.21 V77.2   5. Postgastrectomy malabsorption  K91.2 579.3    Z90.3         Class 3 Severe Obesity (BMI >=40). Obesity-related health conditions include the following:  as above . Obesity is improving with treatment. BMI is is above average; BMI management plan is completed. We discussed low calorie, low carb based diet program, portion control, and increasing exercise.      Plan:  Doing well. Continue w/ good food choices and healthy habits.  Increase protein >80g/day.  Continue routine physical activity.  Routine bariatric labs ordered.  Continue vitamins w/ adjustments pending lab results.  Call w/ problems/concerns.     Paresthesias- will rx Vitamin B12 injections x 14 days. Labs pending.     Exercise education referral placed.     The patient was instructed to follow up in 3 months, sooner if needed.

## 2024-03-09 LAB — HCG INTACT+B SERPL-ACNC: <1 MIU/ML

## 2024-03-10 NOTE — PROGRESS NOTES
Chief Complaint  Eliz Eisenberg is a 33 y.o.  female presenting for Follow-up (LMP was abnormal.  Patient had period on time, starting 3/1/24, but had spotting only, lasting until 3/4/24.  UPT yesterday was negative.  /S/P bariatric surgery, has lost 83# in the last 3 months.  /Attempting to conceive.  )    History of Present Illness  Eliz is a very pleasant 34yo, nulligravid young woman, here for follow up of longstanding hx of oligomenorrhea / PCOS and IR.    She uses a prometrium challenge every 60 days, if no spontaneous menses on her own.  They are seeking pregnancy.  (No contraception x Spring 2020)  She had a bariatric surgery, gastric sleeve, , and has lost 83 lbs!    LMP was spontaneous, 3/1/2024 x 4 days (spotting)  , also spontaneous, 2024, nl amt    The following portions of the patient's history were reviewed and updated as appropriate: allergies, current medications, past family history, past medical history, past social history, past surgical history, and problem list.    Allergies   Allergen Reactions    Iodine Hives, Shortness Of Breath, Swelling and Rash    Cimzia [Certolizumab Pegol] Hives, GI Intolerance and Headache    Elastic Bandages & [Zinc] Hives    Latex Hives, Itching, Swelling and Rash     Gloves     Tape Hives     PAPER TAPE    Dilantin [Phenytoin] Unknown - Low Severity     Skin crawling    Pollen Extract Rash    Tree Extract Other (See Comments)     CONGESTION         Current Outpatient Medications:     albuterol sulfate  (90 Base) MCG/ACT inhaler, Inhale 2 puffs Every 4 (Four) Hours As Needed for Wheezing., Disp: 18 g, Rfl: 2    busPIRone (BUSPAR) 10 MG tablet, Take 1 tablet by mouth 3 (Three) Times a Day., Disp: 90 tablet, Rfl: 2    cetirizine (zyrTEC) 5 MG tablet, Take 1 tablet by mouth Daily., Disp: , Rfl:     Cyanocobalamin 1000 MCG/ML kit, Inject 1 mL as directed Daily for 14 days., Disp: 14 mL, Rfl: 0    dicyclomine (BENTYL) 10 MG capsule,  "Take 1 capsule by mouth 3 (Three) Times a Day As Needed for Abdominal Cramping., Disp: 12 capsule, Rfl: 0    EPINEPHrine (EPIPEN) 0.3 MG/0.3ML solution auto-injector injection, As Needed. (Patient not taking: Reported on 3/7/2024), Disp: , Rfl:     fluticasone (Flonase) 50 MCG/ACT nasal spray, 2 sprays into the nostril(s) as directed by provider Daily., Disp: 16 g, Rfl: 5    gabapentin (NEURONTIN) 300 MG capsule, Take 1 capsule by mouth Daily. (Patient not taking: Reported on 3/7/2024), Disp: , Rfl:     metFORMIN (GLUCOPHAGE) 500 MG tablet, TAKE TWO TABLETS BY MOUTH EVERY MORNING AND TAKE ONE TABLET BY MOUTH EVERY EVENING WITH MEALS, Disp: 270 tablet, Rfl: 3    montelukast (SINGULAIR) 10 MG tablet, TAKE ONE TABLET BY MOUTH ONCE NIGHTLY, Disp: 90 tablet, Rfl: 1    Needle, Disp, 27G X 1\" misc, Use 1 each Take As Directed. For thiamine injections, Disp: 14 each, Rfl: 0    Needles & Syringes misc, Inject 1 mL into the appropriate muscle as directed by prescriber Daily for 14 days., Disp: 14 each, Rfl: 0    omeprazole (priLOSEC) 20 MG capsule, Take 1 capsule by mouth Daily., Disp: 90 capsule, Rfl: 1    ondansetron ODT (ZOFRAN-ODT) 4 MG disintegrating tablet, Place 1 tablet on the tongue Every 8 (Eight) Hours As Needed for Nausea or Vomiting., Disp: 8 tablet, Rfl: 0    promethazine (PHENERGAN) 25 MG tablet, TAKE ONE TABLET BY MOUTH EVERY 4 HOURS AS NEEDED FOR NAUSEA, Disp: 10 tablet, Rfl: 0    rOPINIRole (REQUIP) 0.5 MG tablet, One at bedtime for restless leg, Disp: , Rfl:     Stelara 90 MG/ML solution prefilled syringe Injection, INJECT 90MG SUBCUTANEOUSLY AT WEEK 0 & 4 (LOADING DOSES) [L40.50], Disp: , Rfl:     SUMAtriptan (IMITREX) 100 MG tablet, Take 1 tablet by mouth As Needed., Disp: , Rfl:     topiramate (TOPAMAX) 25 MG tablet, Take 1 tablet by mouth Daily., Disp: , Rfl:     Past Medical History:   Diagnosis Date    Abdominal pain     Acute sinusitis     Allergic     Allergic rhinitis     Ankle sprain     Anxiety  " "   Arthritis 2017    On Mobic daily    Asthma     Atopic dermatitis     Biliary dyskinesia     Cat bite      resolved    Clotting disorder     Current tear of meniscus     Dysfunctional uterine bleeding     Eczema     Foot pain     GERD (gastroesophageal reflux disease)     Omeprazole daily. remote EGD. No hx of h pylori    Gout     Hearing loss, bilateral     R/T PSORASIS    Helicobacter pylori antibody positive     4/26/23. Rx prevpak    IBS (irritable bowel syndrome)     Influenza     Metabolic syndrome     Migraines     Muscular aches     Obesity     JENN (obstructive sleep apnea) - possible 01/28/2021    Panic attack     PCOS (polycystic ovarian syndrome)     Pharyngitis     Psoriasis     planning to start Taltz    Restless leg syndrome     URI (upper respiratory infection)     Wears glasses         Past Surgical History:   Procedure Laterality Date    BARIATRIC SURGERY  11/06/2023    COLONOSCOPY      ENDOSCOPY N/A 05/15/2023    Procedure: ESOPHAGOGASTRODUODENOSCOPY;  Surgeon: Beatriz Sandy MD;  Location:  [facility];  Service: Bariatric;  Laterality: N/A;    GASTRIC SLEEVE LAPAROSCOPIC N/A 11/06/2023    Procedure: GASTRIC SLEEVE LAPAROSCOPIC WITH DAVINCI ROBOT, ESOPHAGOGASTRODUODENOSCOPY;  Surgeon: Beatriz Sandy MD;  Location:  [facility];  Service: Robotics - DaVinci;  Laterality: N/A;  scope ID: [id number]    LAPAROSCOPIC CHOLECYSTECTOMY  2018    no gallstones    TONSILLECTOMY AND ADENOIDECTOMY N/A 07/09/2021    Procedure: TONSILLECTOMY;  Surgeon: Ran Lemon MD;  Location:  [facility];  Service: ENT;  Laterality: N/A;    UMBILICAL HERNIA REPAIR  2018    w/ cholecystectomy    WISDOM TOOTH EXTRACTION         Objective  /80   Ht 167.6 cm (66\")   Wt 122 kg (268 lb 15.4 oz)   LMP 03/01/2024 (Exact Date)   Breastfeeding No   BMI 43.41 kg/m²     Physical Exam  Exam conducted with a chaperone present.   Constitutional:       Appearance: Normal appearance.   Abdominal:      General: " Bowel sounds are normal.      Palpations: Abdomen is soft. There is no mass.      Tenderness: There is no abdominal tenderness.   Genitourinary:     General: Normal vulva.      Labia:         Right: No rash, tenderness or lesion.         Left: No rash, tenderness or lesion.       Vagina: Normal. No vaginal discharge or erythema.      Cervix: No cervical motion tenderness, discharge, lesion or erythema.      Uterus: Normal. Not enlarged and not tender.       Adnexa: Right adnexa normal and left adnexa normal.        Right: No mass or tenderness.          Left: No mass or tenderness.        Rectum: Normal.      Comments: Anus appears wnl.  (No rectal exam performed.)        Assessment/Plan   Diagnoses and all orders for this visit:    1. Irregular periods (Primary)  -     HCG, B-subunit, Quantitative; Future    Since LMP was lighter than nl, will ck bld HCG  We revisited the counseling re: ovulation predictor testing and fertility awareness / doing tests on 1st morning urine.  If she hasn't conceived within next 3 months or so, will refer back to Dr. Almanzar again.  Couns re: avoiding S,A,D and being sure to cont Folic Acid 400 mcg/daily or a good MultiVitamin or PN Vit.  Procedures    19 to 39: Counseling/Anticipatory Guidance Discussed: preconceptional counseling    Return for Next scheduled follow up.    Asia Evans, APRN  03/07/2024

## 2024-03-12 DIAGNOSIS — K91.2 POSTGASTRECTOMY MALABSORPTION: Primary | ICD-10-CM

## 2024-03-12 DIAGNOSIS — Z90.3 POSTGASTRECTOMY MALABSORPTION: Primary | ICD-10-CM

## 2024-03-13 ENCOUNTER — TELEPHONE (OUTPATIENT)
Dept: BARIATRICS/WEIGHT MGMT | Facility: CLINIC | Age: 34
End: 2024-03-13
Payer: COMMERCIAL

## 2024-03-13 RX ORDER — ONDANSETRON 4 MG/1
4 TABLET, ORALLY DISINTEGRATING ORAL EVERY 8 HOURS PRN
Qty: 8 TABLET | Refills: 0 | Status: SHIPPED | OUTPATIENT
Start: 2024-03-13

## 2024-03-13 NOTE — TELEPHONE ENCOUNTER
Rx Refill Note  Requested Prescriptions     Pending Prescriptions Disp Refills    ondansetron ODT (ZOFRAN-ODT) 4 MG disintegrating tablet 8 tablet 0      Last office visit with prescribing clinician: 3/7/2024   Last telemedicine visit with prescribing clinician: Visit date not found   Next office visit with prescribing clinician: Visit date not found                         Would you like a call back once the refill request has been completed: [] Yes [x] No    If the office needs to give you a call back, can they leave a voicemail: [] Yes [x] No    Allison Guzman MA  03/13/24, 15:14 EDT

## 2024-03-13 NOTE — TELEPHONE ENCOUNTER
Called Wilmar back, advised that since she is 4 months post op, this is likely not surgery related. I advised that this sounds like she could have food poisioning or viral illness that needs to run its course. I advised that she needs to do her best to keep fluids down and stay hydrated. If symptoms do not improve over the next 12-24 hours, or if there is concern for dehydration she needs to contact her PCP or go to the ER for eval.     He is requesting she get a refill on the Zofran as she took her last one this AM. Refill request was sent.

## 2024-03-13 NOTE — TELEPHONE ENCOUNTER
Pts  called, stated that since 9 PM lastnight after they went out to eat, Eliz has not been able to keep anything down. States that she is having severe nausea, vomiting, and diarrhea. Denies fever (states that he has not checked her temp) and states that she has had periods of chills off and on.     She has been eating ice chips and jello today and has not been able to keep any of it down. He is very concerned and would like to know how to proceed. Please advise, thanks!

## 2024-03-14 LAB
25(OH)D3+25(OH)D2 SERPL-MCNC: 34.8 NG/ML (ref 30–100)
ALBUMIN SERPL-MCNC: 4.2 G/DL (ref 3.9–4.9)
ALBUMIN/GLOB SERPL: 1.4 {RATIO} (ref 1.2–2.2)
ALP SERPL-CCNC: 107 IU/L (ref 44–121)
ALT SERPL-CCNC: 19 IU/L (ref 0–32)
AST SERPL-CCNC: 19 IU/L (ref 0–40)
BASOPHILS # BLD AUTO: 0.1 X10E3/UL (ref 0–0.2)
BASOPHILS NFR BLD AUTO: 1 %
BILIRUB SERPL-MCNC: 0.7 MG/DL (ref 0–1.2)
BUN SERPL-MCNC: 9 MG/DL (ref 6–20)
BUN/CREAT SERPL: 12 (ref 9–23)
CALCIUM SERPL-MCNC: 9.4 MG/DL (ref 8.7–10.2)
CHLORIDE SERPL-SCNC: 108 MMOL/L (ref 96–106)
CO2 SERPL-SCNC: 21 MMOL/L (ref 20–29)
CREAT SERPL-MCNC: 0.73 MG/DL (ref 0.57–1)
EGFRCR SERPLBLD CKD-EPI 2021: 111 ML/MIN/1.73
EOSINOPHIL # BLD AUTO: 0.1 X10E3/UL (ref 0–0.4)
EOSINOPHIL NFR BLD AUTO: 1 %
ERYTHROCYTE [DISTWIDTH] IN BLOOD BY AUTOMATED COUNT: 15.7 % (ref 11.7–15.4)
FERRITIN SERPL-MCNC: 52 NG/ML (ref 15–150)
FOLATE SERPL-MCNC: 2.9 NG/ML
GLOBULIN SER CALC-MCNC: 2.9 G/DL (ref 1.5–4.5)
GLUCOSE SERPL-MCNC: 83 MG/DL (ref 70–99)
HCT VFR BLD AUTO: 37.1 % (ref 34–46.6)
HGB BLD-MCNC: 11.9 G/DL (ref 11.1–15.9)
IMM GRANULOCYTES # BLD AUTO: 0 X10E3/UL (ref 0–0.1)
IMM GRANULOCYTES NFR BLD AUTO: 0 %
IRON SERPL-MCNC: 40 UG/DL (ref 27–159)
LYMPHOCYTES # BLD AUTO: 2.6 X10E3/UL (ref 0.7–3.1)
LYMPHOCYTES NFR BLD AUTO: 30 %
MCH RBC QN AUTO: 25.6 PG (ref 26.6–33)
MCHC RBC AUTO-ENTMCNC: 32.1 G/DL (ref 31.5–35.7)
MCV RBC AUTO: 80 FL (ref 79–97)
METHYLMALONATE SERPL-SCNC: 100 NMOL/L (ref 0–378)
MONOCYTES # BLD AUTO: 0.4 X10E3/UL (ref 0.1–0.9)
MONOCYTES NFR BLD AUTO: 5 %
NEUTROPHILS # BLD AUTO: 5.5 X10E3/UL (ref 1.4–7)
NEUTROPHILS NFR BLD AUTO: 63 %
PLATELET # BLD AUTO: 326 X10E3/UL (ref 150–450)
POTASSIUM SERPL-SCNC: 4.6 MMOL/L (ref 3.5–5.2)
PREALB SERPL-MCNC: 19 MG/DL (ref 14–35)
PROT SERPL-MCNC: 7.1 G/DL (ref 6–8.5)
RBC # BLD AUTO: 4.64 X10E6/UL (ref 3.77–5.28)
SODIUM SERPL-SCNC: 142 MMOL/L (ref 134–144)
VIT B1 BLD-SCNC: 91.9 NMOL/L (ref 66.5–200)
WBC # BLD AUTO: 8.8 X10E3/UL (ref 3.4–10.8)

## 2024-03-15 ENCOUNTER — TELEPHONE (OUTPATIENT)
Dept: INTERNAL MEDICINE | Facility: CLINIC | Age: 34
End: 2024-03-15
Payer: COMMERCIAL

## 2024-03-15 ENCOUNTER — HOSPITAL ENCOUNTER (EMERGENCY)
Facility: HOSPITAL | Age: 34
Discharge: HOME | End: 2024-03-15
Attending: EMERGENCY MEDICINE
Payer: COMMERCIAL

## 2024-03-15 ENCOUNTER — APPOINTMENT (OUTPATIENT)
Dept: CT IMAGING | Facility: HOSPITAL | Age: 34
End: 2024-03-15
Payer: COMMERCIAL

## 2024-03-15 VITALS
TEMPERATURE: 98.6 F | HEART RATE: 70 BPM | BODY MASS INDEX: 42.59 KG/M2 | RESPIRATION RATE: 16 BRPM | HEIGHT: 66 IN | OXYGEN SATURATION: 100 % | SYSTOLIC BLOOD PRESSURE: 119 MMHG | WEIGHT: 265 LBS | DIASTOLIC BLOOD PRESSURE: 76 MMHG

## 2024-03-15 DIAGNOSIS — E86.0 DEHYDRATION: Primary | ICD-10-CM

## 2024-03-15 DIAGNOSIS — R11.2 NAUSEA AND VOMITING, UNSPECIFIED VOMITING TYPE: ICD-10-CM

## 2024-03-15 DIAGNOSIS — R19.7 DIARRHEA, UNSPECIFIED TYPE: ICD-10-CM

## 2024-03-15 LAB
ALBUMIN SERPL-MCNC: 4.5 G/DL (ref 3.5–5.2)
ALBUMIN/GLOB SERPL: 1.3 G/DL
ALP SERPL-CCNC: 113 U/L (ref 39–117)
ALT SERPL W P-5'-P-CCNC: 24 U/L (ref 1–33)
ANION GAP SERPL CALCULATED.3IONS-SCNC: 11 MMOL/L (ref 5–15)
AST SERPL-CCNC: 27 U/L (ref 1–32)
B-HCG UR QL: NEGATIVE
BACTERIA UR QL AUTO: ABNORMAL /HPF
BASOPHILS # BLD AUTO: 0.03 10*3/MM3 (ref 0–0.2)
BASOPHILS NFR BLD AUTO: 0.4 % (ref 0–1.5)
BILIRUB SERPL-MCNC: 0.7 MG/DL (ref 0–1.2)
BILIRUB UR QL STRIP: NEGATIVE
BUN SERPL-MCNC: 13 MG/DL (ref 6–20)
BUN/CREAT SERPL: 16.7 (ref 7–25)
CALCIUM SPEC-SCNC: 9.3 MG/DL (ref 8.6–10.5)
CHLORIDE SERPL-SCNC: 99 MMOL/L (ref 98–107)
CLARITY UR: ABNORMAL
CO2 SERPL-SCNC: 22 MMOL/L (ref 22–29)
COD CRY URNS QL: ABNORMAL /HPF
COLOR UR: YELLOW
CREAT SERPL-MCNC: 0.78 MG/DL (ref 0.57–1)
DEPRECATED RDW RBC AUTO: 47.6 FL (ref 37–54)
EGFRCR SERPLBLD CKD-EPI 2021: 103 ML/MIN/1.73
EOSINOPHIL # BLD AUTO: 0.07 10*3/MM3 (ref 0–0.4)
EOSINOPHIL NFR BLD AUTO: 1 % (ref 0.3–6.2)
ERYTHROCYTE [DISTWIDTH] IN BLOOD BY AUTOMATED COUNT: 15.8 % (ref 12.3–15.4)
EXPIRATION DATE: NORMAL
GLOBULIN UR ELPH-MCNC: 3.6 GM/DL
GLUCOSE SERPL-MCNC: 80 MG/DL (ref 65–99)
GLUCOSE UR STRIP-MCNC: NEGATIVE MG/DL
HCT VFR BLD AUTO: 41.5 % (ref 34–46.6)
HGB BLD-MCNC: 12.8 G/DL (ref 12–15.9)
HGB UR QL STRIP.AUTO: NEGATIVE
HOLD SPECIMEN: NORMAL
HYALINE CASTS UR QL AUTO: ABNORMAL /LPF
IMM GRANULOCYTES # BLD AUTO: 0.01 10*3/MM3 (ref 0–0.05)
IMM GRANULOCYTES NFR BLD AUTO: 0.1 % (ref 0–0.5)
INTERNAL NEGATIVE CONTROL: NEGATIVE
INTERNAL POSITIVE CONTROL: POSITIVE
KETONES UR QL STRIP: ABNORMAL
LEUKOCYTE ESTERASE UR QL STRIP.AUTO: ABNORMAL
LIPASE SERPL-CCNC: 15 U/L (ref 13–60)
LYMPHOCYTES # BLD AUTO: 2.05 10*3/MM3 (ref 0.7–3.1)
LYMPHOCYTES NFR BLD AUTO: 30.3 % (ref 19.6–45.3)
Lab: NORMAL
MCH RBC QN AUTO: 25.8 PG (ref 26.6–33)
MCHC RBC AUTO-ENTMCNC: 30.8 G/DL (ref 31.5–35.7)
MCV RBC AUTO: 83.5 FL (ref 79–97)
MONOCYTES # BLD AUTO: 0.57 10*3/MM3 (ref 0.1–0.9)
MONOCYTES NFR BLD AUTO: 8.4 % (ref 5–12)
NEUTROPHILS NFR BLD AUTO: 4.04 10*3/MM3 (ref 1.7–7)
NEUTROPHILS NFR BLD AUTO: 59.8 % (ref 42.7–76)
NITRITE UR QL STRIP: NEGATIVE
NRBC BLD AUTO-RTO: 0 /100 WBC (ref 0–0.2)
PH UR STRIP.AUTO: 6 [PH] (ref 5–8)
PLATELET # BLD AUTO: 328 10*3/MM3 (ref 140–450)
PMV BLD AUTO: 10.2 FL (ref 6–12)
POTASSIUM SERPL-SCNC: 3.1 MMOL/L (ref 3.5–5.2)
PROT SERPL-MCNC: 8.1 G/DL (ref 6–8.5)
PROT UR QL STRIP: ABNORMAL
RBC # BLD AUTO: 4.97 10*6/MM3 (ref 3.77–5.28)
RBC # UR STRIP: ABNORMAL /HPF
REF LAB TEST METHOD: ABNORMAL
SODIUM SERPL-SCNC: 132 MMOL/L (ref 136–145)
SP GR UR STRIP: 1.03 (ref 1–1.03)
SQUAMOUS #/AREA URNS HPF: ABNORMAL /HPF
UROBILINOGEN UR QL STRIP: ABNORMAL
WBC # UR STRIP: ABNORMAL /HPF
WBC NRBC COR # BLD AUTO: 6.77 10*3/MM3 (ref 3.4–10.8)
WHOLE BLOOD HOLD COAG: NORMAL
WHOLE BLOOD HOLD SPECIMEN: NORMAL

## 2024-03-15 PROCEDURE — 25010000002 DROPERIDOL PER 5 MG

## 2024-03-15 PROCEDURE — 96374 THER/PROPH/DIAG INJ IV PUSH: CPT

## 2024-03-15 PROCEDURE — 25810000003 SODIUM CHLORIDE 0.9 % SOLUTION

## 2024-03-15 PROCEDURE — 81001 URINALYSIS AUTO W/SCOPE: CPT | Performed by: EMERGENCY MEDICINE

## 2024-03-15 PROCEDURE — 81025 URINE PREGNANCY TEST: CPT | Performed by: EMERGENCY MEDICINE

## 2024-03-15 PROCEDURE — 96361 HYDRATE IV INFUSION ADD-ON: CPT

## 2024-03-15 PROCEDURE — 85025 COMPLETE CBC W/AUTO DIFF WBC: CPT | Performed by: EMERGENCY MEDICINE

## 2024-03-15 PROCEDURE — 83690 ASSAY OF LIPASE: CPT | Performed by: EMERGENCY MEDICINE

## 2024-03-15 PROCEDURE — 80053 COMPREHEN METABOLIC PANEL: CPT | Performed by: EMERGENCY MEDICINE

## 2024-03-15 PROCEDURE — 74176 CT ABD & PELVIS W/O CONTRAST: CPT

## 2024-03-15 PROCEDURE — 25810000003 SODIUM CHLORIDE 0.9 % SOLUTION: Performed by: EMERGENCY MEDICINE

## 2024-03-15 PROCEDURE — 99284 EMERGENCY DEPT VISIT MOD MDM: CPT

## 2024-03-15 RX ORDER — ONDANSETRON 2 MG/ML
4 INJECTION INTRAMUSCULAR; INTRAVENOUS ONCE
Status: DISCONTINUED | OUTPATIENT
Start: 2024-03-15 | End: 2024-03-15

## 2024-03-15 RX ORDER — DICYCLOMINE HYDROCHLORIDE 10 MG/1
10 CAPSULE ORAL 3 TIMES DAILY PRN
Qty: 12 CAPSULE | Refills: 0 | Status: SHIPPED | OUTPATIENT
Start: 2024-03-15

## 2024-03-15 RX ORDER — SODIUM CHLORIDE 0.9 % (FLUSH) 0.9 %
10 SYRINGE (ML) INJECTION AS NEEDED
Status: DISCONTINUED | OUTPATIENT
Start: 2024-03-15 | End: 2024-03-15 | Stop reason: HOSPADM

## 2024-03-15 RX ORDER — POTASSIUM CHLORIDE 1.5 G/1.58G
40 POWDER, FOR SOLUTION ORAL ONCE
Status: COMPLETED | OUTPATIENT
Start: 2024-03-15 | End: 2024-03-15

## 2024-03-15 RX ORDER — SODIUM CHLORIDE 9 MG/ML
10 INJECTION, SOLUTION INTRAMUSCULAR; INTRAVENOUS; SUBCUTANEOUS AS NEEDED
Status: DISCONTINUED | OUTPATIENT
Start: 2024-03-15 | End: 2024-03-15 | Stop reason: HOSPADM

## 2024-03-15 RX ORDER — DROPERIDOL 2.5 MG/ML
2.5 INJECTION, SOLUTION INTRAMUSCULAR; INTRAVENOUS ONCE
Status: COMPLETED | OUTPATIENT
Start: 2024-03-15 | End: 2024-03-15

## 2024-03-15 RX ORDER — DICYCLOMINE HYDROCHLORIDE 10 MG/1
20 CAPSULE ORAL ONCE
Status: COMPLETED | OUTPATIENT
Start: 2024-03-15 | End: 2024-03-15

## 2024-03-15 RX ADMIN — DROPERIDOL 2.5 MG: 2.5 INJECTION, SOLUTION INTRAMUSCULAR; INTRAVENOUS at 16:42

## 2024-03-15 RX ADMIN — POTASSIUM CHLORIDE 40 MEQ: 1.5 POWDER, FOR SOLUTION ORAL at 18:39

## 2024-03-15 RX ADMIN — SODIUM CHLORIDE 1000 ML: 9 INJECTION, SOLUTION INTRAVENOUS at 16:13

## 2024-03-15 RX ADMIN — SODIUM CHLORIDE 1000 ML: 9 INJECTION, SOLUTION INTRAVENOUS at 16:20

## 2024-03-15 RX ADMIN — DICYCLOMINE HYDROCHLORIDE 20 MG: 10 CAPSULE ORAL at 16:41

## 2024-03-15 NOTE — TELEPHONE ENCOUNTER
Caller: AIMEE BASILIO    Relationship: Emergency Contact    Best call back number: [phone]    What is the best time to reach you: ANYTIME     Who are you requesting to speak with (clinical staff, provider,  specific staff member): CLINICAL STAFF    What was the call regarding: PATIENT'S  IS CALLING TO SEE WHAT HE CAN GIVE THE PATIENT FOR HER DIARRHEA. HE SAYS THAT VOMITING HAS STOPPED BUT NOW SHE HAS THE DIARRHEA. HE THINKS SHE MAY BE DEHYDRATED.     Is it okay if the provider responds through MyChart: YES

## 2024-03-15 NOTE — ED PROVIDER NOTES
Subjective   History of Present Illness patient is a pleasant 33-year-old female presents emergency department unaccompanied, complaining that she thinks she has food poisoning.  Patient reports 4-day history of nausea, vomiting, diarrhea, generalized abdominal pain, after eating at [employer] occurring also on Tuesday night.  Patient reports her  also had similar symptoms but have since resolved.  Patient reports that her and her  had dinner on Tuesday night, both having a meal with chicken and pasta.  And they both began feeling ill even before arriving home.  Patient also reports a history of gastric sleeve in November 2023.    Review of Systems   Constitutional:  Positive for activity change. Negative for fever.   HENT: Negative.     Respiratory: Negative.     Cardiovascular: Negative.    Gastrointestinal:  Positive for abdominal pain, diarrhea, nausea and vomiting.   Genitourinary: Negative.    Musculoskeletal: Negative.    Skin: Negative.    Neurological:  Negative for weakness.       Past Medical History:   Diagnosis Date    Abdominal pain     Acute sinusitis     Allergic     Allergic rhinitis     Ankle sprain     Anxiety     Arthritis 2017    On Mobic daily    Asthma     Atopic dermatitis     Biliary dyskinesia     Cat bite      resolved    Clotting disorder     Current tear of meniscus     Dysfunctional uterine bleeding     Eczema     Foot pain     GERD (gastroesophageal reflux disease)     Omeprazole daily. remote EGD. No hx of h pylori    Gout     Hearing loss, bilateral     R/T PSORASIS    Helicobacter pylori antibody positive     4/26/23. Rx prevpak    IBS (irritable bowel syndrome)     Influenza     Metabolic syndrome     Migraines     Muscular aches     Obesity     JENN (obstructive sleep apnea) - possible 01/28/2021    Panic attack     PCOS (polycystic ovarian syndrome)     Pharyngitis     Psoriasis     planning to start Taltz    Restless leg syndrome     URI (upper respiratory infection)      Wears glasses        Allergies   Allergen Reactions    Iodine Hives, Shortness Of Breath, Swelling and Rash    Cimzia [Certolizumab Pegol] Hives, GI Intolerance and Headache    Elastic Bandages & [Zinc] Hives    Latex Hives, Itching, Swelling and Rash     Gloves     Tape Hives     PAPER TAPE    Dilantin [Phenytoin] Unknown - Low Severity     Skin crawling    Pollen Extract Rash    Tree Extract Other (See Comments)     CONGESTION       Past Surgical History:   Procedure Laterality Date    BARIATRIC SURGERY  11/06/2023    COLONOSCOPY      ENDOSCOPY N/A 05/15/2023    Procedure: ESOPHAGOGASTRODUODENOSCOPY;  Surgeon: Beatriz Sandy MD;  Location:  [facility];  Service: Bariatric;  Laterality: N/A;    GASTRIC SLEEVE LAPAROSCOPIC N/A 11/06/2023    Procedure: GASTRIC SLEEVE LAPAROSCOPIC WITH DAVINCI ROBOT, ESOPHAGOGASTRODUODENOSCOPY;  Surgeon: Beatriz Sandy MD;  Location:  [facility];  Service: Robotics - DaVinci;  Laterality: N/A;  scope ID: [id number]    LAPAROSCOPIC CHOLECYSTECTOMY  2018    no gallstones    TONSILLECTOMY AND ADENOIDECTOMY N/A 07/09/2021    Procedure: TONSILLECTOMY;  Surgeon: Ran Lemon MD;  Location:  [facility];  Service: ENT;  Laterality: N/A;    UMBILICAL HERNIA REPAIR  2018    w/ cholecystectomy    WISDOM TOOTH EXTRACTION         Family History   Problem Relation Age of Onset    Heart disease Mother     Multiple sclerosis Mother     Diabetes Mother     Arthritis Mother     Asthma Mother     Early death Mother     Kidney disease Mother     Hypertension Father     Obesity Father     Diabetes Father     Sleep apnea Father     No Known Problems Sister     Breast cancer Maternal Grandmother     Diabetes Maternal Grandmother     Dementia Maternal Grandmother     Asthma Maternal Grandfather     Diabetes Maternal Grandfather     Sleep apnea Maternal Grandfather     Arthritis Maternal Grandfather     Heart disease Maternal Grandfather     Colon cancer Paternal Grandmother     Dementia  Paternal Grandmother     Heart attack Paternal Grandfather     Sleep apnea Paternal Grandfather     Obesity Paternal Grandfather     Hypertension Paternal Grandfather     Diabetes Paternal Grandfather        Social History     Socioeconomic History    Marital status:    Tobacco Use    Smoking status: Never    Smokeless tobacco: Never   Vaping Use    Vaping status: Never Used   Substance and Sexual Activity    Alcohol use: Not Currently     Comment: rare    Drug use: No    Sexual activity: Yes     Partners: Male     Birth control/protection: None           Objective   Physical Exam  Constitutional:       Appearance: Normal appearance. She is obese. She is ill-appearing.   HENT:      Head: Normocephalic and atraumatic.   Eyes:      Extraocular Movements: Extraocular movements intact.      Pupils: Pupils are equal, round, and reactive to light.   Cardiovascular:      Rate and Rhythm: Normal rate.   Pulmonary:      Effort: Pulmonary effort is normal.   Abdominal:      General: Abdomen is flat. Bowel sounds are normal. There is no distension.      Palpations: Abdomen is soft.      Tenderness: There is abdominal tenderness. There is no right CVA tenderness, left CVA tenderness or guarding.   Musculoskeletal:         General: Normal range of motion.      Cervical back: Normal range of motion.   Skin:     General: Skin is warm and dry.   Neurological:      General: No focal deficit present.      Mental Status: She is alert and oriented to person, place, and time.         Procedures           ED Course  ED Course as of 03/15/24 1833   Fri Mar 15, 2024   1557 Patient initially evaluated ED room 17 after she returned from the restroom, and observed ambulating without difficulty. [JH]   1651 Noted negative CBC, as well as negative urine pregnancy test. [JH]   1828 Follow-up evaluation, the patient was resting much more comfortably, has no further questions.  I communicated results of CT scan without acute abnormality,  and will order potassium supplementation and plan for patient's discharge. [JH]      ED Course User Index  [JH] Familia Giraldo APRN                                             Medical Decision Making  Given the patient's presenting symptoms report of onset, as well as spouses correlating symptoms, after similar oral intake history, differential diagnosis includes food poisoning, bacterial versus viral gastroenteritis, acute electrolyte derangement, urinary tract infection, pancreatitis, cholecystitis, appendicitis cannot be excluded.  Patient will have serum screening labs, urinalysis, CT imaging abdomen pelvis with IV contrast if indicated.  Patient given IV crystalloid resuscitation, antiemetic ministration, and oral antispasmodic medication.  Patient is agreeable with plan as explained.    Amount and/or Complexity of Data Reviewed  Labs: ordered.  Radiology: ordered.    Risk  Prescription drug management.        Final diagnoses:   Dehydration   Nausea and vomiting, unspecified vomiting type   Diarrhea, unspecified type       ED Disposition  ED Disposition       ED Disposition   Discharge    Condition   Stable    Comment   --               Noni Lopez MD  [address] [facility] [id number] [phone]      As needed    Beatriz Sandy MD  [address]   [address]  [facility] [id number] [phone]      As needed         Medication List        New Prescriptions      dicyclomine 10 MG capsule  Commonly known as: BENTYL  Take 1 capsule by mouth 3 (Three) Times a Day As Needed for Abdominal Cramping.            Changed      busPIRone 10 MG tablet  Commonly known as: BUSPAR  Take 1 tablet by mouth 3 (Three) Times a Day.  What changed: when to take this               Where to Get Your Medications        These medications were sent to [facility] [id number] - [place], [place] - [address] - [phone] [facility] [phone]   [address], [facility] [address]       Phone: [phone]   dicyclomine 10 MG capsule            Familia Giraldo, APRN  03/15/24 1910

## 2024-03-15 NOTE — TELEPHONE ENCOUNTER
Pt had bariatric surgery in November 2023. Pt states having N/D/V since Tuesday this week. Now having some cramping and diarrhea and a fever around 100.0. Feeling fatigue, states labs w/ recent bariatric appt was showing some signs of dehydration and is concerned. Do you want pt to go on to ED.

## 2024-03-19 ENCOUNTER — TELEMEDICINE (OUTPATIENT)
Dept: BEHAVIORAL HEALTH | Facility: CLINIC | Age: 34
End: 2024-03-19
Payer: COMMERCIAL

## 2024-03-19 DIAGNOSIS — F43.23 ADJUSTMENT DISORDER WITH MIXED ANXIETY AND DEPRESSED MOOD: ICD-10-CM

## 2024-03-19 RX ORDER — BUSPIRONE HYDROCHLORIDE 10 MG/1
10 TABLET ORAL 3 TIMES DAILY
Qty: 90 TABLET | Refills: 2 | Status: SHIPPED | OUTPATIENT
Start: 2024-03-19

## 2024-03-19 NOTE — PROGRESS NOTES
Video Visit      Patient Name: Eliz Eisenberg  : 1990   MRN: [id number]     Referring Provider: Noni Lopez MD    Chief Complaint:      ICD-10-CM ICD-9-CM   1. Adjustment disorder with mixed anxiety and depressed mood  F43.23 309.28        This provider is located at the [facility] (through [facility]), [address] [place] [address] using a secure [employer] Video Visit through [employer]. Patient is being seen remotely via telehealth video visit at their home address in [place], and stated they are in a secure environment for this session. The patient's condition being diagnosed/treated is appropriate for telemedicine. The provider identified herself as well as her credentials. The patient, and/or patients guardian, consent to be seen remotely, and when consent is given they understand that the consent allows for patient identifiable information to be sent to a third party as needed. They may refuse to be seen remotely at any time. The electronic data is encrypted and password protected, and the patient and/or guardian has been advised of the potential risks to privacy not withstanding such measures.    The patient has chosen to receive care today through a telehealth video visit. Do you consent to use a video/audio connection for your medical care today? Yes    History of Present Illness:   Eliz Eisenberg is a 33 y.o. female who is being seen by a video visit today for follow up and medication management. Patient reports that in January she was very emotional, and was having anxiety, but feels like it was situational related to it being the anniversary of her mom passing. She reports that things are going better now. She reports that she has had some rough days since then, but she is able to go to work and still do the things she needs to do. She reports that she is still seeing [facility] for therapy and this is helpful. Denies  SI/HI/AVH    Subjective      Review of Systems:   Review of Systems   Constitutional:  Negative for appetite change and unexpected weight change.   Eyes:  Negative for visual disturbance.   Cardiovascular:  Negative for chest pain.   Musculoskeletal:  Positive for arthralgias. Negative for gait problem.        Diagnosed with rheumatoid arthritis   Skin:  Negative for rash and wound.        History of psoriasis   Neurological:  Negative for dizziness, tremors, seizures, weakness and light-headedness.   Psychiatric/Behavioral:  Negative for agitation, behavioral problems, confusion, decreased concentration, hallucinations, self-injury and suicidal ideas. The patient is not hyperactive.      Sleep pattern: falling asleep on couch, gets 6-7 hours of sleep, most days feels tired  Appetite:bariatric surgery in November, full faster no cravings, not snacking, not turning to food, has lost 85 pounds since the surgery    Screening Scores:   PHQ-9 : 4  ABIODUN-7 : 16    RISK ASSESSMENT:  Patient denies any thoughts of suicide or intent today. Patient denies any suicidal or homicidal ideation today. Patient denies any high risk factors today.     Medications:     Current Outpatient Medications:     busPIRone (BUSPAR) 10 MG tablet, Take 1 tablet by mouth 3 (Three) Times a Day., Disp: 90 tablet, Rfl: 2    albuterol sulfate  (90 Base) MCG/ACT inhaler, Inhale 2 puffs Every 4 (Four) Hours As Needed for Wheezing., Disp: 18 g, Rfl: 2    cetirizine (zyrTEC) 5 MG tablet, Take 1 tablet by mouth Daily., Disp: , Rfl:     Cyanocobalamin 1000 MCG/ML kit, Inject 1 mL as directed Daily for 14 days., Disp: 14 mL, Rfl: 0    dicyclomine (BENTYL) 10 MG capsule, Take 1 capsule by mouth 3 (Three) Times a Day As Needed for Abdominal Cramping., Disp: 12 capsule, Rfl: 0    EPINEPHrine (EPIPEN) 0.3 MG/0.3ML solution auto-injector injection, As Needed. (Patient not taking: Reported on 3/7/2024), Disp: , Rfl:     fluticasone (Flonase) 50 MCG/ACT nasal  "spray, 2 sprays into the nostril(s) as directed by provider Daily., Disp: 16 g, Rfl: 5    gabapentin (NEURONTIN) 300 MG capsule, Take 1 capsule by mouth Daily. (Patient not taking: Reported on 3/7/2024), Disp: , Rfl:     metFORMIN (GLUCOPHAGE) 500 MG tablet, TAKE TWO TABLETS BY MOUTH EVERY MORNING AND TAKE ONE TABLET BY MOUTH EVERY EVENING WITH MEALS, Disp: 270 tablet, Rfl: 3    montelukast (SINGULAIR) 10 MG tablet, TAKE ONE TABLET BY MOUTH ONCE NIGHTLY, Disp: 90 tablet, Rfl: 1    Needle, Disp, 27G X 1\" misc, Use 1 each Take As Directed. For thiamine injections, Disp: 14 each, Rfl: 0    Needles & Syringes misc, Inject 1 mL into the appropriate muscle as directed by prescriber Daily for 14 days., Disp: 14 each, Rfl: 0    omeprazole (priLOSEC) 20 MG capsule, Take 1 capsule by mouth Daily., Disp: 90 capsule, Rfl: 1    ondansetron ODT (ZOFRAN-ODT) 4 MG disintegrating tablet, Place 1 tablet on the tongue Every 8 (Eight) Hours As Needed for Nausea or Vomiting., Disp: 8 tablet, Rfl: 0    promethazine (PHENERGAN) 25 MG tablet, TAKE ONE TABLET BY MOUTH EVERY 4 HOURS AS NEEDED FOR NAUSEA, Disp: 10 tablet, Rfl: 0    rOPINIRole (REQUIP) 0.5 MG tablet, One at bedtime for restless leg, Disp: , Rfl:     Stelara 90 MG/ML solution prefilled syringe Injection, INJECT 90MG SUBCUTANEOUSLY AT WEEK 0 & 4 (LOADING DOSES) [L40.50], Disp: , Rfl:     SUMAtriptan (IMITREX) 100 MG tablet, Take 1 tablet by mouth As Needed., Disp: , Rfl:     topiramate (TOPAMAX) 25 MG tablet, Take 1 tablet by mouth Daily., Disp: , Rfl:     Medication Considerations:  MORGAN reviewed and appropriate.      Allergies:   Allergies   Allergen Reactions    Iodine Hives, Shortness Of Breath, Swelling and Rash    Cimzia [Certolizumab Pegol] Hives, GI Intolerance and Headache    Elastic Bandages & [Zinc] Hives    Latex Hives, Itching, Swelling and Rash     Gloves     Tape Hives     PAPER TAPE    Dilantin [Phenytoin] Unknown - Low Severity     Skin crawling    Pollen " Extract Rash    Tree Extract Other (See Comments)     CONGESTION       Objective     Physical Exam:  Vital Signs: There were no vitals filed for this visit.  There is no height or weight on file to calculate BMI.   Due to telehealth appointment    Mental Status Exam:   Hygiene:   good  Cooperation:  Cooperative  Eye Contact:  Good  Psychomotor Behavior:  Appropriate  Affect:  Appropriate  Mood: normal  Speech:  Normal  Thought Process:  Goal directed and Linear  Thought Content:  Normal  Suicidal:  None  Homicidal:  None  Hallucinations:  None  Delusion:  None  Memory:  Intact  Orientation:  Person, Place, Time, and Situation  Reliability:  good  Insight:  Good  Judgement:  Good  Impulse Control:  Good  Physical/Medical Issues:   see problem list      Assessment / Plan      Visit Diagnosis/Orders Placed This Visit:  Diagnoses and all orders for this visit:    1. Adjustment disorder with mixed anxiety and depressed mood  -     busPIRone (BUSPAR) 10 MG tablet; Take 1 tablet by mouth 3 (Three) Times a Day.  Dispense: 90 tablet; Refill: 2         Functional Status: No impairment    Prognosis: Good with Ongoing Treatment     Impression/Formulation:  Patient appeared alert and oriented.  Patient is voluntarily requesting to continue outpatient psychiatric treatment at [facility].  Patient is receptive to assistance with maintaining a stable lifestyle.  Patient presents with history of     ICD-10-CM ICD-9-CM   1. Adjustment disorder with mixed anxiety and depressed mood  F43.23 309.28    Reviewed patient's previous provider notes. Reviewed most recent labs. Patient meets DSM V diagnostic criteria for diagnoses. Diagnoses may be updated as more information becomes available.     Treatment Plan:   Continue buspar 10 mg 3 times a day  Continue individual therapy  Follow up in 2 months or sooner if needed  Patient will continue supportive psychotherapy efforts and medications as indicated. Clinic will  obtain release of information for current treatment team for continuity of care as needed. Patient will contact this office, call 911 or present to the nearest emergency room should suicidal or homicidal ideations occur. Discussed medication options and treatment plan of prescribed medication(s) as well as the risks, benefits, and potential side effects. Patient ackowledged and verbally consented to continue with current treatment plan and was educated on the importance of compliance with treatment and follow-up appointments.     Patient instructions:  Medication risks and side effects discussed with patient including risk for worsening mood, changes in behavior, thoughts of suicide or homicide, induction of chayo, serotonin syndrome.   If any thoughts of SI or HI, worsening mood or changes in behavior, call 911 or crisis line [phone], or go to nearest ER at once. Pt.verbalizes understanding and consents to treatment with this medication.     Follow Up:   Return in about 2 months (around 5/19/2024) for Med Check, Video visit.        COURTNEY Moreland, PMHNP-BC [facility]

## 2024-03-21 DIAGNOSIS — F43.23 ADJUSTMENT DISORDER WITH MIXED ANXIETY AND DEPRESSED MOOD: ICD-10-CM

## 2024-03-21 RX ORDER — BUSPIRONE HYDROCHLORIDE 10 MG/1
10 TABLET ORAL 3 TIMES DAILY
Qty: 90 TABLET | Refills: 2 | OUTPATIENT
Start: 2024-03-21

## 2024-04-08 ENCOUNTER — OFFICE VISIT (OUTPATIENT)
Dept: BEHAVIORAL HEALTH | Facility: CLINIC | Age: 34
End: 2024-04-08
Payer: COMMERCIAL

## 2024-04-08 DIAGNOSIS — F43.21 GRIEF: ICD-10-CM

## 2024-04-08 DIAGNOSIS — F41.1 GENERALIZED ANXIETY DISORDER: Primary | ICD-10-CM

## 2024-04-08 DIAGNOSIS — F32.2 CURRENT SEVERE EPISODE OF MAJOR DEPRESSIVE DISORDER WITHOUT PSYCHOTIC FEATURES, UNSPECIFIED WHETHER RECURRENT: ICD-10-CM

## 2024-04-08 PROCEDURE — 90834 PSYTX W PT 45 MINUTES: CPT | Performed by: SOCIAL WORKER

## 2024-04-08 NOTE — PROGRESS NOTES
[facility]                 Follow Up Adult      Follow Up Adult Note     Date:2024   Patient Name: Eliz Eisenberg  : 1990   MRN: [id number]   Time IN: 10:45 am        Time OUT: 11:37 am     Referring Provider: Noni Lopez MD    Chief Complaint:      ICD-10-CM ICD-9-CM   1. Generalized anxiety disorder  F41.1 300.02   2. Current severe episode of major depressive disorder without psychotic features, unspecified whether recurrent  F32.2 296.23   3. Grief  F43.21 309.0        History of Present Illness:   Eliz Eisenberg is a 33 y.o. female who is being seen today for follow up counseling for ABIODUN, MDD and grief.    Subjective               Patient's Support Network Includes: extended family    Functional Status: No impairment      Current Outpatient Medications:     albuterol sulfate  (90 Base) MCG/ACT inhaler, Inhale 2 puffs Every 4 (Four) Hours As Needed for Wheezing., Disp: 18 g, Rfl: 2    busPIRone (BUSPAR) 10 MG tablet, Take 1 tablet by mouth 3 (Three) Times a Day., Disp: 90 tablet, Rfl: 2    cetirizine (zyrTEC) 5 MG tablet, Take 1 tablet by mouth Daily., Disp: , Rfl:     dicyclomine (BENTYL) 10 MG capsule, Take 1 capsule by mouth 3 (Three) Times a Day As Needed for Abdominal Cramping., Disp: 12 capsule, Rfl: 0    EPINEPHrine (EPIPEN) 0.3 MG/0.3ML solution auto-injector injection, As Needed. (Patient not taking: Reported on 3/7/2024), Disp: , Rfl:     fluticasone (Flonase) 50 MCG/ACT nasal spray, 2 sprays into the nostril(s) as directed by provider Daily., Disp: 16 g, Rfl: 5    gabapentin (NEURONTIN) 300 MG capsule, Take 1 capsule by mouth Daily. (Patient not taking: Reported on 3/7/2024), Disp: , Rfl:     metFORMIN (GLUCOPHAGE) 500 MG tablet, TAKE TWO TABLETS BY MOUTH EVERY MORNING AND TAKE ONE TABLET BY MOUTH EVERY EVENING WITH MEALS, Disp: 270 tablet, Rfl: 3    montelukast (SINGULAIR) 10 MG tablet, TAKE ONE TABLET BY MOUTH  "ONCE NIGHTLY, Disp: 90 tablet, Rfl: 1    Needle, Disp, 27G X 1\" misc, Use 1 each Take As Directed. For thiamine injections, Disp: 14 each, Rfl: 0    omeprazole (priLOSEC) 20 MG capsule, Take 1 capsule by mouth Daily., Disp: 90 capsule, Rfl: 1    ondansetron ODT (ZOFRAN-ODT) 4 MG disintegrating tablet, Place 1 tablet on the tongue Every 8 (Eight) Hours As Needed for Nausea or Vomiting., Disp: 8 tablet, Rfl: 0    promethazine (PHENERGAN) 25 MG tablet, TAKE ONE TABLET BY MOUTH EVERY 4 HOURS AS NEEDED FOR NAUSEA, Disp: 10 tablet, Rfl: 0    rOPINIRole (REQUIP) 0.5 MG tablet, One at bedtime for restless leg, Disp: , Rfl:     Stelara 90 MG/ML solution prefilled syringe Injection, INJECT 90MG SUBCUTANEOUSLY AT WEEK 0 & 4 (LOADING DOSES) [L40.50], Disp: , Rfl:     SUMAtriptan (IMITREX) 100 MG tablet, Take 1 tablet by mouth As Needed., Disp: , Rfl:     topiramate (TOPAMAX) 25 MG tablet, Take 1 tablet by mouth Daily., Disp: , Rfl:     Allergies   Allergen Reactions    Iodine Hives, Shortness Of Breath, Swelling and Rash    Cimzia [Certolizumab Pegol] Hives, GI Intolerance and Headache    Elastic Bandages & [Zinc] Hives    Latex Hives, Itching, Swelling and Rash     Gloves     Tape Hives     PAPER TAPE    Dilantin [Phenytoin] Unknown - Low Severity     Skin crawling    Pollen Extract Rash    Tree Extract Other (See Comments)     CONGESTION       Objective     Physical Exam:  Vital Signs: There were no vitals filed for this visit.  There is no height or weight on file to calculate BMI.     Mental Status Exam:   Hygiene:   good  Cooperation:  Cooperative  Eye Contact:  Good  Psychomotor Behavior:  Appropriate  Affect:  Full range  Mood: normal  Speech:  Normal  Thought Process:  Goal directed  Thought Content:  Normal  Suicidal:  None  Homicidal:  None  Hallucinations:  None  Delusion:  None  Memory:  Intact  Orientation:  Person, Place, Time, and Situation  Reliability:  good  Insight:  Good  Judgement:  Good  Impulse Control:  " Good  Physical/Medical Issues:   See problem list      Assessment / Plan      Diagnosis:  Diagnoses and all orders for this visit:    1. Generalized anxiety disorder (Primary)    2. Current severe episode of major depressive disorder without psychotic features, unspecified whether recurrent    3. Grief    Patient presented for follow-up with clinician.  Patient and clinician processed patient's anticipatory grief of the passing of their grandmother.  Patient reported staying with their grandmother over the past weekend and noticing serious decline in cognitive functioning as well as motor skills.  Patient and clinician processed patient ongoing conflict with family members who do not see the symptoms as severe as patient does regarding grandmother's dementia.  Clinician provided psychotherapy regarding anticipatory grief and grief stage of denial.  Patient was receptive to psychoeducational session.  Clinician utilized active listening and reflective response to convey empathy and support.    Progress toward goal: Not at goal    Prognosis: Good with ongoing treatment.    PLAN:  Patient and clinician will continue to utilize a cognitive behavioral intervention which identifies and addresses patient cognitive distortions related to ABIODUN, MDD and grief.  Follow-ups will occur approximately every 30 days and will last from 45 to 60 minutes in duration.    Safety: No acute safety concerns  Risk Assessment: Risk of self-harm acutely is low. Risk of self-harm chronically is also low, but could be further elevated in the event of treatment noncompliance and/or AODA.    Treatment Plan/Goals: Continue supportive psychotherapy efforts and medications as indicated. Treatment and medication options discussed during today's visit. Patient ackowledged and verbally consented to continue with current treatment plan and was educated on the importance of compliance with treatment and follow-up appointments. Patient seems reasonably  able to adhere to treatment plan.      Assisted Patient in processing above session content; acknowledged and normalized patient’s thoughts, feelings, and concerns.  Rationalized patient thought process regarding anxiety, depression and grief.      Allowed Patient to freely discuss issues  without interruption or judgement with unconditional positive regard, active listening skills, and empathy. Therapist provided a safe, confidential environment to facilitate the development of a positive therapeutic relationship and encouraged open, honest communication. Assisted Patient in identifying risk factors which would indicate the need for higher level of care including thoughts to harm self or others and/or self-harming behavior and encouraged Patient to contact this office, call 911, or present to the nearest emergency room should any of these events occur. Discussed crisis intervention services and means to access. Patient adamantly and convincingly denies current suicidal or homicidal ideation or perceptual disturbance. Assisted Patient in processing session content; acknowledged and normalized Patient’s thoughts, feelings, and concerns by utilizing a person-centered approach in efforts to build appropriate rapport and a positive therapeutic relationship with open and honest communication. .     Part of this note may be an electronic transcription/translation of spoken language to printed text using the Dragon Dictation System.       Follow Up:   Return in about 3 weeks (around 4/29/2024).    Tunde Mason LCSW

## 2024-04-11 ENCOUNTER — OFFICE VISIT (OUTPATIENT)
Dept: OTHER | Facility: HOSPITAL | Age: 34
End: 2024-04-11

## 2024-04-11 NOTE — PROGRESS NOTES
Exercise Education Note - Initial Visit    Patient: Eliz Eisenberg       Date: 4/11/2024    Patient was seen for initial exercise education via telehealth. Approximately 60 min was spent preparing, counseling, goal setting, and documenting patient encounters.     For/Concerns: Suggested strength exercise to begin incorporating into her exercise routine.     Current Exercise Abilities/Experience: currently walks 4 days per week for 30-60 min    Equipment / Facilities Available:at home using body weight, bands, ball    Barriers to Exercise: none, previous meniscus injury, pt and recovered    Summary of Education / Achievement Strategies: Patient was provided suggested exercise for current needs. She already has and established program and consistent exercise times so no barriers identified. We will use the first part of her time 2 days per week to complete the suggested strength exercise and she will follow up with walking. She will continue her walking on 2 additional days. Several routines provided to accommodate equip options and determine what she likes using best. Patient given specific instruction about how to shift weight out of her knees to insure proper form.  Video demos also provided for continued education.  Follow up scheduled for 5 weeks. Handouts and videos emailed.         Broderick Vizcarra  4/11/2024  12:38 EDT

## 2024-04-30 ENCOUNTER — OFFICE VISIT (OUTPATIENT)
Dept: BEHAVIORAL HEALTH | Facility: CLINIC | Age: 34
End: 2024-04-30
Payer: COMMERCIAL

## 2024-04-30 DIAGNOSIS — F41.1 GENERALIZED ANXIETY DISORDER: Primary | ICD-10-CM

## 2024-04-30 DIAGNOSIS — F32.2 CURRENT SEVERE EPISODE OF MAJOR DEPRESSIVE DISORDER WITHOUT PSYCHOTIC FEATURES, UNSPECIFIED WHETHER RECURRENT: ICD-10-CM

## 2024-04-30 DIAGNOSIS — F43.21 GRIEF: ICD-10-CM

## 2024-04-30 PROCEDURE — 90834 PSYTX W PT 45 MINUTES: CPT | Performed by: SOCIAL WORKER

## 2024-04-30 NOTE — PROGRESS NOTES
[facility] [place]                 Follow Up Adult      Follow Up Adult Note     Date:2024   Patient Name: Eliz Eisenberg  : 1990   MRN: [id number]   Time IN: 8:00 am    Time OUT: 8:49 am     Referring Provider: Noni Lopez MD    Chief Complaint:      ICD-10-CM ICD-9-CM   1. Generalized anxiety disorder  F41.1 300.02   2. Current severe episode of major depressive disorder without psychotic features, unspecified whether recurrent  F32.2 296.23   3. Grief  F43.21 309.0        History of Present Illness:   Eliz Eisenberg is a 33 y.o. female who is being seen today for follow up counseling for anxiety, depression and grief.    Subjective               Patient's Support Network Includes: extended family    Functional Status: Mild impairment       Current Outpatient Medications:     albuterol sulfate  (90 Base) MCG/ACT inhaler, Inhale 2 puffs Every 4 (Four) Hours As Needed for Wheezing., Disp: 18 g, Rfl: 2    busPIRone (BUSPAR) 10 MG tablet, Take 1 tablet by mouth 3 (Three) Times a Day., Disp: 90 tablet, Rfl: 2    cetirizine (zyrTEC) 5 MG tablet, Take 1 tablet by mouth Daily., Disp: , Rfl:     dicyclomine (BENTYL) 10 MG capsule, Take 1 capsule by mouth 3 (Three) Times a Day As Needed for Abdominal Cramping., Disp: 12 capsule, Rfl: 0    EPINEPHrine (EPIPEN) 0.3 MG/0.3ML solution auto-injector injection, As Needed. (Patient not taking: Reported on 3/7/2024), Disp: , Rfl:     fluticasone (Flonase) 50 MCG/ACT nasal spray, 2 sprays into the nostril(s) as directed by provider Daily., Disp: 16 g, Rfl: 5    gabapentin (NEURONTIN) 300 MG capsule, Take 1 capsule by mouth Daily. (Patient not taking: Reported on 3/7/2024), Disp: , Rfl:     metFORMIN (GLUCOPHAGE) 500 MG tablet, TAKE TWO TABLETS BY MOUTH EVERY MORNING AND TAKE ONE TABLET BY MOUTH EVERY EVENING WITH MEALS, Disp: 270 tablet, Rfl: 3    montelukast (SINGULAIR) 10 MG tablet, TAKE ONE TABLET BY  "MOUTH ONCE NIGHTLY, Disp: 90 tablet, Rfl: 1    Needle, Disp, 27G X 1\" misc, Use 1 each Take As Directed. For thiamine injections, Disp: 14 each, Rfl: 0    omeprazole (priLOSEC) 20 MG capsule, Take 1 capsule by mouth Daily., Disp: 90 capsule, Rfl: 1    ondansetron ODT (ZOFRAN-ODT) 4 MG disintegrating tablet, Place 1 tablet on the tongue Every 8 (Eight) Hours As Needed for Nausea or Vomiting., Disp: 8 tablet, Rfl: 0    promethazine (PHENERGAN) 25 MG tablet, TAKE ONE TABLET BY MOUTH EVERY 4 HOURS AS NEEDED FOR NAUSEA, Disp: 10 tablet, Rfl: 0    rOPINIRole (REQUIP) 0.5 MG tablet, One at bedtime for restless leg, Disp: , Rfl:     Stelara 90 MG/ML solution prefilled syringe Injection, INJECT 90MG SUBCUTANEOUSLY AT WEEK 0 & 4 (LOADING DOSES) [L40.50], Disp: , Rfl:     SUMAtriptan (IMITREX) 100 MG tablet, Take 1 tablet by mouth As Needed., Disp: , Rfl:     topiramate (TOPAMAX) 25 MG tablet, Take 1 tablet by mouth Daily., Disp: , Rfl:     Allergies   Allergen Reactions    Iodine Hives, Shortness Of Breath, Swelling and Rash    Cimzia [Certolizumab Pegol] Hives, GI Intolerance and Headache    Elastic Bandages & [Zinc] Hives    Latex Hives, Itching, Swelling and Rash     Gloves     Tape Hives     PAPER TAPE    Dilantin [Phenytoin] Unknown - Low Severity     Skin crawling    Pollen Extract Rash    Tree Extract Other (See Comments)     CONGESTION       Objective     Physical Exam:  Vital Signs: There were no vitals filed for this visit.  There is no height or weight on file to calculate BMI.     Mental Status Exam:   Hygiene:   good  Cooperation:  Cooperative  Eye Contact:  Good  Psychomotor Behavior:  Appropriate  Affect:  Full range  Mood: normal  Speech:  Normal  Thought Process:  Goal directed  Thought Content:  Normal  Suicidal:  None  Homicidal:  None  Hallucinations:  None  Delusion:  None  Memory:  Intact  Orientation:  Person, Place, Time, and Situation  Reliability:  good  Insight:  Good  Judgement:  Good  Impulse " Control:  Good  Physical/Medical Issues:   See problem list      Assessment / Plan      Diagnosis:  Diagnoses and all orders for this visit:    1. Generalized anxiety disorder (Primary)    2. Current severe episode of major depressive disorder without psychotic features, unspecified whether recurrent    3. Grief    Patient presented for follow-up with clinician.  Patient reported that they had to put their family dog to sleep.  Patient and clinician processed patient ongoing grief related to the incident.  Patient and clinician correlated patient's recent regression and increased symptoms regarding their mother to the death of their family pet.  Clinician provided psychoeducation regarding the cycle of grief.  Patient was responsive to psychoeducational session.  Clinician utilized active listening and reflective response to convey empathy and support.    Progress toward goal: Not at goal    Prognosis: Good with further outpatient treatment    PLAN:  Patient and clinician will continue to utilize a cognitive behavioral intervention which identifies and addresses patient cognitive distortions related to anxiety, depression and grief.  Follow-ups will occur monthly and will last from 45 to 60 minutes in duration.    Safety: No acute safety concerns  Risk Assessment: Risk of self-harm acutely is low. Risk of self-harm chronically is also low, but could be further elevated in the event of treatment noncompliance and/or AODA.    Treatment Plan/Goals: Continue supportive psychotherapy efforts and medications as indicated. Treatment and medication options discussed during today's visit. Patient ackowledged and verbally consented to continue with current treatment plan and was educated on the importance of compliance with treatment and follow-up appointments. Patient seems reasonably able to adhere to treatment plan.      Assisted Patient in processing above session content; acknowledged and normalized patient’s thoughts,  feelings, and concerns.  Rationalized patient thought process regarding anxiety and depression and grief.      Allowed Patient to freely discuss issues  without interruption or judgement with unconditional positive regard, active listening skills, and empathy. Therapist provided a safe, confidential environment to facilitate the development of a positive therapeutic relationship and encouraged open, honest communication. Assisted Patient in identifying risk factors which would indicate the need for higher level of care including thoughts to harm self or others and/or self-harming behavior and encouraged Patient to contact this office, call 911, or present to the nearest emergency room should any of these events occur. Discussed crisis intervention services and means to access. Patient adamantly and convincingly denies current suicidal or homicidal ideation or perceptual disturbance. Assisted Patient in processing session content; acknowledged and normalized Patient’s thoughts, feelings, and concerns by utilizing a person-centered approach in efforts to build appropriate rapport and a positive therapeutic relationship with open and honest communication. .     Part of this note may be an electronic transcription/translation of spoken language to printed text using the Dragon Dictation System.       Follow Up:   Return in about 1 month (around 5/30/2024).    Tunde Mason LCSW   DO NOT take any Tylenol (Acetaminophen) or narcotics containing Tylenol until after  11:05pm. You received Tylenol during your operation and it can cause damage to your liver if too much is taken within a 24 hour time period.   No Ibuprofen, Advil, or Motrin until after 11:30pm

## 2024-05-09 ENCOUNTER — OFFICE VISIT (OUTPATIENT)
Dept: OBSTETRICS AND GYNECOLOGY | Facility: CLINIC | Age: 34
End: 2024-05-09
Payer: COMMERCIAL

## 2024-05-09 VITALS
WEIGHT: 252 LBS | DIASTOLIC BLOOD PRESSURE: 72 MMHG | HEIGHT: 66 IN | SYSTOLIC BLOOD PRESSURE: 110 MMHG | BODY MASS INDEX: 40.5 KG/M2

## 2024-05-09 DIAGNOSIS — Z12.4 CERVICAL CANCER SCREENING: ICD-10-CM

## 2024-05-09 DIAGNOSIS — N92.6 IRREGULAR PERIODS: ICD-10-CM

## 2024-05-09 DIAGNOSIS — Z01.411 ENCOUNTER FOR GYNECOLOGICAL EXAMINATION WITH ABNORMAL FINDING: Primary | ICD-10-CM

## 2024-05-09 PROCEDURE — 99395 PREV VISIT EST AGE 18-39: CPT | Performed by: NURSE PRACTITIONER

## 2024-05-09 RX ORDER — CYANOCOBALAMIN 1000 UG/ML
1000 INJECTION, SOLUTION INTRAMUSCULAR; SUBCUTANEOUS 2 TIMES WEEKLY
COMMUNITY
Start: 2024-03-11

## 2024-05-09 RX ORDER — CETIRIZINE HYDROCHLORIDE 10 MG/1
10 TABLET ORAL DAILY
COMMUNITY
Start: 2024-04-23

## 2024-05-09 RX ORDER — ERGOCALCIFEROL (VITAMIN D2) 50 MCG
1 CAPSULE ORAL DAILY
COMMUNITY

## 2024-05-09 NOTE — PROGRESS NOTES
Chief Complaint  Eliz Eisenberg is a 33 y.o.  female presenting for Annual Exam (Patient's last normal period was in February.  Spotting only in March, April, May.  States that she had a ? Positive home pregnancy test last week.  )    History of Present Illness  Eliz is a very pleasant 32yo, nulligravid woman, here for annual gyn exam.  She has no past history of any gynecologic surgeries.  He did have bariatric surgery on 2023 and has lost 99 pounds since then!  (Praised for this!)  After her bariatric surgery and weight loss, she has begun having spontaneous regular monthly menses.  She had a regular menses  and .  In April and May, had spotting only at the time of menses.  LMP 2024.  They are seeking pregnancy.    We discussed her medications, and she is going to check with primary care regarding being off of some of the medicines while seeking pregnancy.  If they do not conceive in the next few months, will return to Dr. Almanzar.      The following portions of the patient's history were reviewed and updated as appropriate: allergies, current medications, past family history, past medical history, past social history, past surgical history, and problem list.    Allergies   Allergen Reactions    Iodine Hives, Shortness Of Breath, Swelling and Rash    Cimzia [Certolizumab Pegol] Hives, GI Intolerance and Headache    Elastic Bandages & [Zinc] Hives    Latex Hives, Itching, Swelling and Rash     Gloves     Tape Hives     PAPER TAPE    Dilantin [Phenytoin] Unknown - Low Severity     Skin crawling    Pollen Extract Rash    Tree Extract Other (See Comments)     CONGESTION         Current Outpatient Medications:     cetirizine (zyrTEC) 10 MG tablet, Take 1 tablet by mouth Daily., Disp: , Rfl:     cyanocobalamin 1000 MCG/ML injection, Inject 1 mL under the skin into the appropriate area as directed 2 (Two) Times a Week., Disp: , Rfl:     albuterol sulfate  (90 Base)  "MCG/ACT inhaler, Inhale 2 puffs Every 4 (Four) Hours As Needed for Wheezing., Disp: 18 g, Rfl: 2    busPIRone (BUSPAR) 10 MG tablet, Take 1 tablet by mouth 3 (Three) Times a Day., Disp: 90 tablet, Rfl: 2    EPINEPHrine (EPIPEN) 0.3 MG/0.3ML solution auto-injector injection, As Needed. (Patient not taking: Reported on 3/7/2024), Disp: , Rfl:     fluticasone (Flonase) 50 MCG/ACT nasal spray, 2 sprays into the nostril(s) as directed by provider Daily., Disp: 16 g, Rfl: 5    metFORMIN (GLUCOPHAGE) 500 MG tablet, TAKE TWO TABLETS BY MOUTH EVERY MORNING AND TAKE ONE TABLET BY MOUTH EVERY EVENING WITH MEALS, Disp: 270 tablet, Rfl: 3    montelukast (SINGULAIR) 10 MG tablet, TAKE ONE TABLET BY MOUTH ONCE NIGHTLY, Disp: 90 tablet, Rfl: 1    Needle, Disp, 27G X 1\" misc, Use 1 each Take As Directed. For thiamine injections, Disp: 14 each, Rfl: 0    omeprazole (priLOSEC) 20 MG capsule, Take 1 capsule by mouth Daily., Disp: 90 capsule, Rfl: 1    ondansetron ODT (ZOFRAN-ODT) 4 MG disintegrating tablet, Place 1 tablet on the tongue Every 8 (Eight) Hours As Needed for Nausea or Vomiting., Disp: 8 tablet, Rfl: 0    promethazine (PHENERGAN) 25 MG tablet, TAKE ONE TABLET BY MOUTH EVERY 4 HOURS AS NEEDED FOR NAUSEA, Disp: 10 tablet, Rfl: 0    rOPINIRole (REQUIP) 0.5 MG tablet, One at bedtime for restless leg, Disp: , Rfl:     Stelara 90 MG/ML solution prefilled syringe Injection, INJECT 90MG SUBCUTANEOUSLY AT WEEK 0 & 4 (LOADING DOSES) [L40.50], Disp: , Rfl:     SUMAtriptan (IMITREX) 100 MG tablet, Take 1 tablet by mouth As Needed., Disp: , Rfl:     topiramate (TOPAMAX) 25 MG tablet, Take 1 tablet by mouth Daily., Disp: , Rfl:     Vitamin D, Ergocalciferol, 50 MCG (2000 UT) capsule, Take 1 capsule by mouth Daily., Disp: , Rfl:     Past Medical History:   Diagnosis Date    Abdominal pain     Acute sinusitis     Allergic     Allergic rhinitis     Ankle sprain     Anxiety     Arthritis 2017    On Mobic daily    Asthma     Atopic dermatitis " "    Biliary dyskinesia     Cat bite      resolved    Clotting disorder     Current tear of meniscus     Dysfunctional uterine bleeding     Eczema     Foot pain     GERD (gastroesophageal reflux disease)     Omeprazole daily. remote EGD. No hx of h pylori    Gout     Hearing loss, bilateral     R/T PSORASIS    Helicobacter pylori antibody positive     4/26/23. Rx prevpak    IBS (irritable bowel syndrome)     Influenza     Metabolic syndrome     Migraines     Muscular aches     Obesity     JENN (obstructive sleep apnea) - possible 01/28/2021    Panic attack     PCOS (polycystic ovarian syndrome)     Pharyngitis     Psoriasis     planning to start Taltz    Restless leg syndrome     URI (upper respiratory infection)     Wears glasses         Past Surgical History:   Procedure Laterality Date    BARIATRIC SURGERY  11/06/2023    COLONOSCOPY      ENDOSCOPY N/A 05/15/2023    Procedure: ESOPHAGOGASTRODUODENOSCOPY;  Surgeon: Beatriz Sandy MD;  Location:  [facility];  Service: Bariatric;  Laterality: N/A;    GASTRIC SLEEVE LAPAROSCOPIC N/A 11/06/2023    Procedure: GASTRIC SLEEVE LAPAROSCOPIC WITH DAVINCI ROBOT, ESOPHAGOGASTRODUODENOSCOPY;  Surgeon: Beatriz Sandy MD;  Location:  [facility];  Service: Robotics - DaVinci;  Laterality: N/A;  scope ID: [id number]    LAPAROSCOPIC CHOLECYSTECTOMY  2018    no gallstones    TONSILLECTOMY AND ADENOIDECTOMY N/A 07/09/2021    Procedure: TONSILLECTOMY;  Surgeon: Ran Lemon MD;  Location:  [facility];  Service: ENT;  Laterality: N/A;    UMBILICAL HERNIA REPAIR  2018    w/ cholecystectomy    WISDOM TOOTH EXTRACTION         Objective  /72   Ht 167.6 cm (66\")   Wt 114 kg (252 lb)   LMP 05/02/2024 (Exact Date)   Breastfeeding No   BMI 40.67 kg/m²     Physical Exam  Vitals and nursing note reviewed. Exam conducted with a chaperone present.   Constitutional:       General: She is not in acute distress.     Appearance: Normal appearance. She is not ill-appearing. "   HENT:      Head: Normocephalic.   Neck:      Thyroid: No thyroid mass or thyromegaly.   Cardiovascular:      Rate and Rhythm: Normal rate and regular rhythm.      Heart sounds: Normal heart sounds. No murmur heard.  Pulmonary:      Effort: Pulmonary effort is normal. No respiratory distress.      Breath sounds: Normal breath sounds.   Chest:   Breasts:     Right: No inverted nipple, mass or nipple discharge.      Left: No inverted nipple, mass or nipple discharge.   Abdominal:      Palpations: Abdomen is soft. There is no mass.      Tenderness: There is no abdominal tenderness.   Genitourinary:     General: Normal vulva.      Labia:         Right: No rash, tenderness or lesion.         Left: No rash, tenderness or lesion.       Vagina: Normal. No erythema.      Cervix: No discharge, lesion or erythema.      Uterus: Not enlarged and not tender.       Adnexa:         Right: No mass or tenderness.          Left: No mass or tenderness.        Comments: Anus appears wnl.  No rectal exam performed.  Lymphadenopathy:      Upper Body:      Right upper body: No supraclavicular or axillary adenopathy.      Left upper body: No supraclavicular or axillary adenopathy.   Skin:     General: Skin is warm and dry.   Neurological:      Mental Status: She is alert and oriented to person, place, and time.   Psychiatric:         Mood and Affect: Mood normal.         Behavior: Behavior normal.         Assessment/Plan   Diagnoses and all orders for this visit:    1. Encounter for gynecological examination with abnormal finding (Primary)    2. Irregular periods  -     HCG, B-subunit, Quantitative; Future    3. Cervical cancer screening  -     LIQUID-BASED PAP SMEAR WITH HPV GENOTYPING REGARDLESS OF INTERPRETATION ([facility]AVIER,[facility])        Procedures    19 to 39: Counseling/Anticipatory Guidance Discussed: nutrition, breast cancer and self breast exams, and fertility awareness / ovulation prediction / multivitamins or folic acid / discussing  meds with PCP.      She will reach out to us prn / will refer back to Reproductive Endocrinology / Fertility Specialist if needed.    Return in about 1 year (around 5/9/2025) for Annual physical.    Asia Evans, APRN  05/09/2024

## 2024-05-14 LAB — REF LAB TEST METHOD: NORMAL

## 2024-06-04 ENCOUNTER — OFFICE VISIT (OUTPATIENT)
Dept: BEHAVIORAL HEALTH | Facility: CLINIC | Age: 34
End: 2024-06-04
Payer: COMMERCIAL

## 2024-06-04 DIAGNOSIS — F41.1 GENERALIZED ANXIETY DISORDER: Primary | ICD-10-CM

## 2024-06-04 DIAGNOSIS — F43.21 GRIEF: ICD-10-CM

## 2024-06-04 DIAGNOSIS — F32.2 CURRENT SEVERE EPISODE OF MAJOR DEPRESSIVE DISORDER WITHOUT PSYCHOTIC FEATURES, UNSPECIFIED WHETHER RECURRENT: ICD-10-CM

## 2024-06-04 PROCEDURE — 90837 PSYTX W PT 60 MINUTES: CPT | Performed by: SOCIAL WORKER

## 2024-06-04 NOTE — TREATMENT PLAN
Multi-Disciplinary Problems (from Behavioral Health Treatment Plan)      Active Problems       Problem: Anxiety  Start Date: 06/04/24      Problem Details: The patient self-scales this problem as a 5 with 10 being the worst.          Goal Priority Start Date Expected End Date End Date    Patient will develop and implement behavioral and cognitive strategies to reduce anxiety and irrational fears. -- 06/04/24 -- --    Goal Details: Progress toward goal:  Not appropriate to rate progress toward goal since this is the initial treatment plan.          Goal Intervention Frequency Start Date End Date    Help patient explore past emotional issues in relation to present anxiety. PRN 06/04/24 --    Intervention Details: Duration of treatment until until discharged.          Goal Intervention Frequency Start Date End Date    Help patient develop an awareness of their cognitive and physical responses to anxiety. PRN 06/04/24 --    Intervention Details: Duration of treatment until until discharged.                  Problem: Depression  Start Date: 06/04/24      Problem Details: The patient self-scales this problem as a 5 with 10 being the worst.          Goal Priority Start Date Expected End Date End Date    Patient will demonstrate the ability to initiate new constructive life skills outside of sessions on a consistent basis. -- 06/04/24 -- --    Goal Details: Progress toward goal:  Not appropriate to rate progress toward goal since this is the initial treatment plan.          Goal Intervention Frequency Start Date End Date    Assist patient in setting attainable activities of daily living goals. PRN 06/04/24 --    Intervention Details: na        Goal Intervention Frequency Start Date End Date    Provide education about depression PRN 06/04/24 --    Intervention Details: Duration of treatment until until discharged.          Goal Intervention Frequency Start Date End Date    Assist patient in developing healthy coping  strategies. PRN 06/04/24 --    Intervention Details: Duration of treatment until until discharged.                  Problem: Grief and Loss  Start Date: 06/04/24      Problem Details: The patient self-scales this problem as a 4 with 10 being the worst.          Goal Priority Start Date Expected End Date End Date    Patient will process feelings and identify and implement specific ways to facilitate the grieving process. -- 06/04/24 -- --    Goal Details: Progress toward goal:  Not appropriate to rate progress toward goal since this is the initial treatment plan.          Goal Intervention Frequency Start Date End Date    Assist patient in identifying and processing feelings about losses. PRN 06/04/24 --    Intervention Details: Duration of treatment until until discharged.          Goal Intervention Frequency Start Date End Date    Identify ways to grieve effectively and utilize healthy support systems PRN 06/04/24 --    Intervention Details: Duration of treatment until until discharged.                          Reviewed By       Tunde Mason LCSW 06/04/24 0800                     I have discussed and reviewed this treatment plan with the patient.  It has been printed for signatures.

## 2024-06-05 ENCOUNTER — OFFICE VISIT (OUTPATIENT)
Dept: BARIATRICS/WEIGHT MGMT | Facility: CLINIC | Age: 34
End: 2024-06-05
Payer: COMMERCIAL

## 2024-06-05 VITALS
BODY MASS INDEX: 39.62 KG/M2 | OXYGEN SATURATION: 99 % | HEART RATE: 65 BPM | HEIGHT: 66 IN | RESPIRATION RATE: 16 BRPM | TEMPERATURE: 97.5 F | DIASTOLIC BLOOD PRESSURE: 72 MMHG | SYSTOLIC BLOOD PRESSURE: 126 MMHG | WEIGHT: 246.5 LBS

## 2024-06-05 DIAGNOSIS — Z13.0 SCREENING, IRON DEFICIENCY ANEMIA: ICD-10-CM

## 2024-06-05 DIAGNOSIS — Z90.3 POSTGASTRECTOMY MALABSORPTION: ICD-10-CM

## 2024-06-05 DIAGNOSIS — R53.83 FATIGUE, UNSPECIFIED TYPE: ICD-10-CM

## 2024-06-05 DIAGNOSIS — E55.9 HYPOVITAMINOSIS D: ICD-10-CM

## 2024-06-05 DIAGNOSIS — K91.2 POSTGASTRECTOMY MALABSORPTION: ICD-10-CM

## 2024-06-05 DIAGNOSIS — Z98.84 STATUS POST BARIATRIC SURGERY: ICD-10-CM

## 2024-06-05 DIAGNOSIS — Z13.21 MALNUTRITION SCREEN: ICD-10-CM

## 2024-06-05 DIAGNOSIS — Z51.81 THERAPEUTIC DRUG MONITORING: ICD-10-CM

## 2024-06-05 DIAGNOSIS — E66.9 OBESITY, CLASS II, BMI 35-39.9: Primary | ICD-10-CM

## 2024-06-05 DIAGNOSIS — K21.9 GASTROESOPHAGEAL REFLUX DISEASE, UNSPECIFIED WHETHER ESOPHAGITIS PRESENT: ICD-10-CM

## 2024-06-05 RX ORDER — OMEPRAZOLE 40 MG/1
40 CAPSULE, DELAYED RELEASE ORAL DAILY
Qty: 90 CAPSULE | Refills: 3 | Status: SHIPPED | OUTPATIENT
Start: 2024-06-05 | End: 2025-05-31

## 2024-06-05 NOTE — PROGRESS NOTES
[facility] Bariatric Surgery  [address]  [address]  [facility] [id number] [phone]        Patient Name:  Eliz Eisenberg.  :  1990      Date of Visit: 2024      Reason for Visit:   6 months postop     HPI: Eliz Eisenberg is a 33 y.o. female s/p robotic LSG 23 w/ Dr. Sandy      Doing well.  No issues/concerns. Denies dysphagia, reflux, nausea, vomiting, and abdominal pain.  Mild constipation.  Getting 90-110g prot/day.  Drinking 64++ fluid oz/day.  Last labs revealed slightly low folate/prealbumin. Taking MVI, B12, B1, Calcium, and iron, weekly vitamin D. On Omeprazole .  Exercise: walking.  Has seen Broderick.  Has done a little bit of strength exercise.    ClasesD alva, does not track every day recently.       Presurgery weight: 350 pounds.  Today's weight is 112 kg (246 lb 8 oz) pounds, today's  Body mass index is 39.79 kg/m²., and weight loss since surgery is 104 pounds.      Past Medical History:   Diagnosis Date    Abdominal pain     Acute sinusitis     Allergic     Allergic rhinitis     Ankle sprain     Anxiety     Arthritis 2017    On Mobic daily    Asthma     Atopic dermatitis     Biliary dyskinesia     Cat bite      resolved    Clotting disorder     Current tear of meniscus     Dysfunctional uterine bleeding     Eczema     Foot pain     GERD (gastroesophageal reflux disease)     Omeprazole daily. remote EGD. No hx of h pylori    Gout     Hearing loss, bilateral     R/T PSORASIS    Helicobacter pylori antibody positive     23. Rx prevpak    IBS (irritable bowel syndrome)     Influenza     Metabolic syndrome     Migraines     Muscular aches     Obesity     JENN (obstructive sleep apnea) - possible 2021    Panic attack     PCOS (polycystic ovarian syndrome)     Pharyngitis     Psoriasis     planning to start Taltz    Restless leg syndrome     URI (upper respiratory infection)     Wears glasses      Past Surgical History:   Procedure  Laterality Date    BARIATRIC SURGERY  11/06/2023    COLONOSCOPY      ENDOSCOPY N/A 05/15/2023    Procedure: ESOPHAGOGASTRODUODENOSCOPY;  Surgeon: Beatriz Sandy MD;  Location:  [facility];  Service: Bariatric;  Laterality: N/A;    GASTRIC SLEEVE LAPAROSCOPIC N/A 11/06/2023    Procedure: GASTRIC SLEEVE LAPAROSCOPIC WITH DAVINCI ROBOT, ESOPHAGOGASTRODUODENOSCOPY;  Surgeon: Beatriz Sandy MD;  Location:  [facility];  Service: Robotics - DaVinci;  Laterality: N/A;  scope ID: [id number]    LAPAROSCOPIC CHOLECYSTECTOMY  2018    no gallstones    TONSILLECTOMY AND ADENOIDECTOMY N/A 07/09/2021    Procedure: TONSILLECTOMY;  Surgeon: Ran Lemon MD;  Location:  [facility];  Service: ENT;  Laterality: N/A;    UMBILICAL HERNIA REPAIR  2018    w/ cholecystectomy    WISDOM TOOTH EXTRACTION       Outpatient Medications Marked as Taking for the 6/5/24 encounter (Office Visit) with Beatriz Sandy MD   Medication Sig Dispense Refill    albuterol sulfate  (90 Base) MCG/ACT inhaler Inhale 2 puffs Every 4 (Four) Hours As Needed for Wheezing. 18 g 2    busPIRone (BUSPAR) 10 MG tablet Take 1 tablet by mouth 3 (Three) Times a Day. 90 tablet 2    cetirizine (zyrTEC) 10 MG tablet Take 1 tablet by mouth Daily.      fluticasone (Flonase) 50 MCG/ACT nasal spray 2 sprays into the nostril(s) as directed by provider Daily. 16 g 5    metFORMIN (GLUCOPHAGE) 500 MG tablet TAKE TWO TABLETS BY MOUTH EVERY MORNING AND TAKE ONE TABLET BY MOUTH EVERY EVENING WITH MEALS 270 tablet 3    montelukast (SINGULAIR) 10 MG tablet TAKE ONE TABLET BY MOUTH ONCE NIGHTLY 90 tablet 1    omeprazole (priLOSEC) 20 MG capsule Take 1 capsule by mouth Daily. 90 capsule 1    ondansetron ODT (ZOFRAN-ODT) 4 MG disintegrating tablet Place 1 tablet on the tongue Every 8 (Eight) Hours As Needed for Nausea or Vomiting. 8 tablet 0    promethazine (PHENERGAN) 25 MG tablet TAKE ONE TABLET BY MOUTH EVERY 4 HOURS AS NEEDED FOR NAUSEA 10 tablet 0     "rOPINIRole (REQUIP) 0.5 MG tablet One at bedtime for restless leg      Stelara 90 MG/ML solution prefilled syringe Injection INJECT 90MG SUBCUTANEOUSLY AT WEEK 0 & 4 (LOADING DOSES) [L40.50]      SUMAtriptan (IMITREX) 100 MG tablet Take 1 tablet by mouth As Needed.      topiramate (TOPAMAX) 25 MG tablet Take 1 tablet by mouth Daily.      VITAMIN D, ERGOCALCIFEROL, PO Take 50,000 Units by mouth Every 7 (Seven) Days.         Allergies   Allergen Reactions    Iodine Hives, Shortness Of Breath, Swelling and Rash    Cimzia [Certolizumab Pegol] Hives, GI Intolerance and Headache    Elastic Bandages & [Zinc] Hives    Latex Hives, Itching, Swelling and Rash     Gloves     Tape Hives     PAPER TAPE    Dilantin [Phenytoin] Unknown - Low Severity     Skin crawling    Pollen Extract Rash    Tree Extract Other (See Comments)     CONGESTION       Social History     Socioeconomic History    Marital status:    Tobacco Use    Smoking status: Never    Smokeless tobacco: Never   Vaping Use    Vaping status: Never Used   Substance and Sexual Activity    Alcohol use: Not Currently     Comment: rare    Drug use: No    Sexual activity: Yes     Partners: Male     Birth control/protection: None       /72 (BP Location: Right arm, Patient Position: Sitting)   Pulse 65   Temp 97.5 °F (36.4 °C)   Resp 16   Ht 167.6 cm (66\")   Wt 112 kg (246 lb 8 oz)   LMP 05/02/2024 (Exact Date)   SpO2 99%   BMI 39.79 kg/m²     Physical Exam  Constitutional:       General: She is not in acute distress.     Appearance: She is well-developed. She is not diaphoretic.   HENT:      Head: Normocephalic and atraumatic.      Mouth/Throat:      Pharynx: No oropharyngeal exudate.   Eyes:      Conjunctiva/sclera: Conjunctivae normal.      Pupils: Pupils are equal, round, and reactive to light.   Pulmonary:      Effort: Pulmonary effort is normal. No respiratory distress.   Abdominal:      General: There is no distension.      Palpations: Abdomen is " soft.   Skin:     General: Skin is warm and dry.      Coloration: Skin is not pale.   Neurological:      Mental Status: She is alert and oriented to person, place, and time.      Cranial Nerves: No cranial nerve deficit.   Psychiatric:         Behavior: Behavior normal.         Thought Content: Thought content normal.           Assessment:  6 months s/p robotic LSG 11/6/23 w/ Dr. Sandy      ICD-10-CM ICD-9-CM   1. Obesity, Class II, BMI 35-39.9  E66.9 278.00   2. Fatigue, unspecified type  R53.83 780.79   3. Postgastrectomy malabsorption  K91.2 579.3    Z90.3    4. Screening, iron deficiency anemia  Z13.0 V78.0   5. Malnutrition screen  Z13.21 V77.2   6. Hypovitaminosis D  E55.9 268.9   7. Status post bariatric surgery  Z98.84 V45.86   8. Therapeutic drug monitoring  Z51.81 V58.83   9. Gastroesophageal reflux disease, unspecified whether esophagitis present  K21.9 530.81              Plan:  Doing well. Continue w/ good food choices and healthy habits.  Continue protein >70g/day.  Continue routine exercise.  Routine bariatric labs ordered.  Continue vitamins w/ adjustments pending lab results.  Call w/ problems/concerns.     [facility]itastic, rec [id number] tatyana/day.  Start doing 3x/week strength exercise.    The patient was instructed to follow up in 3 months, sooner if needed.    I spent 30 minutes caring for Eliz on this date of service. This time includes time spent by me in the following activities: preparing for the visit, reviewing tests, obtaining and/or reviewing a separately obtained history, performing a medically appropriate examination and/or evaluation, counseling and educating the patient/family/caregiver, ordering medications, tests, or procedures, documenting information in the medical record, and independently interpreting results and communicating that information with the patient/family/caregiver      Beatriz Sandy MD

## 2024-06-11 LAB
25(OH)D3+25(OH)D2 SERPL-MCNC: 50.1 NG/ML (ref 30–100)
ALBUMIN SERPL-MCNC: 4 G/DL (ref 3.9–4.9)
ALBUMIN/GLOB SERPL: 1.4 {RATIO} (ref 1.2–2.2)
ALP SERPL-CCNC: 116 IU/L (ref 44–121)
ALT SERPL-CCNC: 15 IU/L (ref 0–32)
AST SERPL-CCNC: 15 IU/L (ref 0–40)
BASOPHILS # BLD AUTO: 0 X10E3/UL (ref 0–0.2)
BASOPHILS NFR BLD AUTO: 0 %
BILIRUB SERPL-MCNC: 0.6 MG/DL (ref 0–1.2)
BUN SERPL-MCNC: 9 MG/DL (ref 6–20)
BUN/CREAT SERPL: 15 (ref 9–23)
CALCIUM SERPL-MCNC: 9.1 MG/DL (ref 8.7–10.2)
CHLORIDE SERPL-SCNC: 104 MMOL/L (ref 96–106)
CO2 SERPL-SCNC: 23 MMOL/L (ref 20–29)
CREAT SERPL-MCNC: 0.62 MG/DL (ref 0.57–1)
EGFRCR SERPLBLD CKD-EPI 2021: 121 ML/MIN/1.73
EOSINOPHIL # BLD AUTO: 0.2 X10E3/UL (ref 0–0.4)
EOSINOPHIL NFR BLD AUTO: 2 %
ERYTHROCYTE [DISTWIDTH] IN BLOOD BY AUTOMATED COUNT: 13.8 % (ref 11.7–15.4)
FERRITIN SERPL-MCNC: 36 NG/ML (ref 15–150)
FOLATE SERPL-MCNC: >20 NG/ML
GLOBULIN SER CALC-MCNC: 2.9 G/DL (ref 1.5–4.5)
GLUCOSE SERPL-MCNC: 80 MG/DL (ref 70–99)
HCT VFR BLD AUTO: 36.6 % (ref 34–46.6)
HGB BLD-MCNC: 11.8 G/DL (ref 11.1–15.9)
IMM GRANULOCYTES # BLD AUTO: 0 X10E3/UL (ref 0–0.1)
IMM GRANULOCYTES NFR BLD AUTO: 0 %
IRON SERPL-MCNC: 49 UG/DL (ref 27–159)
LYMPHOCYTES # BLD AUTO: 2 X10E3/UL (ref 0.7–3.1)
LYMPHOCYTES NFR BLD AUTO: 28 %
MCH RBC QN AUTO: 26.5 PG (ref 26.6–33)
MCHC RBC AUTO-ENTMCNC: 32.2 G/DL (ref 31.5–35.7)
MCV RBC AUTO: 82 FL (ref 79–97)
METHYLMALONATE SERPL-SCNC: 99 NMOL/L (ref 0–378)
MONOCYTES # BLD AUTO: 0.4 X10E3/UL (ref 0.1–0.9)
MONOCYTES NFR BLD AUTO: 6 %
NEUTROPHILS # BLD AUTO: 4.5 X10E3/UL (ref 1.4–7)
NEUTROPHILS NFR BLD AUTO: 64 %
PLATELET # BLD AUTO: 331 X10E3/UL (ref 150–450)
POTASSIUM SERPL-SCNC: 4.3 MMOL/L (ref 3.5–5.2)
PREALB SERPL-MCNC: 17 MG/DL (ref 14–35)
PROT SERPL-MCNC: 6.9 G/DL (ref 6–8.5)
RBC # BLD AUTO: 4.46 X10E6/UL (ref 3.77–5.28)
SODIUM SERPL-SCNC: 141 MMOL/L (ref 134–144)
VIT B1 BLD-SCNC: 103.5 NMOL/L (ref 66.5–200)
WBC # BLD AUTO: 7.1 X10E3/UL (ref 3.4–10.8)

## 2024-06-28 ENCOUNTER — TELEPHONE (OUTPATIENT)
Dept: INTERNAL MEDICINE | Facility: CLINIC | Age: 34
End: 2024-06-28

## 2024-06-28 RX ORDER — FLUCONAZOLE 150 MG/1
150 TABLET ORAL
Qty: 2 TABLET | Refills: 0 | Status: SHIPPED | OUTPATIENT
Start: 2024-06-28

## 2024-06-28 NOTE — TELEPHONE ENCOUNTER
Pt had issues with transportation this morning and wouldn't get to the office until after 745 so I rescheduled her appointment to next week.   She did ask if there was any way you could send in 2 diflucan for a yeast infection to the [facility] in [place] today.      Please call patient back to update on status of request.

## 2024-07-05 ENCOUNTER — LAB (OUTPATIENT)
Dept: LAB | Facility: HOSPITAL | Age: 34
End: 2024-07-05
Payer: COMMERCIAL

## 2024-07-05 ENCOUNTER — OFFICE VISIT (OUTPATIENT)
Dept: INTERNAL MEDICINE | Facility: CLINIC | Age: 34
End: 2024-07-05
Payer: COMMERCIAL

## 2024-07-05 VITALS
OXYGEN SATURATION: 99 % | WEIGHT: 245 LBS | HEIGHT: 66 IN | BODY MASS INDEX: 39.37 KG/M2 | SYSTOLIC BLOOD PRESSURE: 118 MMHG | HEART RATE: 72 BPM | DIASTOLIC BLOOD PRESSURE: 80 MMHG | TEMPERATURE: 97.3 F

## 2024-07-05 DIAGNOSIS — Z13.228 SCREENING FOR ENDOCRINE, NUTRITIONAL, METABOLIC AND IMMUNITY DISORDER: ICD-10-CM

## 2024-07-05 DIAGNOSIS — J45.21 MILD INTERMITTENT ASTHMA WITH ACUTE EXACERBATION: ICD-10-CM

## 2024-07-05 DIAGNOSIS — Z13.220 SCREENING, LIPID: ICD-10-CM

## 2024-07-05 DIAGNOSIS — Z00.00 WELLNESS EXAMINATION: Primary | ICD-10-CM

## 2024-07-05 DIAGNOSIS — Z13.0 SCREENING FOR ENDOCRINE, NUTRITIONAL, METABOLIC AND IMMUNITY DISORDER: ICD-10-CM

## 2024-07-05 DIAGNOSIS — Z13.21 SCREENING FOR ENDOCRINE, NUTRITIONAL, METABOLIC AND IMMUNITY DISORDER: ICD-10-CM

## 2024-07-05 DIAGNOSIS — Z13.29 SCREENING FOR ENDOCRINE, NUTRITIONAL, METABOLIC AND IMMUNITY DISORDER: ICD-10-CM

## 2024-07-05 PROBLEM — E66.812 CLASS 2 SEVERE OBESITY DUE TO EXCESS CALORIES WITH SERIOUS COMORBIDITY AND BODY MASS INDEX (BMI) OF 39.0 TO 39.9 IN ADULT: Status: ACTIVE | Noted: 2024-03-06

## 2024-07-05 LAB
ALBUMIN SERPL-MCNC: 4 G/DL (ref 3.5–5.2)
ALBUMIN/GLOB SERPL: 1.3 G/DL
ALP SERPL-CCNC: 113 U/L (ref 39–117)
ALT SERPL W P-5'-P-CCNC: 11 U/L (ref 1–33)
ANION GAP SERPL CALCULATED.3IONS-SCNC: 10 MMOL/L (ref 5–15)
AST SERPL-CCNC: 13 U/L (ref 1–32)
BILIRUB SERPL-MCNC: 0.6 MG/DL (ref 0–1.2)
BUN SERPL-MCNC: 12 MG/DL (ref 6–20)
BUN/CREAT SERPL: 18.5 (ref 7–25)
CALCIUM SPEC-SCNC: 9.5 MG/DL (ref 8.6–10.5)
CHLORIDE SERPL-SCNC: 105 MMOL/L (ref 98–107)
CHOLEST SERPL-MCNC: 191 MG/DL (ref 0–200)
CO2 SERPL-SCNC: 24 MMOL/L (ref 22–29)
CREAT SERPL-MCNC: 0.65 MG/DL (ref 0.57–1)
DEPRECATED RDW RBC AUTO: 40 FL (ref 37–54)
EGFRCR SERPLBLD CKD-EPI 2021: 118.7 ML/MIN/1.73
ERYTHROCYTE [DISTWIDTH] IN BLOOD BY AUTOMATED COUNT: 13.5 % (ref 12.3–15.4)
GLOBULIN UR ELPH-MCNC: 3.2 GM/DL
GLUCOSE SERPL-MCNC: 81 MG/DL (ref 65–99)
HBA1C MFR BLD: 5.4 % (ref 4.8–5.6)
HCT VFR BLD AUTO: 36.9 % (ref 34–46.6)
HDLC SERPL-MCNC: 36 MG/DL (ref 40–60)
HGB BLD-MCNC: 11.6 G/DL (ref 12–15.9)
LDLC SERPL CALC-MCNC: 138 MG/DL (ref 0–100)
LDLC/HDLC SERPL: 3.78 {RATIO}
MCH RBC QN AUTO: 25.6 PG (ref 26.6–33)
MCHC RBC AUTO-ENTMCNC: 31.4 G/DL (ref 31.5–35.7)
MCV RBC AUTO: 81.3 FL (ref 79–97)
PLATELET # BLD AUTO: 299 10*3/MM3 (ref 140–450)
PMV BLD AUTO: 10.7 FL (ref 6–12)
POTASSIUM SERPL-SCNC: 4.4 MMOL/L (ref 3.5–5.2)
PROT SERPL-MCNC: 7.2 G/DL (ref 6–8.5)
RBC # BLD AUTO: 4.54 10*6/MM3 (ref 3.77–5.28)
SODIUM SERPL-SCNC: 139 MMOL/L (ref 136–145)
TRIGL SERPL-MCNC: 94 MG/DL (ref 0–150)
TSH SERPL DL<=0.05 MIU/L-ACNC: 3.28 UIU/ML (ref 0.27–4.2)
VLDLC SERPL-MCNC: 17 MG/DL (ref 5–40)
WBC NRBC COR # BLD AUTO: 7.73 10*3/MM3 (ref 3.4–10.8)

## 2024-07-05 PROCEDURE — 80061 LIPID PANEL: CPT

## 2024-07-05 PROCEDURE — 99395 PREV VISIT EST AGE 18-39: CPT | Performed by: FAMILY MEDICINE

## 2024-07-05 PROCEDURE — 83036 HEMOGLOBIN GLYCOSYLATED A1C: CPT

## 2024-07-05 PROCEDURE — 80050 GENERAL HEALTH PANEL: CPT

## 2024-07-05 RX ORDER — MONTELUKAST SODIUM 10 MG/1
10 TABLET ORAL NIGHTLY
Qty: 90 TABLET | Refills: 3 | Status: SHIPPED | OUTPATIENT
Start: 2024-07-05

## 2024-07-05 NOTE — PROGRESS NOTES
Office Note     Name: Eliz Eisenberg    : 1990     MRN: [id number]     Chief Complaint  Annual Exam    Subjective     History of Present Illness:  Eliz Eisenberg is a 34 y.o. female who presents today for physical -is currently fasting   No issues or concerns   Does get frequent yeast infections- typically yeast.   No hx of BV- denies current discharge   Is actively trying to get pregnant     Follows with Trina Hernández NP for mood and Neurology for migraines/headaches     PMH-  Metabolic syndrome   PCOS   Obesity   IBS- diarrhea  GERD   Hx of H.Pylori   Arthritis   Migraines   Asthma   Psoriasis   Hx of bariatric surgery 2023  Anxiety      Meds-  Albuterol inhaler as needed   Buspar 10mg daily   Zyrtec 5mg daily   Flonase daily   Vitamin D 95745 IU weekly   Epi pen as needed   Gabapentin 300mg daily- not taking- prescribed by Orthopedics   Metformin 500mg - 1000mg AM  and 500mg at  PM   Montelukast 10mg daily   Omeprazole 40mg daily   Zofran as needed   Promethazine as needed   Requip 0.5mg nightly  Stelara 90mg every 3 months   Imitrex 100mg as needed  Topamax 25mg daily      Family Hx-   Maternal GM- alive - breast cancer, DM, dementia   Maternal GF- - asthma, sleep apnea, DM, arthritis, heart disease   Paternal GM- alive- colon cancer, dementia   Paternal GF- alive- DM, Heart attack, sleep apnea   Mother- - MS, Heart disease, psoriasis, Rheum, - Heart condition?   Father- alive- DM, HTN, sleep apnea   Sister- alive- Healthy      Alcohol - not currently very rare   Smoking - none   Drug use - none   Job -    Stress - 7/10    Sun Exposure - sunscreen- follows with Dermatology- skin checks     Dental/Eye exams - up to date. Vision is stable   Diet/Exercise- Diet- Healthy moderate. Minimal sugar.   Exercise - walking- daily      [facility] - [place]   PAP - will schedule   Mammo - not yet   DEXA - not yet   BC/Hormone replacement - none   Vitamin D -  supplement weekly      Review of Systems:   Review of Systems   Constitutional:  Negative for chills and fever.   HENT:  Negative for dental problem, trouble swallowing and voice change.    Eyes:  Negative for visual disturbance.   Respiratory:  Negative for cough, chest tightness, shortness of breath and wheezing.    Cardiovascular:  Negative for chest pain, palpitations and leg swelling.   Gastrointestinal:  Negative for abdominal pain, blood in stool, constipation, diarrhea, nausea and vomiting.   Genitourinary:  Negative for dysuria.   Musculoskeletal:  Negative for gait problem.   Skin:  Negative for rash.   Neurological:  Negative for light-headedness and headache.   Psychiatric/Behavioral:  Negative for dysphoric mood.        Past Medical History:   Past Medical History:   Diagnosis Date    Abdominal pain     Acute sinusitis     Allergic     Allergic rhinitis     Ankle sprain     Anxiety     Arthritis 2017    On Mobic daily    Asthma     Atopic dermatitis     Biliary dyskinesia     Cat bite      resolved    Clotting disorder     Current tear of meniscus     Dysfunctional uterine bleeding     Eczema     Foot pain     GERD (gastroesophageal reflux disease)     Omeprazole daily. remote EGD. No hx of h pylori    Gout     Hearing loss, bilateral     R/T PSORASIS    Helicobacter pylori antibody positive     4/26/23. Rx prevpak    IBS (irritable bowel syndrome)     Influenza     Metabolic syndrome     Migraines     Muscular aches     Obesity     JENN (obstructive sleep apnea) - possible 01/28/2021    Panic attack     PCOS (polycystic ovarian syndrome)     Pharyngitis     Psoriasis     planning to start Taltz    Restless leg syndrome     URI (upper respiratory infection)     Wears glasses        Past Surgical History:   Past Surgical History:   Procedure Laterality Date    BARIATRIC SURGERY  11/06/2023    COLONOSCOPY      ENDOSCOPY N/A 05/15/2023    Procedure: ESOPHAGOGASTRODUODENOSCOPY;  Surgeon: Beatriz Sandy  MD James;  Location:  [facility];  Service: Bariatric;  Laterality: N/A;    GASTRIC SLEEVE LAPAROSCOPIC N/A 11/06/2023    Procedure: GASTRIC SLEEVE LAPAROSCOPIC WITH DAVINCI ROBOT, ESOPHAGOGASTRODUODENOSCOPY;  Surgeon: Beatriz Sandy MD;  Location:  [facility];  Service: Robotics - DaVinci;  Laterality: N/A;  scope ID: [id number]    LAPAROSCOPIC CHOLECYSTECTOMY  2018    no gallstones    TONSILLECTOMY AND ADENOIDECTOMY N/A 07/09/2021    Procedure: TONSILLECTOMY;  Surgeon: Ran Lemon MD;  Location:  [facility];  Service: ENT;  Laterality: N/A;    UMBILICAL HERNIA REPAIR  2018    w/ cholecystectomy    WISDOM TOOTH EXTRACTION         Immunizations:   Immunization History   Administered Date(s) Administered    COVID-19 (MODERNA) 1st,2nd,3rd Dose Monovalent 08/30/2021, 09/27/2021    COVID-19 (PFIZER) BIVALENT 12+YRS 10/03/2022, 12/07/2022    COVID-19 F23 (PFIZER) 12YRS+ (COMIRNATY) 12/18/2023    Covid-19 (Pfizer) Gray Cap Monovalent 04/18/2022    Fluzone (or Fluarix & Flulaval for VFC) >6mos 10/03/2022    Hepatitis A 11/23/2018    Influenza Injectable Mdck Pf Quad 12/18/2023    Influenza, Unspecified 11/24/2018, 10/30/2019, 11/17/2020, 10/30/2021    Pneumococcal Conjugate 20-Valent (PCV20) 06/07/2023    Pneumococcal Polysaccharide (PPSV23) 04/06/2021    Tdap 01/24/2019, 10/25/2022    flucelvax quad pfs =>4 YRS 10/30/2021        Medications:     Current Outpatient Medications:     albuterol sulfate  (90 Base) MCG/ACT inhaler, Inhale 2 puffs Every 4 (Four) Hours As Needed for Wheezing., Disp: 18 g, Rfl: 2    busPIRone (BUSPAR) 10 MG tablet, Take 1 tablet by mouth 3 (Three) Times a Day., Disp: 90 tablet, Rfl: 2    cetirizine (zyrTEC) 10 MG tablet, Take 1 tablet by mouth Daily., Disp: , Rfl:     EPINEPHrine (EPIPEN) 0.3 MG/0.3ML solution auto-injector injection, As Needed., Disp: , Rfl:     fluticasone (Flonase) 50 MCG/ACT nasal spray, 2 sprays into the nostril(s) as directed by provider Daily., Disp: 16  g, Rfl: 5    metFORMIN (GLUCOPHAGE) 500 MG tablet, TAKE TWO TABLETS BY MOUTH EVERY MORNING AND TAKE ONE TABLET BY MOUTH EVERY EVENING WITH MEALS, Disp: 270 tablet, Rfl: 3    montelukast (SINGULAIR) 10 MG tablet, Take 1 tablet by mouth Every Night., Disp: 90 tablet, Rfl: 3    omeprazole (priLOSEC) 40 MG capsule, Take 1 capsule by mouth Daily for 360 days., Disp: 90 capsule, Rfl: 3    ondansetron ODT (ZOFRAN-ODT) 4 MG disintegrating tablet, Place 1 tablet on the tongue Every 8 (Eight) Hours As Needed for Nausea or Vomiting., Disp: 8 tablet, Rfl: 0    promethazine (PHENERGAN) 25 MG tablet, TAKE ONE TABLET BY MOUTH EVERY 4 HOURS AS NEEDED FOR NAUSEA, Disp: 10 tablet, Rfl: 0    Stelara 90 MG/ML solution prefilled syringe Injection, INJECT 90MG SUBCUTANEOUSLY AT WEEK 0 & 4 (LOADING DOSES) [L40.50], Disp: , Rfl:     SUMAtriptan (IMITREX) 100 MG tablet, Take 1 tablet by mouth As Needed., Disp: , Rfl:     topiramate (TOPAMAX) 25 MG tablet, Take 1 tablet by mouth Daily., Disp: , Rfl:     VITAMIN D, ERGOCALCIFEROL, PO, Take 50,000 Units by mouth Every 7 (Seven) Days., Disp: , Rfl:     Allergies:   Allergies   Allergen Reactions    Iodine Hives, Shortness Of Breath, Swelling and Rash    Cimzia [Certolizumab Pegol] Hives, GI Intolerance and Headache    Elastic Bandages & [Zinc] Hives    Latex Hives, Itching, Swelling and Rash     Gloves     Tape Hives     PAPER TAPE    Dilantin [Phenytoin] Unknown - Low Severity     Skin crawling    Pollen Extract Rash    Tree Extract Other (See Comments)     CONGESTION       Family History:   Family History   Problem Relation Age of Onset    Heart disease Mother     Multiple sclerosis Mother     Diabetes Mother     Arthritis Mother     Asthma Mother     Early death Mother     Kidney disease Mother     Hypertension Father     Obesity Father     Diabetes Father     Sleep apnea Father     No Known Problems Sister     Breast cancer Maternal Grandmother     Diabetes Maternal Grandmother     Dementia  "Maternal Grandmother     Asthma Maternal Grandfather     Diabetes Maternal Grandfather     Sleep apnea Maternal Grandfather     Arthritis Maternal Grandfather     Heart disease Maternal Grandfather     Colon cancer Paternal Grandmother     Dementia Paternal Grandmother     Heart attack Paternal Grandfather     Sleep apnea Paternal Grandfather     Obesity Paternal Grandfather     Hypertension Paternal Grandfather     Diabetes Paternal Grandfather        Social History:   Social History     Socioeconomic History    Marital status:    Tobacco Use    Smoking status: Never    Smokeless tobacco: Never   Vaping Use    Vaping status: Never Used   Substance and Sexual Activity    Alcohol use: Not Currently     Comment: rare    Drug use: No    Sexual activity: Yes     Partners: Male     Birth control/protection: None         Objective     Vital Signs  /80   Pulse 72   Temp 97.3 °F (36.3 °C)   Ht 167.6 cm (66\")   Wt 111 kg (245 lb)   SpO2 99%   BMI 39.54 kg/m²   Estimated body mass index is 39.54 kg/m² as calculated from the following:    Height as of this encounter: 167.6 cm (66\").    Weight as of this encounter: 111 kg (245 lb).            Physical Exam  Vitals and nursing note reviewed.   Constitutional:       General: She is not in acute distress.     Appearance: Normal appearance.   HENT:      Head: Normocephalic and atraumatic.      Right Ear: Tympanic membrane normal.      Left Ear: Tympanic membrane normal.      Nose: Nose normal.      Mouth/Throat:      Mouth: Mucous membranes are moist.   Eyes:      Extraocular Movements: Extraocular movements intact.      Conjunctiva/sclera: Conjunctivae normal.      Pupils: Pupils are equal, round, and reactive to light.   Cardiovascular:      Rate and Rhythm: Normal rate and regular rhythm.      Heart sounds: Normal heart sounds.   Pulmonary:      Effort: Pulmonary effort is normal. No respiratory distress.      Breath sounds: Normal breath sounds.   Abdominal: "      General: Bowel sounds are normal.      Palpations: Abdomen is soft.   Musculoskeletal:         General: Normal range of motion.      Comments: Moving all extremities    Skin:     General: Skin is warm and dry.   Neurological:      General: No focal deficit present.      Mental Status: She is alert and oriented to person, place, and time.   Psychiatric:         Mood and Affect: Mood normal.         Behavior: Behavior normal.         Thought Content: Thought content normal.         Judgment: Judgment normal.          Procedures     Assessment and Plan   Diagnosis Discussed   Continue to monitor   Plenty of fluids, monitor diet and exercise   Labs ordered will notify of results   Immunizations up to date   Follow up with GYN as directed   Follow up with Bariatrics   Follow up with Trina Hernanedz as directed   Take medications as instructed  Follow up as directed   If symptoms worsen or persist please seek further evaluation     1. Wellness examination    2. Screening for endocrine, nutritional, metabolic and immunity disorder  - CBC (No Diff); Future  - Comprehensive Metabolic Panel; Future  - Hemoglobin A1c; Future  - TSH Rfx On Abnormal To Free T4; Future    3. Screening, lipid  - Lipid Panel; Future    4. Mild intermittent asthma with acute exacerbation  - montelukast (SINGULAIR) 10 MG tablet; Take 1 tablet by mouth Every Night.  Dispense: 90 tablet; Refill: 3       Follow Up  Return in about 6 months (around 1/5/2025), or if symptoms worsen or fail to improve, for Recheck- follow up meds .    Noni Lopez MD  [facility] INTERNAL MEDICINE  [facility] [facility] [address] [phone]

## 2024-07-05 NOTE — PATIENT INSTRUCTIONS
Diagnosis Discussed   Continue to monitor   Plenty of fluids, monitor diet and exercise   Labs ordered will notify of results   Immunizations up to date   Follow up with GYN as directed   Follow up with Bariatrics   Follow up with Trina Hernandez as directed   Take medications as instructed  Follow up as directed   If symptoms worsen or persist please seek further evaluation

## 2024-07-08 DIAGNOSIS — F43.23 ADJUSTMENT DISORDER WITH MIXED ANXIETY AND DEPRESSED MOOD: ICD-10-CM

## 2024-07-08 RX ORDER — BUSPIRONE HYDROCHLORIDE 10 MG/1
TABLET ORAL
Qty: 90 TABLET | Refills: 2 | OUTPATIENT
Start: 2024-07-08

## 2024-07-19 RX ORDER — FLUCONAZOLE 150 MG/1
150 TABLET ORAL
Qty: 2 TABLET | Refills: 0 | Status: SHIPPED | OUTPATIENT
Start: 2024-07-19

## 2024-07-31 ENCOUNTER — OFFICE VISIT (OUTPATIENT)
Dept: BEHAVIORAL HEALTH | Facility: CLINIC | Age: 34
End: 2024-07-31
Payer: COMMERCIAL

## 2024-07-31 ENCOUNTER — OFFICE VISIT (OUTPATIENT)
Dept: INTERNAL MEDICINE | Facility: CLINIC | Age: 34
End: 2024-07-31
Payer: COMMERCIAL

## 2024-07-31 ENCOUNTER — LAB (OUTPATIENT)
Dept: LAB | Facility: HOSPITAL | Age: 34
End: 2024-07-31
Payer: COMMERCIAL

## 2024-07-31 VITALS
HEART RATE: 59 BPM | OXYGEN SATURATION: 98 % | DIASTOLIC BLOOD PRESSURE: 68 MMHG | TEMPERATURE: 97.5 F | BODY MASS INDEX: 37.77 KG/M2 | SYSTOLIC BLOOD PRESSURE: 104 MMHG | WEIGHT: 235 LBS | HEIGHT: 66 IN

## 2024-07-31 DIAGNOSIS — F32.2 CURRENT SEVERE EPISODE OF MAJOR DEPRESSIVE DISORDER WITHOUT PSYCHOTIC FEATURES, UNSPECIFIED WHETHER RECURRENT: ICD-10-CM

## 2024-07-31 DIAGNOSIS — N92.6 IRREGULAR PERIODS: ICD-10-CM

## 2024-07-31 DIAGNOSIS — R55 SYNCOPE, UNSPECIFIED SYNCOPE TYPE: Primary | ICD-10-CM

## 2024-07-31 DIAGNOSIS — D64.9 LOW HEMOGLOBIN: ICD-10-CM

## 2024-07-31 DIAGNOSIS — F43.21 GRIEF: ICD-10-CM

## 2024-07-31 DIAGNOSIS — R55 SYNCOPE, UNSPECIFIED SYNCOPE TYPE: ICD-10-CM

## 2024-07-31 DIAGNOSIS — F41.1 GENERALIZED ANXIETY DISORDER: Primary | ICD-10-CM

## 2024-07-31 LAB
ALBUMIN SERPL-MCNC: 4.1 G/DL (ref 3.5–5.2)
ALBUMIN/GLOB SERPL: 1.1 G/DL
ALP SERPL-CCNC: 108 U/L (ref 39–117)
ALT SERPL W P-5'-P-CCNC: 9 U/L (ref 1–33)
ANION GAP SERPL CALCULATED.3IONS-SCNC: 12.8 MMOL/L (ref 5–15)
AST SERPL-CCNC: 19 U/L (ref 1–32)
B-HCG UR QL: NEGATIVE
BASOPHILS # BLD AUTO: 0.04 10*3/MM3 (ref 0–0.2)
BASOPHILS NFR BLD AUTO: 0.5 % (ref 0–1.5)
BILIRUB SERPL-MCNC: 0.7 MG/DL (ref 0–1.2)
BUN SERPL-MCNC: 10 MG/DL (ref 6–20)
BUN/CREAT SERPL: 13.7 (ref 7–25)
CALCIUM SPEC-SCNC: 9.2 MG/DL (ref 8.6–10.5)
CHLORIDE SERPL-SCNC: 106 MMOL/L (ref 98–107)
CO2 SERPL-SCNC: 19.2 MMOL/L (ref 22–29)
CREAT SERPL-MCNC: 0.73 MG/DL (ref 0.57–1)
DEPRECATED RDW RBC AUTO: 39.9 FL (ref 37–54)
EGFRCR SERPLBLD CKD-EPI 2021: 110.8 ML/MIN/1.73
EOSINOPHIL # BLD AUTO: 0.1 10*3/MM3 (ref 0–0.4)
EOSINOPHIL NFR BLD AUTO: 1.3 % (ref 0.3–6.2)
ERYTHROCYTE [DISTWIDTH] IN BLOOD BY AUTOMATED COUNT: 14.1 % (ref 12.3–15.4)
EXPIRATION DATE: NORMAL
FERRITIN SERPL-MCNC: 44.8 NG/ML (ref 13–150)
GLOBULIN UR ELPH-MCNC: 3.7 GM/DL
GLUCOSE SERPL-MCNC: 73 MG/DL (ref 65–99)
HCT VFR BLD AUTO: 39.4 % (ref 34–46.6)
HGB BLD-MCNC: 12.5 G/DL (ref 12–15.9)
IMM GRANULOCYTES # BLD AUTO: 0.02 10*3/MM3 (ref 0–0.05)
IMM GRANULOCYTES NFR BLD AUTO: 0.3 % (ref 0–0.5)
INTERNAL NEGATIVE CONTROL: NORMAL
INTERNAL POSITIVE CONTROL: NORMAL
IRON 24H UR-MRATE: 50 MCG/DL (ref 37–145)
IRON SATN MFR SERPL: 12 % (ref 20–50)
LYMPHOCYTES # BLD AUTO: 2.01 10*3/MM3 (ref 0.7–3.1)
LYMPHOCYTES NFR BLD AUTO: 26.1 % (ref 19.6–45.3)
Lab: NORMAL
MCH RBC QN AUTO: 25.2 PG (ref 26.6–33)
MCHC RBC AUTO-ENTMCNC: 31.7 G/DL (ref 31.5–35.7)
MCV RBC AUTO: 79.3 FL (ref 79–97)
MONOCYTES # BLD AUTO: 0.47 10*3/MM3 (ref 0.1–0.9)
MONOCYTES NFR BLD AUTO: 6.1 % (ref 5–12)
NEUTROPHILS NFR BLD AUTO: 5.07 10*3/MM3 (ref 1.7–7)
NEUTROPHILS NFR BLD AUTO: 65.7 % (ref 42.7–76)
NRBC BLD AUTO-RTO: 0 /100 WBC (ref 0–0.2)
PLATELET # BLD AUTO: 312 10*3/MM3 (ref 140–450)
PMV BLD AUTO: 10.2 FL (ref 6–12)
POTASSIUM SERPL-SCNC: 3.8 MMOL/L (ref 3.5–5.2)
PROT SERPL-MCNC: 7.8 G/DL (ref 6–8.5)
RBC # BLD AUTO: 4.97 10*6/MM3 (ref 3.77–5.28)
SODIUM SERPL-SCNC: 138 MMOL/L (ref 136–145)
TIBC SERPL-MCNC: 431 MCG/DL (ref 298–536)
TRANSFERRIN SERPL-MCNC: 289 MG/DL (ref 200–360)
WBC NRBC COR # BLD AUTO: 7.71 10*3/MM3 (ref 3.4–10.8)

## 2024-07-31 PROCEDURE — 99214 OFFICE O/P EST MOD 30 MIN: CPT | Performed by: FAMILY MEDICINE

## 2024-07-31 PROCEDURE — 80053 COMPREHEN METABOLIC PANEL: CPT

## 2024-07-31 PROCEDURE — 36415 COLL VENOUS BLD VENIPUNCTURE: CPT

## 2024-07-31 PROCEDURE — 81025 URINE PREGNANCY TEST: CPT | Performed by: FAMILY MEDICINE

## 2024-07-31 PROCEDURE — 84466 ASSAY OF TRANSFERRIN: CPT

## 2024-07-31 PROCEDURE — 85025 COMPLETE CBC W/AUTO DIFF WBC: CPT

## 2024-07-31 PROCEDURE — 82728 ASSAY OF FERRITIN: CPT

## 2024-07-31 PROCEDURE — 83540 ASSAY OF IRON: CPT

## 2024-07-31 PROCEDURE — 84702 CHORIONIC GONADOTROPIN TEST: CPT

## 2024-07-31 NOTE — PROGRESS NOTES
[facility]                 Follow Up Adult      Follow Up Adult Note     Date:2024   Patient Name: Eliz Eisenberg  : 1990   MRN: [id number]   Time IN: 10:00 am        Time OUT: 10:47 am     Referring Provider: Noni Lopez MD    Chief Complaint:      ICD-10-CM ICD-9-CM   1. Generalized anxiety disorder  F41.1 300.02   2. Current severe episode of major depressive disorder without psychotic features, unspecified whether recurrent  F32.2 296.23   3. Grief  F43.21 309.0        History of Present Illness:   Eliz Eisenberg is a 34 y.o. female who is being seen today for follow up counseling for anxiety, depression and grief.    Subjective               Patient's Support Network Includes: extended family    Functional Status: Mild impairment       Current Outpatient Medications:     albuterol sulfate  (90 Base) MCG/ACT inhaler, Inhale 2 puffs Every 4 (Four) Hours As Needed for Wheezing., Disp: 18 g, Rfl: 2    busPIRone (BUSPAR) 10 MG tablet, Take 1 tablet by mouth 3 (Three) Times a Day., Disp: 90 tablet, Rfl: 2    cetirizine (zyrTEC) 10 MG tablet, Take 1 tablet by mouth Daily., Disp: , Rfl:     EPINEPHrine (EPIPEN) 0.3 MG/0.3ML solution auto-injector injection, As Needed., Disp: , Rfl:     fluconazole (Diflucan) 150 MG tablet, Take 1 tablet by mouth Every 3 (Three) Days. For 2 doses., Disp: 2 tablet, Rfl: 0    fluticasone (Flonase) 50 MCG/ACT nasal spray, 2 sprays into the nostril(s) as directed by provider Daily., Disp: 16 g, Rfl: 5    metFORMIN (GLUCOPHAGE) 500 MG tablet, TAKE TWO TABLETS BY MOUTH EVERY MORNING AND TAKE ONE TABLET BY MOUTH EVERY EVENING WITH MEALS, Disp: 270 tablet, Rfl: 3    montelukast (SINGULAIR) 10 MG tablet, Take 1 tablet by mouth Every Night., Disp: 90 tablet, Rfl: 3    omeprazole (priLOSEC) 40 MG capsule, Take 1 capsule by mouth Daily for 360 days., Disp: 90 capsule, Rfl: 3    ondansetron ODT (ZOFRAN-ODT) 4 MG  disintegrating tablet, Place 1 tablet on the tongue Every 8 (Eight) Hours As Needed for Nausea or Vomiting., Disp: 8 tablet, Rfl: 0    promethazine (PHENERGAN) 25 MG tablet, TAKE ONE TABLET BY MOUTH EVERY 4 HOURS AS NEEDED FOR NAUSEA, Disp: 10 tablet, Rfl: 0    Stelara 90 MG/ML solution prefilled syringe Injection, INJECT 90MG SUBCUTANEOUSLY AT WEEK 0 & 4 (LOADING DOSES) [L40.50], Disp: , Rfl:     SUMAtriptan (IMITREX) 100 MG tablet, Take 1 tablet by mouth As Needed., Disp: , Rfl:     topiramate (TOPAMAX) 25 MG tablet, Take 1 tablet by mouth Daily., Disp: , Rfl:     VITAMIN D, ERGOCALCIFEROL, PO, Take 50,000 Units by mouth Every 7 (Seven) Days., Disp: , Rfl:     Allergies   Allergen Reactions    Iodine Hives, Shortness Of Breath, Swelling and Rash    Cimzia [Certolizumab Pegol] Hives, GI Intolerance and Headache    Elastic Bandages & [Zinc] Hives    Latex Hives, Itching, Swelling and Rash     Gloves     Tape Hives     PAPER TAPE    Dilantin [Phenytoin] Unknown - Low Severity     Skin crawling    Pollen Extract Rash    Tree Extract Other (See Comments)     CONGESTION       Objective     Physical Exam:  Vital Signs: There were no vitals filed for this visit.  There is no height or weight on file to calculate BMI.     Mental Status Exam:   Hygiene:   good  Cooperation:  Cooperative  Eye Contact:  Good  Psychomotor Behavior:  Appropriate  Affect:  Full range  Mood: normal  Speech:  Normal  Thought Process:  Goal directed  Thought Content:  Normal  Suicidal:  None  Homicidal:  None  Hallucinations:  None  Delusion:  None  Memory:  Intact  Orientation: Time, person, place, situation  Reliability:  good  Insight:  Good  Judgement:  Good  Impulse Control:  Good  Physical/Medical Issues:   See problem list      Assessment / Plan      Diagnosis:  Diagnoses and all orders for this visit:    1. Generalized anxiety disorder (Primary)    2. Current severe episode of major depressive disorder without psychotic features,  unspecified whether recurrent    3. Grief    Patient presented for follow-up with clinician.  Patient reported that their father had recently disclosed that they wished to start seeing other women.  Patient and clinician processed patient's feelings of betrayal and hurt due to the fact that they are not ready to see their father with another man.  Clinician urged patient to discuss feelings with her father, with whom they state they have a stable and supportive relationship.  Patient reported corresponding worsening symptoms of anxiety and depression.  Patient and clinician reviewed coping mechanisms designed to alleviate anxiety and depression.  Clinician utilized active listening and reflective response to convey empathy and support.    Progress toward goal: Not at goal    Prognosis: Good with ongoing treatment    PLAN:  Patient and clinician will continue to utilize a cognitive behavioral intervention which identifies and addresses patient cognitive distortions related to anxiety, depression and grief.  Follow-ups will occur every 21 days and will last from 45 to 60 minutes in duration.    Safety:  Not a safety risk  Risk Assessment: Risk of self-harm acutely is low. Risk of self-harm chronically is also low, but could be further elevated in the event of treatment noncompliance and/or AODA.    Treatment Plan/Goals: Continue supportive psychotherapy efforts and medications as indicated. Treatment and medication options discussed during today's visit. Patient ackowledged and verbally consented to continue with current treatment plan and was educated on the importance of compliance with treatment and follow-up appointments. Patient seems reasonably able to adhere to treatment plan.      Assisted Patient in processing above session content; acknowledged and normalized patient’s thoughts, feelings, and concerns.  Rationalized patient thought process regarding anxiety, depression and angry.      Allowed Patient to  freely discuss issues  without interruption or judgement with unconditional positive regard, active listening skills, and empathy. Therapist provided a safe, confidential environment to facilitate the development of a positive therapeutic relationship and encouraged open, honest communication. Assisted Patient in identifying risk factors which would indicate the need for higher level of care including thoughts to harm self or others and/or self-harming behavior and encouraged Patient to contact this office, call 911, or present to the nearest emergency room should any of these events occur. Discussed crisis intervention services and means to access. Patient adamantly and convincingly denies current suicidal or homicidal ideation or perceptual disturbance. Assisted Patient in processing session content; acknowledged and normalized Patient’s thoughts, feelings, and concerns by utilizing a person-centered approach in efforts to build appropriate rapport and a positive therapeutic relationship with open and honest communication. .     Part of this note may be an electronic transcription/translation of spoken language to printed text using the Dragon Dictation System.       Follow Up:   Return in about 3 weeks (around 8/21/2024).    Tunde Mason LCSW

## 2024-07-31 NOTE — PATIENT INSTRUCTIONS
Diagnosis Discussed   Continue to monitor   Plenty of fluids, monitor diet and exercise   Labs ordered will notify of results   Take medications as instructed  Follow up as directed   If symptoms worsen or persist please seek further evaluation

## 2024-07-31 NOTE — PROGRESS NOTES
Office Note     Name: Eliz Eisenberg    : 1990     MRN: [id number]     Chief Complaint  Dizziness (Pt c/o dizziness x week.)    Subjective     History of Present Illness:  Eliz Eisenberg is a 34 y.o. female who presents today for concerns for dizziness x 1 week   Started last Wednesday- started feeling clammy- grabbed a mini rice krispie treat, orange juice sat down for a few minutes   10 min later felt fine   Then 30 min later started to feel this on and off approx 4 times     Thursday another episode- last approx 5 min   Then self resolves     Denies sob, chest pain, nausea, vomiting, abdominal pain, constipation, diarrhea     Denies anymore stress than normal   Denies strenuous activity, dehydration, sight of blood   Has been eating and drinking ok     Denies LOC, head injuries  Denies changes in medications. No new supplements     Currently actively trying to get pregnant   Previous blood work low hemoglobin- recheck CBC with diff today- anemia?     Review of Systems:   Review of Systems   Constitutional:  Negative for chills and fever.   HENT:  Negative for dental problem, trouble swallowing and voice change.    Eyes:  Negative for visual disturbance.   Respiratory:  Negative for cough and shortness of breath.    Cardiovascular:  Negative for chest pain, palpitations and leg swelling.   Gastrointestinal:  Negative for abdominal pain, blood in stool, constipation, diarrhea, nausea and vomiting.   Genitourinary:  Negative for dysuria and frequency.   Musculoskeletal:  Negative for gait problem.   Skin:  Negative for rash.   Neurological:  Positive for dizziness, syncope and light-headedness. Negative for headache.   Psychiatric/Behavioral:  Negative for dysphoric mood.        Past Medical History:   Past Medical History:   Diagnosis Date    Abdominal pain     Acute sinusitis     Allergic     Allergic rhinitis     Ankle sprain     Anxiety     Arthritis 2017    On Mobic daily    Asthma      Atopic dermatitis     Biliary dyskinesia     Cat bite      resolved    Clotting disorder     Current tear of meniscus     Dysfunctional uterine bleeding     Eczema     Foot pain     GERD (gastroesophageal reflux disease)     Omeprazole daily. remote EGD. No hx of h pylori    Gout     Hearing loss, bilateral     R/T PSORASIS    Helicobacter pylori antibody positive     4/26/23. Rx prevpak    IBS (irritable bowel syndrome)     Influenza     Metabolic syndrome     Migraines     Muscular aches     Obesity     JENN (obstructive sleep apnea) - possible 01/28/2021    Panic attack     PCOS (polycystic ovarian syndrome)     Pharyngitis     Psoriasis     planning to start Taltz    Restless leg syndrome     URI (upper respiratory infection)     Wears glasses        Past Surgical History:   Past Surgical History:   Procedure Laterality Date    BARIATRIC SURGERY  11/06/2023    COLONOSCOPY      ENDOSCOPY N/A 05/15/2023    Procedure: ESOPHAGOGASTRODUODENOSCOPY;  Surgeon: Beatriz Sandy MD;  Location:  [facility];  Service: Bariatric;  Laterality: N/A;    GASTRIC SLEEVE LAPAROSCOPIC N/A 11/06/2023    Procedure: GASTRIC SLEEVE LAPAROSCOPIC WITH DAVINCI ROBOT, ESOPHAGOGASTRODUODENOSCOPY;  Surgeon: Beatriz Sandy MD;  Location:  [facility];  Service: Robotics - DaVinci;  Laterality: N/A;  scope ID: [id number]    LAPAROSCOPIC CHOLECYSTECTOMY  2018    no gallstones    TONSILLECTOMY AND ADENOIDECTOMY N/A 07/09/2021    Procedure: TONSILLECTOMY;  Surgeon: Ran Lemon MD;  Location:  [facility];  Service: ENT;  Laterality: N/A;    UMBILICAL HERNIA REPAIR  2018    w/ cholecystectomy    WISDOM TOOTH EXTRACTION         Immunizations:   Immunization History   Administered Date(s) Administered    COVID-19 (MODERNA) 1st,2nd,3rd Dose Monovalent 08/30/2021, 09/27/2021    COVID-19 (PFIZER) BIVALENT 12+YRS 10/03/2022, 12/07/2022    COVID-19 F23 (PFIZER) 12YRS+ (COMIRNATY) 12/18/2023    Covid-19 (Pfizer) Gray Cap Monovalent  04/18/2022    Fluzone (or Fluarix & Flulaval for VFC) >6mos 10/03/2022    Hepatitis A 11/23/2018    Influenza Injectable Mdck Pf Quad 12/18/2023    Influenza, Unspecified 11/24/2018, 10/30/2019, 11/17/2020, 10/30/2021    Pneumococcal Conjugate 20-Valent (PCV20) 06/07/2023    Pneumococcal Polysaccharide (PPSV23) 04/06/2021    Tdap 01/24/2019, 10/25/2022    flucelvax quad pfs =>4 YRS 10/30/2021        Medications:     Current Outpatient Medications:     albuterol sulfate  (90 Base) MCG/ACT inhaler, Inhale 2 puffs Every 4 (Four) Hours As Needed for Wheezing., Disp: 18 g, Rfl: 2    busPIRone (BUSPAR) 10 MG tablet, Take 1 tablet by mouth 3 (Three) Times a Day., Disp: 90 tablet, Rfl: 2    cetirizine (zyrTEC) 10 MG tablet, Take 1 tablet by mouth Daily., Disp: , Rfl:     EPINEPHrine (EPIPEN) 0.3 MG/0.3ML solution auto-injector injection, As Needed., Disp: , Rfl:     fluconazole (Diflucan) 150 MG tablet, Take 1 tablet by mouth Every 3 (Three) Days. For 2 doses., Disp: 2 tablet, Rfl: 0    fluticasone (Flonase) 50 MCG/ACT nasal spray, 2 sprays into the nostril(s) as directed by provider Daily., Disp: 16 g, Rfl: 5    metFORMIN (GLUCOPHAGE) 500 MG tablet, TAKE TWO TABLETS BY MOUTH EVERY MORNING AND TAKE ONE TABLET BY MOUTH EVERY EVENING WITH MEALS, Disp: 270 tablet, Rfl: 3    montelukast (SINGULAIR) 10 MG tablet, Take 1 tablet by mouth Every Night., Disp: 90 tablet, Rfl: 3    omeprazole (priLOSEC) 40 MG capsule, Take 1 capsule by mouth Daily for 360 days., Disp: 90 capsule, Rfl: 3    ondansetron ODT (ZOFRAN-ODT) 4 MG disintegrating tablet, Place 1 tablet on the tongue Every 8 (Eight) Hours As Needed for Nausea or Vomiting., Disp: 8 tablet, Rfl: 0    promethazine (PHENERGAN) 25 MG tablet, TAKE ONE TABLET BY MOUTH EVERY 4 HOURS AS NEEDED FOR NAUSEA, Disp: 10 tablet, Rfl: 0    Stelara 90 MG/ML solution prefilled syringe Injection, INJECT 90MG SUBCUTANEOUSLY AT WEEK 0 & 4 (LOADING DOSES) [L40.50], Disp: , Rfl:     SUMAtriptan  (IMITREX) 100 MG tablet, Take 1 tablet by mouth As Needed., Disp: , Rfl:     topiramate (TOPAMAX) 25 MG tablet, Take 1 tablet by mouth Daily., Disp: , Rfl:     VITAMIN D, ERGOCALCIFEROL, PO, Take 50,000 Units by mouth Every 7 (Seven) Days., Disp: , Rfl:     Allergies:   Allergies   Allergen Reactions    Iodine Hives, Shortness Of Breath, Swelling and Rash    Cimzia [Certolizumab Pegol] Hives, GI Intolerance and Headache    Elastic Bandages & [Zinc] Hives    Latex Hives, Itching, Swelling and Rash     Gloves     Tape Hives     PAPER TAPE    Dilantin [Phenytoin] Unknown - Low Severity     Skin crawling    Pollen Extract Rash    Tree Extract Other (See Comments)     CONGESTION       Family History:   Family History   Problem Relation Age of Onset    Heart disease Mother     Multiple sclerosis Mother     Diabetes Mother     Arthritis Mother     Asthma Mother     Early death Mother     Kidney disease Mother     Hypertension Father     Obesity Father     Diabetes Father     Sleep apnea Father     No Known Problems Sister     Breast cancer Maternal Grandmother     Diabetes Maternal Grandmother     Dementia Maternal Grandmother     Asthma Maternal Grandfather     Diabetes Maternal Grandfather     Sleep apnea Maternal Grandfather     Arthritis Maternal Grandfather     Heart disease Maternal Grandfather     Colon cancer Paternal Grandmother     Dementia Paternal Grandmother     Heart attack Paternal Grandfather     Sleep apnea Paternal Grandfather     Obesity Paternal Grandfather     Hypertension Paternal Grandfather     Diabetes Paternal Grandfather        Social History:   Social History     Socioeconomic History    Marital status:    Tobacco Use    Smoking status: Never    Smokeless tobacco: Never   Vaping Use    Vaping status: Never Used   Substance and Sexual Activity    Alcohol use: Not Currently     Comment: rare    Drug use: No    Sexual activity: Yes     Partners: Male     Birth control/protection: None  "        Objective     Vital Signs  /68   Pulse 59   Temp 97.5 °F (36.4 °C)   Ht 167.6 cm (66\")   Wt 107 kg (235 lb)   SpO2 98%   BMI 37.93 kg/m²   Estimated body mass index is 37.93 kg/m² as calculated from the following:    Height as of this encounter: 167.6 cm (66\").    Weight as of this encounter: 107 kg (235 lb).            Physical Exam  Vitals and nursing note reviewed.   Constitutional:       General: She is not in acute distress.     Appearance: Normal appearance.   HENT:      Head: Normocephalic and atraumatic.      Right Ear: Tympanic membrane normal.      Left Ear: Tympanic membrane normal.      Nose: Nose normal.      Mouth/Throat:      Mouth: Mucous membranes are moist.   Eyes:      Extraocular Movements: Extraocular movements intact.      Conjunctiva/sclera: Conjunctivae normal.      Pupils: Pupils are equal, round, and reactive to light.   Cardiovascular:      Rate and Rhythm: Normal rate and regular rhythm.      Heart sounds: Normal heart sounds.   Pulmonary:      Effort: Pulmonary effort is normal.      Breath sounds: Normal breath sounds.   Abdominal:      General: Bowel sounds are normal.      Palpations: Abdomen is soft.   Musculoskeletal:         General: Normal range of motion.   Skin:     General: Skin is warm and dry.   Neurological:      General: No focal deficit present.      Mental Status: She is alert and oriented to person, place, and time.   Psychiatric:         Mood and Affect: Mood normal.          Procedures     Assessment and Plan   Diagnosis Discussed   Continue to monitor   Plenty of fluids, monitor diet and exercise   Labs ordered will notify of results   Take medications as instructed  Follow up as directed   If symptoms worsen or persist please seek further evaluation     1. Syncope, unspecified syncope type  - Comprehensive metabolic panel; Future  - CBC & Differential; Future  - Iron Profile; Future  - Ferritin; Future  - POCT pregnancy, urine    2. Low " hemoglobin  - CBC & Differential; Future  - Iron Profile; Future  - Ferritin; Future       Follow Up  Return if symptoms worsen or fail to improve.    Noni Lopez MD  [facility]  [address] [place] [id number] [phone]

## 2024-08-01 LAB — HCG INTACT+B SERPL-ACNC: <1 MIU/ML

## 2024-08-18 ENCOUNTER — HOSPITAL ENCOUNTER (EMERGENCY)
Facility: HOSPITAL | Age: 34
Discharge: HOME | End: 2024-08-19
Attending: EMERGENCY MEDICINE | Admitting: EMERGENCY MEDICINE
Payer: COMMERCIAL

## 2024-08-18 DIAGNOSIS — R55 NEAR SYNCOPE: Primary | ICD-10-CM

## 2024-08-18 PROCEDURE — 85025 COMPLETE CBC W/AUTO DIFF WBC: CPT

## 2024-08-18 PROCEDURE — 84484 ASSAY OF TROPONIN QUANT: CPT

## 2024-08-18 PROCEDURE — 80053 COMPREHEN METABOLIC PANEL: CPT

## 2024-08-18 PROCEDURE — 93005 ELECTROCARDIOGRAM TRACING: CPT

## 2024-08-18 PROCEDURE — 36415 COLL VENOUS BLD VENIPUNCTURE: CPT

## 2024-08-18 PROCEDURE — 99283 EMERGENCY DEPT VISIT LOW MDM: CPT

## 2024-08-19 VITALS
OXYGEN SATURATION: 99 % | HEIGHT: 66 IN | DIASTOLIC BLOOD PRESSURE: 82 MMHG | BODY MASS INDEX: 37.93 KG/M2 | TEMPERATURE: 98.6 F | HEART RATE: 58 BPM | SYSTOLIC BLOOD PRESSURE: 117 MMHG | RESPIRATION RATE: 18 BRPM | WEIGHT: 236 LBS

## 2024-08-19 LAB
ALBUMIN SERPL-MCNC: 3.9 G/DL (ref 3.5–5.2)
ALBUMIN/GLOB SERPL: 1.2 G/DL
ALP SERPL-CCNC: 116 U/L (ref 39–117)
ALT SERPL W P-5'-P-CCNC: 8 U/L (ref 1–33)
ANION GAP SERPL CALCULATED.3IONS-SCNC: 11.9 MMOL/L (ref 5–15)
AST SERPL-CCNC: 13 U/L (ref 1–32)
BASOPHILS # BLD AUTO: 0.02 10*3/MM3 (ref 0–0.2)
BASOPHILS NFR BLD AUTO: 0.2 % (ref 0–1.5)
BILIRUB SERPL-MCNC: 0.4 MG/DL (ref 0–1.2)
BUN SERPL-MCNC: 13 MG/DL (ref 6–20)
BUN/CREAT SERPL: 17.8 (ref 7–25)
CALCIUM SPEC-SCNC: 8.9 MG/DL (ref 8.6–10.5)
CHLORIDE SERPL-SCNC: 108 MMOL/L (ref 98–107)
CO2 SERPL-SCNC: 24.1 MMOL/L (ref 22–29)
CREAT SERPL-MCNC: 0.73 MG/DL (ref 0.57–1)
DEPRECATED RDW RBC AUTO: 44.6 FL (ref 37–54)
EGFRCR SERPLBLD CKD-EPI 2021: 110.8 ML/MIN/1.73
EOSINOPHIL # BLD AUTO: 0.14 10*3/MM3 (ref 0–0.4)
EOSINOPHIL NFR BLD AUTO: 1.7 % (ref 0.3–6.2)
ERYTHROCYTE [DISTWIDTH] IN BLOOD BY AUTOMATED COUNT: 14.7 % (ref 12.3–15.4)
GLOBULIN UR ELPH-MCNC: 3.3 GM/DL
GLUCOSE SERPL-MCNC: 114 MG/DL (ref 65–99)
HCT VFR BLD AUTO: 36.8 % (ref 34–46.6)
HGB BLD-MCNC: 11.5 G/DL (ref 12–15.9)
IMM GRANULOCYTES # BLD AUTO: 0.01 10*3/MM3 (ref 0–0.05)
IMM GRANULOCYTES NFR BLD AUTO: 0.1 % (ref 0–0.5)
LYMPHOCYTES # BLD AUTO: 3.02 10*3/MM3 (ref 0.7–3.1)
LYMPHOCYTES NFR BLD AUTO: 36.4 % (ref 19.6–45.3)
MCH RBC QN AUTO: 25.8 PG (ref 26.6–33)
MCHC RBC AUTO-ENTMCNC: 31.3 G/DL (ref 31.5–35.7)
MCV RBC AUTO: 82.5 FL (ref 79–97)
MONOCYTES # BLD AUTO: 0.52 10*3/MM3 (ref 0.1–0.9)
MONOCYTES NFR BLD AUTO: 6.3 % (ref 5–12)
NEUTROPHILS NFR BLD AUTO: 4.58 10*3/MM3 (ref 1.7–7)
NEUTROPHILS NFR BLD AUTO: 55.3 % (ref 42.7–76)
PLATELET # BLD AUTO: 291 10*3/MM3 (ref 140–450)
PMV BLD AUTO: 10.3 FL (ref 6–12)
POTASSIUM SERPL-SCNC: 4.1 MMOL/L (ref 3.5–5.2)
PROT SERPL-MCNC: 7.2 G/DL (ref 6–8.5)
QT INTERVAL: 444 MS
QTC INTERVAL: 446 MS
RBC # BLD AUTO: 4.46 10*6/MM3 (ref 3.77–5.28)
SODIUM SERPL-SCNC: 144 MMOL/L (ref 136–145)
TROPONIN T SERPL HS-MCNC: <6 NG/L
WBC NRBC COR # BLD AUTO: 8.29 10*3/MM3 (ref 3.4–10.8)

## 2024-08-19 RX ORDER — MECLIZINE HYDROCHLORIDE 25 MG/1
25 TABLET ORAL 3 TIMES DAILY PRN
Qty: 15 TABLET | Refills: 0 | Status: SHIPPED | OUTPATIENT
Start: 2024-08-19 | End: 2024-08-24

## 2024-08-19 RX ORDER — MECLIZINE HYDROCHLORIDE 25 MG/1
25 TABLET ORAL ONCE
Status: COMPLETED | OUTPATIENT
Start: 2024-08-19 | End: 2024-08-19

## 2024-08-19 RX ADMIN — MECLIZINE HYDROCHLORIDE 25 MG: 25 TABLET ORAL at 00:45

## 2024-08-19 NOTE — FSED PROVIDER NOTE
Subjective  History of Present Illness:    34-year-old female presents emergency department with complaints of hypotension and near syncope.  Patient states she chronically has vasovagal episodes lasting 1 to 5 minutes resulting in near syncope, but tonight, the episode has lasted well over an hour.  She stated she was sitting in a chair at home when the episode came on abruptly leading to lightheadedness and dizziness.  She took her blood pressure at home and recorded 105/62.  She then called her PCP who recommended she present to the emergency department for further evaluation.  Patient is still symptomatic in the emergency department, but denies loss of consciousness or syncope.  Patient states she cannot identify any triggers to these episodes, nothing seems to aggravate symptoms, and closing her eyes seems to relieve symptoms.  Patient denies any treatment prior to arrival.  Patient also endorses associated blurred vision and increased stress.  Patient denies nausea vomiting or diarrhea.  Patient denies chest pain palpitations or shortness of breath.  Patient states she has had plenty to eat and drink today, denies dehydration or increase in activity.  Patient denies any recent head injury or medication change.    Nurses Notes reviewed and agree, including vitals, allergies, social history and prior medical history.     REVIEW OF SYSTEMS: All systems reviewed and not pertinent unless noted.  Review of Systems    Past Medical History:   Diagnosis Date    Abdominal pain     Acute sinusitis     Allergic     Allergic rhinitis     Ankle sprain     Anxiety     Arthritis 2017    On Mobic daily    Asthma     Atopic dermatitis     Biliary dyskinesia     Cat bite      resolved    Clotting disorder     Current tear of meniscus     Dysfunctional uterine bleeding     Eczema     Foot pain     GERD (gastroesophageal reflux disease)     Omeprazole daily. remote EGD. No hx of h pylori    Gout     Hearing loss, bilateral     R/T  PSORASIS    Helicobacter pylori antibody positive     4/26/23. Rx prevpak    IBS (irritable bowel syndrome)     Influenza     Metabolic syndrome     Migraines     Muscular aches     Obesity     JENN (obstructive sleep apnea) - possible 01/28/2021    Panic attack     PCOS (polycystic ovarian syndrome)     Pharyngitis     Psoriasis     planning to start Taltz    Restless leg syndrome     URI (upper respiratory infection)     Wears glasses        Allergies:    Iodine, Cimzia [certolizumab pegol], Elastic bandages & [zinc], Latex, Tape, Dilantin [phenytoin], Pollen extract, and Tree extract      Past Surgical History:   Procedure Laterality Date    BARIATRIC SURGERY  11/06/2023    COLONOSCOPY      ENDOSCOPY N/A 05/15/2023    Procedure: ESOPHAGOGASTRODUODENOSCOPY;  Surgeon: Beatriz Sandy MD;  Location:  [facility];  Service: Bariatric;  Laterality: N/A;    GASTRIC SLEEVE LAPAROSCOPIC N/A 11/06/2023    Procedure: GASTRIC SLEEVE LAPAROSCOPIC WITH DAVINCI ROBOT, ESOPHAGOGASTRODUODENOSCOPY;  Surgeon: Beatriz Sandy MD;  Location:  [facility];  Service: Robotics - DaVinci;  Laterality: N/A;  scope ID: [id number]    LAPAROSCOPIC CHOLECYSTECTOMY  2018    no gallstones    TONSILLECTOMY AND ADENOIDECTOMY N/A 07/09/2021    Procedure: TONSILLECTOMY;  Surgeon: Ran Lemon MD;  Location:  [facility];  Service: ENT;  Laterality: N/A;    UMBILICAL HERNIA REPAIR  2018    w/ cholecystectomy    WISDOM TOOTH EXTRACTION           Social History     Socioeconomic History    Marital status:    Tobacco Use    Smoking status: Never    Smokeless tobacco: Never   Vaping Use    Vaping status: Never Used   Substance and Sexual Activity    Alcohol use: Not Currently     Comment: rare    Drug use: No    Sexual activity: Yes     Partners: Male     Birth control/protection: None         Family History   Problem Relation Age of Onset    Heart disease Mother     Multiple sclerosis Mother     Diabetes Mother     Arthritis Mother   "   Asthma Mother     Early death Mother     Kidney disease Mother     Hypertension Father     Obesity Father     Diabetes Father     Sleep apnea Father     No Known Problems Sister     Breast cancer Maternal Grandmother     Diabetes Maternal Grandmother     Dementia Maternal Grandmother     Asthma Maternal Grandfather     Diabetes Maternal Grandfather     Sleep apnea Maternal Grandfather     Arthritis Maternal Grandfather     Heart disease Maternal Grandfather     Colon cancer Paternal Grandmother     Dementia Paternal Grandmother     Heart attack Paternal Grandfather     Sleep apnea Paternal Grandfather     Obesity Paternal Grandfather     Hypertension Paternal Grandfather     Diabetes Paternal Grandfather        Objective  Physical Exam:  /84   Pulse 67   Temp 98.6 °F (37 °C)   Resp 18   Ht 167.6 cm (66\")   Wt 107 kg (236 lb)   LMP 08/11/2024 (Approximate)   SpO2 100%   BMI 38.09 kg/m²      Physical Exam  Vitals and nursing note reviewed.   Constitutional:       General: She is not in acute distress.     Appearance: Normal appearance. She is not ill-appearing, toxic-appearing or diaphoretic.   HENT:      Head: Normocephalic and atraumatic.      Right Ear: External ear normal.      Left Ear: External ear normal.      Nose: Nose normal.      Mouth/Throat:      Mouth: Mucous membranes are moist.      Pharynx: Oropharynx is clear.   Eyes:      Extraocular Movements: Extraocular movements intact.      Conjunctiva/sclera: Conjunctivae normal.      Pupils: Pupils are equal, round, and reactive to light.   Cardiovascular:      Rate and Rhythm: Normal rate and regular rhythm.      Heart sounds: Murmur heard.   Pulmonary:      Effort: Pulmonary effort is normal.      Breath sounds: Normal breath sounds. No wheezing, rhonchi or rales.   Musculoskeletal:         General: No swelling or deformity. Normal range of motion.      Cervical back: Normal range of motion and neck supple.      Right lower leg: No edema. "      Left lower leg: No edema.   Skin:     General: Skin is warm and dry.      Capillary Refill: Capillary refill takes less than 2 seconds.   Neurological:      General: No focal deficit present.      Mental Status: She is alert and oriented to person, place, and time.      Cranial Nerves: No cranial nerve deficit.      Sensory: No sensory deficit.      Motor: No weakness.      Coordination: Coordination normal.      Gait: Gait normal.   Psychiatric:         Mood and Affect: Mood normal.         Thought Content: Thought content normal.         Judgment: Judgment normal.         Procedures    ED Course:  34-year-old female presents to emergency department with complaints of near syncopal episode, symptomatic in emergency department.  Patient recorded blood pressure of 105/62 at home.  On arrival patient hemodynamically stable afebrile and not in acute distress.  Physical exam unremarkable, no focal deficits appreciated.  Normal sensation motor and coordination.  New onset murmur appreciated.  Lab analysis unremarkable, no signs of leukocytosis or anemia.  No electrolyte abnormalities appreciated, undetectable high-sensitivity troponin and ECG with no ST or T wave changes.  Discussed findings with patient, discussed that although workup was negative that further investigation into the cause of her near syncope is warranted.  Recommended outpatient follow-up with PCP within the next 7 to 10 days for recheck of todays complaint.  Provided referral to cardiology for Holter monitor placement.  Provided patient with oral meclizine which seem to mildly relieve patient's dizziness, sent patient home with meclizine prescription.  Discussed strict return precautions.  Patient understanding agreement.  Discharge.  ED Course as of 08/19/24 0001   Sun Aug 18, 2024   9788 EKG interpreted by ED physician normal sinus rhythm rate of 61 normal axis normal intervals no ST segment elevation or depression [DM]      ED Course User  Index  [DM] Justyn Strong, DO       Lab Results (last 24 hours)       ** No results found for the last 24 hours. **             No radiology results from the last 24 hrs       MDM      DDX: includes but is not limited to: Arrhythmia, near syncope, syncope, dehydration, panic disorder, hypotension, vasovagal    Patient arrives hemodynamically stable, not in acute distress and afebrile with vitals interpreted by myself.     Pertinent features from physical exam: New onset murmur appreciated, otherwise, benign physical exam.  No neurological deficits appreciated, normal strength and coordination.  Normal cranial nerves.    Initial diagnostic plan: EKG, CMP, CBC, high-sensitivity troponin    Results from initial plan were reviewed and interpreted by me revealing benign workup.      Medications - No data to display    Results/clinical rationale were discussed with attending physician, Dr. Strong      Data interpreted: Nursing notes reviewed, vital signs reviewed.  Labs independently interpreted by me (CBC, CMP, lipase, UA, troponin, ABG, lactic acid, procalcitonin).  Imaging independently interpreted by me (x-ray, CT scan).  EKG independently interpreted by me.  O2 saturation: 99%    Counseling: Discussed the results above with the patient regarding need for admission or discharge.  Patient understands and agrees plan of care.      -----  ED Disposition       None          Final diagnoses:   None     Your Follow-Up Providers    Follow-up information has not been specified.       Contact information for after-discharge care    Follow-up information has not been specified.          Your medication list        ASK your doctor about these medications        Instructions Last Dose Given Next Dose Due   albuterol sulfate  (90 Base) MCG/ACT inhaler  Commonly known as: PROVENTIL HFA;VENTOLIN HFA;PROAIR HFA      Inhale 2 puffs Every 4 (Four) Hours As Needed for Wheezing.       busPIRone 10 MG tablet  Commonly known as:  BUSPAR      Take 1 tablet by mouth 3 (Three) Times a Day.       cetirizine 10 MG tablet  Commonly known as: zyrTEC      Take 1 tablet by mouth Daily.       EPINEPHrine 0.3 MG/0.3ML solution auto-injector injection  Commonly known as: EPIPEN      As Needed.       fluconazole 150 MG tablet  Commonly known as: Diflucan      Take 1 tablet by mouth Every 3 (Three) Days. For 2 doses.       fluticasone 50 MCG/ACT nasal spray  Commonly known as: Flonase      2 sprays into the nostril(s) as directed by provider Daily.       metFORMIN 500 MG tablet  Commonly known as: GLUCOPHAGE      TAKE TWO TABLETS BY MOUTH EVERY MORNING AND TAKE ONE TABLET BY MOUTH EVERY EVENING WITH MEALS       montelukast 10 MG tablet  Commonly known as: SINGULAIR      Take 1 tablet by mouth Every Night.       omeprazole 40 MG capsule  Commonly known as: priLOSEC      Take 1 capsule by mouth Daily for 360 days.       ondansetron ODT 4 MG disintegrating tablet  Commonly known as: ZOFRAN-ODT      Place 1 tablet on the tongue Every 8 (Eight) Hours As Needed for Nausea or Vomiting.       promethazine 25 MG tablet  Commonly known as: PHENERGAN      TAKE ONE TABLET BY MOUTH EVERY 4 HOURS AS NEEDED FOR NAUSEA       Stelara 90 MG/ML solution prefilled syringe Injection  Generic drug: Ustekinumab      INJECT 90MG SUBCUTANEOUSLY AT WEEK 0 & 4 (LOADING DOSES) [L40.50]       SUMAtriptan 100 MG tablet  Commonly known as: IMITREX      Take 1 tablet by mouth As Needed.       topiramate 25 MG tablet  Commonly known as: TOPAMAX      Take 1 tablet by mouth Daily.       VITAMIN D (ERGOCALCIFEROL) PO      Take 50,000 Units by mouth Every 7 (Seven) Days.

## 2024-08-20 ENCOUNTER — HOSPITAL ENCOUNTER (OUTPATIENT)
Dept: CARDIOLOGY | Facility: HOSPITAL | Age: 34
Discharge: HOME | End: 2024-08-20
Payer: COMMERCIAL

## 2024-08-20 ENCOUNTER — OFFICE VISIT (OUTPATIENT)
Dept: CARDIOLOGY | Facility: HOSPITAL | Age: 34
End: 2024-08-20
Payer: COMMERCIAL

## 2024-08-20 VITALS
DIASTOLIC BLOOD PRESSURE: 73 MMHG | BODY MASS INDEX: 38.09 KG/M2 | WEIGHT: 237 LBS | SYSTOLIC BLOOD PRESSURE: 117 MMHG | HEART RATE: 64 BPM | HEIGHT: 66 IN

## 2024-08-20 DIAGNOSIS — R00.2 PALPITATIONS: ICD-10-CM

## 2024-08-20 DIAGNOSIS — R42 EPISODIC LIGHTHEADEDNESS: ICD-10-CM

## 2024-08-20 DIAGNOSIS — R42 EPISODIC LIGHTHEADEDNESS: Primary | ICD-10-CM

## 2024-08-20 PROCEDURE — 93246 EXT ECG>7D<15D RECORDING: CPT

## 2024-08-20 PROCEDURE — 99214 OFFICE O/P EST MOD 30 MIN: CPT | Performed by: NURSE PRACTITIONER

## 2024-08-20 NOTE — PATIENT INSTRUCTIONS
- Heart monitor for 2 weeks     - Office will schedule testing  - Office will call or send message with testing results

## 2024-08-20 NOTE — PROGRESS NOTES
[facility] Heart Monitor Documentation    Eliz Eisenberg  1990  [id number]  08/20/24      [] ZIO XT Patch  Model [id number] Prescribed for N/A Days    Serial Number: (N + 9 Digits) N   Apply-By Date on Box:   USPS Tracking Number:   USPS Tracking        [] [employer] MINI PLUS Mobile Cardiac Telemetry  Model BGMINIPLUS Prescribed for N/A Days    Serial Number: (BGM + 7 Digits) BGM  Shipped-By Date on Box:   UPS Tracking Number: 1Z  UPS Tracking      [] [employer] MINI Holter Monitor  Model BGMINIEL Prescribed for 14 Days    Serial Number: (7 Digits) [id number]  Shipped-By Date on Box:   UPS Tracking Number: [id number]  UPS Tracking        This monitor was applied to the patient's chest and checked for proper functioning.  Ms. Eliz Eisenberg was instructed in the proper use of this monitor.  She was given the opportunity to ask questions and left the office with the device 's instruction manual.    Kassie Saavedra MA, 09:23 EDT, 08/20/24                  [facility]

## 2024-08-20 NOTE — PROGRESS NOTES
"Chief Complaint  Dizziness    Subjective      History of Present Illness {  Problem List  Visit  Diagnosis   Encounters  Notes  Medications  Labs  Result Review Imaging  Media :23}     Eliz Eisenberg, 34 y.o. female with history of gastric bypass presents to [facility] for Dizziness.    Patient was recently evaluated at [facility] emergency department for complaints of near syncope.  Emergency department work-up revealed normal troponin, and ECGs with no evidence of acute coronary syndrome/arrhythmia. Patient was instructed to follow-up at [facility] heart and valve clinic for further evaluation.    Patient presents today noting ongoing dizziness over the past month. Episodes lasting no more than 5 minutes and no absolute correlation; occur while seated and with activity. Sunday night felt like blood pressure was 105/62 and experienced room spinning; dizziness lasted 2 hours. Did note elevated heartbeat around this time, Apple Watch with reported normal sinus rhythm. Caffeine: minimal, alcohol: rare, hydration: 120oz daily.       Objective     Vital Signs:   Vitals:    08/20/24 0826   BP: 117/73   BP Location: Left arm   Patient Position: Sitting   Pulse: 64   Weight: 108 kg (237 lb)   Height: 167.6 cm (66\")     Body mass index is 38.25 kg/m².  Physical Exam  Vitals and nursing note reviewed.   Constitutional:       Appearance: Normal appearance.   HENT:      Head: Normocephalic.   Eyes:      Extraocular Movements: Extraocular movements intact.   Neck:      Vascular: No carotid bruit.   Cardiovascular:      Rate and Rhythm: Normal rate and regular rhythm.      Pulses: Normal pulses.      Heart sounds: Normal heart sounds, S1 normal and S2 normal. No murmur heard.  Pulmonary:      Effort: Pulmonary effort is normal. No respiratory distress.      Breath sounds: Normal breath sounds.   Musculoskeletal:      Cervical back: Neck supple.      Right lower leg: " No edema.      Left lower leg: No edema.   Skin:     General: Skin is warm and dry.   Neurological:      General: No focal deficit present.      Mental Status: She is alert.   Psychiatric:         Mood and Affect: Mood normal.         Behavior: Behavior normal.         Thought Content: Thought content normal.        Data Reviewed:{ Labs  Result Review  Imaging  Med Tab  Media :23}     ECG 12 Lead Syncope (08/18/2024 23:51)   Single High Sensitivity Troponin T (08/18/2024 23:54)  Comprehensive Metabolic Panel (08/18/2024 23:54)  CBC & Differential (08/18/2024 23:54)  Iron Profile (07/31/2024 09:43)  Lipid Panel (07/05/2024 10:11)  TSH Rfx On Abnormal To Free T4 (07/05/2024 10:11)  Adult Transthoracic Echo Complete W/ Cont if Necessary Per Protocol (08/01/2023 10:40)  Stress Test With Pet Myocardial Perfusion (08/28/2023 08:53)  Holter Monitor - 72 Hour Up To 15 Days (08/18/2023 11:33)       Assessment & Plan   Assessment and Plan {CC Problem List  Visit Diagnosis  ROS  Review (Popup)  Health Maintenance  Quality  BestPractice  Medications  SmartSets  SnapShot Encounters  Media :23}     1. Episodic lightheadedness/dizziness  -Intermittent lightheadedness/dizziness, no syncope.  -Given new onset symptoms we will complete echocardiogram for structural/functional evaluation, Holter monitor for arrhythmia surveillance.  - Adult Transthoracic Echo Complete W/ Cont if Necessary Per Protocol; Future  - Holter Monitor - 14 Days; Future  -Continue meclizine as prescribed    2. Palpitations  -Intermittent palpitation, no syncope  --Given new onset symptoms we will complete echocardiogram for structural/functional evaluation, Holter monitor for arrhythmia surveillance.  - Adult Transthoracic Echo Complete W/ Cont if Necessary Per Protocol; Future  - Holter Monitor - 14 Days; Future      Follow Up {Instructions Charge Capture  Follow-up Communications :23}     Return in about 1 month (around 9/20/2024) for  Office follow-up, Monitor results, Results follow-up.      Patient was given instructions and counseling regarding her condition or for health maintenance advice. Please see specific information pulled into the AVS if appropriate.  Patient was instructed to call the [facility] with any questions, concerns, or worsening symptoms.    Dictated Utilizing Dragon Dictation   Please note that portions of this note were completed with a voice recognition program.   Part of this note may be an electronic transcription/translation of spoken language to printed text using the Dragon Dictation System.

## 2024-08-21 ENCOUNTER — OFFICE VISIT (OUTPATIENT)
Dept: BEHAVIORAL HEALTH | Facility: CLINIC | Age: 34
End: 2024-08-21
Payer: COMMERCIAL

## 2024-08-21 DIAGNOSIS — F41.1 GENERALIZED ANXIETY DISORDER: Primary | ICD-10-CM

## 2024-08-21 DIAGNOSIS — F43.21 GRIEF: ICD-10-CM

## 2024-08-21 DIAGNOSIS — F32.2 CURRENT SEVERE EPISODE OF MAJOR DEPRESSIVE DISORDER WITHOUT PSYCHOTIC FEATURES, UNSPECIFIED WHETHER RECURRENT: ICD-10-CM

## 2024-08-21 NOTE — PROGRESS NOTES
[facility]                 Follow Up Adult      Follow Up Adult Note     Date:2024   Patient Name: Eliz Eisenberg  : 1990   MRN: [id number]   Time IN: 9:00 am    Time OUT: 9:56 am     Referring Provider: Noni Lopez MD    Chief Complaint:      ICD-10-CM ICD-9-CM   1. Generalized anxiety disorder  F41.1 300.02   2. Current severe episode of major depressive disorder without psychotic features, unspecified whether recurrent  F32.2 296.23   3. Grief  F43.21 309.0        History of Present Illness:   Eliz Eisenberg is a 34 y.o. female who is being seen today for follow up counseling for anxiety, depression and grief.    Subjective               Patient's Support Network Includes: extended family    Functional Status: Moderate impairment       Current Outpatient Medications:     albuterol sulfate  (90 Base) MCG/ACT inhaler, Inhale 2 puffs Every 4 (Four) Hours As Needed for Wheezing., Disp: 18 g, Rfl: 2    busPIRone (BUSPAR) 10 MG tablet, Take 1 tablet by mouth 3 (Three) Times a Day., Disp: 90 tablet, Rfl: 2    cetirizine (zyrTEC) 10 MG tablet, Take 1 tablet by mouth Daily., Disp: , Rfl:     EPINEPHrine (EPIPEN) 0.3 MG/0.3ML solution auto-injector injection, As Needed., Disp: , Rfl:     fluconazole (Diflucan) 150 MG tablet, Take 1 tablet by mouth Every 3 (Three) Days. For 2 doses., Disp: 2 tablet, Rfl: 0    fluticasone (Flonase) 50 MCG/ACT nasal spray, 2 sprays into the nostril(s) as directed by provider Daily., Disp: 16 g, Rfl: 5    meclizine (ANTIVERT) 25 MG tablet, Take 1 tablet by mouth 3 (Three) Times a Day As Needed for Dizziness for up to 5 days., Disp: 15 tablet, Rfl: 0    metFORMIN (GLUCOPHAGE) 500 MG tablet, TAKE TWO TABLETS BY MOUTH EVERY MORNING AND TAKE ONE TABLET BY MOUTH EVERY EVENING WITH MEALS, Disp: 270 tablet, Rfl: 3    montelukast (SINGULAIR) 10 MG tablet, Take 1 tablet by mouth Every Night., Disp: 90 tablet, Rfl:  3    omeprazole (priLOSEC) 40 MG capsule, Take 1 capsule by mouth Daily for 360 days., Disp: 90 capsule, Rfl: 3    ondansetron ODT (ZOFRAN-ODT) 4 MG disintegrating tablet, Place 1 tablet on the tongue Every 8 (Eight) Hours As Needed for Nausea or Vomiting., Disp: 8 tablet, Rfl: 0    promethazine (PHENERGAN) 25 MG tablet, TAKE ONE TABLET BY MOUTH EVERY 4 HOURS AS NEEDED FOR NAUSEA, Disp: 10 tablet, Rfl: 0    Stelara 90 MG/ML solution prefilled syringe Injection, INJECT 90MG SUBCUTANEOUSLY AT WEEK 0 & 4 (LOADING DOSES) [L40.50], Disp: , Rfl:     SUMAtriptan (IMITREX) 100 MG tablet, Take 1 tablet by mouth As Needed., Disp: , Rfl:     topiramate (TOPAMAX) 25 MG tablet, Take 1 tablet by mouth Daily., Disp: , Rfl:     VITAMIN D, ERGOCALCIFEROL, PO, Take 50,000 Units by mouth Every 7 (Seven) Days., Disp: , Rfl:     Allergies   Allergen Reactions    Iodine Hives, Shortness Of Breath, Swelling and Rash    Cimzia [Certolizumab Pegol] Hives, GI Intolerance and Headache    Elastic Bandages & [Zinc] Hives    Latex Hives, Itching, Swelling and Rash     Gloves     Tape Hives     PAPER TAPE    Dilantin [Phenytoin] Unknown - Low Severity     Skin crawling    Pollen Extract Rash    Tree Extract Other (See Comments)     CONGESTION       Objective     Physical Exam:  Vital Signs: There were no vitals filed for this visit.  There is no height or weight on file to calculate BMI.     Mental Status Exam:   Hygiene:   good  Cooperation:  Cooperative  Eye Contact:  Good  Psychomotor Behavior:  Appropriate  Affect:  Full range  Mood: normal  Speech:  Normal  Thought Process:  Goal directed  Thought Content:  Normal  Suicidal:  None  Homicidal:  None  Hallucinations:  None  Delusion:  None  Memory:  Intact  Orientation:  Person, Place, Time, and Situation  Reliability:  good  Insight:  Good  Judgement:  Good  Impulse Control:  Good  Physical/Medical Issues:   See problem list      Assessment / Plan      Diagnosis:  Diagnoses and all orders for  this visit:    1. Generalized anxiety disorder (Primary)    2. Current severe episode of major depressive disorder without psychotic features, unspecified whether recurrent    3. Grief    Patient presented for follow-up with clinician.  Patient and clinician processed patient health concerns.  Patient stated that they had had several episodes of their blood pressure dropping dangerously low and had reported to the ER.  Patient and clinician identified and addressed cognitive distortions related to their condition.  Patient stated that they had received angry correspondence from their landlord directing affection.  Patient described a pattern of behavior perpetrated by the landlord which was irrational and abrasive.  Patient stated that this was out of character.  Clinician utilized active listening to convey empathy and support.    Progress toward goal: Not at goal    Prognosis: Good with ongoing treatment    PLAN:  Patient and clinician will continue to utilize a cognitive behavioral intervention which identifies and addresses patient cognitive distortions related to anxiety, depression and grief.  Follow-ups will occur approximately every 3 weeks and will last from 45 to 60 minutes in duration.    Safety: No acute safety concerns  Risk Assessment: Risk of self-harm acutely is low. Risk of self-harm chronically is also low, but could be further elevated in the event of treatment noncompliance and/or AODA.    Treatment Plan/Goals: Continue supportive psychotherapy efforts and medications as indicated. Treatment and medication options discussed during today's visit. Patient ackowledged and verbally consented to continue with current treatment plan and was educated on the importance of compliance with treatment and follow-up appointments. Patient seems reasonably able to adhere to treatment plan.      Assisted Patient in processing above session content; acknowledged and normalized patient’s thoughts, feelings, and  concerns.  Rationalized patient thought process regarding anxiety, depression and grief.      Allowed Patient to freely discuss issues  without interruption or judgement with unconditional positive regard, active listening skills, and empathy. Therapist provided a safe, confidential environment to facilitate the development of a positive therapeutic relationship and encouraged open, honest communication. Assisted Patient in identifying risk factors which would indicate the need for higher level of care including thoughts to harm self or others and/or self-harming behavior and encouraged Patient to contact this office, call 911, or present to the nearest emergency room should any of these events occur. Discussed crisis intervention services and means to access. Patient adamantly and convincingly denies current suicidal or homicidal ideation or perceptual disturbance. Assisted Patient in processing session content; acknowledged and normalized Patient’s thoughts, feelings, and concerns by utilizing a person-centered approach in efforts to build appropriate rapport and a positive therapeutic relationship with open and honest communication. .     Part of this note may be an electronic transcription/translation of spoken language to printed text using the Dragon Dictation System.       Follow Up:   Return in about 1 month (around 9/21/2024) for Next scheduled follow up.    Tunde Mason LCSW

## 2024-08-26 ENCOUNTER — HOSPITAL ENCOUNTER (OUTPATIENT)
Dept: CARDIOLOGY | Facility: HOSPITAL | Age: 34
Discharge: HOME | End: 2024-08-26
Admitting: NURSE PRACTITIONER
Payer: COMMERCIAL

## 2024-08-26 VITALS — WEIGHT: 238.1 LBS | HEIGHT: 66 IN | BODY MASS INDEX: 38.27 KG/M2

## 2024-08-26 DIAGNOSIS — R42 EPISODIC LIGHTHEADEDNESS: ICD-10-CM

## 2024-08-26 DIAGNOSIS — R00.2 PALPITATIONS: ICD-10-CM

## 2024-08-26 LAB
ASCENDING AORTA: 3.4 CM
BH CV ECHO MEAS - AO MAX PG: 14.7 MMHG
BH CV ECHO MEAS - AO MEAN PG: 8 MMHG
BH CV ECHO MEAS - AO ROOT DIAM: 2.7 CM
BH CV ECHO MEAS - AO V2 MAX: 192 CM/SEC
BH CV ECHO MEAS - AO V2 VTI: 41.5 CM
BH CV ECHO MEAS - AVA(I,D): 2.7 CM2
BH CV ECHO MEAS - EDV(CUBED): 54.9 ML
BH CV ECHO MEAS - EDV(MOD-SP4): 59.6 ML
BH CV ECHO MEAS - EF(MOD-SP4): 61.9 %
BH CV ECHO MEAS - ESV(CUBED): 15.6 ML
BH CV ECHO MEAS - ESV(MOD-SP4): 22.7 ML
BH CV ECHO MEAS - FS: 34.2 %
BH CV ECHO MEAS - IVS/LVPW: 1 CM
BH CV ECHO MEAS - IVSD: 1 CM
BH CV ECHO MEAS - LA DIMENSION: 3.6 CM
BH CV ECHO MEAS - LAT PEAK E' VEL: 15.4 CM/SEC
BH CV ECHO MEAS - LV DIASTOLIC VOL/BSA (35-75): 27.7 CM2
BH CV ECHO MEAS - LV MASS(C)D: 117.3 GRAMS
BH CV ECHO MEAS - LV MAX PG: 11.3 MMHG
BH CV ECHO MEAS - LV MEAN PG: 6 MMHG
BH CV ECHO MEAS - LV SYSTOLIC VOL/BSA (12-30): 10.6 CM2
BH CV ECHO MEAS - LV V1 MAX: 168 CM/SEC
BH CV ECHO MEAS - LV V1 VTI: 36.1 CM
BH CV ECHO MEAS - LVIDD: 3.8 CM
BH CV ECHO MEAS - LVIDS: 2.5 CM
BH CV ECHO MEAS - LVOT AREA: 3.1 CM2
BH CV ECHO MEAS - LVOT DIAM: 2 CM
BH CV ECHO MEAS - LVPWD: 1 CM
BH CV ECHO MEAS - MED PEAK E' VEL: 11.6 CM/SEC
BH CV ECHO MEAS - MV A MAX VEL: 85.9 CM/SEC
BH CV ECHO MEAS - MV DEC SLOPE: 385 CM/SEC2
BH CV ECHO MEAS - MV DEC TIME: 0.24 SEC
BH CV ECHO MEAS - MV E MAX VEL: 101 CM/SEC
BH CV ECHO MEAS - MV E/A: 1.18
BH CV ECHO MEAS - MV MAX PG: 6.9 MMHG
BH CV ECHO MEAS - MV MEAN PG: 3 MMHG
BH CV ECHO MEAS - MV P1/2T: 92.8 MSEC
BH CV ECHO MEAS - MV V2 VTI: 42.8 CM
BH CV ECHO MEAS - MVA(P1/2T): 2.37 CM2
BH CV ECHO MEAS - MVA(VTI): 2.6 CM2
BH CV ECHO MEAS - PA ACC TIME: 0.15 SEC
BH CV ECHO MEAS - SV(LVOT): 113.4 ML
BH CV ECHO MEAS - SV(MOD-SP4): 36.9 ML
BH CV ECHO MEAS - SVI(LVOT): 52.8 ML/M2
BH CV ECHO MEAS - SVI(MOD-SP4): 17.2 ML/M2
BH CV ECHO MEAS - TAPSE (>1.6): 2.22 CM
BH CV ECHO MEAS - TR MAX PG: 18.5 MMHG
BH CV ECHO MEAS - TR MAX VEL: 215 CM/SEC
BH CV ECHO MEASUREMENTS AVERAGE E/E' RATIO: 7.48
BH CV VAS BP RIGHT ARM: NORMAL MMHG
BH CV XLRA - RV BASE: 3.2 CM
BH CV XLRA - RV LENGTH: 6 CM
BH CV XLRA - RV MID: 2.6 CM
BH CV XLRA - TDI S': 12 CM/SEC

## 2024-08-26 PROCEDURE — 93306 TTE W/DOPPLER COMPLETE: CPT | Performed by: INTERNAL MEDICINE

## 2024-08-26 PROCEDURE — 93306 TTE W/DOPPLER COMPLETE: CPT

## 2024-09-14 LAB
QT INTERVAL: 444 MS
QTC INTERVAL: 446 MS

## 2024-09-26 ENCOUNTER — OFFICE VISIT (OUTPATIENT)
Dept: BARIATRICS/WEIGHT MGMT | Facility: CLINIC | Age: 34
End: 2024-09-26
Payer: COMMERCIAL

## 2024-09-26 VITALS
DIASTOLIC BLOOD PRESSURE: 80 MMHG | RESPIRATION RATE: 20 BRPM | OXYGEN SATURATION: 99 % | TEMPERATURE: 97.5 F | HEART RATE: 79 BPM | WEIGHT: 233.2 LBS | HEIGHT: 66 IN | SYSTOLIC BLOOD PRESSURE: 124 MMHG | BODY MASS INDEX: 37.48 KG/M2

## 2024-09-26 DIAGNOSIS — R53.83 FATIGUE, UNSPECIFIED TYPE: ICD-10-CM

## 2024-09-26 DIAGNOSIS — E55.9 VITAMIN D DEFICIENCY: ICD-10-CM

## 2024-09-26 DIAGNOSIS — E66.812 OBESITY, CLASS II, BMI 35-39.9: Primary | ICD-10-CM

## 2024-09-26 DIAGNOSIS — R79.0 ABNORMAL BLOOD LEVEL OF IRON: ICD-10-CM

## 2024-09-26 DIAGNOSIS — K91.2 POSTGASTRECTOMY MALABSORPTION: ICD-10-CM

## 2024-09-26 DIAGNOSIS — Z90.3 POSTGASTRECTOMY MALABSORPTION: ICD-10-CM

## 2024-09-26 PROCEDURE — 99214 OFFICE O/P EST MOD 30 MIN: CPT | Performed by: PHYSICIAN ASSISTANT

## 2024-09-26 RX ORDER — MECLIZINE HCL 12.5 MG 12.5 MG/1
12.5 TABLET ORAL 3 TIMES DAILY PRN
COMMUNITY

## 2024-09-26 NOTE — PROGRESS NOTES
[facility]  [address]  [address]  [facility] [id number] [phone]        Patient Name:  Eliz Eisenberg.  :  1990      Date of Visit: 2024      Reason for Visit:   almost 1 year postop     HPI: Eliz Eisenberg is a 34 y.o. female s/p robotic LSG 23 w/ Dr. Sandy      Since last visit has started having random dizzy spells, unclear etiology, not really associated w/ food intake, Cardiac + Neuro evals pending, taking Meclizine prn.  Has fatigue, but no other issues.     Feels weight loss stalled, wants to lose more.  Goal weight:  185 lbs.       Typically eats 4-5 small meals/day.  Getting 80-100g prot/day.  Drinking 100+oz water/day.     Denies dysphagia/reflux/vomiting/abd.pain.  Continues on daily PPI.  Exercise: 3x/week.    Taking MVI and biotin + wkly Vit D.  Finished twice wkly B12 injection.    Last labs 2024 revealed prealbumin 17.       Presurgery weight: 350 pounds.  Today's weight is 106 kg (233 lb 3.2 oz) pounds, today's  Body mass index is 37.64 kg/m²., and weight loss since surgery is 117 pounds.      Past Medical History:   Diagnosis Date    Abdominal pain     Acute sinusitis     Allergic     Allergic rhinitis     Ankle sprain     Anxiety     Arthritis 2017    On Mobic daily    Asthma     Atopic dermatitis     Biliary dyskinesia     Cat bite      resolved    Clotting disorder     Current tear of meniscus     Dysfunctional uterine bleeding     Eczema     Foot pain     GERD (gastroesophageal reflux disease)     Gout     Hearing loss, bilateral     R/T PSORASIS    Helicobacter pylori antibody positive     23. Rx prevpak    IBS (irritable bowel syndrome)     Influenza     Metabolic syndrome     Migraines     Muscular aches     Obesity     JENN (obstructive sleep apnea) - possible 2021    Panic attack     PCOS (polycystic ovarian syndrome)     Pharyngitis     Psoriasis     planning to start Taltz    Restless leg  syndrome     URI (upper respiratory infection)     Wears glasses      Past Surgical History:   Procedure Laterality Date    COLONOSCOPY      ENDOSCOPY N/A 05/15/2023    Procedure: ESOPHAGOGASTRODUODENOSCOPY;  Surgeon: Beatriz Sandy MD;  Location:  [facility];  Service: Bariatric;  Laterality: N/A;    GASTRIC SLEEVE LAPAROSCOPIC N/A 11/06/2023    Procedure: GASTRIC SLEEVE LAPAROSCOPIC WITH DAVINCI ROBOT, ESOPHAGOGASTRODUODENOSCOPY;  Surgeon: Beatriz Sandy MD;  Location:  [facility];  Service: Robotics - DaVinci;  Laterality: N/A;  scope ID: [id number]    LAPAROSCOPIC CHOLECYSTECTOMY  2018    no gallstones    TONSILLECTOMY AND ADENOIDECTOMY N/A 07/09/2021    Procedure: TONSILLECTOMY;  Surgeon: Ran Lemon MD;  Location:  [facility];  Service: ENT;  Laterality: N/A;    UMBILICAL HERNIA REPAIR  2018    w/ cholecystectomy    WISDOM TOOTH EXTRACTION       Outpatient Medications Marked as Taking for the 9/26/24 encounter (Office Visit) with Nina Cox PA   Medication Sig Dispense Refill    albuterol sulfate  (90 Base) MCG/ACT inhaler Inhale 2 puffs Every 4 (Four) Hours As Needed for Wheezing. 18 g 2    BIOTIN PO Take  by mouth Daily. 1 daily      busPIRone (BUSPAR) 10 MG tablet Take 1 tablet by mouth 3 (Three) Times a Day. 90 tablet 2    cetirizine (zyrTEC) 10 MG tablet Take 1 tablet by mouth Daily.      fluticasone (Flonase) 50 MCG/ACT nasal spray 2 sprays into the nostril(s) as directed by provider Daily. (Patient taking differently: Administer 2 sprays into the nostril(s) as directed by provider As Needed.) 16 g 5    meclizine (ANTIVERT) 12.5 MG tablet Take 1 tablet by mouth 3 (Three) Times a Day As Needed for Dizziness.      metFORMIN (GLUCOPHAGE) 500 MG tablet TAKE TWO TABLETS BY MOUTH EVERY MORNING AND TAKE ONE TABLET BY MOUTH EVERY EVENING WITH MEALS 270 tablet 3    montelukast (SINGULAIR) 10 MG tablet Take 1 tablet by mouth Every Night. 90 tablet 3    multivitamin with minerals  "(MULTIVITAL PO) Take 1 tablet by mouth Daily.      omeprazole (priLOSEC) 40 MG capsule Take 1 capsule by mouth Daily for 360 days. 90 capsule 3    ondansetron ODT (ZOFRAN-ODT) 4 MG disintegrating tablet Place 1 tablet on the tongue Every 8 (Eight) Hours As Needed for Nausea or Vomiting. 8 tablet 0    promethazine (PHENERGAN) 25 MG tablet TAKE ONE TABLET BY MOUTH EVERY 4 HOURS AS NEEDED FOR NAUSEA 10 tablet 0    Stelara 90 MG/ML solution prefilled syringe Injection 1 injection every 12 weeks      SUMAtriptan (IMITREX) 100 MG tablet Take 1 tablet by mouth As Needed.      topiramate (TOPAMAX) 25 MG tablet Take 1 tablet by mouth Daily.      VITAMIN D, ERGOCALCIFEROL, PO Take 50,000 Units by mouth Every 7 (Seven) Days.         Allergies   Allergen Reactions    Iodine Hives, Shortness Of Breath, Swelling and Rash    Cimzia [Certolizumab Pegol] Hives, GI Intolerance and Headache    Elastic Bandages & [Zinc] Hives    Latex Hives, Itching, Swelling and Rash     Gloves     Tape Hives     PAPER TAPE    Dilantin [Phenytoin] Unknown - Low Severity     Skin crawling    Pollen Extract Rash    Tree Extract Other (See Comments)     CONGESTION       Social History     Socioeconomic History    Marital status:    Tobacco Use    Smoking status: Never     Passive exposure: Never    Smokeless tobacco: Never   Vaping Use    Vaping status: Never Used   Substance and Sexual Activity    Alcohol use: Not Currently     Comment: rare    Drug use: No    Sexual activity: Yes     Partners: Male     Birth control/protection: None       /80 (BP Location: Left arm, Patient Position: Sitting)   Pulse 79   Temp 97.5 °F (36.4 °C)   Resp 20   Ht 167.6 cm (66\")   Wt 106 kg (233 lb 3.2 oz)   SpO2 99%   BMI 37.64 kg/m²     Physical Exam  Constitutional:       Appearance: She is well-developed.   Cardiovascular:      Rate and Rhythm: Normal rate.   Pulmonary:      Effort: Pulmonary effort is normal.   Musculoskeletal:         General: " Normal range of motion.   Neurological:      Mental Status: She is alert.   Psychiatric:         Thought Content: Thought content normal.         Judgment: Judgment normal.           Assessment:  almost 1 year s/p robotic LSG 11/6/23 w/ Dr. Sandy      ICD-10-CM ICD-9-CM   1. Obesity, Class II, BMI 35-39.9  E66.9 278.00   2. Fatigue, unspecified type  R53.83 780.79   3. Abnormal blood level of iron  R79.0 790.6   4. Vitamin D deficiency  E55.9 268.9   5. Postgastrectomy malabsorption  K91.2 579.3    Z90.3        Class 3 Severe Obesity (BMI >=40). Obesity-related health conditions include the following:  see above . Obesity is improving with treatment. BMI is is above average; BMI management plan is completed. We discussed  see plan .       Plan:  Continue w/ good food choices and healthy habits.  Continue protein 100g/day.  Continue routine exercise.  Routine bariatric labs ordered.  Continue vitamins w/ adjustments pending lab results.  Call w/ problems/concerns.     The patient was instructed to follow up in 3 months, sooner if needed.      VINNIE Marcelino      I spent 30 minutes on this patient encounter, which includes chart review/documentation, evaluation & management, patient education and counseling r/t healthy habits and necessary dietary/lifestyle modifications for continued success/weight loss.

## 2024-09-30 ENCOUNTER — OFFICE VISIT (OUTPATIENT)
Dept: CARDIOLOGY | Facility: HOSPITAL | Age: 34
End: 2024-09-30
Payer: COMMERCIAL

## 2024-09-30 VITALS
TEMPERATURE: 96 F | HEART RATE: 70 BPM | HEIGHT: 66 IN | WEIGHT: 236.8 LBS | BODY MASS INDEX: 38.06 KG/M2 | RESPIRATION RATE: 18 BRPM | DIASTOLIC BLOOD PRESSURE: 71 MMHG | SYSTOLIC BLOOD PRESSURE: 132 MMHG | OXYGEN SATURATION: 98 %

## 2024-09-30 PROCEDURE — 99214 OFFICE O/P EST MOD 30 MIN: CPT | Performed by: NURSE PRACTITIONER

## 2024-09-30 NOTE — PROGRESS NOTES
Chief Complaint  Monitor Results and Palpitations (Follow-up)    Subjective      History of Present Illness {CC  Problem List  Visit  Diagnosis   Encounters  Notes  Medications  Labs  Result Review Imaging  Media :23}     Eliz Eisenberg, 34 y.o. female with history of gastric bypass presents to [facility] for Monitor Results and Palpitations (Follow-up).    Since previous evaluation patient completed echocardiogram that revealed normal LV systolic/diastolic function, no significant valvular abnormalities identified.  Holter monitor reported as relatively benign monitor study, no sustained arrhythmias, patient triggered events predominantly correlate with sinus rhythm/sinus tachycardia.    Presents today with improvement in fluctuating blood pressure. SBP improved with -118. Continued intermittent lightheadedness, following with neurology with multiple upcoming tests to investigate. Exercise tolerance improved with weight loss.       Previous evaluation:  Patient was recently evaluated at [facility] emergency department for complaints of near syncope.  Emergency department work-up revealed normal troponin, and ECGs with no evidence of acute coronary syndrome/arrhythmia. Patient was instructed to follow-up at [facility] for further evaluation.    Patient presents today noting ongoing dizziness over the past month. Episodes lasting no more than 5 minutes and no absolute correlation; occur while seated and with activity. Sunday night felt like blood pressure was 105/62 and experienced room spinning; dizziness lasted 2 hours. Did note elevated heartbeat around this time, Apple Watch with reported normal sinus rhythm. Caffeine: minimal, alcohol: rare, hydration: 120oz daily.       Objective     Vital Signs:   Vitals:    09/30/24 0812   BP: 132/71   BP Location: Left arm   Patient Position: Sitting   Cuff Size: Large Adult   Pulse:  "70   Resp: 18   Temp: 96 °F (35.6 °C)   TempSrc: Temporal   SpO2: 98%   Weight: 107 kg (236 lb 12.8 oz)   Height: 167.6 cm (66\")       Body mass index is 38.22 kg/m².  Physical Exam  Vitals and nursing note reviewed.   Constitutional:       Appearance: Normal appearance.   HENT:      Head: Normocephalic.   Eyes:      Extraocular Movements: Extraocular movements intact.   Neck:      Vascular: No carotid bruit.   Cardiovascular:      Rate and Rhythm: Normal rate and regular rhythm.      Pulses: Normal pulses.      Heart sounds: Normal heart sounds, S1 normal and S2 normal. No murmur heard.  Pulmonary:      Effort: Pulmonary effort is normal. No respiratory distress.      Breath sounds: Normal breath sounds.   Musculoskeletal:      Cervical back: Neck supple.      Right lower leg: No edema.      Left lower leg: No edema.   Skin:     General: Skin is warm and dry.   Neurological:      General: No focal deficit present.      Mental Status: She is alert.   Psychiatric:         Mood and Affect: Mood normal.         Behavior: Behavior normal.         Thought Content: Thought content normal.        Data Reviewed:{ Labs  Result Review  Imaging  Med Tab  Media :23}     Holter Monitor - 72 Hour Up To 15 Days (08/20/2024 09:26)  Adult Transthoracic Echo Complete W/ Cont if Necessary Per Protocol (08/26/2024 15:41)    ECG 12 Lead Syncope (08/18/2024 23:51)   Single High Sensitivity Troponin T (08/18/2024 23:54)  Comprehensive Metabolic Panel (08/18/2024 23:54)  CBC & Differential (08/18/2024 23:54)  Iron Profile (07/31/2024 09:43)  Lipid Panel (07/05/2024 10:11)  TSH Rfx On Abnormal To Free T4 (07/05/2024 10:11)  Adult Transthoracic Echo Complete W/ Cont if Necessary Per Protocol (08/01/2023 10:40)  Stress Test With Pet Myocardial Perfusion (08/28/2023 08:53)  Holter Monitor - 72 Hour Up To 15 Days (08/18/2023 11:33)       Assessment & Plan   Assessment and Plan {CC Problem List  Visit Diagnosis  ROS  Review (Popup)  " [facility]  Quality  BestPractice  Medications  SmartSets  SnapShot Encounters  Media :23}     1. Episodic lightheadedness/dizziness  -Intermittent lightheadedness/dizziness, no syncope.  -Recent echocardiogram that revealed normal LV systolic/diastolic function, no significant valvular abnormalities identified.    -Holter monitor reported as relatively benign monitor study, no sustained arrhythmias, patient triggered events predominantly correlate with sinus rhythm/sinus tachycardia.  -Continue neurology follow-up as scheduled  -Continue meclizine as prescribed    2. Palpitations  -Intermittent isolated palpitation, no syncope  -Recent echocardiogram that revealed normal LV systolic/diastolic function, no significant valvular abnormalities identified.    -Holter monitor reported as relatively benign monitor study, no sustained arrhythmias, patient triggered events predominantly correlate with sinus rhythm/sinus tachycardia.  -Continue regular cardiovascular activity, weight loss efforts, low caffeine      Follow Up {Instructions Charge Capture  Follow-up Communications :23}     Return if symptoms worsen or fail to improve.      Patient was given instructions and counseling regarding her condition or for health maintenance advice. Please see specific information pulled into the AVS if appropriate.  Patient was instructed to call the [facility] with any questions, concerns, or worsening symptoms.    Dictated Utilizing Dragon Dictation   Please note that portions of this note were completed with a voice recognition program.   Part of this note may be an electronic transcription/translation of spoken language to printed text using the Dragon Dictation System.

## 2024-10-01 ENCOUNTER — OFFICE VISIT (OUTPATIENT)
Dept: BEHAVIORAL HEALTH | Facility: CLINIC | Age: 34
End: 2024-10-01
Payer: COMMERCIAL

## 2024-10-01 DIAGNOSIS — F41.1 GENERALIZED ANXIETY DISORDER: Primary | ICD-10-CM

## 2024-10-01 DIAGNOSIS — F32.2 CURRENT SEVERE EPISODE OF MAJOR DEPRESSIVE DISORDER WITHOUT PSYCHOTIC FEATURES, UNSPECIFIED WHETHER RECURRENT: ICD-10-CM

## 2024-10-01 DIAGNOSIS — F43.21 GRIEF: ICD-10-CM

## 2024-10-01 NOTE — PLAN OF CARE
Treatment plan updated October 1, 2024.  Patient reports moderate progress in the area of independent management of symptoms of anxiety.  Patient reports moderate improvement in symptoms of depression.  Patient and clinician continue to process patient grief.  Future course of treatment will concentrate on self-care, addition of coping mechanisms for patient independent management of symptoms related to anxiety depression and guilt and patient taking all medications as directed as well as keeping all medical appointments.  Treatment plan will be updated in 90 days.

## 2024-10-01 NOTE — PROGRESS NOTES
[facility] [place]                 Follow Up Adult      Follow Up Adult Note     Date:10/01/2024   Patient Name: Eliz Eisenberg  : 1990   MRN: [id number]   Time IN: 9:02 am    Time OUT: 9:52 am     Referring Provider: Noni Lopez MD    Chief Complaint:      ICD-10-CM ICD-9-CM   1. Generalized anxiety disorder  F41.1 300.02   2. Current severe episode of major depressive disorder without psychotic features, unspecified whether recurrent  F32.2 296.23   3. Grief  F43.21 309.0        History of Present Illness:   Eliz Eisenberg is a 34 y.o. female who is being seen today for follow up counseling for anxiety, depression and grief.    Subjective               Patient's Support Network Includes: extended family    Functional Status: Mild impairment       Current Outpatient Medications:     albuterol sulfate  (90 Base) MCG/ACT inhaler, Inhale 2 puffs Every 4 (Four) Hours As Needed for Wheezing., Disp: 18 g, Rfl: 2    BIOTIN PO, Take  by mouth Daily. 1 daily, Disp: , Rfl:     busPIRone (BUSPAR) 10 MG tablet, Take 1 tablet by mouth 3 (Three) Times a Day., Disp: 90 tablet, Rfl: 2    cetirizine (zyrTEC) 10 MG tablet, Take 1 tablet by mouth Daily., Disp: , Rfl:     EPINEPHrine (EPIPEN) 0.3 MG/0.3ML solution auto-injector injection, As Needed., Disp: , Rfl:     fluticasone (Flonase) 50 MCG/ACT nasal spray, 2 sprays into the nostril(s) as directed by provider Daily. (Patient taking differently: Administer 2 sprays into the nostril(s) as directed by provider As Needed.), Disp: 16 g, Rfl: 5    meclizine (ANTIVERT) 12.5 MG tablet, Take 1 tablet by mouth 3 (Three) Times a Day As Needed for Dizziness., Disp: , Rfl:     metFORMIN (GLUCOPHAGE) 500 MG tablet, TAKE TWO TABLETS BY MOUTH EVERY MORNING AND TAKE ONE TABLET BY MOUTH EVERY EVENING WITH MEALS, Disp: 270 tablet, Rfl: 3    montelukast (SINGULAIR) 10 MG tablet, Take 1 tablet by mouth Every Night., Disp: 90  tablet, Rfl: 3    multivitamin with minerals (MULTIVITAL PO), Take 1 tablet by mouth Daily., Disp: , Rfl:     omeprazole (priLOSEC) 40 MG capsule, Take 1 capsule by mouth Daily for 360 days., Disp: 90 capsule, Rfl: 3    ondansetron ODT (ZOFRAN-ODT) 4 MG disintegrating tablet, Place 1 tablet on the tongue Every 8 (Eight) Hours As Needed for Nausea or Vomiting., Disp: 8 tablet, Rfl: 0    promethazine (PHENERGAN) 25 MG tablet, TAKE ONE TABLET BY MOUTH EVERY 4 HOURS AS NEEDED FOR NAUSEA, Disp: 10 tablet, Rfl: 0    Stelara 90 MG/ML solution prefilled syringe Injection, 1 injection every 12 weeks, Disp: , Rfl:     SUMAtriptan (IMITREX) 100 MG tablet, Take 1 tablet by mouth As Needed., Disp: , Rfl:     topiramate (TOPAMAX) 25 MG tablet, Take 1 tablet by mouth Daily., Disp: , Rfl:     VITAMIN D, ERGOCALCIFEROL, PO, Take 50,000 Units by mouth Every 7 (Seven) Days., Disp: , Rfl:     Allergies   Allergen Reactions    Iodine Hives, Shortness Of Breath, Swelling and Rash    Cimzia [Certolizumab Pegol] Hives, GI Intolerance and Headache    Elastic Bandages & [Zinc] Hives    Latex Hives, Itching, Swelling and Rash     Gloves     Tape Hives     PAPER TAPE    Dilantin [Phenytoin] Unknown - Low Severity     Skin crawling    Pollen Extract Rash    Tree Extract Other (See Comments)     CONGESTION       Objective     Physical Exam:  Vital Signs: There were no vitals filed for this visit.  There is no height or weight on file to calculate BMI.     Mental Status Exam:   Hygiene:   good  Cooperation:  Cooperative  Eye Contact:  Good  Psychomotor Behavior:  Appropriate  Affect:  Full range  Mood: normal  Speech:  Normal  Thought Process:  Goal directed  Thought Content:  Normal  Suicidal:  None  Homicidal:  None  Hallucinations:  None  Delusion:  None  Memory:  Intact  Orientation:  Person, Place, Time, and Situation  Reliability:  good  Insight:  Good  Judgement:  Good  Impulse Control:  Good  Physical/Medical Issues:   See problem list       Assessment / Plan      Diagnosis:  Diagnoses and all orders for this visit:    1. Generalized anxiety disorder (Primary)    2. Current severe episode of major depressive disorder without psychotic features, unspecified whether recurrent    3. Grief    Treatment plan updated October 1, 2024.  Patient reports moderate progress in the area of independent management of symptoms of anxiety.  Patient reports moderate improvement in symptoms of depression.  Patient and clinician continue to process patient grief.  Future course of treatment will concentrate on self-care, addition of coping mechanisms for patient independent management of symptoms related to anxiety depression and guilt and patient taking all medications as directed as well as keeping all medical appointments.  Treatment plan will be updated in 90 days.    Progress toward goal: Not at goal    Prognosis: Good with ongoing treatment    PLAN:  Patient and clinician will continue to utilize a cognitive behavioral intervention which identifies and addresses patient cognitive distortions related to anxiety, depression and grief.  Follow-ups will occur approximately every 21 days and will last for 45 to 60 minutes in duration.    Safety: No acute safety concerns  Risk Assessment: Risk of self-harm acutely is low. Risk of self-harm chronically is also low, but could be further elevated in the event of treatment noncompliance and/or AODA.    Treatment Plan/Goals: Continue supportive psychotherapy efforts and medications as indicated. Treatment and medication options discussed during today's visit. Patient ackowledged and verbally consented to continue with current treatment plan and was educated on the importance of compliance with treatment and follow-up appointments. Patient seems reasonably able to adhere to treatment plan.      Assisted Patient in processing above session content; acknowledged and normalized patient’s thoughts, feelings, and concerns.   Rationalized patient thought process regarding anxiety, depression and grief.      Allowed Patient to freely discuss issues  without interruption or judgement with unconditional positive regard, active listening skills, and empathy. Therapist provided a safe, confidential environment to facilitate the development of a positive therapeutic relationship and encouraged open, honest communication. Assisted Patient in identifying risk factors which would indicate the need for higher level of care including thoughts to harm self or others and/or self-harming behavior and encouraged Patient to contact this office, call 911, or present to the nearest emergency room should any of these events occur. Discussed crisis intervention services and means to access. Patient adamantly and convincingly denies current suicidal or homicidal ideation or perceptual disturbance. Assisted Patient in processing session content; acknowledged and normalized Patient’s thoughts, feelings, and concerns by utilizing a person-centered approach in efforts to build appropriate rapport and a positive therapeutic relationship with open and honest communication. .     Part of this note may be an electronic transcription/translation of spoken language to printed text using the Dragon Dictation System.       Follow Up:   Return in about 3 weeks (around 10/22/2024) for Next scheduled follow up.    Tunde Mason LCSW

## 2024-10-02 LAB
25(OH)D3+25(OH)D2 SERPL-MCNC: 28.5 NG/ML (ref 30–100)
ALBUMIN SERPL-MCNC: 4.4 G/DL (ref 3.9–4.9)
ALP SERPL-CCNC: 123 IU/L (ref 44–121)
ALT SERPL-CCNC: 10 IU/L (ref 0–32)
AST SERPL-CCNC: 11 IU/L (ref 0–40)
BASOPHILS # BLD AUTO: 0.1 X10E3/UL (ref 0–0.2)
BASOPHILS NFR BLD AUTO: 1 %
BILIRUB SERPL-MCNC: 0.6 MG/DL (ref 0–1.2)
BUN SERPL-MCNC: 12 MG/DL (ref 6–20)
BUN/CREAT SERPL: 15 (ref 9–23)
CALCIUM SERPL-MCNC: 9.3 MG/DL (ref 8.7–10.2)
CHLORIDE SERPL-SCNC: 106 MMOL/L (ref 96–106)
CO2 SERPL-SCNC: 23 MMOL/L (ref 20–29)
CREAT SERPL-MCNC: 0.78 MG/DL (ref 0.57–1)
EGFRCR SERPLBLD CKD-EPI 2021: 102 ML/MIN/1.73
EOSINOPHIL # BLD AUTO: 0.1 X10E3/UL (ref 0–0.4)
EOSINOPHIL NFR BLD AUTO: 1 %
ERYTHROCYTE [DISTWIDTH] IN BLOOD BY AUTOMATED COUNT: 14.9 % (ref 11.7–15.4)
FERRITIN SERPL-MCNC: 28 NG/ML (ref 15–150)
FOLATE SERPL-MCNC: >20 NG/ML
GLOBULIN SER CALC-MCNC: 3 G/DL (ref 1.5–4.5)
GLUCOSE SERPL-MCNC: 84 MG/DL (ref 70–99)
HCT VFR BLD AUTO: 40.1 % (ref 34–46.6)
HGB BLD-MCNC: 12.5 G/DL (ref 11.1–15.9)
IMM GRANULOCYTES # BLD AUTO: 0 X10E3/UL (ref 0–0.1)
IMM GRANULOCYTES NFR BLD AUTO: 1 %
IRON SERPL-MCNC: 40 UG/DL (ref 27–159)
LYMPHOCYTES # BLD AUTO: 2.3 X10E3/UL (ref 0.7–3.1)
LYMPHOCYTES NFR BLD AUTO: 26 %
MCH RBC QN AUTO: 26 PG (ref 26.6–33)
MCHC RBC AUTO-ENTMCNC: 31.2 G/DL (ref 31.5–35.7)
MCV RBC AUTO: 84 FL (ref 79–97)
METHYLMALONATE SERPL-SCNC: 107 NMOL/L (ref 0–378)
MONOCYTES # BLD AUTO: 0.6 X10E3/UL (ref 0.1–0.9)
MONOCYTES NFR BLD AUTO: 6 %
NEUTROPHILS # BLD AUTO: 5.8 X10E3/UL (ref 1.4–7)
NEUTROPHILS NFR BLD AUTO: 65 %
PLATELET # BLD AUTO: 339 X10E3/UL (ref 150–450)
POTASSIUM SERPL-SCNC: 4.1 MMOL/L (ref 3.5–5.2)
PREALB SERPL-MCNC: 20 MG/DL (ref 14–35)
PROT SERPL-MCNC: 7.4 G/DL (ref 6–8.5)
RBC # BLD AUTO: 4.8 X10E6/UL (ref 3.77–5.28)
SODIUM SERPL-SCNC: 145 MMOL/L (ref 134–144)
VIT B1 BLD-SCNC: 109.3 NMOL/L (ref 66.5–200)
WBC # BLD AUTO: 8.8 X10E3/UL (ref 3.4–10.8)

## 2024-10-15 ENCOUNTER — APPOINTMENT (OUTPATIENT)
Facility: HOSPITAL | Age: 34
End: 2024-10-15
Payer: COMMERCIAL

## 2024-10-15 ENCOUNTER — HOSPITAL ENCOUNTER (EMERGENCY)
Facility: HOSPITAL | Age: 34
Discharge: HOME | End: 2024-10-15
Attending: STUDENT IN AN ORGANIZED HEALTH CARE EDUCATION/TRAINING PROGRAM | Admitting: STUDENT IN AN ORGANIZED HEALTH CARE EDUCATION/TRAINING PROGRAM
Payer: COMMERCIAL

## 2024-10-15 VITALS
RESPIRATION RATE: 16 BRPM | WEIGHT: 242.4 LBS | DIASTOLIC BLOOD PRESSURE: 86 MMHG | OXYGEN SATURATION: 98 % | HEIGHT: 63 IN | BODY MASS INDEX: 42.95 KG/M2 | HEART RATE: 75 BPM | SYSTOLIC BLOOD PRESSURE: 129 MMHG | TEMPERATURE: 98.8 F

## 2024-10-15 DIAGNOSIS — G43.001 MIGRAINE WITHOUT AURA AND WITH STATUS MIGRAINOSUS, NOT INTRACTABLE: Primary | ICD-10-CM

## 2024-10-15 LAB
ALBUMIN SERPL-MCNC: 4.2 G/DL (ref 3.5–5.2)
ALBUMIN/GLOB SERPL: 1.2 G/DL
ALP SERPL-CCNC: 120 U/L (ref 39–117)
ALT SERPL W P-5'-P-CCNC: 22 U/L (ref 1–33)
ANION GAP SERPL CALCULATED.3IONS-SCNC: 12.7 MMOL/L (ref 5–15)
AST SERPL-CCNC: 22 U/L (ref 1–32)
BASOPHILS # BLD AUTO: 0.02 10*3/MM3 (ref 0–0.2)
BASOPHILS NFR BLD AUTO: 0.4 % (ref 0–1.5)
BILIRUB SERPL-MCNC: 0.4 MG/DL (ref 0–1.2)
BUN SERPL-MCNC: 7 MG/DL (ref 6–20)
BUN/CREAT SERPL: 11.7 (ref 7–25)
CALCIUM SPEC-SCNC: 8.5 MG/DL (ref 8.6–10.5)
CHLORIDE SERPL-SCNC: 105 MMOL/L (ref 98–107)
CO2 SERPL-SCNC: 23.3 MMOL/L (ref 22–29)
CREAT SERPL-MCNC: 0.6 MG/DL (ref 0.57–1)
CRP SERPL-MCNC: 1.06 MG/DL (ref 0–0.5)
DEPRECATED RDW RBC AUTO: 47.6 FL (ref 37–54)
EGFRCR SERPLBLD CKD-EPI 2021: 121 ML/MIN/1.73
EOSINOPHIL # BLD AUTO: 0.07 10*3/MM3 (ref 0–0.4)
EOSINOPHIL NFR BLD AUTO: 1.3 % (ref 0.3–6.2)
ERYTHROCYTE [DISTWIDTH] IN BLOOD BY AUTOMATED COUNT: 15.6 % (ref 12.3–15.4)
ERYTHROCYTE [SEDIMENTATION RATE] IN BLOOD: 43 MM/HR (ref 0–20)
GLOBULIN UR ELPH-MCNC: 3.4 GM/DL
GLUCOSE SERPL-MCNC: 83 MG/DL (ref 65–99)
HCG INTACT+B SERPL-ACNC: <0.2 MIU/ML
HCT VFR BLD AUTO: 38.6 % (ref 34–46.6)
HGB BLD-MCNC: 11.9 G/DL (ref 12–15.9)
IMM GRANULOCYTES # BLD AUTO: 0 10*3/MM3 (ref 0–0.05)
IMM GRANULOCYTES NFR BLD AUTO: 0 % (ref 0–0.5)
LYMPHOCYTES # BLD AUTO: 1.69 10*3/MM3 (ref 0.7–3.1)
LYMPHOCYTES NFR BLD AUTO: 32.3 % (ref 19.6–45.3)
MCH RBC QN AUTO: 25.4 PG (ref 26.6–33)
MCHC RBC AUTO-ENTMCNC: 30.8 G/DL (ref 31.5–35.7)
MCV RBC AUTO: 82.3 FL (ref 79–97)
MONOCYTES # BLD AUTO: 0.43 10*3/MM3 (ref 0.1–0.9)
MONOCYTES NFR BLD AUTO: 8.2 % (ref 5–12)
NEUTROPHILS NFR BLD AUTO: 3.03 10*3/MM3 (ref 1.7–7)
NEUTROPHILS NFR BLD AUTO: 57.8 % (ref 42.7–76)
PLATELET # BLD AUTO: 298 10*3/MM3 (ref 140–450)
PMV BLD AUTO: 10.2 FL (ref 6–12)
POTASSIUM SERPL-SCNC: 3.6 MMOL/L (ref 3.5–5.2)
PROT SERPL-MCNC: 7.6 G/DL (ref 6–8.5)
RBC # BLD AUTO: 4.69 10*6/MM3 (ref 3.77–5.28)
SODIUM SERPL-SCNC: 141 MMOL/L (ref 136–145)
WBC NRBC COR # BLD AUTO: 5.24 10*3/MM3 (ref 3.4–10.8)

## 2024-10-15 PROCEDURE — 25010000002 DIPHENHYDRAMINE PER 50 MG

## 2024-10-15 PROCEDURE — 96375 TX/PRO/DX INJ NEW DRUG ADDON: CPT

## 2024-10-15 PROCEDURE — 84702 CHORIONIC GONADOTROPIN TEST: CPT

## 2024-10-15 PROCEDURE — 25010000002 MAGNESIUM SULFATE 2 GM/50ML SOLUTION

## 2024-10-15 PROCEDURE — 99284 EMERGENCY DEPT VISIT MOD MDM: CPT

## 2024-10-15 PROCEDURE — 25010000002 DROPERIDOL PER 5 MG

## 2024-10-15 PROCEDURE — 85652 RBC SED RATE AUTOMATED: CPT

## 2024-10-15 PROCEDURE — 85025 COMPLETE CBC W/AUTO DIFF WBC: CPT

## 2024-10-15 PROCEDURE — 96365 THER/PROPH/DIAG IV INF INIT: CPT

## 2024-10-15 PROCEDURE — 96372 THER/PROPH/DIAG INJ SC/IM: CPT

## 2024-10-15 PROCEDURE — 25010000002 DEXAMETHASONE PER 1 MG

## 2024-10-15 PROCEDURE — 80053 COMPREHEN METABOLIC PANEL: CPT

## 2024-10-15 PROCEDURE — 86140 C-REACTIVE PROTEIN: CPT

## 2024-10-15 PROCEDURE — 70450 CT HEAD/BRAIN W/O DYE: CPT

## 2024-10-15 RX ORDER — DROPERIDOL 2.5 MG/ML
1.25 INJECTION, SOLUTION INTRAMUSCULAR; INTRAVENOUS ONCE
Status: COMPLETED | OUTPATIENT
Start: 2024-10-15 | End: 2024-10-15

## 2024-10-15 RX ORDER — MAGNESIUM SULFATE HEPTAHYDRATE 40 MG/ML
1 INJECTION, SOLUTION INTRAVENOUS ONCE
Status: COMPLETED | OUTPATIENT
Start: 2024-10-15 | End: 2024-10-15

## 2024-10-15 RX ORDER — DIPHENHYDRAMINE HYDROCHLORIDE 50 MG/ML
25 INJECTION INTRAMUSCULAR; INTRAVENOUS ONCE
Status: COMPLETED | OUTPATIENT
Start: 2024-10-15 | End: 2024-10-15

## 2024-10-15 RX ORDER — KETOROLAC TROMETHAMINE 30 MG/ML
15 INJECTION, SOLUTION INTRAMUSCULAR; INTRAVENOUS ONCE
Status: DISCONTINUED | OUTPATIENT
Start: 2024-10-15 | End: 2024-10-15 | Stop reason: HOSPADM

## 2024-10-15 RX ORDER — DEXAMETHASONE SODIUM PHOSPHATE 10 MG/ML
10 INJECTION INTRAMUSCULAR; INTRAVENOUS ONCE
Status: COMPLETED | OUTPATIENT
Start: 2024-10-15 | End: 2024-10-15

## 2024-10-15 RX ORDER — SUMATRIPTAN 6 MG/.5ML
6 INJECTION, SOLUTION SUBCUTANEOUS ONCE
Status: COMPLETED | OUTPATIENT
Start: 2024-10-15 | End: 2024-10-15

## 2024-10-15 RX ADMIN — DROPERIDOL 1.25 MG: 2.5 INJECTION, SOLUTION INTRAMUSCULAR; INTRAVENOUS at 16:14

## 2024-10-15 RX ADMIN — DIPHENHYDRAMINE HYDROCHLORIDE 25 MG: 50 INJECTION INTRAMUSCULAR; INTRAVENOUS at 15:37

## 2024-10-15 RX ADMIN — SUMATRIPTAN 6 MG: 6 INJECTION SUBCUTANEOUS at 17:51

## 2024-10-15 RX ADMIN — DEXAMETHASONE SODIUM PHOSPHATE 10 MG: 10 INJECTION INTRAMUSCULAR; INTRAVENOUS at 15:36

## 2024-10-15 RX ADMIN — MAGNESIUM SULFATE HEPTAHYDRATE 1 G: 40 INJECTION, SOLUTION INTRAVENOUS at 16:14

## 2024-10-15 NOTE — DISCHARGE INSTRUCTIONS
Please return with worsening or changing symptoms.  Please follow-up with primary care soon as possible

## 2024-10-15 NOTE — FSED PROVIDER NOTE
Subjective  History of Present Illness:    Patient is a 34-year-old female who presents with headache and fever x 2 days.  Patient states her primary care sent her over here to rule out meningitis.  Patient states that she takes Topamax daily for migraines, and has Imitrex to take which she took last night and it did not help.  Patient states she last took Tylenol at noon today.  Patient states she cannot take ibuprofen due to previous bariatric surgery.  Patient denies chest pain, shortness of breath, abdominal pain, nausea, vomiting, dizziness.      Nurses Notes reviewed and agree, including vitals, allergies, social history and prior medical history.     REVIEW OF SYSTEMS: All systems reviewed and not pertinent unless noted.  Review of Systems    Past Medical History:   Diagnosis Date    Abdominal pain     Acute sinusitis     Allergic     Allergic rhinitis     Ankle sprain     Anxiety     Arthritis 2017    On Mobic daily    Asthma     Atopic dermatitis     Biliary dyskinesia     Cat bite      resolved    Clotting disorder     Current tear of meniscus     Dysfunctional uterine bleeding     Eczema     Foot pain     GERD (gastroesophageal reflux disease)     Omeprazole daily. remote EGD. No hx of h pylori    Gout     Hearing loss, bilateral     R/T PSORASIS    Helicobacter pylori antibody positive     4/26/23. Rx prevpak    IBS (irritable bowel syndrome)     Influenza     Metabolic syndrome     Migraines     Muscular aches     Obesity     JENN (obstructive sleep apnea) - possible 01/28/2021    Panic attack     PCOS (polycystic ovarian syndrome)     Pharyngitis     Psoriasis     planning to start Taltz    Restless leg syndrome     URI (upper respiratory infection)     Wears glasses        Allergies:    Iodine, Cimzia [certolizumab pegol], Elastic bandages & [zinc], Latex, Tape, Dilantin [phenytoin], Pollen extract, and Tree extract      Past Surgical History:   Procedure Laterality Date    BARIATRIC SURGERY   11/06/2023    COLONOSCOPY      ENDOSCOPY N/A 05/15/2023    Procedure: ESOPHAGOGASTRODUODENOSCOPY;  Surgeon: Beatriz Sandy MD;  Location:  [facility];  Service: Bariatric;  Laterality: N/A;    GASTRIC SLEEVE LAPAROSCOPIC N/A 11/06/2023    Procedure: GASTRIC SLEEVE LAPAROSCOPIC WITH DAVINCI ROBOT, ESOPHAGOGASTRODUODENOSCOPY;  Surgeon: Beatriz Sandy MD;  Location:  [facility];  Service: Robotics - DaVinci;  Laterality: N/A;  scope ID: [id number]    LAPAROSCOPIC CHOLECYSTECTOMY  2018    no gallstones    TONSILLECTOMY AND ADENOIDECTOMY N/A 07/09/2021    Procedure: TONSILLECTOMY;  Surgeon: Ran Lemon MD;  Location:  [facility];  Service: ENT;  Laterality: N/A;    UMBILICAL HERNIA REPAIR  2018    w/ cholecystectomy    WISDOM TOOTH EXTRACTION           Social History     Socioeconomic History    Marital status:    Tobacco Use    Smoking status: Never     Passive exposure: Never    Smokeless tobacco: Never   Vaping Use    Vaping status: Never Used   Substance and Sexual Activity    Alcohol use: Not Currently     Comment: rare    Drug use: No    Sexual activity: Yes     Partners: Male     Birth control/protection: None         Family History   Problem Relation Age of Onset    Heart disease Mother     Multiple sclerosis Mother     Diabetes Mother     Arthritis Mother     Asthma Mother     Early death Mother     Kidney disease Mother     Hypertension Father     Obesity Father     Diabetes Father     Sleep apnea Father     No Known Problems Sister     Breast cancer Maternal Grandmother     Diabetes Maternal Grandmother     Dementia Maternal Grandmother     Asthma Maternal Grandfather     Diabetes Maternal Grandfather     Sleep apnea Maternal Grandfather     Arthritis Maternal Grandfather     Heart disease Maternal Grandfather     Colon cancer Paternal Grandmother     Dementia Paternal Grandmother     Heart attack Paternal Grandfather     Sleep apnea Paternal Grandfather     Obesity Paternal  "Grandfather     Hypertension Paternal Grandfather     Diabetes Paternal Grandfather        Objective  Physical Exam:  /86   Pulse 75   Temp 98.8 °F (37.1 °C) (Oral)   Resp 16   Ht 160 cm (63\")   Wt 110 kg (242 lb 6.4 oz)   LMP 09/23/2024   SpO2 98%   BMI 42.94 kg/m²      Physical Exam  Constitutional:       Appearance: Well-developed. No acute distress  HENT:      Head: Normocephalic and atraumatic.  No pain to palpation of the head or face     Mouth/Throat:      Mouth: Mucous membranes are moist.      Eyes:      Extraocular Movements: Extraocular movements intact.   Cardiovascular:      Rate and Rhythm: Normal rate and regular rhythm.      Heart sounds: Normal heart sounds.   Pulmonary:      Effort: Pulmonary effort is normal. No respiratory distress.      Breath sounds: Normal breath sounds.   Abdominal:      General: There is no distension.      Palpations: Abdomen is soft and nontender. No rebound tenderness or guarding noted  Musculoskeletal:         General: No swelling or tenderness.       Extremities: Moves all 4s   Neck: No nuchal rigidity or meningeal signs.  Full active range of motion of the neck.  Skin:     General: Skin is warm and dry.      Capillary Refill: Capillary refill takes less than 2 seconds.   Neurological:      Mental Status:Alert and oriented to person, place, and time.   Mentation is normal   Psychiatric:         Mood and Affect: Mood normal.         Behavior: Behavior normal.     Procedures    ED Course:    ED Course as of 10/15/24 1830   Tue Oct 15, 2024   1707 CT scan of the head did not show any acute abnormalities. [OM]      ED Course User Index  [OM] Vaishali Falk PA-C       Lab Results (last 24 hours)       Procedure Component Value Units Date/Time    hCG, Quantitative, Pregnancy [[id number]] Collected: 10/15/24 1533    Specimen: Blood Updated: 10/15/24 1617     HCG Quantitative <0.20 mIU/mL     Narrative:      HCG Ranges by Gestational Age    Females - non-pregnant " premenopausal   </= 1mIU/mL HCG  Females - postmenopausal               </= 7mIU/mL HCG    3 Weeks         5.8 -    71.2 mIU/mL  4 Weeks         9.5 -     750 mIU/mL  5 Weeks         217 -   7,138 mIU/mL  6 Weeks         158 -  31,795 mIU/mL  7 Weeks       3,697 - 163,563 mIU/mL  8 Weeks      32,065 - 149,571 mIU/mL  9 Weeks      63,803 - 151,410 mIU/mL  10 Weeks     46,509 - 186,977 mIU/mL  12 Weeks     27,832 - 210,612 mIU/mL  14 Weeks     13,950 -  62,530 mIU/mL  15 Weeks     12,039 -  70,971 mIU/mL  16 Weeks      9,040 -  56,451 mIU/mL  17 Weeks      8,175 -  55,868 mIU/mL  18 Weeks      8,099 -  58,176 mIU/mL  Results may be falsely decreased if patient taking Biotin.      CBC & Differential [[id number]]  (Abnormal) Collected: 10/15/24 1533    Specimen: Blood Updated: 10/15/24 1557    Narrative:      The following orders were created for panel order CBC & Differential.  Procedure                               Abnormality         Status                     ---------                               -----------         ------                     CBC Auto Differential[[id number]]        Abnormal            Final result                 Please view results for these tests on the individual orders.    Comprehensive Metabolic Panel [[id number]]  (Abnormal) Collected: 10/15/24 1533    Specimen: Blood Updated: 10/15/24 1610     Glucose 83 mg/dL      BUN 7 mg/dL      Creatinine 0.60 mg/dL      Sodium 141 mmol/L      Potassium 3.6 mmol/L      Chloride 105 mmol/L      CO2 23.3 mmol/L      Calcium 8.5 mg/dL      Total Protein 7.6 g/dL      Albumin 4.2 g/dL      ALT (SGPT) 22 U/L      AST (SGOT) 22 U/L      Alkaline Phosphatase 120 U/L      Total Bilirubin 0.4 mg/dL      Globulin 3.4 gm/dL      A/G Ratio 1.2 g/dL      BUN/Creatinine Ratio 11.7     Anion Gap 12.7 mmol/L      eGFR 121.0 mL/min/1.73     Narrative:      GFR Normal >60  Chronic Kidney Disease <60  Kidney Failure <15      C-reactive Protein [[id number]]  (Abnormal)  Collected: 10/15/24 1533    Specimen: Blood Updated: 10/15/24 1610     C-Reactive Protein 1.06 mg/dL     CBC Auto Differential [[id number]]  (Abnormal) Collected: 10/15/24 1533    Specimen: Blood Updated: 10/15/24 1557     WBC 5.24 10*3/mm3      RBC 4.69 10*6/mm3      Hemoglobin 11.9 g/dL      Hematocrit 38.6 %      MCV 82.3 fL      MCH 25.4 pg      MCHC 30.8 g/dL      RDW 15.6 %      RDW-SD 47.6 fl      MPV 10.2 fL      Platelets 298 10*3/mm3      Neutrophil % 57.8 %      Lymphocyte % 32.3 %      Monocyte % 8.2 %      Eosinophil % 1.3 %      Basophil % 0.4 %      Immature Grans % 0.0 %      Neutrophils, Absolute 3.03 10*3/mm3      Lymphocytes, Absolute 1.69 10*3/mm3      Monocytes, Absolute 0.43 10*3/mm3      Eosinophils, Absolute 0.07 10*3/mm3      Basophils, Absolute 0.02 10*3/mm3      Immature Grans, Absolute 0.00 10*3/mm3              CT Head Without Contrast    Result Date: 10/15/2024  CT HEAD WO CONTRAST Date of Exam: 10/15/2024 4:36 PM EDT Indication: headache. Comparison: 1/8/2023 Technique: Axial CT images were obtained of the head without contrast administration.  Automated exposure control and iterative construction methods were used. Findings: There is no evidence of acute territorial infarction. There is no acute intracranial hemorrhage. There are no extra-axial collections. Ventricles and CSF spaces are symmetric. No mass effect nor hydrocephalus. Brain parenchyma appears normal for age.  There is similar mucosal thickening involving the inferior recess of the left maxillary sinus with minimal fluid. Paranasal sinuses are otherwise well aerated.  Osseous structures and orbits appear intact.     Impression: Impression: 1.No acute intracranial process identified. Electronically Signed: Haresh Adams MD  10/15/2024 4:52 PM EDT  Workstation ID: [id number]        [facility]      Initial impression of presenting illness: Patient presents with 2 days of headache and fevers, saw PCP today and had her come here to rule  out meningitis.  Patient walked into the emergency department.  Patient states she has been taking her Topamax that she does take daily along with Imitrex last night but did not help her head.    DDX: includes but is not limited to: Viral illness, migraine, hemorrhage    Patient arrives vital signs stable, lights are off in the room with vitals interpreted by myself.     Pertinent results: White count within normal limits.  CRP mildly elevated, other lab work nonactionable.  Discussed this with patient and family member.    Diagnostic information from other sources: Chart review     Interventions / Re-evaluation: Decadron, Benadryl, droperidol given for migraine cocktail, ordered Toradol but patient refused.    Offered patient a lumbar puncture to rule out meningitis.  Patient states she is feeling significantly better after the migraine cocktail.  Discussed the risks and benefits of the lumbar puncture with patient and that cannot definitively rule out meningitis without performing a lumbar puncture she verbalized understanding and would like to wait on the lumbar puncture and come back if she worsens.   Discussed with attending as well who is agreeable with this plan.     Medications   ketorolac (TORADOL) injection 15 mg (0 mg Intravenous Hold 10/15/24 1540)   dexAMETHasone (DECADRON) injection 10 mg (10 mg Intravenous Given 10/15/24 1536)   diphenhydrAMINE (BENADRYL) injection 25 mg (25 mg Intravenous Given 10/15/24 1537)   magnesium sulfate 2g/50 mL (PREMIX) infusion (0 g Intravenous Stopped 10/15/24 1750)   droperidol (INAPSINE) injection 1.25 mg (1.25 mg Intravenous Given 10/15/24 1614)   SUMAtriptan (IMITREX) injection 6 mg (6 mg Subcutaneous Given 10/15/24 1751)       Results/clinical rationale were discussed with Patient and family member     Consultations/Discussion of results with other physicians: Attending     Data interpreted: Nursing notes reviewed, vital signs reviewed.  Labs independently  interpreted by me     Counseling: Discussed the results above with the patient regarding need for admission or discharge.  Patient understands and agrees plan of care.  Discussed with patients the results from today's visit. Discussed with patient strict return precautions and they verbalize understanding. Recommend to them following up with primary care as soon as possible. Patient is discharged hemodynamically stable and comfortable.       -----  ED Disposition       ED Disposition   Discharge    Condition   Stable    Comment   --             Final diagnoses:   Migraine without aura and with status migrainosus, not intractable      Your Follow-Up Providers       Schedule an appointment as soon as possible for a visit  with Noni Lopez MD.    Specialty: Internal Medicine  [address] [place] [address]  [phone]                       Contact information for after-discharge care    Follow-up information has not been specified.                    Your medication list        CHANGE how you take these medications        Instructions Last Dose Given Next Dose Due   fluticasone 50 MCG/ACT nasal spray  Commonly known as: Flonase  What changed:   when to take this  reasons to take this      2 sprays into the nostril(s) as directed by provider Daily.              CONTINUE taking these medications        Instructions Last Dose Given Next Dose Due   albuterol sulfate  (90 Base) MCG/ACT inhaler  Commonly known as: PROVENTIL HFA;VENTOLIN HFA;PROAIR HFA      Inhale 2 puffs Every 4 (Four) Hours As Needed for Wheezing.       BIOTIN PO      Take  by mouth Daily. 1 daily       busPIRone 10 MG tablet  Commonly known as: BUSPAR      Take 1 tablet by mouth 3 (Three) Times a Day.       cetirizine 10 MG tablet  Commonly known as: zyrTEC      Take 1 tablet by mouth Daily.       EPINEPHrine 0.3 MG/0.3ML solution auto-injector injection  Commonly known as: EPIPEN      As Needed.       meclizine 12.5 MG tablet  Commonly known  as: ANTIVERT      Take 1 tablet by mouth 3 (Three) Times a Day As Needed for Dizziness.       metFORMIN 500 MG tablet  Commonly known as: GLUCOPHAGE      TAKE TWO TABLETS BY MOUTH EVERY MORNING AND TAKE ONE TABLET BY MOUTH EVERY EVENING WITH MEALS       montelukast 10 MG tablet  Commonly known as: SINGULAIR      Take 1 tablet by mouth Every Night.       multivitamin with minerals tablet tablet      Take 1 tablet by mouth Daily.       omeprazole 40 MG capsule  Commonly known as: priLOSEC      Take 1 capsule by mouth Daily for 360 days.       ondansetron ODT 4 MG disintegrating tablet  Commonly known as: ZOFRAN-ODT      Place 1 tablet on the tongue Every 8 (Eight) Hours As Needed for Nausea or Vomiting.       promethazine 25 MG tablet  Commonly known as: PHENERGAN      TAKE ONE TABLET BY MOUTH EVERY 4 HOURS AS NEEDED FOR NAUSEA       Stelara 90 MG/ML solution prefilled syringe Injection  Generic drug: Ustekinumab      1 injection every 12 weeks       SUMAtriptan 100 MG tablet  Commonly known as: IMITREX      Take 1 tablet by mouth As Needed.       topiramate 25 MG tablet  Commonly known as: TOPAMAX      Take 1 tablet by mouth Daily.       VITAMIN D (ERGOCALCIFEROL) PO      Take 50,000 Units by mouth Every 7 (Seven) Days.

## 2024-10-23 ENCOUNTER — TELEMEDICINE (OUTPATIENT)
Dept: BEHAVIORAL HEALTH | Facility: CLINIC | Age: 34
End: 2024-10-23
Payer: COMMERCIAL

## 2024-10-23 DIAGNOSIS — F43.21 GRIEF: ICD-10-CM

## 2024-10-23 DIAGNOSIS — F32.2 CURRENT SEVERE EPISODE OF MAJOR DEPRESSIVE DISORDER WITHOUT PSYCHOTIC FEATURES, UNSPECIFIED WHETHER RECURRENT: ICD-10-CM

## 2024-10-23 DIAGNOSIS — F41.1 GENERALIZED ANXIETY DISORDER: Primary | ICD-10-CM

## 2024-10-23 NOTE — PROGRESS NOTES
[facility]                 Follow Up Adult      Follow Up Adult Note     Date:10/23/2024   Patient Name: Eliz Eisenberg  : 1990   MRN: [id number]   Time IN: 8:01 am    Time OUT: 8:55 am     Referring Provider: Noni Lopez MD    Chief Complaint:      ICD-10-CM ICD-9-CM   1. Generalized anxiety disorder  F41.1 300.02   2. Current severe episode of major depressive disorder without psychotic features, unspecified whether recurrent  F32.2 296.23   3. Grief  F43.21 309.0        History of Present Illness:   Eliz Eisenberg is a 34 y.o. female who is being seen today for follow up counseling for ABIODUN, MDD and grief.     This provider is located at [facility], using a secure [employer] Video Visit through [email or web address]. Patient is being seen remotely via telehealth at their home address in [place], and stated they are in a secure environment for this session. The patient's condition being diagnosed/treated is appropriate for telemedicine. Tunde LONGOW identified myself as well as my credentials.   The patient, and/or patients guardian, consent to be seen remotely, and when consent is given they understand that the consent allows for patient identifiable information to be sent to a third party as needed.   They may refuse to be seen remotely at any time. The electronic data is encrypted and password protected, and the patient and/or guardian has been advised of the potential risks to privacy not withstanding such measures.     You have chosen to receive care through a telehealth visit.  Do you consent to use a video/audio connection for your medical care today? Yes.  This visit has been scheduled as a video visit to comply with patient safety concerns in accordance with CDC recommendations.              Subjective               Patient's Support Network Includes: extended family    Functional Status: Mild impairment        Current Outpatient Medications:     albuterol sulfate  (90 Base) MCG/ACT inhaler, Inhale 2 puffs Every 4 (Four) Hours As Needed for Wheezing., Disp: 18 g, Rfl: 2    BIOTIN PO, Take  by mouth Daily. 1 daily, Disp: , Rfl:     busPIRone (BUSPAR) 10 MG tablet, Take 1 tablet by mouth 3 (Three) Times a Day., Disp: 90 tablet, Rfl: 2    cetirizine (zyrTEC) 10 MG tablet, Take 1 tablet by mouth Daily., Disp: , Rfl:     EPINEPHrine (EPIPEN) 0.3 MG/0.3ML solution auto-injector injection, As Needed., Disp: , Rfl:     fluticasone (Flonase) 50 MCG/ACT nasal spray, 2 sprays into the nostril(s) as directed by provider Daily. (Patient taking differently: Administer 2 sprays into the nostril(s) as directed by provider As Needed.), Disp: 16 g, Rfl: 5    meclizine (ANTIVERT) 12.5 MG tablet, Take 1 tablet by mouth 3 (Three) Times a Day As Needed for Dizziness., Disp: , Rfl:     metFORMIN (GLUCOPHAGE) 500 MG tablet, TAKE TWO TABLETS BY MOUTH EVERY MORNING AND TAKE ONE TABLET BY MOUTH EVERY EVENING WITH MEALS, Disp: 270 tablet, Rfl: 3    montelukast (SINGULAIR) 10 MG tablet, Take 1 tablet by mouth Every Night., Disp: 90 tablet, Rfl: 3    multivitamin with minerals (MULTIVITAL PO), Take 1 tablet by mouth Daily., Disp: , Rfl:     omeprazole (priLOSEC) 40 MG capsule, Take 1 capsule by mouth Daily for 360 days., Disp: 90 capsule, Rfl: 3    ondansetron ODT (ZOFRAN-ODT) 4 MG disintegrating tablet, Place 1 tablet on the tongue Every 8 (Eight) Hours As Needed for Nausea or Vomiting., Disp: 8 tablet, Rfl: 0    promethazine (PHENERGAN) 25 MG tablet, TAKE ONE TABLET BY MOUTH EVERY 4 HOURS AS NEEDED FOR NAUSEA, Disp: 10 tablet, Rfl: 0    Stelara 90 MG/ML solution prefilled syringe Injection, 1 injection every 12 weeks, Disp: , Rfl:     SUMAtriptan (IMITREX) 100 MG tablet, Take 1 tablet by mouth As Needed., Disp: , Rfl:     topiramate (TOPAMAX) 25 MG tablet, Take 1 tablet by mouth Daily., Disp: , Rfl:     VITAMIN D, ERGOCALCIFEROL, PO,  Take 50,000 Units by mouth Every 7 (Seven) Days., Disp: , Rfl:     Allergies   Allergen Reactions    Iodine Hives, Shortness Of Breath, Swelling and Rash    Cimzia [Certolizumab Pegol] Hives, GI Intolerance and Headache    Elastic Bandages & [Zinc] Hives    Latex Hives, Itching, Swelling and Rash     Gloves     Tape Hives     PAPER TAPE    Dilantin [Phenytoin] Unknown - Low Severity     Skin crawling    Pollen Extract Rash    Tree Extract Other (See Comments)     CONGESTION       Objective     Physical Exam:  Vital Signs: There were no vitals filed for this visit.  There is no height or weight on file to calculate BMI.     Mental Status Exam:   Hygiene:   good  Cooperation:  Cooperative  Eye Contact:  Good  Psychomotor Behavior:  Appropriate  Affect:  Full range  Mood: normal  Speech:  Normal  Thought Process:  Goal directed  Thought Content:  Normal  Suicidal:  None  Homicidal:  None  Hallucinations:  None  Delusion:  None  Memory:  Intact  Orientation:  Person, Place, Time, and Situation  Reliability:  good  Insight:  Good  Judgement:  Good  Impulse Control:  Good  Physical/Medical Issues:   See problem list     Assessment / Plan      Diagnosis:  Diagnoses and all orders for this visit:    1. Generalized anxiety disorder (Primary)    2. Current severe episode of major depressive disorder without psychotic features, unspecified whether recurrent    3. Grief    Patient presented for follow-up via telehealth.  Patient and clinician processed patient's stress over upcoming obligations and commitments.  Patient and clinician identified and addressed patient cognitive distortions related to anxiety.  The patient's ongoing medical issues were identified as main trigger.  Patient was responsive to intervention.  Clinician utilized active listening and reflective response to convey empathy and support.    Progress toward goal: Not at goal    Prognosis: Good with ongoing treatment    PLAN:  Patient clinician will continue  to utilize a cognitive behavioral intervention which identifies and addresses patient cognitive distortions related to anxiety, depression and grief.  Follow-ups will occur approximately every 21 days and will last from 45 to 60 minutes in duration.    Safety: No acute safety concerns  Risk Assessment: Risk of self-harm acutely is low. Risk of self-harm chronically is also low, but could be further elevated in the event of treatment noncompliance and/or AODA.    Treatment Plan/Goals: Continue supportive psychotherapy efforts and medications as indicated. Treatment and medication options discussed during today's visit. Patient ackowledged and verbally consented to continue with current treatment plan and was educated on the importance of compliance with treatment and follow-up appointments. Patient seems reasonably able to adhere to treatment plan.      Assisted Patient in processing above session content; acknowledged and normalized patient’s thoughts, feelings, and concerns.  Rationalized patient thought process regarding anxiety, depression and grief..      Allowed Patient to freely discuss issues  without interruption or judgement with unconditional positive regard, active listening skills, and empathy. Therapist provided a safe, confidential environment to facilitate the development of a positive therapeutic relationship and encouraged open, honest communication. Assisted Patient in identifying risk factors which would indicate the need for higher level of care including thoughts to harm self or others and/or self-harming behavior and encouraged Patient to contact this office, call 911, or present to the nearest emergency room should any of these events occur. Discussed crisis intervention services and means to access. Patient adamantly and convincingly denies current suicidal or homicidal ideation or perceptual disturbance. Assisted Patient in processing session content; acknowledged and normalized Patient’s  thoughts, feelings, and concerns by utilizing a person-centered approach in efforts to build appropriate rapport and a positive therapeutic relationship with open and honest communication. .     Part of this note may be an electronic transcription/translation of spoken language to printed text using the Dragon Dictation System.       Follow Up:   Return in about 3 weeks (around 11/13/2024) for Next scheduled follow up.    Tunde Mason LCSW

## 2024-10-25 ENCOUNTER — PATIENT MESSAGE (OUTPATIENT)
Dept: INTERNAL MEDICINE | Facility: CLINIC | Age: 34
End: 2024-10-25
Payer: COMMERCIAL

## 2024-10-25 DIAGNOSIS — B37.31 YEAST VAGINITIS: Primary | ICD-10-CM

## 2024-10-25 RX ORDER — FLUCONAZOLE 150 MG/1
TABLET ORAL
Qty: 2 TABLET | Refills: 0 | OUTPATIENT
Start: 2024-10-25

## 2024-10-25 RX ORDER — FLUCONAZOLE 150 MG/1
150 TABLET ORAL ONCE
Qty: 1 TABLET | Refills: 1 | Status: SHIPPED | OUTPATIENT
Start: 2024-10-25 | End: 2024-10-25

## 2024-11-13 ENCOUNTER — OFFICE VISIT (OUTPATIENT)
Dept: BEHAVIORAL HEALTH | Facility: CLINIC | Age: 34
End: 2024-11-13
Payer: COMMERCIAL

## 2024-11-13 ENCOUNTER — OFFICE VISIT (OUTPATIENT)
Dept: INTERNAL MEDICINE | Facility: CLINIC | Age: 34
End: 2024-11-13
Payer: COMMERCIAL

## 2024-11-13 VITALS
OXYGEN SATURATION: 99 % | DIASTOLIC BLOOD PRESSURE: 64 MMHG | SYSTOLIC BLOOD PRESSURE: 104 MMHG | BODY MASS INDEX: 40.75 KG/M2 | HEIGHT: 63 IN | RESPIRATION RATE: 16 BRPM | WEIGHT: 230 LBS | TEMPERATURE: 97.7 F | HEART RATE: 65 BPM

## 2024-11-13 DIAGNOSIS — J30.9 ALLERGIC RHINITIS, UNSPECIFIED SEASONALITY, UNSPECIFIED TRIGGER: ICD-10-CM

## 2024-11-13 DIAGNOSIS — B37.9 ANTIBIOTIC-INDUCED YEAST INFECTION: ICD-10-CM

## 2024-11-13 DIAGNOSIS — F41.1 GENERALIZED ANXIETY DISORDER: Primary | ICD-10-CM

## 2024-11-13 DIAGNOSIS — F43.21 GRIEF: ICD-10-CM

## 2024-11-13 DIAGNOSIS — J01.10 ACUTE NON-RECURRENT FRONTAL SINUSITIS: Primary | ICD-10-CM

## 2024-11-13 DIAGNOSIS — J45.21 MILD INTERMITTENT ASTHMA WITH ACUTE EXACERBATION: ICD-10-CM

## 2024-11-13 DIAGNOSIS — F32.2 CURRENT SEVERE EPISODE OF MAJOR DEPRESSIVE DISORDER WITHOUT PSYCHOTIC FEATURES, UNSPECIFIED WHETHER RECURRENT: ICD-10-CM

## 2024-11-13 DIAGNOSIS — T36.95XA ANTIBIOTIC-INDUCED YEAST INFECTION: ICD-10-CM

## 2024-11-13 PROCEDURE — 99214 OFFICE O/P EST MOD 30 MIN: CPT | Performed by: FAMILY MEDICINE

## 2024-11-13 RX ORDER — FLUCONAZOLE 150 MG/1
150 TABLET ORAL ONCE
Qty: 1 TABLET | Refills: 2 | Status: SHIPPED | OUTPATIENT
Start: 2024-11-13 | End: 2024-11-13

## 2024-11-13 RX ORDER — DOXYCYCLINE 100 MG/1
100 CAPSULE ORAL 2 TIMES DAILY
Qty: 20 CAPSULE | Refills: 0 | Status: SHIPPED | OUTPATIENT
Start: 2024-11-13 | End: 2024-11-23

## 2024-11-13 RX ORDER — ALBUTEROL SULFATE 0.83 MG/ML
2.5 SOLUTION RESPIRATORY (INHALATION) EVERY 6 HOURS PRN
Qty: 48 ML | Refills: 0 | Status: SHIPPED | OUTPATIENT
Start: 2024-11-13

## 2024-11-13 RX ORDER — METHYLPREDNISOLONE 4 MG/1
TABLET ORAL
Qty: 21 TABLET | Refills: 0 | Status: SHIPPED | OUTPATIENT
Start: 2024-11-13

## 2024-11-13 RX ORDER — FLUTICASONE PROPIONATE 50 MCG
2 SPRAY, SUSPENSION (ML) NASAL DAILY
Qty: 16 G | Refills: 5 | Status: SHIPPED | OUTPATIENT
Start: 2024-11-13

## 2024-11-13 NOTE — PROGRESS NOTES
[facility] [place]                 Follow Up Adult      Follow Up Adult Note     Date:2024   Patient Name: Eliz Eisenberg  : 1990   MRN: [id number]   Time IN: 3:20 pm    Time OUT: 4:14 pm     Referring Provider: Noni Lopez MD    Chief Complaint:      ICD-10-CM ICD-9-CM   1. Generalized anxiety disorder  F41.1 300.02   2. Current severe episode of major depressive disorder without psychotic features, unspecified whether recurrent  F32.2 296.23   3. Grief  F43.21 309.0        History of Present Illness:   Eliz Eisenberg is a 34 y.o. female who is being seen today for follow up counseling for ABIODUN, MDD and grief.    Subjective               Patient's Support Network Includes: extended family    Functional Status: Mild impairment       Current Outpatient Medications:     albuterol (PROVENTIL) (2.5 MG/3ML) 0.083% nebulizer solution, Take 2.5 mg by nebulization Every 6 (Six) Hours As Needed for Wheezing., Disp: 48 mL, Rfl: 0    albuterol sulfate  (90 Base) MCG/ACT inhaler, Inhale 2 puffs Every 4 (Four) Hours As Needed for Wheezing., Disp: 18 g, Rfl: 2    BIOTIN PO, Take  by mouth Daily. 1 daily, Disp: , Rfl:     busPIRone (BUSPAR) 10 MG tablet, Take 1 tablet by mouth 3 (Three) Times a Day., Disp: 90 tablet, Rfl: 2    cetirizine (zyrTEC) 10 MG tablet, Take 1 tablet by mouth Daily., Disp: , Rfl:     doxycycline (VIBRAMYCIN) 100 MG capsule, Take 1 capsule by mouth 2 (Two) Times a Day for 10 days., Disp: 20 capsule, Rfl: 0    EPINEPHrine (EPIPEN) 0.3 MG/0.3ML solution auto-injector injection, As Needed., Disp: , Rfl:     fluconazole (Diflucan) 150 MG tablet, Take 1 tablet by mouth 1 (One) Time for 1 dose., Disp: 1 tablet, Rfl: 2    fluticasone (Flonase) 50 MCG/ACT nasal spray, Administer 2 sprays into the nostril(s) as directed by provider Daily., Disp: 16 g, Rfl: 5    meclizine (ANTIVERT) 12.5 MG tablet, Take 1 tablet by mouth 3 (Three) Times a  Day As Needed for Dizziness., Disp: , Rfl:     metFORMIN (GLUCOPHAGE) 500 MG tablet, TAKE TWO TABLETS BY MOUTH EVERY MORNING AND TAKE ONE TABLET BY MOUTH EVERY EVENING WITH MEALS, Disp: 270 tablet, Rfl: 3    methylPREDNISolone (MEDROL) 4 MG dose pack, Take as directed on package instructions., Disp: 21 tablet, Rfl: 0    montelukast (SINGULAIR) 10 MG tablet, Take 1 tablet by mouth Every Night., Disp: 90 tablet, Rfl: 3    multivitamin with minerals (MULTIVITAL PO), Take 1 tablet by mouth Daily., Disp: , Rfl:     omeprazole (priLOSEC) 40 MG capsule, Take 1 capsule by mouth Daily for 360 days., Disp: 90 capsule, Rfl: 3    ondansetron ODT (ZOFRAN-ODT) 4 MG disintegrating tablet, Place 1 tablet on the tongue Every 8 (Eight) Hours As Needed for Nausea or Vomiting., Disp: 8 tablet, Rfl: 0    promethazine (PHENERGAN) 25 MG tablet, TAKE ONE TABLET BY MOUTH EVERY 4 HOURS AS NEEDED FOR NAUSEA, Disp: 10 tablet, Rfl: 0    Stelara 90 MG/ML solution prefilled syringe Injection, 1 injection every 12 weeks, Disp: , Rfl:     SUMAtriptan (IMITREX) 100 MG tablet, Take 1 tablet by mouth As Needed., Disp: , Rfl:     topiramate (TOPAMAX) 25 MG tablet, Take 1 tablet by mouth Daily., Disp: , Rfl:     VITAMIN D, ERGOCALCIFEROL, PO, Take 50,000 Units by mouth Every 7 (Seven) Days., Disp: , Rfl:     Allergies   Allergen Reactions    Iodine Hives, Rash, Shortness Of Breath and Swelling    Cimzia [Certolizumab Pegol] Hives, GI Intolerance and Headache    Elastic Bandages & [Zinc] Hives    Latex Hives, Itching, Swelling and Rash     Gloves     Tape Hives     PAPER TAPE    Dilantin [Phenytoin] Unknown - Low Severity     Skin crawling    Pollen Extract Rash    Tree Extract Other (See Comments)     CONGESTION       Objective     Physical Exam:  Vital Signs: There were no vitals filed for this visit.  There is no height or weight on file to calculate BMI.     Mental Status Exam:   Hygiene:   good  Cooperation:  Cooperative  Eye Contact:   Good  Psychomotor Behavior:  Appropriate  Affect:  Full range  Mood: normal  Speech:  Normal  Thought Process:  Goal directed  Thought Content:  Normal  Suicidal:  None  Homicidal:  None  Hallucinations:  None  Delusion:  None  Memory:  Intact  Orientation:  Person, Place, Time, and Situation  Reliability:  good  Insight:  Good  Judgement:  Good  Impulse Control:  Good  Physical/Medical Issues:   See problem list      Assessment / Plan      Diagnosis:  Diagnoses and all orders for this visit:    1. Generalized anxiety disorder (Primary)    2. Current severe episode of major depressive disorder without psychotic features, unspecified whether recurrent    3. Grief    Patient presented for follow-up with clinician.  Patient and clinician identified and addressed patient cognitive distortions related to anxiety.  The unknown test results regarding the patient's health concerns were identified as main stressor.  Patient and clinician speculated about patient's response to worst-case scenario, best case scenario and other scenario not perceived.  Patient was responsive to intervention.  Clinician utilized active listening and reflective response to convey empathy and support.    Progress toward goal: Not at goal    Prognosis: Good with ongoing treatment.    PLAN:  Patient and clinician will continue to utilize a cognitive behavioral intervention which identifies and addresses patient cognitive distortions related to ABIODUN, MDD and grief.    Safety: No acute safety concerns  Risk Assessment: Risk of self-harm acutely is low. Risk of self-harm chronically is also low, but could be further elevated in the event of treatment noncompliance and/or AODA.    Treatment Plan/Goals: Continue supportive psychotherapy efforts and medications as indicated. Treatment and medication options discussed during today's visit. Patient ackowledged and verbally consented to continue with current treatment plan and was educated on the importance  of compliance with treatment and follow-up appointments. Patient seems reasonably able to adhere to treatment plan.      Assisted Patient in processing above session content; acknowledged and normalized patient’s thoughts, feelings, and concerns.  Rationalized patient thought process regarding anxiety, depression and grief.      Allowed Patient to freely discuss issues  without interruption or judgement with unconditional positive regard, active listening skills, and empathy. Therapist provided a safe, confidential environment to facilitate the development of a positive therapeutic relationship and encouraged open, honest communication. Assisted Patient in identifying risk factors which would indicate the need for higher level of care including thoughts to harm self or others and/or self-harming behavior and encouraged Patient to contact this office, call 911, or present to the nearest emergency room should any of these events occur. Discussed crisis intervention services and means to access. Patient adamantly and convincingly denies current suicidal or homicidal ideation or perceptual disturbance. Assisted Patient in processing session content; acknowledged and normalized Patient’s thoughts, feelings, and concerns by utilizing a person-centered approach in efforts to build appropriate rapport and a positive therapeutic relationship with open and honest communication. .     Part of this note may be an electronic transcription/translation of spoken language to printed text using the Dragon Dictation System.       Follow Up:   Return in about 3 weeks (around 12/4/2024) for Next scheduled follow up.    Tunde Mason LCSW

## 2024-11-13 NOTE — PROGRESS NOTES
Office Note     Name: Eliz Eisenberg    : 1990     MRN: [id number]     Chief Complaint  Rash (Pt states sxs x week and half) and Sinusitis    Subjective     History of Present Illness:  Eliz Eisenberg is a 34 y.o. female who presents today for rash and concerns for sinus infection  Rash on face x 1 week   Red/irritation on right side of nose- wear glasses- nasal bridge   Redness into checks   Hx of MRSA     Completed 1 pack pseduophed over the counter x 10 days- with minimal relief    Denies fevers, chills, chest pain, sob  Chest tightness  Nasal drainage- green/yellow- 1 week   Cough- dry hacking     Drinking plenty of fluids     OTC- pseudophed   Kids have been recently ill   COVID home testing negative.         Review of Systems:   Review of Systems   Constitutional:  Positive for fatigue. Negative for chills and fever.   HENT:  Positive for congestion, postnasal drip, rhinorrhea and sinus pressure. Negative for trouble swallowing and voice change.    Respiratory:  Positive for cough. Negative for chest tightness, shortness of breath and wheezing.    Cardiovascular:  Negative for chest pain, palpitations and leg swelling.   Gastrointestinal:  Negative for abdominal pain, nausea and vomiting.   Skin:  Positive for rash.   Neurological:  Negative for light-headedness and headache.       Past Medical History:   Past Medical History:   Diagnosis Date    Abdominal pain     Acute sinusitis     Allergic     Allergic rhinitis     Ankle sprain     Anxiety     Arthritis 2017    On Mobic daily    Asthma     Atopic dermatitis     Biliary dyskinesia     Cat bite      resolved    Clotting disorder     Current tear of meniscus     Dysfunctional uterine bleeding     Eczema     Foot pain     GERD (gastroesophageal reflux disease)     Gout     Hearing loss, bilateral     R/T PSORASIS    Helicobacter pylori antibody positive     23. Rx prevpak    IBS (irritable bowel syndrome)     Influenza      Metabolic syndrome     Migraines     Muscular aches     Obesity     JENN (obstructive sleep apnea) - possible 01/28/2021    Panic attack     PCOS (polycystic ovarian syndrome)     Pharyngitis     Psoriasis     planning to start Taltz    Restless leg syndrome     URI (upper respiratory infection)     Wears glasses        Past Surgical History:   Past Surgical History:   Procedure Laterality Date    COLONOSCOPY      ENDOSCOPY N/A 05/15/2023    Procedure: ESOPHAGOGASTRODUODENOSCOPY;  Surgeon: Beatriz Sandy MD;  Location:  [facility];  Service: Bariatric;  Laterality: N/A;    GASTRIC SLEEVE LAPAROSCOPIC N/A 11/06/2023    Procedure: GASTRIC SLEEVE LAPAROSCOPIC WITH DAVINCI ROBOT, ESOPHAGOGASTRODUODENOSCOPY;  Surgeon: Beatriz Sandy MD;  Location:  [facility];  Service: Robotics - DaVinci;  Laterality: N/A;  scope ID: [id number]    LAPAROSCOPIC CHOLECYSTECTOMY  2018    no gallstones    TONSILLECTOMY AND ADENOIDECTOMY N/A 07/09/2021    Procedure: TONSILLECTOMY;  Surgeon: Ran Lemon MD;  Location:  [facility];  Service: ENT;  Laterality: N/A;    UMBILICAL HERNIA REPAIR  2018    w/ cholecystectomy    WISDOM TOOTH EXTRACTION         Immunizations:   Immunization History   Administered Date(s) Administered    COVID-19 (MODERNA) 1st,2nd,3rd Dose Monovalent 08/30/2021, 09/27/2021    COVID-19 (PFIZER) 12YRS+ (COMIRNATY) 12/18/2023, 10/14/2024    COVID-19 (PFIZER) BIVALENT 12+YRS 10/03/2022, 12/07/2022    Covid-19 (Pfizer) Gray Cap Monovalent 04/18/2022    Fluzone  >6mos 10/14/2024    Fluzone (or Fluarix & Flulaval for VFC) >6mos 10/03/2022    Hepatitis A 11/23/2018    Influenza Injectable Mdck Pf Quad 12/18/2023    Influenza, Unspecified 11/24/2018, 10/30/2019, 11/17/2020, 10/30/2021    Pneumococcal Conjugate 20-Valent (PCV20) 06/07/2023    Pneumococcal Polysaccharide (PPSV23) 04/06/2021    Tdap 01/24/2019, 10/25/2022    flucelvax quad pfs =>4 YRS 10/30/2021        Medications:     Current Outpatient  Medications:     albuterol (PROVENTIL) (2.5 MG/3ML) 0.083% nebulizer solution, Take 2.5 mg by nebulization Every 6 (Six) Hours As Needed for Wheezing., Disp: 48 mL, Rfl: 0    albuterol sulfate  (90 Base) MCG/ACT inhaler, Inhale 2 puffs Every 4 (Four) Hours As Needed for Wheezing., Disp: 18 g, Rfl: 2    BIOTIN PO, Take  by mouth Daily. 1 daily, Disp: , Rfl:     busPIRone (BUSPAR) 10 MG tablet, Take 1 tablet by mouth 3 (Three) Times a Day., Disp: 90 tablet, Rfl: 2    cetirizine (zyrTEC) 10 MG tablet, Take 1 tablet by mouth Daily., Disp: , Rfl:     EPINEPHrine (EPIPEN) 0.3 MG/0.3ML solution auto-injector injection, As Needed., Disp: , Rfl:     fluticasone (Flonase) 50 MCG/ACT nasal spray, Administer 2 sprays into the nostril(s) as directed by provider Daily., Disp: 16 g, Rfl: 5    meclizine (ANTIVERT) 12.5 MG tablet, Take 1 tablet by mouth 3 (Three) Times a Day As Needed for Dizziness., Disp: , Rfl:     metFORMIN (GLUCOPHAGE) 500 MG tablet, TAKE TWO TABLETS BY MOUTH EVERY MORNING AND TAKE ONE TABLET BY MOUTH EVERY EVENING WITH MEALS, Disp: 270 tablet, Rfl: 3    montelukast (SINGULAIR) 10 MG tablet, Take 1 tablet by mouth Every Night., Disp: 90 tablet, Rfl: 3    multivitamin with minerals (MULTIVITAL PO), Take 1 tablet by mouth Daily., Disp: , Rfl:     omeprazole (priLOSEC) 40 MG capsule, Take 1 capsule by mouth Daily for 360 days., Disp: 90 capsule, Rfl: 3    ondansetron ODT (ZOFRAN-ODT) 4 MG disintegrating tablet, Place 1 tablet on the tongue Every 8 (Eight) Hours As Needed for Nausea or Vomiting., Disp: 8 tablet, Rfl: 0    promethazine (PHENERGAN) 25 MG tablet, TAKE ONE TABLET BY MOUTH EVERY 4 HOURS AS NEEDED FOR NAUSEA, Disp: 10 tablet, Rfl: 0    Stelara 90 MG/ML solution prefilled syringe Injection, 1 injection every 12 weeks, Disp: , Rfl:     SUMAtriptan (IMITREX) 100 MG tablet, Take 1 tablet by mouth As Needed., Disp: , Rfl:     topiramate (TOPAMAX) 25 MG tablet, Take 1 tablet by mouth Daily., Disp: , Rfl:      VITAMIN D, ERGOCALCIFEROL, PO, Take 50,000 Units by mouth Every 7 (Seven) Days., Disp: , Rfl:     doxycycline (VIBRAMYCIN) 100 MG capsule, Take 1 capsule by mouth 2 (Two) Times a Day for 10 days., Disp: 20 capsule, Rfl: 0    methylPREDNISolone (MEDROL) 4 MG dose pack, Take as directed on package instructions., Disp: 21 tablet, Rfl: 0    Allergies:   Allergies   Allergen Reactions    Iodine Hives, Rash, Shortness Of Breath and Swelling    Cimzia [Certolizumab Pegol] Hives, GI Intolerance and Headache    Elastic Bandages & [Zinc] Hives    Latex Hives, Itching, Swelling and Rash     Gloves     Tape Hives     PAPER TAPE    Dilantin [Phenytoin] Unknown - Low Severity     Skin crawling    Pollen Extract Rash    Tree Extract Other (See Comments)     CONGESTION       Family History:   Family History   Problem Relation Age of Onset    Heart disease Mother     Multiple sclerosis Mother     Diabetes Mother     Arthritis Mother     Asthma Mother     Early death Mother     Kidney disease Mother     Hypertension Father     Obesity Father     Diabetes Father     Sleep apnea Father     No Known Problems Sister     Breast cancer Maternal Grandmother     Diabetes Maternal Grandmother     Dementia Maternal Grandmother     Asthma Maternal Grandfather     Diabetes Maternal Grandfather     Sleep apnea Maternal Grandfather     Arthritis Maternal Grandfather     Heart disease Maternal Grandfather     Colon cancer Paternal Grandmother     Dementia Paternal Grandmother     Heart attack Paternal Grandfather     Sleep apnea Paternal Grandfather     Obesity Paternal Grandfather     Hypertension Paternal Grandfather     Diabetes Paternal Grandfather        Social History:   Social History     Socioeconomic History    Marital status:    Tobacco Use    Smoking status: Never     Passive exposure: Never    Smokeless tobacco: Never   Vaping Use    Vaping status: Never Used   Substance and Sexual Activity    Alcohol use: Not Currently      "Comment: rare    Drug use: No    Sexual activity: Yes     Partners: Male     Birth control/protection: None         Objective     Vital Signs  /64   Pulse 65   Temp 97.7 °F (36.5 °C)   Resp 16   Ht 160 cm (63\")   Wt 104 kg (230 lb)   SpO2 99%   BMI 40.74 kg/m²   Estimated body mass index is 40.74 kg/m² as calculated from the following:    Height as of this encounter: 160 cm (63\").    Weight as of this encounter: 104 kg (230 lb).            Physical Exam  Vitals and nursing note reviewed.   Constitutional:       General: She is not in acute distress.     Appearance: Normal appearance.   HENT:      Head: Normocephalic and atraumatic.      Right Ear: Tympanic membrane normal.      Left Ear: Tympanic membrane normal.      Nose: Congestion and rhinorrhea present.      Right Turbinates: Enlarged and swollen.      Right Sinus: Frontal sinus tenderness present.      Left Sinus: Frontal sinus tenderness present.      Comments: Redness and irritation over right side nasal bridge- skin lesion- red swollen   Swelling and irritation at tip of nose   Faint redness over right side face cheek and into neck- cellulitis? Denies pain into cheek area.      Mouth/Throat:      Mouth: Mucous membranes are moist.      Comments: Post nasal drip   Cardiovascular:      Rate and Rhythm: Normal rate and regular rhythm.      Heart sounds: Normal heart sounds.   Pulmonary:      Effort: Pulmonary effort is normal. No respiratory distress.      Breath sounds: Normal breath sounds.   Skin:     General: Skin is warm and dry.   Neurological:      General: No focal deficit present.      Mental Status: She is alert and oriented to person, place, and time.          Procedures     Assessment and Plan   Diagnosis Discussed   Continue to monitor   Plenty of fluids  Continue zyrtec and flonase daily   Mucinex as needed as directed   Please complete full course of steroids   Please complete full course of antibiotics   Refills today   If symptoms " worsen or persist please seek further evaluation     1. Acute non-recurrent frontal sinusitis  - doxycycline (VIBRAMYCIN) 100 MG capsule; Take 1 capsule by mouth 2 (Two) Times a Day for 10 days.  Dispense: 20 capsule; Refill: 0  - methylPREDNISolone (MEDROL) 4 MG dose pack; Take as directed on package instructions.  Dispense: 21 tablet; Refill: 0    2. Mild intermittent asthma with acute exacerbation  - albuterol (PROVENTIL) (2.5 MG/3ML) 0.083% nebulizer solution; Take 2.5 mg by nebulization Every 6 (Six) Hours As Needed for Wheezing.  Dispense: 48 mL; Refill: 0    3. Antibiotic-induced yeast infection  - fluconazole (Diflucan) 150 MG tablet; Take 1 tablet by mouth 1 (One) Time for 1 dose.  Dispense: 1 tablet; Refill: 2    4. Allergic rhinitis, unspecified seasonality, unspecified trigger  - fluticasone (Flonase) 50 MCG/ACT nasal spray; Administer 2 sprays into the nostril(s) as directed by provider Daily.  Dispense: 16 g; Refill: 5       Follow Up  Return if symptoms worsen or fail to improve, for Next scheduled follow up.    Noni Lopez MD  [facility] INTERNAL MEDICINE  [address] [place] [id number] [phone]

## 2024-11-13 NOTE — PATIENT INSTRUCTIONS
Diagnosis Discussed   Continue to monitor   Plenty of fluids  Continue zyrtec and flonase daily   Mucinex as needed as directed   Please complete full course of steroids   Please complete full course of antibiotics   Refills today   If symptoms worsen or persist please seek further evaluation

## 2024-11-22 ENCOUNTER — OFFICE VISIT (OUTPATIENT)
Dept: CARDIOLOGY | Facility: CLINIC | Age: 34
End: 2024-11-22
Payer: COMMERCIAL

## 2024-11-22 VITALS
HEIGHT: 66 IN | BODY MASS INDEX: 37.51 KG/M2 | HEART RATE: 78 BPM | DIASTOLIC BLOOD PRESSURE: 78 MMHG | SYSTOLIC BLOOD PRESSURE: 122 MMHG | WEIGHT: 233.4 LBS | OXYGEN SATURATION: 99 %

## 2024-11-22 DIAGNOSIS — R42 EPISODIC LIGHTHEADEDNESS: ICD-10-CM

## 2024-11-22 DIAGNOSIS — R00.2 PALPITATIONS: ICD-10-CM

## 2024-11-22 DIAGNOSIS — R42 POSTURAL DIZZINESS WITH PRESYNCOPE: Primary | ICD-10-CM

## 2024-11-22 DIAGNOSIS — R55 POSTURAL DIZZINESS WITH PRESYNCOPE: Primary | ICD-10-CM

## 2024-11-22 PROCEDURE — 99214 OFFICE O/P EST MOD 30 MIN: CPT | Performed by: INTERNAL MEDICINE

## 2024-11-22 NOTE — PROGRESS NOTES
[facility] CARDIOLOGY    Established Patient Office Visit    Patient Name: Eliz Eisenberg  : 1990   MRN: [id number]   Care Team: Patient Care Team:  Noni Lopez MD as PCP - General (Internal Medicine)  Nathan, COURTNEY Hinkle (Inactive) as Nurse Practitioner (Obstetrics and Gynecology)  Beatriz Sandy MD as Surgeon (General Surgery)  Trina Hernandez APRN as Nurse Practitioner (Nurse Practitioner)  Cortez Choi MD as Consulting Physician (Neurology)  Vishal Flores MD as Cardiologist (Cardiology)  Poli Taylor DO as Consulting Physician (Rheumatology)  Ran Lemon MD as Consulting Physician (Otolaryngology)    Chief Complaint   Patient presents with    Syncope     HPI: Eliz Eisenberg is a 34 y.o. female with a history of anxiety disorder with panic attacks, biliary dyskinesia, GERD with previous treatment for H. pylori, metabolic syndrome, and obesity with bariatric surgery last year who presents for follow-up of spells of dizziness.    Her last visit in 2023 she has undergone leave gastrectomy with approximately 100 pound weight loss since our last visit.  She continues to have spells multiple times per week of dizziness and palpitations.  She has been seen in the heart valve clinic and underwent repeat echocardiogram and wearable monitor which showed no evidence of arrhythmia.  She will have some dizziness with standing but also reports having symptoms that will start suddenly while sitting or laying.  She has been seen by neurologist through [place] [facility] has been started on Topamax without any significant change in her symptoms.    Subjective   Review of Systems   Respiratory:  Positive for chest tightness.    Cardiovascular:  Positive for chest pain and palpitations. Negative for leg swelling.       Past Medical History:   Diagnosis Date    Abdominal pain     Acute sinusitis     Allergic     Allergic rhinitis      Ankle sprain     Anxiety     Arthritis 2017    On Mobic daily    Asthma     Atopic dermatitis     Biliary dyskinesia     Cat bite      resolved    Clotting disorder     Current tear of meniscus     Dysfunctional uterine bleeding     Eczema     Foot pain     GERD (gastroesophageal reflux disease)     Gout     Hearing loss, bilateral     R/T PSORASIS    Helicobacter pylori antibody positive     4/26/23. Rx prevpak    IBS (irritable bowel syndrome)     Influenza     Metabolic syndrome     Migraines     Muscular aches     Obesity     JENN (obstructive sleep apnea) - possible 01/28/2021    Panic attack     PCOS (polycystic ovarian syndrome)     Pharyngitis     Psoriasis     planning to start Taltz    Restless leg syndrome     URI (upper respiratory infection)     Wears glasses      Past Surgical History:   Procedure Laterality Date    COLONOSCOPY      ENDOSCOPY N/A 05/15/2023    Procedure: ESOPHAGOGASTRODUODENOSCOPY;  Surgeon: Beatriz Sandy MD;  Location:  [facility];  Service: Bariatric;  Laterality: N/A;    GASTRIC SLEEVE LAPAROSCOPIC N/A 11/06/2023    Procedure: GASTRIC SLEEVE LAPAROSCOPIC WITH DAVINCI ROBOT, ESOPHAGOGASTRODUODENOSCOPY;  Surgeon: Beatriz Sandy MD;  Location:  [facility];  Service: Robotics - DaVinci;  Laterality: N/A;  scope ID: [id number]    LAPAROSCOPIC CHOLECYSTECTOMY  2018    no gallstones    TONSILLECTOMY AND ADENOIDECTOMY N/A 07/09/2021    Procedure: TONSILLECTOMY;  Surgeon: Ran Lemon MD;  Location:  [facility];  Service: ENT;  Laterality: N/A;    UMBILICAL HERNIA REPAIR  2018    w/ cholecystectomy    WISDOM TOOTH EXTRACTION       Social History     Socioeconomic History    Marital status:    Tobacco Use    Smoking status: Never     Passive exposure: Never    Smokeless tobacco: Never   Vaping Use    Vaping status: Never Used   Substance and Sexual Activity    Alcohol use: Not Currently     Comment: rare    Drug use: No    Sexual activity: Yes     Partners: Male      Birth control/protection: None     Family History   Problem Relation Age of Onset    Heart disease Mother     Multiple sclerosis Mother     Diabetes Mother     Arthritis Mother     Asthma Mother     Early death Mother     Kidney disease Mother     Hypertension Father     Obesity Father     Diabetes Father     Sleep apnea Father     No Known Problems Sister     Breast cancer Maternal Grandmother     Diabetes Maternal Grandmother     Dementia Maternal Grandmother     Asthma Maternal Grandfather     Diabetes Maternal Grandfather     Sleep apnea Maternal Grandfather     Arthritis Maternal Grandfather     Heart disease Maternal Grandfather     Colon cancer Paternal Grandmother     Dementia Paternal Grandmother     Heart attack Paternal Grandfather     Sleep apnea Paternal Grandfather     Obesity Paternal Grandfather     Hypertension Paternal Grandfather     Diabetes Paternal Grandfather        Current Outpatient Medications:     albuterol (PROVENTIL) (2.5 MG/3ML) 0.083% nebulizer solution, Take 2.5 mg by nebulization Every 6 (Six) Hours As Needed for Wheezing., Disp: 48 mL, Rfl: 0    albuterol sulfate  (90 Base) MCG/ACT inhaler, Inhale 2 puffs Every 4 (Four) Hours As Needed for Wheezing., Disp: 18 g, Rfl: 2    BIOTIN PO, Take  by mouth Daily. 1 daily, Disp: , Rfl:     busPIRone (BUSPAR) 10 MG tablet, Take 1 tablet by mouth 3 (Three) Times a Day., Disp: 90 tablet, Rfl: 2    cetirizine (zyrTEC) 10 MG tablet, Take 1 tablet by mouth Daily., Disp: , Rfl:     doxycycline (VIBRAMYCIN) 100 MG capsule, Take 1 capsule by mouth 2 (Two) Times a Day for 10 days., Disp: 20 capsule, Rfl: 0    EPINEPHrine (EPIPEN) 0.3 MG/0.3ML solution auto-injector injection, As Needed., Disp: , Rfl:     fluticasone (Flonase) 50 MCG/ACT nasal spray, Administer 2 sprays into the nostril(s) as directed by provider Daily., Disp: 16 g, Rfl: 5    meclizine (ANTIVERT) 12.5 MG tablet, Take 1 tablet by mouth 3 (Three) Times a Day As Needed for  "Dizziness., Disp: , Rfl:     metFORMIN (GLUCOPHAGE) 500 MG tablet, TAKE TWO TABLETS BY MOUTH EVERY MORNING AND TAKE ONE TABLET BY MOUTH EVERY EVENING WITH MEALS, Disp: 270 tablet, Rfl: 3    methylPREDNISolone (MEDROL) 4 MG dose pack, Take as directed on package instructions., Disp: 21 tablet, Rfl: 0    montelukast (SINGULAIR) 10 MG tablet, Take 1 tablet by mouth Every Night., Disp: 90 tablet, Rfl: 3    multivitamin with minerals (MULTIVITAL PO), Take 1 tablet by mouth Daily., Disp: , Rfl:     omeprazole (priLOSEC) 40 MG capsule, Take 1 capsule by mouth Daily for 360 days., Disp: 90 capsule, Rfl: 3    ondansetron ODT (ZOFRAN-ODT) 4 MG disintegrating tablet, Place 1 tablet on the tongue Every 8 (Eight) Hours As Needed for Nausea or Vomiting., Disp: 8 tablet, Rfl: 0    promethazine (PHENERGAN) 25 MG tablet, TAKE ONE TABLET BY MOUTH EVERY 4 HOURS AS NEEDED FOR NAUSEA, Disp: 10 tablet, Rfl: 0    Stelara 90 MG/ML solution prefilled syringe Injection, 1 injection every 12 weeks, Disp: , Rfl:     SUMAtriptan (IMITREX) 100 MG tablet, Take 1 tablet by mouth As Needed., Disp: , Rfl:     topiramate (TOPAMAX) 25 MG tablet, Take 1 tablet by mouth Daily., Disp: , Rfl:     VITAMIN D, ERGOCALCIFEROL, PO, Take 50,000 Units by mouth Every 7 (Seven) Days., Disp: , Rfl:     Allergies   Allergen Reactions    Iodine Hives, Rash, Shortness Of Breath and Swelling    Cimzia [Certolizumab Pegol] Hives, GI Intolerance and Headache    Elastic Bandages & [Zinc] Hives    Latex Hives, Itching, Swelling and Rash     Gloves     Tape Hives     PAPER TAPE    Dilantin [Phenytoin] Unknown - Low Severity     Skin crawling    Pollen Extract Rash    Tree Extract Other (See Comments)     CONGESTION     Objective     Vitals:    11/22/24 1040   BP: 122/78   BP Location: Right arm   Patient Position: Sitting   Cuff Size: Adult   Pulse: 78   SpO2: 99%   Weight: 106 kg (233 lb 6.4 oz)   Height: 167.6 cm (66\")   Body mass index is 37.67 kg/m².  Gen: well " developed, sitting up on exam table, comfortable appearing  HEENT: MMM, sclera anicteric, conjunctiva normal, no carotid bruits  CV: regular rate, regular rhythm, no murmurs or rubs, normal S1, S2. 2+ radial pulses  Pulm: RA, normal work of breathing, no wheezes, rales, rhonchi  Ext: normal bulk for age, normal tone  Neuro: alert, oriented, face symmetrical, moving all extremities well  Psych: normal mood, appropriate affect    Most recent PCP note, imaging tests, and labs reviewed.    Labs:  Lab Results   Component Value Date    WBC 5.24 10/15/2024    HGB 11.9 (L) 10/15/2024    HCT 38.6 10/15/2024    MCV 82.3 10/15/2024     10/15/2024     Lab Results   Component Value Date    GLUCOSE 83 10/15/2024    BUN 7 10/15/2024    CREATININE 0.60 10/15/2024    EGFRIFNONA 121 01/14/2022    BCR 11.7 10/15/2024    K 3.6 10/15/2024    CO2 23.3 10/15/2024    CALCIUM 8.5 (L) 10/15/2024    PROTENTOTREF 7.4 09/26/2024    ALBUMIN 4.2 10/15/2024    LABIL2 1.4 06/05/2024    AST 22 10/15/2024    ALT 22 10/15/2024     Lab Results   Component Value Date    HGBA1C 5.40 07/05/2024     Lab Results   Component Value Date    CHOL 191 07/05/2024    CHLPL 191 04/26/2023    TRIG 94 07/05/2024    HDL 36 (L) 07/05/2024     (H) 07/05/2024 8/28/23 - Stress PET    No significant ST or T wave changes identified with regadenason.    Findings consistent with a normal ECG stress test.    There is no prior study available for comparison. Rest EF = 67% Stress EF = 67%.    Myocardial perfusion imaging indicates a normal myocardial perfusion study with no evidence of ischemia.    Impressions are consistent with a low risk study.    Results for orders placed during the hospital encounter of 08/26/24    Adult Transthoracic Echo Complete W/ Cont if Necessary Per Protocol    Interpretation Summary    Left ventricular ejection fraction appears to be 61 - 65%. Normal left ventricular cavity size and wall thickness noted. All left ventricular wall  segments contract normally. Left ventricular diastolic function was normal. Normal left atrial pressure.    Normal right ventricular cavity size, wall thickness, systolic function and septal motion.    Normal left atrial cavity size noted.    No aortic valve regurgitation or stenosis is present. The aortic valve is grossly normal in structure. The aortic valve was poorly visualized but appears trileaflet.    The mitral valve is structurally normal with no regurgitation or significant stenosis present.    The tricuspid valve is structurally normal with no significant stenosis present. Trace to mild tricuspid valve regurgitation is present.    8/20/24 -14-day monitor    A relatively benign monitor study.    Patient triggers with sinus rhythm and sinus tachycardia.  No sustained arrhythmias seen.  Rare VE and SVE, supraventricular triplets seen.    7/13-7/26/23 -wearable cardiac monitor   - 1 SVT event of 6 beats   - Rare ectopy   - Average heart rate 94 bpm, 24% tachycardia events    Procedures    Assessment & Plan       ICD-10-CM ICD-9-CM   1. Postural dizziness with presyncope  R42 780.4    R55 780.2   2. Palpitations  R00.2 785.1   3. Episodic lightheadedness  R42 780.4       Dizziness spells, palpitations   -Benign ECG, echocardiogram, and stress test as well as wearable monitors   -Referred for tilt table testing     Return in about 4 months (around 3/22/2025).    SANAM Flores MD  11/22/24    [facility] Cardiology  [address] [place] [id number] [phone]

## 2024-12-09 ENCOUNTER — OFFICE VISIT (OUTPATIENT)
Dept: INTERNAL MEDICINE | Facility: CLINIC | Age: 34
End: 2024-12-09
Payer: COMMERCIAL

## 2024-12-09 VITALS
OXYGEN SATURATION: 95 % | HEART RATE: 80 BPM | BODY MASS INDEX: 37.28 KG/M2 | WEIGHT: 232 LBS | SYSTOLIC BLOOD PRESSURE: 110 MMHG | DIASTOLIC BLOOD PRESSURE: 76 MMHG | TEMPERATURE: 98.2 F | RESPIRATION RATE: 16 BRPM | HEIGHT: 66 IN

## 2024-12-09 DIAGNOSIS — R05.1 ACUTE COUGH: ICD-10-CM

## 2024-12-09 DIAGNOSIS — J06.9 UPPER RESPIRATORY TRACT INFECTION, UNSPECIFIED TYPE: Primary | ICD-10-CM

## 2024-12-09 DIAGNOSIS — T36.95XA ANTIBIOTIC-INDUCED YEAST INFECTION: ICD-10-CM

## 2024-12-09 DIAGNOSIS — B37.9 ANTIBIOTIC-INDUCED YEAST INFECTION: ICD-10-CM

## 2024-12-09 LAB
EXPIRATION DATE: NORMAL
FLUAV AG UPPER RESP QL IA.RAPID: NOT DETECTED
FLUBV AG UPPER RESP QL IA.RAPID: NOT DETECTED
INTERNAL CONTROL: NORMAL
Lab: NORMAL
SARS-COV-2 AG UPPER RESP QL IA.RAPID: NOT DETECTED

## 2024-12-09 PROCEDURE — 87428 SARSCOV & INF VIR A&B AG IA: CPT | Performed by: FAMILY MEDICINE

## 2024-12-09 PROCEDURE — 99213 OFFICE O/P EST LOW 20 MIN: CPT | Performed by: FAMILY MEDICINE

## 2024-12-09 RX ORDER — FLUCONAZOLE 150 MG/1
150 TABLET ORAL ONCE
Qty: 1 TABLET | Refills: 1 | Status: SHIPPED | OUTPATIENT
Start: 2024-12-09 | End: 2024-12-09

## 2024-12-09 RX ORDER — AZITHROMYCIN 250 MG/1
TABLET, FILM COATED ORAL
Qty: 6 TABLET | Refills: 0 | Status: SHIPPED | OUTPATIENT
Start: 2024-12-09

## 2024-12-09 RX ORDER — FLUTICASONE PROPIONATE AND SALMETEROL 100; 50 UG/1; UG/1
1 POWDER RESPIRATORY (INHALATION)
Qty: 60 EACH | Refills: 1 | Status: SHIPPED | OUTPATIENT
Start: 2024-12-09

## 2024-12-09 RX ORDER — DEXTROMETHORPHAN HYDROBROMIDE AND PROMETHAZINE HYDROCHLORIDE 15; 6.25 MG/5ML; MG/5ML
5 SYRUP ORAL 4 TIMES DAILY PRN
Qty: 180 ML | Refills: 0 | Status: SHIPPED | OUTPATIENT
Start: 2024-12-09

## 2024-12-09 NOTE — PATIENT INSTRUCTIONS
Diagnosis Discussed   Continue to monitor   Plenty of fluids  Inhalers as directed   Please complete full course of antibiotics   Cough syrup as needed as directed   If symptoms worsen or persist please seek further evaluation

## 2024-12-09 NOTE — PROGRESS NOTES
Office Note     Name: Eliz Eisenberg    : 1990     MRN: [id number]     Chief Complaint  Cough (Pt states sxs x month, some improvement w/ abx, once finished sxs returned.), Fever, and Nasal Congestion    Subjective     History of Present Illness:  Eliz Eisenberg is a 34 y.o. female who presents today for cough x 1 month mild improvement with abx since completion cough has returned   Fever and congestion     Urgent care approx 1 week ago   Given round of steroids, mucinex, robitussin   High dose taper  of prednisone- did not help with cough as expected   Using nebulizer and inhalers     Fever- 102.2   Chills   Cough- non productive   Post nasal drip- worse at nighttime   Thick/green productive mucous     OTC- mucinex, robitussin     Review of Systems:   Review of Systems   Constitutional:  Positive for fatigue and fever. Negative for chills.   HENT:  Positive for congestion, postnasal drip, rhinorrhea and sinus pressure. Negative for trouble swallowing and voice change.    Eyes:  Negative for visual disturbance.   Respiratory:  Positive for cough and chest tightness. Negative for shortness of breath and wheezing.    Cardiovascular:  Negative for chest pain, palpitations and leg swelling.   Gastrointestinal:  Negative for abdominal pain, nausea and vomiting.   Neurological:  Negative for light-headedness and headache.       Past Medical History:   Past Medical History:   Diagnosis Date    Abdominal pain     Acute sinusitis     Allergic     Allergic rhinitis     Ankle sprain     Anxiety     Arthritis 2017    On Mobic daily    Asthma     Atopic dermatitis     Biliary dyskinesia     Cat bite      resolved    Clotting disorder     Current tear of meniscus     Dysfunctional uterine bleeding     Eczema     Foot pain     GERD (gastroesophageal reflux disease)     Gout     Hearing loss, bilateral     R/T PSORASIS    Helicobacter pylori antibody positive     23. Rx prevpak    IBS (irritable bowel  syndrome)     Influenza     Metabolic syndrome     Migraines     Muscular aches     Obesity     JENN (obstructive sleep apnea) - possible 01/28/2021    Panic attack     PCOS (polycystic ovarian syndrome)     Pharyngitis     Psoriasis     planning to start Taltz    Restless leg syndrome     URI (upper respiratory infection)     Wears glasses        Past Surgical History:   Past Surgical History:   Procedure Laterality Date    COLONOSCOPY      ENDOSCOPY N/A 05/15/2023    Procedure: ESOPHAGOGASTRODUODENOSCOPY;  Surgeon: Beatriz Sandy MD;  Location:  [facility];  Service: Bariatric;  Laterality: N/A;    GASTRIC SLEEVE LAPAROSCOPIC N/A 11/06/2023    Procedure: GASTRIC SLEEVE LAPAROSCOPIC WITH E-BlinkINCI ROBOT, ESOPHAGOGASTRODUODENOSCOPY;  Surgeon: Beatriz Sandy MD;  Location:  [facility];  Service: Robotics - DaVinci;  Laterality: N/A;  scope ID: [id number]    LAPAROSCOPIC CHOLECYSTECTOMY  2018    no gallstones    TONSILLECTOMY AND ADENOIDECTOMY N/A 07/09/2021    Procedure: TONSILLECTOMY;  Surgeon: Ran Lemon MD;  Location:  [facility];  Service: ENT;  Laterality: N/A;    UMBILICAL HERNIA REPAIR  2018    w/ cholecystectomy    WISDOM TOOTH EXTRACTION         Immunizations:   Immunization History   Administered Date(s) Administered    COVID-19 (MODERNA) 1st,2nd,3rd Dose Monovalent 08/30/2021, 09/27/2021    COVID-19 (PFIZER) 12YRS+ (COMIRNATY) 12/18/2023, 10/14/2024    COVID-19 (PFIZER) BIVALENT 12+YRS 10/03/2022, 12/07/2022    Covid-19 (Pfizer) Gray Cap Monovalent 04/18/2022    Fluzone  >6mos 10/14/2024    Fluzone (or Fluarix & Flulaval for VFC) >6mos 10/03/2022    Hepatitis A 11/23/2018    Influenza Injectable Mdck Pf Quad 12/18/2023    Influenza, Unspecified 11/24/2018, 10/30/2019, 11/17/2020, 10/30/2021    Pneumococcal Conjugate 20-Valent (PCV20) 06/07/2023    Pneumococcal Polysaccharide (PPSV23) 04/06/2021    Tdap 01/24/2019, 10/25/2022    flucelvax quad pfs =>4 YRS 10/30/2021        Medications:      Current Outpatient Medications:     albuterol (PROVENTIL) (2.5 MG/3ML) 0.083% nebulizer solution, Take 2.5 mg by nebulization Every 6 (Six) Hours As Needed for Wheezing., Disp: 48 mL, Rfl: 0    albuterol sulfate  (90 Base) MCG/ACT inhaler, Inhale 2 puffs Every 4 (Four) Hours As Needed for Wheezing., Disp: 18 g, Rfl: 2    BIOTIN PO, Take  by mouth Daily. 1 daily, Disp: , Rfl:     busPIRone (BUSPAR) 10 MG tablet, Take 1 tablet by mouth 3 (Three) Times a Day., Disp: 90 tablet, Rfl: 2    cetirizine (zyrTEC) 10 MG tablet, Take 1 tablet by mouth Daily., Disp: , Rfl:     EPINEPHrine (EPIPEN) 0.3 MG/0.3ML solution auto-injector injection, As Needed., Disp: , Rfl:     fluticasone (Flonase) 50 MCG/ACT nasal spray, Administer 2 sprays into the nostril(s) as directed by provider Daily., Disp: 16 g, Rfl: 5    meclizine (ANTIVERT) 12.5 MG tablet, Take 1 tablet by mouth 3 (Three) Times a Day As Needed for Dizziness., Disp: , Rfl:     metFORMIN (GLUCOPHAGE) 500 MG tablet, TAKE TWO TABLETS BY MOUTH EVERY MORNING AND TAKE ONE TABLET BY MOUTH EVERY EVENING WITH MEALS, Disp: 270 tablet, Rfl: 3    montelukast (SINGULAIR) 10 MG tablet, Take 1 tablet by mouth Every Night., Disp: 90 tablet, Rfl: 3    multivitamin with minerals (MULTIVITAL PO), Take 1 tablet by mouth Daily., Disp: , Rfl:     omeprazole (priLOSEC) 40 MG capsule, Take 1 capsule by mouth Daily for 360 days., Disp: 90 capsule, Rfl: 3    ondansetron ODT (ZOFRAN-ODT) 4 MG disintegrating tablet, Place 1 tablet on the tongue Every 8 (Eight) Hours As Needed for Nausea or Vomiting., Disp: 8 tablet, Rfl: 0    promethazine (PHENERGAN) 25 MG tablet, TAKE ONE TABLET BY MOUTH EVERY 4 HOURS AS NEEDED FOR NAUSEA, Disp: 10 tablet, Rfl: 0    Stelara 90 MG/ML solution prefilled syringe Injection, 1 injection every 12 weeks, Disp: , Rfl:     SUMAtriptan (IMITREX) 100 MG tablet, Take 1 tablet by mouth As Needed., Disp: , Rfl:     topiramate (TOPAMAX) 25 MG tablet, Take 1 tablet by  mouth Daily., Disp: , Rfl:     VITAMIN D, ERGOCALCIFEROL, PO, Take 50,000 Units by mouth Every 7 (Seven) Days., Disp: , Rfl:     azithromycin (Zithromax Z-Alirio) 250 MG tablet, Take 2 tablets by mouth on day 1, then 1 tablet daily on days 2-5, Disp: 6 tablet, Rfl: 0    fluconazole (Diflucan) 150 MG tablet, Take 1 tablet by mouth 1 (One) Time for 1 dose., Disp: 1 tablet, Rfl: 1    Fluticasone-Salmeterol (ADVAIR/WIXELA) 100-50 MCG/ACT DISKUS, Inhale 1 puff 2 (Two) Times a Day., Disp: 60 each, Rfl: 1    promethazine-dextromethorphan (PROMETHAZINE-DM) 6.25-15 MG/5ML syrup, Take 5 mL by mouth 4 (Four) Times a Day As Needed for Cough., Disp: 180 mL, Rfl: 0    Allergies:   Allergies   Allergen Reactions    Iodine Hives, Rash, Shortness Of Breath and Swelling    Cimzia [Certolizumab Pegol] Hives, GI Intolerance and Headache    Elastic Bandages & [Zinc] Hives    Latex Hives, Itching, Swelling and Rash     Gloves     Tape Hives     PAPER TAPE    Dilantin [Phenytoin] Unknown - Low Severity     Skin crawling    Pollen Extract Rash    Tree Extract Other (See Comments)     CONGESTION       Family History:   Family History   Problem Relation Age of Onset    Heart disease Mother     Multiple sclerosis Mother     Diabetes Mother     Arthritis Mother     Asthma Mother     Early death Mother     Kidney disease Mother     Hypertension Father     Obesity Father     Diabetes Father     Sleep apnea Father     No Known Problems Sister     Breast cancer Maternal Grandmother     Diabetes Maternal Grandmother     Dementia Maternal Grandmother     Asthma Maternal Grandfather     Diabetes Maternal Grandfather     Sleep apnea Maternal Grandfather     Arthritis Maternal Grandfather     Heart disease Maternal Grandfather     Colon cancer Paternal Grandmother     Dementia Paternal Grandmother     Heart attack Paternal Grandfather     Sleep apnea Paternal Grandfather     Obesity Paternal Grandfather     Hypertension Paternal Grandfather     Diabetes  "Paternal Grandfather        Social History:   Social History     Socioeconomic History    Marital status:    Tobacco Use    Smoking status: Never     Passive exposure: Never    Smokeless tobacco: Never   Vaping Use    Vaping status: Never Used   Substance and Sexual Activity    Alcohol use: Not Currently     Comment: rare    Drug use: No    Sexual activity: Yes     Partners: Male     Birth control/protection: None         Objective     Vital Signs  /76   Pulse 80   Temp 98.2 °F (36.8 °C)   Resp 16   Ht 167.6 cm (66\")   Wt 105 kg (232 lb)   SpO2 95%   BMI 37.45 kg/m²   Estimated body mass index is 37.45 kg/m² as calculated from the following:    Height as of this encounter: 167.6 cm (66\").    Weight as of this encounter: 105 kg (232 lb).            Physical Exam  Vitals and nursing note reviewed.   Constitutional:       General: She is not in acute distress.     Appearance: Normal appearance.   HENT:      Head: Normocephalic and atraumatic.      Right Ear: Tympanic membrane normal.      Left Ear: Tympanic membrane normal.      Nose: Congestion and rhinorrhea present.      Mouth/Throat:      Mouth: Mucous membranes are moist.      Comments: Post nasal drip   Cardiovascular:      Rate and Rhythm: Normal rate and regular rhythm.      Heart sounds: Normal heart sounds.   Pulmonary:      Effort: Pulmonary effort is normal. No respiratory distress.      Breath sounds: Normal breath sounds.   Skin:     General: Skin is warm and dry.   Neurological:      General: No focal deficit present.      Mental Status: She is alert and oriented to person, place, and time.          Procedures     Assessment and Plan   Diagnosis Discussed   Continue to monitor   Plenty of fluids  Inhalers as directed   Please complete full course of antibiotics   Cough syrup as needed as directed   If symptoms worsen or persist please seek further evaluation     1. Upper respiratory tract infection, unspecified type  - azithromycin " (Zithromax Z-Alirio) 250 MG tablet; Take 2 tablets by mouth on day 1, then 1 tablet daily on days 2-5  Dispense: 6 tablet; Refill: 0  - Fluticasone-Salmeterol (ADVAIR/WIXELA) 100-50 MCG/ACT DISKUS; Inhale 1 puff 2 (Two) Times a Day.  Dispense: 60 each; Refill: 1  - promethazine-dextromethorphan (PROMETHAZINE-DM) 6.25-15 MG/5ML syrup; Take 5 mL by mouth 4 (Four) Times a Day As Needed for Cough.  Dispense: 180 mL; Refill: 0    2. Acute cough  - POCT SARS-CoV-2 Antigen EASTON + Flu  - azithromycin (Zithromax Z-Alirio) 250 MG tablet; Take 2 tablets by mouth on day 1, then 1 tablet daily on days 2-5  Dispense: 6 tablet; Refill: 0  - Fluticasone-Salmeterol (ADVAIR/WIXELA) 100-50 MCG/ACT DISKUS; Inhale 1 puff 2 (Two) Times a Day.  Dispense: 60 each; Refill: 1  - promethazine-dextromethorphan (PROMETHAZINE-DM) 6.25-15 MG/5ML syrup; Take 5 mL by mouth 4 (Four) Times a Day As Needed for Cough.  Dispense: 180 mL; Refill: 0    3. Antibiotic-induced yeast infection  - fluconazole (Diflucan) 150 MG tablet; Take 1 tablet by mouth 1 (One) Time for 1 dose.  Dispense: 1 tablet; Refill: 1       Follow Up  Return if symptoms worsen or fail to improve.    Noni Lopez MD  [facility] INTER[facility] MEDIC[facility]  [address] [facility] [address] [phone]

## 2025-01-10 ENCOUNTER — TELEPHONE (OUTPATIENT)
Age: 35
End: 2025-01-10
Payer: COMMERCIAL

## 2025-01-22 ENCOUNTER — OFFICE VISIT (OUTPATIENT)
Dept: BEHAVIORAL HEALTH | Facility: CLINIC | Age: 35
End: 2025-01-22
Payer: COMMERCIAL

## 2025-01-22 ENCOUNTER — OFFICE VISIT (OUTPATIENT)
Dept: INTERNAL MEDICINE | Facility: CLINIC | Age: 35
End: 2025-01-22
Payer: COMMERCIAL

## 2025-01-22 VITALS
DIASTOLIC BLOOD PRESSURE: 78 MMHG | HEIGHT: 66 IN | RESPIRATION RATE: 16 BRPM | WEIGHT: 232 LBS | OXYGEN SATURATION: 100 % | BODY MASS INDEX: 37.28 KG/M2 | SYSTOLIC BLOOD PRESSURE: 120 MMHG | HEART RATE: 72 BPM

## 2025-01-22 DIAGNOSIS — E55.9 VITAMIN D DEFICIENCY: ICD-10-CM

## 2025-01-22 DIAGNOSIS — F32.2 CURRENT SEVERE EPISODE OF MAJOR DEPRESSIVE DISORDER WITHOUT PSYCHOTIC FEATURES, UNSPECIFIED WHETHER RECURRENT: ICD-10-CM

## 2025-01-22 DIAGNOSIS — R42 DIZZINESS: ICD-10-CM

## 2025-01-22 DIAGNOSIS — F43.21 GRIEF: ICD-10-CM

## 2025-01-22 DIAGNOSIS — E28.2 PCOS (POLYCYSTIC OVARIAN SYNDROME): Primary | ICD-10-CM

## 2025-01-22 DIAGNOSIS — F41.1 GENERALIZED ANXIETY DISORDER: Primary | ICD-10-CM

## 2025-01-22 DIAGNOSIS — G62.9 NEUROPATHY: ICD-10-CM

## 2025-01-22 DIAGNOSIS — E88.810 METABOLIC SYNDROME: ICD-10-CM

## 2025-01-22 DIAGNOSIS — G43.909 MIGRAINE WITHOUT STATUS MIGRAINOSUS, NOT INTRACTABLE, UNSPECIFIED MIGRAINE TYPE: ICD-10-CM

## 2025-01-22 PROCEDURE — 99214 OFFICE O/P EST MOD 30 MIN: CPT | Performed by: FAMILY MEDICINE

## 2025-01-22 RX ORDER — TOPIRAMATE 25 MG/1
50 TABLET, FILM COATED ORAL DAILY
Status: SHIPPED | COMMUNITY
Start: 2025-01-22

## 2025-01-22 RX ORDER — ERGOCALCIFEROL 1.25 MG/1
50000 CAPSULE, LIQUID FILLED ORAL
Qty: 12 CAPSULE | Refills: 1 | Status: SHIPPED | OUTPATIENT
Start: 2025-01-22

## 2025-01-22 NOTE — PROGRESS NOTES
Office Note     Name: Eliz Eisenberg    : 1990     MRN: [id number]     Chief Complaint  Dizziness (Follow up, 6 month)    Subjective     History of Present Illness:  Eliz Eisenberg is a 34 y.o. female who presents today for follow up dizziness and medication management- needs refill on some medications     Dizziness - following with cardiology- Dr. Flores - tilt table? Will schedule   EKG, stress testing and ECHO -  unremarkable   Neurology through [facility] - Dr. Choi continued on Topamax 50mg daily   Ubrelvy as needed   Brain MRI- negative per Dr. Choi   Referred to specialist- within [facility]- [facility]    Dizziness come and goes   More so of neuropathy in hands and feet   Denies falls   Has lost balance   Has been feeling more neuropathy with cold weather shift     No other issues or concerns   Stable on current medications     Doing better overall - symptoms still coming and going. Unclear etiology     I spent 25 minutes on the separately reported service. This time is not included in the time used to support the E/M service also reported today.  This time includes activities preparing for the visit, reviewing test, reviewing history and performing an appropriate examination. Discussing with the patient and reviewing and independently interpreting the diagnostic studies, communicating that information to the patient along with discussing care coordination and referrals if needed and documenting information in the medical records.       Review of Systems:   Review of Systems   Constitutional:  Negative for chills, diaphoresis, fatigue and fever.   HENT:  Negative for congestion, sore throat and swollen glands.    Respiratory:  Negative for cough.    Cardiovascular:  Negative for chest pain.   Gastrointestinal:  Negative for abdominal pain, nausea and vomiting.   Genitourinary:  Negative for dysuria.   Musculoskeletal:  Negative for myalgias and neck pain.   Skin:  Negative for rash.    Neurological:  Positive for dizziness, numbness and headache. Negative for weakness and light-headedness.        Migraines   Neuropathy in hands and feet   Some mild gait imbalance- working with neurology- MRI brain- negative for finding   Referral per neurology placed for [facility] specialist for further evaluation        Past Medical History:   Past Medical History:   Diagnosis Date    Abdominal pain     Acute sinusitis     Allergic     Allergic rhinitis     Ankle sprain     Anxiety     Arthritis 2017    On Mobic daily    Asthma     Atopic dermatitis     Biliary dyskinesia     Cat bite      resolved    Clotting disorder     Current tear of meniscus     Depression March 2023    Dysfunctional uterine bleeding     Eczema     Foot pain     GERD (gastroesophageal reflux disease)     Gout     Hearing loss, bilateral     R/T PSORASIS    Helicobacter pylori antibody positive     4/26/23. Rx prevpak    IBS (irritable bowel syndrome)     Influenza     Metabolic syndrome     Migraines     Muscular aches     Neuromuscular disorder     Obesity     JENN (obstructive sleep apnea) - possible 01/28/2021    Panic attack     PCOS (polycystic ovarian syndrome)     Pharyngitis     Psoriasis     planning to start Taltz    Restless leg syndrome     URI (upper respiratory infection)     Wears glasses        Past Surgical History:   Past Surgical History:   Procedure Laterality Date    COLONOSCOPY      ENDOSCOPY N/A 05/15/2023    Procedure: ESOPHAGOGASTRODUODENOSCOPY;  Surgeon: Beatriz Sandy MD;  Location: [facility];  Service: Bariatric;  Laterality: N/A;    GASTRIC SLEEVE LAPAROSCOPIC N/A 11/06/2023    Procedure: GASTRIC SLEEVE LAPAROSCOPIC WITH DAVINCI ROBOT, ESOPHAGOGASTRODUODENOSCOPY;  Surgeon: Beatriz Sandy MD;  Location: [facility] OR;  Service: Robotics - DaVinci;  Laterality: N/A;  scope ID: [id number]    LAPAROSCOPIC CHOLECYSTECTOMY  2018    no gallstones    TONSILLECTOMY AND ADENOIDECTOMY N/A 07/09/2021    Procedure:  TONSILLECTOMY;  Surgeon: Ran Lemon MD;  Location: [facility];  Service: ENT;  Laterality: N/A;    UMBILICAL HERNIA REPAIR  2018    w/ cholecystectomy    WISDOM TOOTH EXTRACTION         Immunizations:   Immunization History   Administered Date(s) Administered    COVID-19 (MODERNA) 1st,2nd,3rd Dose Monovalent 08/30/2021, 09/27/2021    COVID-19 (PFIZER) 12YRS+ (COMIRNATY) 12/18/2023, 10/14/2024    COVID-19 (PFIZER) BIVALENT 12+YRS 10/03/2022, 12/07/2022    Covid-19 (Pfizer) Gray Cap Monovalent 04/18/2022    Fluzone  >6mos 10/14/2024    Fluzone (or Fluarix & Flulaval for VFC) >6mos 10/03/2022    Hepatitis A 11/23/2018    Influenza Injectable Mdck Pf Quad 12/18/2023    Influenza, Unspecified 11/24/2018, 10/30/2019, 11/17/2020, 10/30/2021    Pneumococcal Conjugate 20-Valent (PCV20) 06/07/2023    Pneumococcal Polysaccharide (PPSV23) 04/06/2021    Tdap 01/24/2019, 10/25/2022    flucelvax quad pfs =>4 YRS 10/30/2021        Medications:     Current Outpatient Medications:     albuterol (PROVENTIL) (2.5 MG/3ML) 0.083% nebulizer solution, Take 2.5 mg by nebulization Every 6 (Six) Hours As Needed for Wheezing., Disp: 48 mL, Rfl: 0    albuterol sulfate  (90 Base) MCG/ACT inhaler, Inhale 2 puffs Every 4 (Four) Hours As Needed for Wheezing., Disp: 18 g, Rfl: 2    BIOTIN PO, Take  by mouth Daily. 1 daily, Disp: , Rfl:     busPIRone (BUSPAR) 10 MG tablet, Take 1 tablet by mouth 3 (Three) Times a Day., Disp: 90 tablet, Rfl: 2    cetirizine (zyrTEC) 10 MG tablet, Take 1 tablet by mouth Daily., Disp: , Rfl:     EPINEPHrine (EPIPEN) 0.3 MG/0.3ML solution auto-injector injection, As Needed., Disp: , Rfl:     fluticasone (Flonase) 50 MCG/ACT nasal spray, Administer 2 sprays into the nostril(s) as directed by provider Daily., Disp: 16 g, Rfl: 5    Fluticasone-Salmeterol (ADVAIR/WIXELA) 100-50 MCG/ACT DISKUS, Inhale 1 puff 2 (Two) Times a Day., Disp: 60 each, Rfl: 1    meclizine (ANTIVERT) 12.5 MG tablet, Take 1 tablet by  mouth 3 (Three) Times a Day As Needed for Dizziness., Disp: , Rfl:     metFORMIN (GLUCOPHAGE) 500 MG tablet, TAKE TWO TABLETS BY MOUTH EVERY MORNING AND TAKE ONE TABLET BY MOUTH EVERY EVENING WITH MEALS, Disp: 270 tablet, Rfl: 1    montelukast (SINGULAIR) 10 MG tablet, Take 1 tablet by mouth Every Night., Disp: 90 tablet, Rfl: 3    multivitamin with minerals (MULTIVITAL PO), Take 1 tablet by mouth Daily., Disp: , Rfl:     omeprazole (priLOSEC) 40 MG capsule, Take 1 capsule by mouth Daily for 360 days., Disp: 90 capsule, Rfl: 3    ondansetron ODT (ZOFRAN-ODT) 4 MG disintegrating tablet, Place 1 tablet on the tongue Every 8 (Eight) Hours As Needed for Nausea or Vomiting., Disp: 8 tablet, Rfl: 0    promethazine (PHENERGAN) 25 MG tablet, TAKE ONE TABLET BY MOUTH EVERY 4 HOURS AS NEEDED FOR NAUSEA, Disp: 10 tablet, Rfl: 0    Stelara 90 MG/ML solution prefilled syringe Injection, 1 injection every 12 weeks, Disp: , Rfl:     topiramate (TOPAMAX) 25 MG tablet, Take 2 tablets by mouth Daily., Disp: , Rfl:     vitamin D (ERGOCALCIFEROL) 1.25 MG (27085 UT) capsule capsule, Take 1 capsule by mouth Every 7 (Seven) Days., Disp: 12 capsule, Rfl: 1    ubrogepant (UBRELVY) 100 MG tablet, Take 1 tablet by mouth As Needed., Disp: , Rfl:     Allergies:   Allergies   Allergen Reactions    Iodine Hives, Rash, Shortness Of Breath and Swelling    Cimzia [Certolizumab Pegol] Hives, GI Intolerance and Headache    Elastic Bandages & [Zinc] Hives    Latex Hives, Itching, Swelling and Rash     Gloves     Tape Hives     PAPER TAPE    Dilantin [Phenytoin] Unknown - Low Severity     Skin crawling    Pollen Extract Rash    Tree Extract Other (See Comments)     CONGESTION       Family History:   Family History   Problem Relation Age of Onset    Heart disease Mother     Multiple sclerosis Mother     Diabetes Mother     Arthritis Mother     Asthma Mother     Early death Mother     Kidney disease Mother     Hypertension Father     Obesity Father      "Diabetes Father     Sleep apnea Father     No Known Problems Sister     Breast cancer Maternal Grandmother     Diabetes Maternal Grandmother     Dementia Maternal Grandmother     Asthma Maternal Grandfather     Diabetes Maternal Grandfather     Sleep apnea Maternal Grandfather     Arthritis Maternal Grandfather     Heart disease Maternal Grandfather     Colon cancer Paternal Grandmother     Dementia Paternal Grandmother     Heart attack Paternal Grandfather     Sleep apnea Paternal Grandfather     Obesity Paternal Grandfather     Hypertension Paternal Grandfather     Diabetes Paternal Grandfather        Social History:   Social History     Socioeconomic History    Marital status:    Tobacco Use    Smoking status: Never     Passive exposure: Never    Smokeless tobacco: Never   Vaping Use    Vaping status: Never Used   Substance and Sexual Activity    Alcohol use: Not Currently     Comment: rare    Drug use: No    Sexual activity: Yes     Partners: Male     Birth control/protection: None         Objective     Vital Signs  /78   Pulse 72   Resp 16   Ht 167.6 cm (66\")   Wt 105 kg (232 lb)   SpO2 100%   BMI 37.45 kg/m²   Estimated body mass index is 37.45 kg/m² as calculated from the following:    Height as of this encounter: 167.6 cm (66\").    Weight as of this encounter: 105 kg (232 lb).            Physical Exam  Vitals and nursing note reviewed.   Constitutional:       General: She is not in acute distress.     Appearance: Normal appearance.   HENT:      Head: Normocephalic and atraumatic.      Right Ear: Tympanic membrane normal.      Left Ear: Tympanic membrane normal.      Nose: Nose normal.      Mouth/Throat:      Mouth: Mucous membranes are moist.   Eyes:      Extraocular Movements: Extraocular movements intact.      Conjunctiva/sclera: Conjunctivae normal.   Cardiovascular:      Rate and Rhythm: Normal rate and regular rhythm.      Heart sounds: Normal heart sounds.   Pulmonary:      Effort: " Pulmonary effort is normal. No respiratory distress.      Breath sounds: Normal breath sounds.   Skin:     General: Skin is warm and dry.   Neurological:      General: No focal deficit present.      Mental Status: She is alert and oriented to person, place, and time.          Procedures     Assessment and Plan   Diagnosis Discussed   Continue to monitor   Plenty of fluids, monitor diet and exercise   Follow up with Neurology   Follow up with referred specialist through Neurology- [facility]?   Follow up with Cardiology   Follow up with GYN- reschedule   Take medications as instructed- refills today   Follow up as directed   If symptoms worsen or persist please seek further evaluation     1. PCOS (polycystic ovarian syndrome)  - metFORMIN (GLUCOPHAGE) 500 MG tablet; TAKE TWO TABLETS BY MOUTH EVERY MORNING AND TAKE ONE TABLET BY MOUTH EVERY EVENING WITH MEALS  Dispense: 270 tablet; Refill: 1    2. Metabolic syndrome  - metFORMIN (GLUCOPHAGE) 500 MG tablet; TAKE TWO TABLETS BY MOUTH EVERY MORNING AND TAKE ONE TABLET BY MOUTH EVERY EVENING WITH MEALS  Dispense: 270 tablet; Refill: 1    3. Vitamin D deficiency  - vitamin D (ERGOCALCIFEROL) 1.25 MG (99902 UT) capsule capsule; Take 1 capsule by mouth Every 7 (Seven) Days.  Dispense: 12 capsule; Refill: 1    4. Migraine without status migrainosus, not intractable, unspecified migraine type  - topiramate (TOPAMAX) 25 MG tablet; Take 2 tablets by mouth Daily.  - ubrogepant (UBRELVY) 100 MG tablet; Take 1 tablet by mouth As Needed.    5. Neuropathy  Stable. Effecting hands and feet. Continue to follow with neurology and specialist as directed    6. Dizziness  Stable. Comes and goes- no clear etiology. Follow up with Cardiology. Tilt table ordered.        Follow Up  Return if symptoms worsen or fail to improve, for Next scheduled follow up- .    Noni Lopez MD  [facility] INTERNAL MEDICINE  [address] [place] [id number] [phone]

## 2025-01-22 NOTE — PROGRESS NOTES
[facility] [place]                 Follow Up Adult      Follow Up Adult Note     Date:2025   Patient Name: Eliz Eisenberg  : 1990   MRN: [id number]   Time IN: 8:35 am    Time OUT: 9:26 am    Referring Provider: Noni Lopez MD    Chief Complaint:      ICD-10-CM ICD-9-CM   1. Generalized anxiety disorder  F41.1 300.02   2. Current severe episode of major depressive disorder without psychotic features, unspecified whether recurrent  F32.2 296.23   3. Grief  F43.21 309.0        History of Present Illness:   Eliz Eisenberg is a 34 y.o. female who is being seen today for follow up counseling for anxiety, depression and grief.    Subjective               Patient's Support Network Includes: extended family    Functional Status: Moderate impairment       Current Outpatient Medications:     albuterol (PROVENTIL) (2.5 MG/3ML) 0.083% nebulizer solution, Take 2.5 mg by nebulization Every 6 (Six) Hours As Needed for Wheezing., Disp: 48 mL, Rfl: 0    albuterol sulfate  (90 Base) MCG/ACT inhaler, Inhale 2 puffs Every 4 (Four) Hours As Needed for Wheezing., Disp: 18 g, Rfl: 2    BIOTIN PO, Take  by mouth Daily. 1 daily, Disp: , Rfl:     busPIRone (BUSPAR) 10 MG tablet, Take 1 tablet by mouth 3 (Three) Times a Day., Disp: 90 tablet, Rfl: 2    cetirizine (zyrTEC) 10 MG tablet, Take 1 tablet by mouth Daily., Disp: , Rfl:     EPINEPHrine (EPIPEN) 0.3 MG/0.3ML solution auto-injector injection, As Needed., Disp: , Rfl:     fluticasone (Flonase) 50 MCG/ACT nasal spray, Administer 2 sprays into the nostril(s) as directed by provider Daily., Disp: 16 g, Rfl: 5    Fluticasone-Salmeterol (ADVAIR/WIXELA) 100-50 MCG/ACT DISKUS, Inhale 1 puff 2 (Two) Times a Day., Disp: 60 each, Rfl: 1    meclizine (ANTIVERT) 12.5 MG tablet, Take 1 tablet by mouth 3 (Three) Times a Day As Needed for Dizziness., Disp: , Rfl:     metFORMIN (GLUCOPHAGE) 500 MG tablet, TAKE TWO TABLETS  BY MOUTH EVERY MORNING AND TAKE ONE TABLET BY MOUTH EVERY EVENING WITH MEALS, Disp: 270 tablet, Rfl: 1    montelukast (SINGULAIR) 10 MG tablet, Take 1 tablet by mouth Every Night., Disp: 90 tablet, Rfl: 3    multivitamin with minerals (MULTIVITAL PO), Take 1 tablet by mouth Daily., Disp: , Rfl:     omeprazole (priLOSEC) 40 MG capsule, Take 1 capsule by mouth Daily for 360 days., Disp: 90 capsule, Rfl: 3    ondansetron ODT (ZOFRAN-ODT) 4 MG disintegrating tablet, Place 1 tablet on the tongue Every 8 (Eight) Hours As Needed for Nausea or Vomiting., Disp: 8 tablet, Rfl: 0    promethazine (PHENERGAN) 25 MG tablet, TAKE ONE TABLET BY MOUTH EVERY 4 HOURS AS NEEDED FOR NAUSEA, Disp: 10 tablet, Rfl: 0    Stelara 90 MG/ML solution prefilled syringe Injection, 1 injection every 12 weeks, Disp: , Rfl:     topiramate (TOPAMAX) 25 MG tablet, Take 2 tablets by mouth Daily., Disp: , Rfl:     ubrogepant (UBRELVY) 100 MG tablet, Take 1 tablet by mouth As Needed., Disp: , Rfl:     vitamin D (ERGOCALCIFEROL) 1.25 MG (88793 UT) capsule capsule, Take 1 capsule by mouth Every 7 (Seven) Days., Disp: 12 capsule, Rfl: 1    Allergies   Allergen Reactions    Iodine Hives, Rash, Shortness Of Breath and Swelling    Cimzia [Certolizumab Pegol] Hives, GI Intolerance and Headache    Elastic Bandages & [Zinc] Hives    Latex Hives, Itching, Swelling and Rash     Gloves     Tape Hives     PAPER TAPE    Dilantin [Phenytoin] Unknown - Low Severity     Skin crawling    Pollen Extract Rash    Tree Extract Other (See Comments)     CONGESTION       Objective     Physical Exam:  Vital Signs: There were no vitals filed for this visit.  There is no height or weight on file to calculate BMI.     Mental Status Exam:   Hygiene:   good  Cooperation:  Cooperative  Eye Contact:  Good  Psychomotor Behavior:  Appropriate  Affect:  Full range  Mood: normal  Speech:  Normal  Thought Process:  Goal directed  Thought Content:  Normal  Suicidal:  None  Homicidal:   None  Hallucinations:  None  Delusion:  None  Memory:  Intact  Orientation:  Person, Place, Time, and Situation  Reliability:  good  Insight:  Good  Judgement:  Good  Impulse Control:  Good  Physical/Medical Issues:   See problem list      Assessment / Plan      Diagnosis:  Diagnoses and all orders for this visit:    1. Generalized anxiety disorder (Primary)    2. Current severe episode of major depressive disorder without psychotic features, unspecified whether recurrent    3. Grief    Treatment plan updated January 22, 2025.  Treatment will focus on identifying and addressing patient cognitive distortions related to their recent change of employment, recent MS diagnosis, the terminal illness of their mother-in-law and ongoing family conflict.  Patient and clinician will collaborate to utilize coping mechanisms learned in psychotherapy to assist patient in managing symptoms related to depression, anxiety and grief.  Treatment plan will be updated in 90 days.    Progress toward goal: Not at goal    Prognosis: Good with ongoing treatment    PLAN:  Patient and clinician will continue to utilize a cognitive behavioral intervention which identifies and addresses patient cognitive distortions related to anxiety, depression and grief.  Follow-ups will occur every 14 days and will last for 45 to 60 minutes in duration.    Safety: No acute safety concerns  Risk Assessment: Risk of self-harm acutely is low. Risk of self-harm chronically is also low, but could be further elevated in the event of treatment noncompliance and/or AODA.    Treatment Plan/Goals: Continue supportive psychotherapy efforts and medications as indicated. Treatment and medication options discussed during today's visit. Patient ackowledged and verbally consented to continue with current treatment plan and was educated on the importance of compliance with treatment and follow-up appointments. Patient seems reasonably able to adhere to treatment plan.       Assisted Patient in processing above session content; acknowledged and normalized patient’s thoughts, feelings, and concerns.  Rationalized patient thought process regarding anxiety, depression and grief..      Allowed Patient to freely discuss issues  without interruption or judgement with unconditional positive regard, active listening skills, and empathy. Therapist provided a safe, confidential environment to facilitate the development of a positive therapeutic relationship and encouraged open, honest communication. Assisted Patient in identifying risk factors which would indicate the need for higher level of care including thoughts to harm self or others and/or self-harming behavior and encouraged Patient to contact this office, call 911, or present to the nearest emergency room should any of these events occur. Discussed crisis intervention services and means to access. Patient adamantly and convincingly denies current suicidal or homicidal ideation or perceptual disturbance. Assisted Patient in processing session content; acknowledged and normalized Patient’s thoughts, feelings, and concerns by utilizing a person-centered approach in efforts to build appropriate rapport and a positive therapeutic relationship with open and honest communication. .     Part of this note may be an electronic transcription/translation of spoken language to printed text using the Dragon Dictation System.       Follow Up:   Return in about 2 weeks (around 2/5/2025) for Next scheduled follow up.    Tunde Mason LCSW

## 2025-01-22 NOTE — PLAN OF CARE
Treatment plan updated January 22, 2025.  Treatment will focus on identifying and addressing patient cognitive distortions related to their recent change of employment, recent MS diagnosis, the terminal illness of their mother-in-law and ongoing family conflict.  Patient and clinician will collaborate to utilize coping mechanisms learned in psychotherapy to assist patient in managing symptoms related to depression, anxiety and grief.  Treatment plan will be updated in 90 days.

## 2025-01-22 NOTE — PATIENT INSTRUCTIONS
Diagnosis Discussed   Continue to monitor   Plenty of fluids, monitor diet and exercise   Follow up with Neurology   Follow up with referred specialist through Neurology- MS?   Follow up with Cardiology   Follow up with GYN- reschedule   Take medications as instructed- refills today   Follow up as directed   If symptoms worsen or persist please seek further evaluation

## 2025-02-04 DIAGNOSIS — J06.9 UPPER RESPIRATORY TRACT INFECTION, UNSPECIFIED TYPE: ICD-10-CM

## 2025-02-04 DIAGNOSIS — R05.1 ACUTE COUGH: ICD-10-CM

## 2025-02-04 RX ORDER — FLUTICASONE PROPIONATE AND SALMETEROL 100; 50 UG/1; UG/1
POWDER RESPIRATORY (INHALATION)
Qty: 60 EACH | Refills: 0 | Status: SHIPPED | OUTPATIENT
Start: 2025-02-04

## 2025-03-18 ENCOUNTER — HOSPITAL ENCOUNTER (OUTPATIENT)
Dept: CARDIOLOGY | Facility: HOSPITAL | Age: 35
Discharge: HOME | End: 2025-03-18
Admitting: INTERNAL MEDICINE
Payer: COMMERCIAL

## 2025-03-18 DIAGNOSIS — R55 POSTURAL DIZZINESS WITH PRESYNCOPE: ICD-10-CM

## 2025-03-18 DIAGNOSIS — R42 POSTURAL DIZZINESS WITH PRESYNCOPE: ICD-10-CM

## 2025-03-18 PROCEDURE — 93660 TILT TABLE EVALUATION: CPT

## 2025-03-19 ENCOUNTER — TELEPHONE (OUTPATIENT)
Dept: INTERNAL MEDICINE | Facility: CLINIC | Age: 35
End: 2025-03-19
Payer: COMMERCIAL

## 2025-03-19 ENCOUNTER — OFFICE VISIT (OUTPATIENT)
Dept: BARIATRICS/WEIGHT MGMT | Facility: CLINIC | Age: 35
End: 2025-03-19
Payer: COMMERCIAL

## 2025-03-19 ENCOUNTER — OFFICE VISIT (OUTPATIENT)
Dept: BEHAVIORAL HEALTH | Facility: CLINIC | Age: 35
End: 2025-03-19
Payer: COMMERCIAL

## 2025-03-19 VITALS
HEART RATE: 76 BPM | WEIGHT: 231.6 LBS | BODY MASS INDEX: 37.22 KG/M2 | HEIGHT: 66 IN | OXYGEN SATURATION: 98 % | SYSTOLIC BLOOD PRESSURE: 124 MMHG | RESPIRATION RATE: 16 BRPM | TEMPERATURE: 97.7 F | DIASTOLIC BLOOD PRESSURE: 76 MMHG

## 2025-03-19 DIAGNOSIS — Z90.3 POSTGASTRECTOMY MALABSORPTION: ICD-10-CM

## 2025-03-19 DIAGNOSIS — Z13.21 MALNUTRITION SCREEN: ICD-10-CM

## 2025-03-19 DIAGNOSIS — E28.2 PCOS (POLYCYSTIC OVARIAN SYNDROME): ICD-10-CM

## 2025-03-19 DIAGNOSIS — E66.812 OBESITY, CLASS II, BMI 35-39.9: Primary | ICD-10-CM

## 2025-03-19 DIAGNOSIS — R53.83 FATIGUE, UNSPECIFIED TYPE: ICD-10-CM

## 2025-03-19 DIAGNOSIS — E55.9 HYPOVITAMINOSIS D: ICD-10-CM

## 2025-03-19 DIAGNOSIS — Z13.0 SCREENING, IRON DEFICIENCY ANEMIA: ICD-10-CM

## 2025-03-19 DIAGNOSIS — Z51.81 THERAPEUTIC DRUG MONITORING: ICD-10-CM

## 2025-03-19 DIAGNOSIS — F32.2 CURRENT SEVERE EPISODE OF MAJOR DEPRESSIVE DISORDER WITHOUT PSYCHOTIC FEATURES, UNSPECIFIED WHETHER RECURRENT: ICD-10-CM

## 2025-03-19 DIAGNOSIS — F41.1 GENERALIZED ANXIETY DISORDER: Primary | ICD-10-CM

## 2025-03-19 DIAGNOSIS — F43.21 GRIEF: ICD-10-CM

## 2025-03-19 DIAGNOSIS — K91.2 POSTGASTRECTOMY MALABSORPTION: ICD-10-CM

## 2025-03-19 NOTE — PROGRESS NOTES
[facility] [place]                 Follow Up Adult      Follow Up Adult Note     Date:2025   Patient Name: Eliz Eisenberg  : 1990   MRN: [id number]   Time IN: 3:38 pm    Time OUT: 4:15 pm     Referring Provider: Noni Lopez MD    Chief Complaint:      ICD-10-CM ICD-9-CM   1. Generalized anxiety disorder  F41.1 300.02   2. Current severe episode of major depressive disorder without psychotic features, unspecified whether recurrent  F32.2 296.23   3. Grief  F43.21 309.0        History of Present Illness:   Eliz Eisenberg is a 34 y.o. female who is being seen today for follow up counseling for anxiety depression and grief.    Subjective               Patient's Support Network Includes: extended family    Functional Status: Moderate impairment       Current Outpatient Medications:     albuterol (PROVENTIL) (2.5 MG/3ML) 0.083% nebulizer solution, Take 2.5 mg by nebulization Every 6 (Six) Hours As Needed for Wheezing., Disp: 48 mL, Rfl: 0    albuterol sulfate  (90 Base) MCG/ACT inhaler, Inhale 2 puffs Every 4 (Four) Hours As Needed for Wheezing., Disp: 18 g, Rfl: 2    BIOTIN PO, Take  by mouth Daily. 1 daily, Disp: , Rfl:     busPIRone (BUSPAR) 10 MG tablet, Take 1 tablet by mouth 3 (Three) Times a Day., Disp: 90 tablet, Rfl: 2    cetirizine (zyrTEC) 10 MG tablet, Take 1 tablet by mouth Daily., Disp: , Rfl:     EPINEPHrine (EPIPEN) 0.3 MG/0.3ML solution auto-injector injection, As Needed. (Patient not taking: Reported on 3/19/2025), Disp: , Rfl:     fluticasone (Flonase) 50 MCG/ACT nasal spray, Administer 2 sprays into the nostril(s) as directed by provider Daily., Disp: 16 g, Rfl: 5    Fluticasone-Salmeterol (ADVAIR/WIXELA) 100-50 MCG/ACT DISKUS, INHALE 1 PUFF BY MOUTH 2 TIMES A DAY, Disp: 60 each, Rfl: 0    meclizine (ANTIVERT) 12.5 MG tablet, Take 1 tablet by mouth 3 (Three) Times a Day As Needed for Dizziness., Disp: , Rfl:     metFORMIN  (GLUCOPHAGE) 500 MG tablet, TAKE TWO TABLETS BY MOUTH EVERY MORNING AND TAKE ONE TABLET BY MOUTH EVERY EVENING WITH MEALS, Disp: 270 tablet, Rfl: 1    montelukast (SINGULAIR) 10 MG tablet, Take 1 tablet by mouth Every Night., Disp: 90 tablet, Rfl: 3    multivitamin with minerals (MULTIVITAL PO), Take 1 tablet by mouth Daily., Disp: , Rfl:     omeprazole (priLOSEC) 40 MG capsule, Take 1 capsule by mouth Daily for 360 days., Disp: 90 capsule, Rfl: 3    Stelara 90 MG/ML solution prefilled syringe Injection, 1 injection every 12 weeks, Disp: , Rfl:     topiramate (TOPAMAX) 25 MG tablet, Take 2 tablets by mouth Daily., Disp: , Rfl:     ubrogepant (UBRELVY) 100 MG tablet, Take 1 tablet by mouth As Needed., Disp: , Rfl:     vitamin D (ERGOCALCIFEROL) 1.25 MG (38170 UT) capsule capsule, Take 1 capsule by mouth Every 7 (Seven) Days., Disp: 12 capsule, Rfl: 1    Allergies   Allergen Reactions    Iodine Hives, Rash, Shortness Of Breath and Swelling    Cimzia [Certolizumab Pegol] Hives, GI Intolerance and Headache    Elastic Bandages & [Zinc] Hives    Latex Hives, Itching, Swelling and Rash     Gloves     Tape Hives     PAPER TAPE    Dilantin [Phenytoin] Unknown - Low Severity     Skin crawling    Pollen Extract Rash    Tree Extract Other (See Comments)     CONGESTION       Objective     Physical Exam:  Vital Signs: There were no vitals filed for this visit.  There is no height or weight on file to calculate BMI.     Mental Status Exam:   Hygiene:   good  Cooperation:  Cooperative  Eye Contact:  Good  Psychomotor Behavior:  Appropriate  Affect:  Full range  Mood: normal  Speech:  Normal  Thought Process:  Goal directed  Thought Content:  Normal  Suicidal:  None  Homicidal:  None  Hallucinations:  None  Delusion:  None  Memory:  Intact  Orientation:  Person, Place, Time, and Situation  Reliability:  good  Insight:  Good  Judgement:  Good  Impulse Control:  Good  Physical/Medical Issues:   See problem list      Assessment / Plan       Diagnosis:  Diagnoses and all orders for this visit:    1. Generalized anxiety disorder (Primary)    2. Current severe episode of major depressive disorder without psychotic features, unspecified whether recurrent    3. Grief    Patient presented for follow-up with clinician.  Patient and clinician identified and addressed patient cognitive distortions related to anxiety.  The patient's recent transition and jobs, financial concerns and the possibility of a legal case against the patient's former employer were identified as main triggers.  Patient was responsive to intervention.  Clinician utilized active listening and reflective response to convey empathy and support.    Progress toward goal: Not at goal    Prognosis: Good with ongoing treatment    PLAN:  Patient and clinician will continue to utilize a cognitive behavioral intervention which identifies and addresses patient cognitive distortions related to anxiety, depression and grief.  Follow-ups will occur approximately every 14 days and will last for 45 to 60 minutes in duration.    Safety: No acute safety concerns  Risk Assessment: Risk of self-harm acutely is low. Risk of self-harm chronically is also low, but could be further elevated in the event of treatment noncompliance and/or AODA.    Treatment Plan/Goals: Continue supportive psychotherapy efforts and medications as indicated. Treatment and medication options discussed during today's visit. Patient ackowledged and verbally consented to continue with current treatment plan and was educated on the importance of compliance with treatment and follow-up appointments. Patient seems reasonably able to adhere to treatment plan.      Assisted Patient in processing above session content; acknowledged and normalized patient’s thoughts, feelings, and concerns.  Rationalized patient thought process regarding anxiety, depression and grief.      Allowed Patient to freely discuss issues  without interruption or  judgement with unconditional positive regard, active listening skills, and empathy. Therapist provided a safe, confidential environment to facilitate the development of a positive therapeutic relationship and encouraged open, honest communication. Assisted Patient in identifying risk factors which would indicate the need for higher level of care including thoughts to harm self or others and/or self-harming behavior and encouraged Patient to contact this office, call 911, or present to the nearest emergency room should any of these events occur. Discussed crisis intervention services and means to access. Patient adamantly and convincingly denies current suicidal or homicidal ideation or perceptual disturbance. Assisted Patient in processing session content; acknowledged and normalized Patient’s thoughts, feelings, and concerns by utilizing a person-centered approach in efforts to build appropriate rapport and a positive therapeutic relationship with open and honest communication. .     Part of this note may be an electronic transcription/translation of spoken language to printed text using the Dragon Dictation System.       Follow Up:   Return in about 2 weeks (around 4/2/2025) for Next scheduled follow up.    Tunde Mason LCSW

## 2025-03-19 NOTE — PROGRESS NOTES
[facility]  [address]  [address]  [facility] [id number] [phone]        Patient Name:  Eliz Eisenberg.  :  1990      Date of Visit: 3/19/2025      Reason for Visit:   16 months postop      HPI: Eliz Eisenberg is a 34 y.o. female s/p LSG 23 w/ Dr. Sandy      Doing well.  No issues/concerns. Denies dysphagia, reflux, nausea, vomiting, and abdominal pain.  Getting 80-100g prot/day.  Drinking 64+ fluid oz/day. Last labs revealed  no vitamin deficiencies. Taking Patches: iron + B12  + MVI pill, weekly vitamin D.  On Omeprazole .  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  Her watch shows 15-20K steps/day.             Presurgery weight: 350 pounds.  Today's weight is 105 kg (231 lb 9.6 oz) pounds, today's  Body mass index is 37.38 kg/m²., and weight loss since surgery is 119 pounds.  Feels like weight loss is stalled.    Past Medical History:   Diagnosis Date    Abdominal pain     Acute sinusitis     Allergic     Allergic rhinitis     Ankle sprain     Anxiety     Arthritis 2017    On Mobic daily    Asthma     Atopic dermatitis     Biliary dyskinesia     Cat bite      resolved    Clotting disorder     Current tear of meniscus     Depression 2023    Dysfunctional uterine bleeding     Eczema     Foot pain     GERD (gastroesophageal reflux disease)     Gout     Hearing loss, bilateral     R/T PSORASIS    Helicobacter pylori antibody positive     23. Rx prevpak    IBS (irritable bowel syndrome)     Influenza     Metabolic syndrome     Migraines     Muscular aches     Neuromuscular disorder     Obesity     JENN (obstructive sleep apnea) - possible 2021    Panic attack     PCOS (polycystic ovarian syndrome)     Pharyngitis     Psoriasis     planning to start Taltz    Restless leg syndrome     URI (upper respiratory infection)     Wears glasses      Past Surgical History:   Procedure Laterality Date     COLONOSCOPY      ENDOSCOPY N/A 05/15/2023    Procedure: ESOPHAGOGASTRODUODENOSCOPY;  Surgeon: Beatriz Sandy MD;  Location:  [facility];  Service: Bariatric;  Laterality: N/A;    GASTRIC SLEEVE LAPAROSCOPIC N/A 11/6/2023    Procedure: GASTRIC SLEEVE LAPAROSCOPIC WITH DAVINCI ROBOT, ESOPHAGOGASTRODUODENOSCOPY;  Surgeon: Beatriz Sandy MD;  Location:  [facility];  Service: Robotics - DaVinci;  Laterality: N/A;  scope ID: [id number]    LAPAROSCOPIC CHOLECYSTECTOMY  2018    no gallstones    TONSILLECTOMY AND ADENOIDECTOMY N/A 07/09/2021    Procedure: TONSILLECTOMY;  Surgeon: Ran Lemon MD;  Location:  [facility];  Service: ENT;  Laterality: N/A;    UMBILICAL HERNIA REPAIR  2018    w/ cholecystectomy    WISDOM TOOTH EXTRACTION       Outpatient Medications Marked as Taking for the 3/19/25 encounter (Office Visit) with Beatriz Sandy MD   Medication Sig Dispense Refill    albuterol (PROVENTIL) (2.5 MG/3ML) 0.083% nebulizer solution Take 2.5 mg by nebulization Every 6 (Six) Hours As Needed for Wheezing. 48 mL 0    albuterol sulfate  (90 Base) MCG/ACT inhaler Inhale 2 puffs Every 4 (Four) Hours As Needed for Wheezing. 18 g 2    BIOTIN PO Take  by mouth Daily. 1 daily      busPIRone (BUSPAR) 10 MG tablet Take 1 tablet by mouth 3 (Three) Times a Day. 90 tablet 2    cetirizine (zyrTEC) 10 MG tablet Take 1 tablet by mouth Daily.      fluticasone (Flonase) 50 MCG/ACT nasal spray Administer 2 sprays into the nostril(s) as directed by provider Daily. 16 g 5    Fluticasone-Salmeterol (ADVAIR/WIXELA) 100-50 MCG/ACT DISKUS INHALE 1 PUFF BY MOUTH 2 TIMES A DAY 60 each 0    meclizine (ANTIVERT) 12.5 MG tablet Take 1 tablet by mouth 3 (Three) Times a Day As Needed for Dizziness.      metFORMIN (GLUCOPHAGE) 500 MG tablet TAKE TWO TABLETS BY MOUTH EVERY MORNING AND TAKE ONE TABLET BY MOUTH EVERY EVENING WITH MEALS 270 tablet 1    montelukast (SINGULAIR) 10 MG tablet Take 1 tablet by mouth Every Night.  "90 tablet 3    multivitamin with minerals (MULTIVITAL PO) Take 1 tablet by mouth Daily.      omeprazole (priLOSEC) 40 MG capsule Take 1 capsule by mouth Daily for 360 days. 90 capsule 3    Stelara 90 MG/ML solution prefilled syringe Injection 1 injection every 12 weeks      topiramate (TOPAMAX) 25 MG tablet Take 2 tablets by mouth Daily.      ubrogepant (UBRELVY) 100 MG tablet Take 1 tablet by mouth As Needed.      vitamin D (ERGOCALCIFEROL) 1.25 MG (39220 UT) capsule capsule Take 1 capsule by mouth Every 7 (Seven) Days. 12 capsule 1       Allergies   Allergen Reactions    Iodine Hives, Rash, Shortness Of Breath and Swelling    Cimzia [Certolizumab Pegol] Hives, GI Intolerance and Headache    Elastic Bandages & [Zinc] Hives    Latex Hives, Itching, Swelling and Rash     Gloves     Tape Hives     PAPER TAPE    Dilantin [Phenytoin] Unknown - Low Severity     Skin crawling    Pollen Extract Rash    Tree Extract Other (See Comments)     CONGESTION       Social History     Socioeconomic History    Marital status:    Tobacco Use    Smoking status: Never     Passive exposure: Never    Smokeless tobacco: Never   Vaping Use    Vaping status: Never Used   Substance and Sexual Activity    Alcohol use: Not Currently     Comment: rare    Drug use: No    Sexual activity: Yes     Partners: Male     Birth control/protection: None       /76 (BP Location: Left arm, Patient Position: Sitting)   Pulse 76   Temp 97.7 °F (36.5 °C)   Resp 16   Ht 167.6 cm (66\")   Wt 105 kg (231 lb 9.6 oz)   SpO2 98%   BMI 37.38 kg/m²     Physical Exam  Constitutional:       General: She is not in acute distress.     Appearance: She is well-developed. She is not diaphoretic.   HENT:      Head: Normocephalic and atraumatic.      Mouth/Throat:      Pharynx: No oropharyngeal exudate.   Eyes:      Conjunctiva/sclera: Conjunctivae normal.      Pupils: Pupils are equal, round, and reactive to light.   Pulmonary:      Effort: Pulmonary effort " is normal. No respiratory distress.   Abdominal:      General: There is no distension.      Palpations: Abdomen is soft.   Skin:     General: Skin is warm and dry.      Coloration: Skin is not pale.   Neurological:      Mental Status: She is alert and oriented to person, place, and time.      Cranial Nerves: No cranial nerve deficit.   Psychiatric:         Behavior: Behavior normal.         Thought Content: Thought content normal.           Assessment:  16 months s/p LSG 11/6/23 w/ Dr. Sandy      ICD-10-CM ICD-9-CM   1. Obesity, Class II, BMI 35-39.9  E66.812 278.00   2. Fatigue, unspecified type  R53.83 780.79   3. Postgastrectomy malabsorption  K91.2 579.3    Z90.3    4. Malnutrition screen  Z13.21 V77.2   5. Hypovitaminosis D  E55.9 268.9   6. Screening, iron deficiency anemia  Z13.0 V78.0   7. Therapeutic drug monitoring  Z51.81 V58.83   8. PCOS (polycystic ovarian syndrome)  E28.2 256.4              Plan:  Doing well. Continue w/ good food choices and healthy habits.  Continue protein >70g/day.  Continue routine exercise.  Routine bariatric labs ordered.  Continue vitamins w/ adjustments pending lab results.  Call w/ problems/concerns.     Interested in medical weight loss.  Also potentially planning a pregnancy.    Return to strength training, she has plans from [place].  She really has had an excellent weight loss.  Check UDS.  F/u in 1 month to discuss MWM after surgery.  Needs to stop Topamax before considering pregnancy, may benefit from the addition of phentermine and increased Topamax dose for MWM at next visit.  Continue on metformin for PCOS insulin resistance.  Run EOB this week.    The patient was instructed to follow up in 6 months, sooner if needed.    I spent 30 minutes caring for Eliz on this date of service. This time includes time spent by me in the following activities: preparing for the visit, reviewing tests, performing a medically appropriate examination and/or evaluation, counseling  and educating the patient/family/caregiver, referring and communicating with other health care professionals, documenting information in the medical record, independently interpreting results and communicating that information with the patient/family/caregiver, care coordination, ordering medications, ordering test(s), ordering procedure(s), obtaining a separately obtained history, and reviewing a separately obtained history      Beatriz Sandy MD

## 2025-03-19 NOTE — TELEPHONE ENCOUNTER
Caller: Eliz Eisenberg    Relationship: Self    Best call back number: [phone]    What is the best time to reach you: ANYTIME     Who are you requesting to speak with (clinical staff, provider,  specific staff member): NURSE       What was the call regarding: THE PATIENT STATES THAT SHE DID A TILT TABLE TEST FOR HER CARDIOLOGIST YESTERDAY AND NOW SHE HAS A HEADACHE THAT WONT GO AWAY SHE WOULD LIKE TO KNOW IF THAT IS NORMAL OR NOT SHE TRIED TO CALL THE CARDIOLOGIST BUT WAS NOT ABLE TO REACH THEM     Is it okay if the provider responds through MyChart: YES

## 2025-03-20 NOTE — TELEPHONE ENCOUNTER
HUB to read: LM to return call, please give message below.  Please advise patient that is possible and hopefully will start to resolve. If symptoms worsening- nausea vomiting worsening headaches would recommend follow up for further evaluation      Thanks   Dr. Lopez

## 2025-03-24 LAB
25(OH)D3+25(OH)D2 SERPL-MCNC: 36.2 NG/ML (ref 30–100)
ALBUMIN SERPL-MCNC: 4.2 G/DL (ref 3.9–4.9)
ALP SERPL-CCNC: 121 IU/L (ref 44–121)
ALT SERPL-CCNC: 11 IU/L (ref 0–32)
AMPHETAMINES UR QL SCN: NEGATIVE NG/ML
AST SERPL-CCNC: 11 IU/L (ref 0–40)
BARBITURATES UR QL SCN: NEGATIVE NG/ML
BASOPHILS # BLD AUTO: 0.1 X10E3/UL (ref 0–0.2)
BASOPHILS NFR BLD AUTO: 1 %
BENZODIAZ UR QL SCN: NEGATIVE NG/ML
BILIRUB SERPL-MCNC: 0.6 MG/DL (ref 0–1.2)
BUN SERPL-MCNC: 12 MG/DL (ref 6–20)
BUN/CREAT SERPL: 17 (ref 9–23)
BZE UR QL SCN: NEGATIVE NG/ML
CALCIUM SERPL-MCNC: 9.3 MG/DL (ref 8.7–10.2)
CANNABINOIDS UR QL SCN: NEGATIVE NG/ML
CHLORIDE SERPL-SCNC: 106 MMOL/L (ref 96–106)
CO2 SERPL-SCNC: 22 MMOL/L (ref 20–29)
CREAT SERPL-MCNC: 0.69 MG/DL (ref 0.57–1)
CREAT UR-MCNC: 152.6 MG/DL (ref 20–300)
EGFRCR SERPLBLD CKD-EPI 2021: 117 ML/MIN/1.73
EOSINOPHIL # BLD AUTO: 0.2 X10E3/UL (ref 0–0.4)
EOSINOPHIL NFR BLD AUTO: 2 %
ERYTHROCYTE [DISTWIDTH] IN BLOOD BY AUTOMATED COUNT: 14.2 % (ref 11.7–15.4)
FERRITIN SERPL-MCNC: 32 NG/ML (ref 15–150)
FOLATE SERPL-MCNC: >20 NG/ML
GLOBULIN SER CALC-MCNC: 2.9 G/DL (ref 1.5–4.5)
GLUCOSE SERPL-MCNC: 84 MG/DL (ref 70–99)
HCT VFR BLD AUTO: 41.1 % (ref 34–46.6)
HGB BLD-MCNC: 12.8 G/DL (ref 11.1–15.9)
IMM GRANULOCYTES # BLD AUTO: 0.1 X10E3/UL (ref 0–0.1)
IMM GRANULOCYTES NFR BLD AUTO: 1 %
IRON SERPL-MCNC: 46 UG/DL (ref 27–159)
LABORATORY COMMENT REPORT: NORMAL
LYMPHOCYTES # BLD AUTO: 1.7 X10E3/UL (ref 0.7–3.1)
LYMPHOCYTES NFR BLD AUTO: 17 %
MCH RBC QN AUTO: 26.6 PG (ref 26.6–33)
MCHC RBC AUTO-ENTMCNC: 31.1 G/DL (ref 31.5–35.7)
MCV RBC AUTO: 85 FL (ref 79–97)
METHADONE UR QL SCN: NEGATIVE NG/ML
METHYLMALONATE SERPL-SCNC: 102 NMOL/L (ref 0–378)
MONOCYTES # BLD AUTO: 0.6 X10E3/UL (ref 0.1–0.9)
MONOCYTES NFR BLD AUTO: 6 %
MORPHOLOGY BLD-IMP: ABNORMAL
NEUTROPHILS # BLD AUTO: 7.5 X10E3/UL (ref 1.4–7)
NEUTROPHILS NFR BLD AUTO: 73 %
OPIATES UR QL SCN: NEGATIVE NG/ML
OXYCODONE+OXYMORPHONE UR QL SCN: NEGATIVE NG/ML
PCP UR QL: NEGATIVE NG/ML
PH UR: 5.5 [PH] (ref 4.5–8.9)
PLATELET # BLD AUTO: ABNORMAL X10E3/UL
POTASSIUM SERPL-SCNC: 4.2 MMOL/L (ref 3.5–5.2)
PREALB SERPL-MCNC: 22 MG/DL (ref 14–35)
PROPOXYPH UR QL SCN: NEGATIVE NG/ML
PROT SERPL-MCNC: 7.1 G/DL (ref 6–8.5)
RBC # BLD AUTO: 4.81 X10E6/UL (ref 3.77–5.28)
SODIUM SERPL-SCNC: 140 MMOL/L (ref 134–144)
VIT B1 BLD-SCNC: 192.1 NMOL/L (ref 66.5–200)
WBC # BLD AUTO: 10.2 X10E3/UL (ref 3.4–10.8)

## 2025-04-01 ENCOUNTER — RESULTS FOLLOW-UP (OUTPATIENT)
Dept: BARIATRICS/WEIGHT MGMT | Facility: CLINIC | Age: 35
End: 2025-04-01
Payer: COMMERCIAL

## 2025-04-02 ENCOUNTER — OFFICE VISIT (OUTPATIENT)
Dept: CARDIOLOGY | Facility: CLINIC | Age: 35
End: 2025-04-02
Payer: COMMERCIAL

## 2025-04-02 VITALS
HEART RATE: 56 BPM | BODY MASS INDEX: 42.27 KG/M2 | SYSTOLIC BLOOD PRESSURE: 112 MMHG | HEIGHT: 66 IN | OXYGEN SATURATION: 100 % | WEIGHT: 263 LBS | DIASTOLIC BLOOD PRESSURE: 72 MMHG

## 2025-04-02 DIAGNOSIS — R42 POSTURAL DIZZINESS WITH PRESYNCOPE: Primary | ICD-10-CM

## 2025-04-02 DIAGNOSIS — R55 POSTURAL DIZZINESS WITH PRESYNCOPE: Primary | ICD-10-CM

## 2025-04-02 DIAGNOSIS — R55 VASOVAGAL SYNCOPE: ICD-10-CM

## 2025-04-02 PROCEDURE — 99213 OFFICE O/P EST LOW 20 MIN: CPT | Performed by: INTERNAL MEDICINE

## 2025-04-02 NOTE — PROGRESS NOTES
[facility] CARDIOLOGY    Established Patient Office Visit    Patient Name: Eliz Eisenberg  : 1990   MRN: [id number]   Care Team: Patient Care Team:  Noni Lopez MD as PCP - General (Internal Medicine)  Nathan, COURTNEY Hinkle (Inactive) as Nurse Practitioner (Obstetrics and Gynecology)  Beatriz Sandy MD as Surgeon (General Surgery)  Trina Hernandez APRN as Nurse Practitioner (Nurse Practitioner)  Cortez Choi MD as Consulting Physician (Neurology)  Vishal Flores MD as Cardiologist (Cardiology)  Poli Taylor DO as Consulting Physician (Rheumatology)  Ran Lemon MD as Consulting Physician (Otolaryngology)    Chief Complaint   Patient presents with    Postural dizziness with presyncope     HPI: Eliz Eisenberg is a 34 y.o. female with a history of anxiety disorder with panic attacks, biliary dyskinesia, GERD with previous treatment for H. pylori, metabolic syndrome, and obesity with bariatric surgery last year who presents for follow-up of spells of dizziness.    She had been having dizziness with positional standing and ultimately underwent tilt table testing on 3/21/2025.  There, she had initial heart rate rise but no significant change in blood pressure followed by a vasovagal reaction with bradycardia, hypotension, and loss of consciousness at the end of the study.  She continues to have episodes of dizziness both with initial standing and after standing for some time.  She has not had any other recent medication changes.    Subjective   Review of Systems   Cardiovascular:  Positive for palpitations. Negative for leg swelling.   Neurological:  Positive for dizziness.       Past Medical History:   Diagnosis Date    Abdominal pain     Abnormal ECG     Acute sinusitis     Allergic     Allergic rhinitis     Ankle sprain     Anxiety     Arrhythmia     Arthritis 2017    On Mobic daily    Asthma     Atopic dermatitis      Biliary dyskinesia     Cat bite      resolved    Clotting disorder     Current tear of meniscus     Depression March 2023    Dysfunctional uterine bleeding     Eczema     Foot pain     GERD (gastroesophageal reflux disease)     Gout     Hearing loss, bilateral     R/T PSORASIS    Helicobacter pylori antibody positive     4/26/23. Rx prevpak    IBS (irritable bowel syndrome)     Influenza     Metabolic syndrome     Migraines     Muscular aches     Neuromuscular disorder     Obesity     JENN (obstructive sleep apnea) - possible 01/28/2021    Panic attack     PCOS (polycystic ovarian syndrome)     Pharyngitis     PMS (premenstrual syndrome)     PONV (postoperative nausea and vomiting) December 7, 2017    Psoriasis     planning to start Taltz    PTSD (post-traumatic stress disorder)     Restless leg syndrome     URI (upper respiratory infection)     Wears glasses      Past Surgical History:   Procedure Laterality Date    ABDOMINAL SURGERY  12/2018    Multiple: gallbladder and hernia repair    COLONOSCOPY      ENDOSCOPY N/A 05/15/2023    Procedure: ESOPHAGOGASTRODUODENOSCOPY;  Surgeon: Beatriz Sandy MD;  Location:  [facility];  Service: Bariatric;  Laterality: N/A;    GASTRIC SLEEVE LAPAROSCOPIC N/A 11/06/2023    Procedure: GASTRIC SLEEVE LAPAROSCOPIC WITH DAVINCI ROBOT, ESOPHAGOGASTRODUODENOSCOPY;  Surgeon: Beatriz Sandy MD;  Location:  [facility];  Service: Robotics - DaVinci;  Laterality: N/A;  scope ID: [id number]    LAPAROSCOPIC CHOLECYSTECTOMY  2018    no gallstones    TONSILLECTOMY  06/2021    TONSILLECTOMY AND ADENOIDECTOMY N/A 07/09/2021    Procedure: TONSILLECTOMY;  Surgeon: Ran Lemon MD;  Location:  [facility];  Service: ENT;  Laterality: N/A;    UMBILICAL HERNIA REPAIR  2018    w/ cholecystectomy    WISDOM TOOTH EXTRACTION       Social History     Socioeconomic History    Marital status:    Tobacco Use    Smoking status: Never     Passive exposure: Never    Smokeless tobacco:  Never   Vaping Use    Vaping status: Never Used   Substance and Sexual Activity    Alcohol use: Not Currently     Comment: Rarely drink alcohol    Drug use: No    Sexual activity: Yes     Partners: Male     Birth control/protection: None     Family History   Problem Relation Age of Onset    Heart disease Mother         Passed away from sudden cardiac arrest    Multiple sclerosis Mother     Diabetes Mother     Arthritis Mother     Asthma Mother     Early death Mother     Kidney disease Mother     Anemia Mother     Anxiety disorder Mother     Depression Mother     Hypertension Father     Obesity Father     Diabetes Father     Sleep apnea Father     No Known Problems Sister     Breast cancer Maternal Grandmother     Diabetes Maternal Grandmother     Dementia Maternal Grandmother     Cancer Maternal Grandmother         Breast    Asthma Maternal Grandfather     Diabetes Maternal Grandfather     Sleep apnea Maternal Grandfather     Arthritis Maternal Grandfather     Heart disease Maternal Grandfather     Hearing loss Maternal Grandfather     Colon cancer Paternal Grandmother     Dementia Paternal Grandmother     Heart attack Paternal Grandfather     Sleep apnea Paternal Grandfather     Obesity Paternal Grandfather     Hypertension Paternal Grandfather     Diabetes Paternal Grandfather     Heart disease Paternal Grandfather        Current Outpatient Medications:     albuterol (PROVENTIL) (2.5 MG/3ML) 0.083% nebulizer solution, Take 2.5 mg by nebulization Every 6 (Six) Hours As Needed for Wheezing., Disp: 48 mL, Rfl: 0    albuterol sulfate  (90 Base) MCG/ACT inhaler, Inhale 2 puffs Every 4 (Four) Hours As Needed for Wheezing., Disp: 18 g, Rfl: 2    BIOTIN PO, Take  by mouth Daily. 1 daily, Disp: , Rfl:     busPIRone (BUSPAR) 10 MG tablet, Take 1 tablet by mouth 3 (Three) Times a Day., Disp: 90 tablet, Rfl: 2    cetirizine (zyrTEC) 10 MG tablet, Take 1 tablet by mouth Daily., Disp: , Rfl:     EPINEPHrine (EPIPEN) 0.3  MG/0.3ML solution auto-injector injection, As Needed., Disp: , Rfl:     fluticasone (Flonase) 50 MCG/ACT nasal spray, Administer 2 sprays into the nostril(s) as directed by provider Daily., Disp: 16 g, Rfl: 5    Fluticasone-Salmeterol (ADVAIR/WIXELA) 100-50 MCG/ACT DISKUS, INHALE 1 PUFF BY MOUTH 2 TIMES A DAY, Disp: 60 each, Rfl: 0    meclizine (ANTIVERT) 12.5 MG tablet, Take 1 tablet by mouth 3 (Three) Times a Day As Needed for Dizziness., Disp: , Rfl:     metFORMIN (GLUCOPHAGE) 500 MG tablet, TAKE TWO TABLETS BY MOUTH EVERY MORNING AND TAKE ONE TABLET BY MOUTH EVERY EVENING WITH MEALS, Disp: 270 tablet, Rfl: 1    montelukast (SINGULAIR) 10 MG tablet, Take 1 tablet by mouth Every Night., Disp: 90 tablet, Rfl: 3    multivitamin with minerals (MULTIVITAL PO), Take 1 tablet by mouth Daily., Disp: , Rfl:     omeprazole (priLOSEC) 40 MG capsule, Take 1 capsule by mouth Daily for 360 days., Disp: 90 capsule, Rfl: 3    Stelara 90 MG/ML solution prefilled syringe Injection, 1 injection every 12 weeks, Disp: , Rfl:     topiramate (TOPAMAX) 25 MG tablet, Take 2 tablets by mouth Daily., Disp: , Rfl:     ubrogepant (UBRELVY) 100 MG tablet, Take 1 tablet by mouth As Needed., Disp: , Rfl:     vitamin D (ERGOCALCIFEROL) 1.25 MG (12445 UT) capsule capsule, Take 1 capsule by mouth Every 7 (Seven) Days., Disp: 12 capsule, Rfl: 1    Allergies   Allergen Reactions    Iodine Hives, Rash, Shortness Of Breath and Swelling    Cimzia [Certolizumab Pegol] Hives, GI Intolerance and Headache    Elastic Bandages & [Zinc] Hives    Latex Hives, Itching, Swelling and Rash     Gloves     Tape Hives     PAPER TAPE    Dilantin [Phenytoin] Unknown - Low Severity     Skin crawling    Pollen Extract Rash    Tree Extract Other (See Comments)     CONGESTION     Objective     Vitals:    04/02/25 1332   BP: 112/72   BP Location: Right arm   Patient Position: Sitting   Cuff Size: Adult   Pulse: 56   SpO2: 100%   Weight: 119 kg (263 lb)   Height: 167.6 cm  "(66\")   Body mass index is 42.45 kg/m².  Gen: well developed, sitting up on exam table, comfortable appearing  HEENT: MMM, sclera anicteric, conjunctiva normal, no carotid bruits  CV: regular rate, regular rhythm, no murmurs or rubs, normal S1, S2. 2+ radial pulses  Pulm: RA, normal work of breathing, no wheezes, rales, rhonchi  Neuro: alert, oriented, face symmetrical, moving all extremities well  Psych: normal mood, appropriate affect    Most recent PCP note, imaging tests, and labs reviewed.    Labs:  Lab Results   Component Value Date    WBC 10.2 03/19/2025    HGB 12.8 03/19/2025    HCT 41.1 03/19/2025    MCV 85 03/19/2025    PLT CANCELED 03/19/2025     Lab Results   Component Value Date    GLUCOSE 84 03/19/2025    BUN 12 03/19/2025    CREATININE 0.69 03/19/2025    EGFRIFNONA 121 01/14/2022    BCR 17 03/19/2025    K 4.2 03/19/2025    CO2 22 03/19/2025    CALCIUM 9.3 03/19/2025    ALBUMIN 4.2 03/19/2025    AST 11 03/19/2025    ALT 11 03/19/2025     Lab Results   Component Value Date    HGBA1C 5.40 07/05/2024     Lab Results   Component Value Date    CHOL 191 07/05/2024    CHLPL 191 04/26/2023    TRIG 94 07/05/2024    HDL 36 (L) 07/05/2024     (H) 07/05/2024 8/28/23 - Stress PET    No significant ST or T wave changes identified with regadenason.    Findings consistent with a normal ECG stress test.    There is no prior study available for comparison. Rest EF = 67% Stress EF = 67%.    Myocardial perfusion imaging indicates a normal myocardial perfusion study with no evidence of ischemia.    Impressions are consistent with a low risk study.    Results for orders placed during the hospital encounter of 08/26/24    Adult Transthoracic Echo Complete W/ Cont if Necessary Per Protocol    Interpretation Summary    Left ventricular ejection fraction appears to be 61 - 65%. Normal left ventricular cavity size and wall thickness noted. All left ventricular wall segments contract normally. Left ventricular " diastolic function was normal. Normal left atrial pressure.    Normal right ventricular cavity size, wall thickness, systolic function and septal motion.    Normal left atrial cavity size noted.    No aortic valve regurgitation or stenosis is present. The aortic valve is grossly normal in structure. The aortic valve was poorly visualized but appears trileaflet.    The mitral valve is structurally normal with no regurgitation or significant stenosis present.    The tricuspid valve is structurally normal with no significant stenosis present. Trace to mild tricuspid valve regurgitation is present.    8/20/24 -14-day monitor    A relatively benign monitor study.    Patient triggers with sinus rhythm and sinus tachycardia.  No sustained arrhythmias seen.  Rare VE and SVE, supraventricular triplets seen.    7/13-7/26/23 -wearable cardiac monitor   - 1 SVT event of 6 beats   - Rare ectopy   - Average heart rate 94 bpm, 24% tachycardia events    Procedures    Assessment & Plan       ICD-10-CM ICD-9-CM   1. Postural dizziness with presyncope  R42 780.4    R55 780.2   2. Vasovagal syncope  R55 780.2       Dizziness spells, palpitations -tilt table testing consistent with vasovagal syncope   -Benign ECG, echocardiogram, and stress test as well as wearable monitors   -Discussed with the patient that the loss of consciousness with her study was consistent with vasovagal syncope.  There is no specific medication intervention for this and encouraged her to remain well-hydrated, keep an eye out for any consistent triggers to her symptoms, and have a low threshold to sit or lay down should she have any prodromal symptoms.  I provided her with a patient information handout from up-to-date that included educational information as well as lifestyle intervention and counterpressure maneuvers which may help alleviate some of her symptoms.     Return in about 6 months (around 10/2/2025).    SANAM Flores MD  04/02/25    [facility] Cardiology  [address] [address] [phone]

## 2025-04-03 RX ORDER — FLUCONAZOLE 150 MG/1
150 TABLET ORAL
Qty: 2 TABLET | Refills: 0 | Status: SHIPPED | OUTPATIENT
Start: 2025-04-03

## 2025-06-09 ENCOUNTER — OFFICE VISIT (OUTPATIENT)
Dept: BARIATRICS/WEIGHT MGMT | Facility: CLINIC | Age: 35
End: 2025-06-09
Payer: COMMERCIAL

## 2025-06-09 VITALS
WEIGHT: 237.6 LBS | HEART RATE: 74 BPM | RESPIRATION RATE: 16 BRPM | HEIGHT: 66 IN | SYSTOLIC BLOOD PRESSURE: 118 MMHG | DIASTOLIC BLOOD PRESSURE: 72 MMHG | OXYGEN SATURATION: 98 % | BODY MASS INDEX: 38.18 KG/M2

## 2025-06-09 DIAGNOSIS — E55.9 VITAMIN D DEFICIENCY: ICD-10-CM

## 2025-06-09 DIAGNOSIS — E66.812 OBESITY, CLASS II, BMI 35-39.9: Primary | ICD-10-CM

## 2025-06-09 DIAGNOSIS — Z71.3 ENCOUNTER FOR WEIGHT LOSS COUNSELING: ICD-10-CM

## 2025-06-09 DIAGNOSIS — R53.83 FATIGUE, UNSPECIFIED TYPE: ICD-10-CM

## 2025-06-09 RX ORDER — CYANOCOBALAMIN 1000 UG/ML
1000 INJECTION, SOLUTION INTRAMUSCULAR; SUBCUTANEOUS
Status: SHIPPED | OUTPATIENT
Start: 2025-06-09

## 2025-06-09 RX ORDER — ERGOCALCIFEROL 1.25 MG/1
50000 CAPSULE, LIQUID FILLED ORAL
Qty: 12 CAPSULE | Refills: 1 | Status: SHIPPED | OUTPATIENT
Start: 2025-06-09

## 2025-06-09 RX ADMIN — CYANOCOBALAMIN 1000 MCG: 1000 INJECTION, SOLUTION INTRAMUSCULAR; SUBCUTANEOUS at 11:55

## 2025-06-09 NOTE — PROGRESS NOTES
[facility] Bariatric Surgery  [address]  [address]  [facility] [id number] [phone]        Patient Name:  Eliz Eisenberg.  :  1990      Date of Visit: 2025      Reason for Visit:   18 months postop        HPI: Eliz Eisenberg is a 34 y.o. female s/p LSG 23 w/ Dr. Sandy      Still struggling w/ weight loss plateau.  Has been prioritizing protein and water intake.  Keeping a food journal.  Not eating after 9pm.  Getting 15-16k steps/day, some jogging, but having some (R) knee issues.      Diet Recall:   B - oatmeal w/ higgin[place], coffee  L - ham w/ roasted veggies, mixed fruit  D - steak w/ half potato and salad    Labs 3/19/25 - acceptable.      Currently on Topamax (migraines) and Metformin (PCOS).         Presurgery weight: 350 pounds.  Today's weight is 108 kg (237 lb 9.6 oz) pounds, today's  Body mass index is 38.35 kg/m²., and weight loss since surgery is 113 pounds.        Past Medical History:   Diagnosis Date    Abdominal pain     Abnormal ECG     Acute sinusitis     Allergic rhinitis     Anxiety     Arrhythmia     Asthma     Atopic dermatitis     Biliary dyskinesia     Cat bite      resolved    Clotting disorder     Current tear of meniscus     Depression 2023    Dysfunctional uterine bleeding     Eczema     Foot pain     GERD (gastroesophageal reflux disease)     Gout     Hearing loss, bilateral     R/T PSORASIS    Helicobacter pylori antibody positive     23. Rx prevpak    IBS (irritable bowel syndrome)     Influenza     Metabolic syndrome     Migraines     Muscular aches     Neuromuscular disorder     Obesity     JENN (obstructive sleep apnea) - possible 2021    Panic attack     PCOS (polycystic ovarian syndrome)     Pharyngitis     PMS (premenstrual syndrome)     PONV (postoperative nausea and vomiting) 2017    Psoriasis     planning to start Taltz    PTSD (post-traumatic stress disorder)     Restless  leg syndrome     URI (upper respiratory infection)     Wears glasses      Past Surgical History:   Procedure Laterality Date    ABDOMINAL SURGERY  12/2018    Multiple: gallbladder and hernia repair    COLONOSCOPY      ENDOSCOPY N/A 05/15/2023    Procedure: ESOPHAGOGASTRODUODENOSCOPY;  Surgeon: Beatriz Sandy MD;  Location:  [facility];  Service: Bariatric;  Laterality: N/A;    GASTRIC SLEEVE LAPAROSCOPIC N/A 11/06/2023    Procedure: GASTRIC SLEEVE LAPAROSCOPIC WITH DAVINCI ROBOT, ESOPHAGOGASTRODUODENOSCOPY;  Surgeon: Beatriz Sandy MD;  Location:  [facility];  Service: Robotics - DaVinci;  Laterality: N/A;  scope ID: [id number]    LAPAROSCOPIC CHOLECYSTECTOMY  2018    no gallstones    TONSILLECTOMY  06/2021    TONSILLECTOMY AND ADENOIDECTOMY N/A 07/09/2021    Procedure: TONSILLECTOMY;  Surgeon: Ran Lemon MD;  Location:  [facility];  Service: ENT;  Laterality: N/A;    UMBILICAL HERNIA REPAIR  2018    w/ cholecystectomy    WISDOM TOOTH EXTRACTION       Outpatient Medications Marked as Taking for the 6/9/25 encounter (Office Visit) with Nina Cox PA   Medication Sig Dispense Refill    albuterol (PROVENTIL) (2.5 MG/3ML) 0.083% nebulizer solution Take 2.5 mg by nebulization Every 6 (Six) Hours As Needed for Wheezing. 48 mL 0    albuterol sulfate  (90 Base) MCG/ACT inhaler Inhale 2 puffs Every 4 (Four) Hours As Needed for Wheezing. 18 g 2    BIOTIN PO Take  by mouth Daily. 1 daily      busPIRone (BUSPAR) 10 MG tablet Take 1 tablet by mouth 3 (Three) Times a Day. 90 tablet 2    cetirizine (zyrTEC) 10 MG tablet Take 1 tablet by mouth Daily.      fluticasone (Flonase) 50 MCG/ACT nasal spray Administer 2 sprays into the nostril(s) as directed by provider Daily. 16 g 5    Fluticasone-Salmeterol (ADVAIR/WIXELA) 100-50 MCG/ACT DISKUS INHALE 1 PUFF BY MOUTH 2 TIMES A DAY 60 each 0    meclizine (ANTIVERT) 12.5 MG tablet Take 1 tablet by mouth 3 (Three) Times a Day As Needed for Dizziness.       metFORMIN (GLUCOPHAGE) 500 MG tablet TAKE TWO TABLETS BY MOUTH EVERY MORNING AND TAKE ONE TABLET BY MOUTH EVERY EVENING WITH MEALS 270 tablet 1    montelukast (SINGULAIR) 10 MG tablet Take 1 tablet by mouth Every Night. 90 tablet 3    multivitamin with minerals (MULTIVITAL PO) Take 1 tablet by mouth Daily.      Stelara 90 MG/ML solution prefilled syringe Injection 1 injection every 12 weeks      topiramate (TOPAMAX) 25 MG tablet Take 2 tablets by mouth Daily.      ubrogepant (UBRELVY) 100 MG tablet Take 1 tablet by mouth As Needed.      vitamin D (ERGOCALCIFEROL) 1.25 MG (08725 UT) capsule capsule Take 1 capsule by mouth Every 7 (Seven) Days. 12 capsule 1    [DISCONTINUED] vitamin D (ERGOCALCIFEROL) 1.25 MG (76572 UT) capsule capsule Take 1 capsule by mouth Every 7 (Seven) Days. 12 capsule 1     Current Facility-Administered Medications for the 6/9/25 encounter (Office Visit) with Nina Cox PA   Medication Dose Route Frequency Provider Last Rate Last Admin    cyanocobalamin injection 1,000 mcg  1,000 mcg Intramuscular Q28 Days Nina Cox PA   1,000 mcg at 06/09/25 1155       Allergies   Allergen Reactions    Iodine Hives, Rash, Shortness Of Breath and Swelling    Cimzia [Certolizumab Pegol] Hives, GI Intolerance and Headache    Elastic Bandages & [Zinc] Hives    Latex Hives, Itching, Swelling and Rash     Gloves     Tape Hives     PAPER TAPE    Dilantin [Phenytoin] Unknown - Low Severity     Skin crawling    Pollen Extract Rash    Tree Extract Other (See Comments)     CONGESTION       Social History     Socioeconomic History    Marital status:    Tobacco Use    Smoking status: Never     Passive exposure: Never    Smokeless tobacco: Never   Vaping Use    Vaping status: Never Used   Substance and Sexual Activity    Alcohol use: Not Currently     Comment: Rarely drink alcohol    Drug use: No    Sexual activity: Yes     Partners: Male     Birth control/protection: None       /72 (BP  "Location: Left arm, Patient Position: Sitting)   Pulse 74   Resp 16   Ht 167.6 cm (66\")   Wt 108 kg (237 lb 9.6 oz)   SpO2 98%   BMI 38.35 kg/m²     Physical Exam  Constitutional:       General: She is not in acute distress.     Appearance: She is well-developed. She is not diaphoretic.   HENT:      Head: Normocephalic and atraumatic.      Mouth/Throat:      Pharynx: No oropharyngeal exudate.   Eyes:      Conjunctiva/sclera: Conjunctivae normal.      Pupils: Pupils are equal, round, and reactive to light.   Pulmonary:      Effort: Pulmonary effort is normal. No respiratory distress.   Abdominal:      General: There is no distension.      Palpations: Abdomen is soft.   Skin:     General: Skin is warm and dry.      Coloration: Skin is not pale.   Neurological:      Mental Status: She is alert and oriented to person, place, and time.      Cranial Nerves: No cranial nerve deficit.   Psychiatric:         Behavior: Behavior normal.         Thought Content: Thought content normal.           Assessment:  18 months s/p LSG 11/6/23 w/ Dr. Sandy          ICD-10-CM ICD-9-CM   1. Obesity, Class II, BMI 35-39.9  E66.812 278.00   2. Encounter for weight loss counseling  Z71.3 V65.3   3. Vitamin D deficiency  E55.9 268.9   4. Fatigue, unspecified type  R53.83 780.79       Class 2 Severe Obesity (BMI >=35 and <=39.9). Obesity-related health conditions include the following: see above. Obesity is unchanged. BMI is is above average; BMI management plan is completed. We discussed see plan.      Plan:  I have instructed the patient to continue with pursuit of medical weight loss as a part of this program. Patient does meet criteria for use of anorectics at this time as BMI >30 , body fat percentage >30%, and is not at treatment goal.     The current plan for this month includes:   - Continue current exercise efforts  - Continue nutrition focus  - Continue Topamax + Metformin.  - Will RX Phentermine - discussed potential side " effects.       The patient was instructed to follow up in 1 month, sooner if needed.      VINNIE Marcelino        I spent 45 minutes caring for Eliz on this date of service. This time includes time spent by me in the following activities: preparing for the visit, reviewing tests, performing a medically appropriate examination and/or evaluation, counseling and educating the patient/family/caregiver, documenting information in the medical record, and care coordination

## 2025-06-10 ENCOUNTER — TELEMEDICINE (OUTPATIENT)
Dept: BEHAVIORAL HEALTH | Facility: CLINIC | Age: 35
End: 2025-06-10
Payer: COMMERCIAL

## 2025-06-10 DIAGNOSIS — F32.2 CURRENT SEVERE EPISODE OF MAJOR DEPRESSIVE DISORDER WITHOUT PSYCHOTIC FEATURES, UNSPECIFIED WHETHER RECURRENT: ICD-10-CM

## 2025-06-10 DIAGNOSIS — F41.1 GENERALIZED ANXIETY DISORDER: Primary | ICD-10-CM

## 2025-06-10 DIAGNOSIS — F43.21 GRIEF: ICD-10-CM

## 2025-06-10 NOTE — PLAN OF CARE
Treatment and updated Nenita 10, 2025.  Patient has returned to treatment after a lapse of approximately 90 days.  Treatment will focus on the following for the next 90-day.:  Patient will take all medication as directed and keep all appointments as scheduled, patient will regularly engage in activities which they have identified as healthy coping mechanisms and effort to independently manage unwanted symptoms, patient will utilize family and community resources to assist them in caring for family members with dementia.  Treatment plan will be updated in 90 days.

## 2025-06-10 NOTE — PROGRESS NOTES
[facility]                 Follow Up Adult      Follow Up Adult Note     Date:06/10/2025   Patient Name: Eliz Eisenberg  : 1990   MRN: [id number]   Time IN: 11:00 am    Time OUT: 11:40 am     Referring Provider: Noni Lopez MD    Chief Complaint:      ICD-10-CM ICD-9-CM   1. Generalized anxiety disorder  F41.1 300.02   2. Current severe episode of major depressive disorder without psychotic features, unspecified whether recurrent  F32.2 296.23   3. Grief  F43.21 309.0        History of Present Illness:   Eliz Eisenberg is a 34 y.o. female who is being seen today for follow up counseling for ABIODUN, MDD and grief.    This provider is located at [facility], using a secure [employer] Video Visit through [employer]. Patient is being seen remotely via telehealth at their home address in [place], and stated they are in a secure environment for this session. The patient's condition being diagnosed/treated is appropriate for telemedicine. Tunde LONGOW identified myself as well as my credentials.   The patient, and/or patients guardian, consent to be seen remotely, and when consent is given they understand that the consent allows for patient identifiable information to be sent to a third party as needed.   They may refuse to be seen remotely at any time. The electronic data is encrypted and password protected, and the patient and/or guardian has been advised of the potential risks to privacy not withstanding such measures.     You have chosen to receive care through a telehealth visit.  Do you consent to use a video/audio connection for your medical care today? Yes.  This visit has been scheduled as a video visit to comply with patient safety concerns in accordance with CDC recommendations.      Subjective               Patient's Support Network Includes: extended family    Functional Status: Mild impairment        Current Outpatient Medications:     albuterol (PROVENTIL) (2.5 MG/3ML) 0.083% nebulizer solution, Take 2.5 mg by nebulization Every 6 (Six) Hours As Needed for Wheezing., Disp: 48 mL, Rfl: 0    albuterol sulfate  (90 Base) MCG/ACT inhaler, Inhale 2 puffs Every 4 (Four) Hours As Needed for Wheezing., Disp: 18 g, Rfl: 2    BIOTIN PO, Take  by mouth Daily. 1 daily, Disp: , Rfl:     busPIRone (BUSPAR) 10 MG tablet, Take 1 tablet by mouth 3 (Three) Times a Day., Disp: 90 tablet, Rfl: 2    cetirizine (zyrTEC) 10 MG tablet, Take 1 tablet by mouth Daily., Disp: , Rfl:     EPINEPHrine (EPIPEN) 0.3 MG/0.3ML solution auto-injector injection, As Needed. (Patient not taking: Reported on 6/9/2025), Disp: , Rfl:     fluticasone (Flonase) 50 MCG/ACT nasal spray, Administer 2 sprays into the nostril(s) as directed by provider Daily., Disp: 16 g, Rfl: 5    Fluticasone-Salmeterol (ADVAIR/WIXELA) 100-50 MCG/ACT DISKUS, INHALE 1 PUFF BY MOUTH 2 TIMES A DAY, Disp: 60 each, Rfl: 0    meclizine (ANTIVERT) 12.5 MG tablet, Take 1 tablet by mouth 3 (Three) Times a Day As Needed for Dizziness., Disp: , Rfl:     metFORMIN (GLUCOPHAGE) 500 MG tablet, TAKE TWO TABLETS BY MOUTH EVERY MORNING AND TAKE ONE TABLET BY MOUTH EVERY EVENING WITH MEALS, Disp: 270 tablet, Rfl: 1    montelukast (SINGULAIR) 10 MG tablet, Take 1 tablet by mouth Every Night., Disp: 90 tablet, Rfl: 3    multivitamin with minerals (MULTIVITAL PO), Take 1 tablet by mouth Daily., Disp: , Rfl:     Stelara 90 MG/ML solution prefilled syringe Injection, 1 injection every 12 weeks, Disp: , Rfl:     topiramate (TOPAMAX) 25 MG tablet, Take 2 tablets by mouth Daily., Disp: , Rfl:     ubrogepant (UBRELVY) 100 MG tablet, Take 1 tablet by mouth As Needed., Disp: , Rfl:     vitamin D (ERGOCALCIFEROL) 1.25 MG (69899 UT) capsule capsule, Take 1 capsule by mouth Every 7 (Seven) Days., Disp: 12 capsule, Rfl: 1    Current Facility-Administered Medications:     cyanocobalamin  injection 1,000 mcg, 1,000 mcg, Intramuscular, Q28 Days, Nina Cox PA, 1,000 mcg at 06/09/25 1155    Allergies   Allergen Reactions    Iodine Hives, Rash, Shortness Of Breath and Swelling    Cimzia [Certolizumab Pegol] Hives, GI Intolerance and Headache    Elastic Bandages & [Zinc] Hives    Latex Hives, Itching, Swelling and Rash     Gloves     Tape Hives     PAPER TAPE    Dilantin [Phenytoin] Unknown - Low Severity     Skin crawling    Pollen Extract Rash    Tree Extract Other (See Comments)     CONGESTION       Objective     Physical Exam:  Vital Signs: There were no vitals filed for this visit.  There is no height or weight on file to calculate BMI.     Mental Status Exam:   Hygiene:   good  Cooperation:  Cooperative  Eye Contact:  Good  Psychomotor Behavior:  Appropriate  Affect:  Full range  Mood: normal  Speech:  Normal  Thought Process:  Goal directed  Thought Content:  Normal  Suicidal:  None  Homicidal:  None  Hallucinations:  None  Delusion:  None  Memory:  Intact  Orientation:  Person, Place, Time, and Situation  Reliability:  good  Insight:  Good  Judgement:  Good  Impulse Control:  Good  Physical/Medical Issues:  See problem list     Assessment / Plan      Diagnosis:  Diagnoses and all orders for this visit:    1. Generalized anxiety disorder (Primary)    2. Current severe episode of major depressive disorder without psychotic features, unspecified whether recurrent    3. Grief    Treatment and updated Nenita 10, 2025.  Patient has returned to treatment after a lapse of approximately 90 days.  Treatment will focus on the following for the next 90-day.:  Patient will take all medication as directed and keep all appointments as scheduled, patient will regularly engage in activities which they have identified as healthy coping mechanisms and effort to independently manage unwanted symptoms, patient will utilize family and community resources to assist them in caring for family members with  dementia.  Treatment plan will be updated in 90 days.    Progress toward goal: Treatment and updated Nenita 10, 2025.  Patient has returned to treatment after a lapse of approximately 90 days.  Treatment will focus on the following for the next 90-day.:  Patient will take all medication as directed and keep all appointments as scheduled, patient will regularly engage in activities which they have identified as healthy coping mechanisms and effort to independently manage unwanted symptoms, patient will utilize family and community resources to assist them in caring for family members with dementia.  Treatment plan will be updated in 90 days.    Prognosis: Good with ongoing treatment    PLAN:  Treatment and updated Nenita 10, 2025.  Patient has returned to treatment after a lapse of approximately 90 days.  Treatment will focus on the following for the next 90-day.:  Patient will take all medication as directed and keep all appointments as scheduled, patient will regularly engage in activities which they have identified as healthy coping mechanisms and effort to independently manage unwanted symptoms, patient will utilize family and community resources to assist them in caring for family members with dementia.  Treatment plan will be updated in 90 days.     Safety: No acute safety concerns  Risk Assessment: Risk of self-harm acutely is low. Risk of self-harm chronically is also low, but could be further elevated in the event of treatment noncompliance and/or AODA.    Treatment Plan/Goals: Continue supportive psychotherapy efforts and medications as indicated. Treatment and medication options discussed during today's visit. Patient ackowledged and verbally consented to continue with current treatment plan and was educated on the importance of compliance with treatment and follow-up appointments. Patient seems reasonably able to adhere to treatment plan.      Assisted Patient in processing above session content; acknowledged  and normalized patient’s thoughts, feelings, and concerns.  Rationalized patient thought process regarding anxiety, depression and grief.      Allowed Patient to freely discuss issues  without interruption or judgement with unconditional positive regard, active listening skills, and empathy. Therapist provided a safe, confidential environment to facilitate the development of a positive therapeutic relationship and encouraged open, honest communication. Assisted Patient in identifying risk factors which would indicate the need for higher level of care including thoughts to harm self or others and/or self-harming behavior and encouraged Patient to contact this office, call 911, or present to the nearest emergency room should any of these events occur. Discussed crisis intervention services and means to access. Patient adamantly and convincingly denies current suicidal or homicidal ideation or perceptual disturbance. Assisted Patient in processing session content; acknowledged and normalized Patient’s thoughts, feelings, and concerns by utilizing a person-centered approach in efforts to build appropriate rapport and a positive therapeutic relationship with open and honest communication. .     Part of this note may be an electronic transcription/translation of spoken language to printed text using the Dragon Dictation System.       Follow Up:   No follow-ups on file.    Tunde Mason LCSW

## 2025-06-10 NOTE — TREATMENT PLAN
Multi-Disciplinary Problems (from Behavioral Health Treatment Plan)      Active Problems       Problem: Anxiety  Start Date: 06/10/25      Problem Details: The patient self-scales this problem as a 5 with 10 being the worst.          Goal Priority Start Date Expected End Date End Date    Patient will develop and implement behavioral and cognitive strategies to reduce anxiety and irrational fears. -- 06/10/25 12/09/25 --    Goal Details: Progress toward goal:  The patient self-scales their progress related to this goal as a 5 with 10 being the worst.        Goal Intervention Frequency Start Date End Date    Help patient explore past emotional issues in relation to present anxiety. PRN 06/10/25 --    Intervention Details: Duration of treatment until discharged.        Goal Intervention Frequency Start Date End Date    Help patient develop an awareness of their cognitive and physical responses to anxiety. PRN 06/10/25 --    Intervention Details: Duration of treatment until discharged.                Problem: Depression  Start Date: 06/10/25      Problem Details: The patient self-scales this problem as a 5 with 10 being the worst.          Goal Priority Start Date Expected End Date End Date    Patient will demonstrate the ability to initiate new constructive life skills outside of sessions on a consistent basis. -- 06/10/25 12/09/25 --    Goal Details: Progress toward goal:  The patient self-scales their progress related to this goal as a 5 with 10 being the worst.        Goal Intervention Frequency Start Date End Date    Assist patient in setting attainable activities of daily living goals. PRN 06/10/25 --    Intervention Details: See care plan note        Goal Intervention Frequency Start Date End Date    Provide education about depression PRN 06/10/25 --    Intervention Details: Duration of treatment until discharged.        Goal Intervention Frequency Start Date End Date    Assist patient in developing healthy  coping strategies. PRN 06/10/25 --    Intervention Details: Duration of treatment until discharged.                Problem: Grief and Loss  Start Date: 06/10/25      Problem Details: The patient self-scales this problem as a 7 with 10 being the worst.          Goal Priority Start Date Expected End Date End Date    Patient will process feelings and identify and implement specific ways to facilitate the grieving process. -- 06/10/25 12/09/25 --    Goal Details: Progress toward goal:  The patient self-scales their progress related to this goal as a 7 with 10 being the worst.        Goal Intervention Frequency Start Date End Date    Assist patient in identifying and processing feelings about losses. PRN 06/10/25 --    Intervention Details: Duration of treatment until discharged.        Goal Intervention Frequency Start Date End Date    Identify ways to grieve effectively and utilize healthy support systems [facility] 06/10/25 --    Intervention Details: Duration of treatment until discharged.                        Reviewed By       Tunde Mason LCSW 06/10/25 0587                     I have discussed and reviewed this treatment plan with the patient.

## 2025-06-12 RX ORDER — OMEPRAZOLE 40 MG/1
40 CAPSULE, DELAYED RELEASE ORAL DAILY
Qty: 90 CAPSULE | Refills: 3 | Status: SHIPPED | OUTPATIENT
Start: 2025-06-12

## 2025-06-18 RX ORDER — PHENTERMINE HYDROCHLORIDE 37.5 MG/1
TABLET ORAL
Qty: 28 TABLET | Refills: 0 | Status: SHIPPED | OUTPATIENT
Start: 2025-06-18

## 2025-07-07 ENCOUNTER — OFFICE VISIT (OUTPATIENT)
Dept: BARIATRICS/WEIGHT MGMT | Facility: CLINIC | Age: 35
End: 2025-07-07
Payer: COMMERCIAL

## 2025-07-07 VITALS
BODY MASS INDEX: 37.77 KG/M2 | RESPIRATION RATE: 16 BRPM | HEIGHT: 66 IN | DIASTOLIC BLOOD PRESSURE: 70 MMHG | OXYGEN SATURATION: 99 % | HEART RATE: 60 BPM | SYSTOLIC BLOOD PRESSURE: 126 MMHG | WEIGHT: 235 LBS

## 2025-07-07 DIAGNOSIS — Z71.3 ENCOUNTER FOR WEIGHT LOSS COUNSELING: ICD-10-CM

## 2025-07-07 DIAGNOSIS — E66.812 OBESITY, CLASS II, BMI 35-39.9: Primary | ICD-10-CM

## 2025-07-07 PROCEDURE — 99214 OFFICE O/P EST MOD 30 MIN: CPT | Performed by: PHYSICIAN ASSISTANT

## 2025-07-07 NOTE — PROGRESS NOTES
[facility]  [address]  [address]  [facility] [id number] [phone]        Patient Name:  Eliz Eisenberg.  :  1990      Date of Visit: 2025        Reason for Visit:   MWM; 18 months postop        HPI: Eliz Eisenberg is a 35 y.o. female s/p LSG 23 w/ Dr. Sandy      Has been struggling w/ wt.loss plateau x 6 months.  On Topamax (migraines) and Metformin (PCOS).  Started Phentermine last month, tolerating well but notes having a little more constipation.  Bowels typically moved daily, now having bowel movements every other day.  Denies nausea or abd.pain.  No other issues/concerns.  Feels medication has helped w/ cravings.      Continues to prioritize protein and water intake.  Keeping a food journal.  Not eating after 9pm.  Getting 15-16k steps/day, some jogging, but having some (R) knee issues.      Diet Recall:   B - protein shake w/ coffee  L - chicken w/ stir cobian veggies  S - strawberries, cantaloupe, cheestick  D - cheeseburger + carrots    Labs 3/19/25 - acceptable.      ______    MW start weight: 237 lbs  (25)      Current weight:  235 lbs w/ body fat 48 %.     Total weight loss:  -2 lbs.   Total % loss:  1 %     ______        Presurgery weight: 350 pounds.  Today's weight is 107 kg (235 lb) pounds, today's  Body mass index is 37.93 kg/m²., and weight loss since surgery is 115 pounds.        Past Medical History:   Diagnosis Date    Abdominal pain     Abnormal ECG     Acute sinusitis     Allergic rhinitis     Anxiety     Arrhythmia     Asthma     Atopic dermatitis     Biliary dyskinesia     Cat bite      resolved    Clotting disorder     Current tear of meniscus     Depression 2023    Dysfunctional uterine bleeding     Eczema     Foot pain     GERD (gastroesophageal reflux disease)     Gout     Hearing loss, bilateral     R/T PSORASIS    Helicobacter pylori antibody positive     23. Rx prevpak, repeat  UBT 6/2023 (-)    IBS (irritable bowel syndrome)     Influenza     Metabolic syndrome     Migraines     Muscular aches     Neuromuscular disorder     Obesity     JENN (obstructive sleep apnea) - possible 01/28/2021    Panic attack     PCOS (polycystic ovarian syndrome)     Pharyngitis     PMS (premenstrual syndrome)     PONV (postoperative nausea and vomiting) December 7, 2017    Psoriasis     planning to start Taltz    PTSD (post-traumatic stress disorder)     Restless leg syndrome     URI (upper respiratory infection)     Wears glasses      Past Surgical History:   Procedure Laterality Date    ABDOMINAL SURGERY  12/2018    Multiple: gallbladder and hernia repair    COLONOSCOPY      ENDOSCOPY N/A 05/15/2023    Procedure: ESOPHAGOGASTRODUODENOSCOPY;  Surgeon: Beatriz Sandy MD;  Location:  [facility];  Service: Bariatric;  Laterality: N/A;    GASTRIC SLEEVE LAPAROSCOPIC N/A 11/06/2023    Procedure: GASTRIC SLEEVE LAPAROSCOPIC WITH DAVINCI ROBOT, ESOPHAGOGASTRODUODENOSCOPY;  Surgeon: Beatriz Sandy MD;  Location:  [facility];  Service: Robotics - DaVinci;  Laterality: N/A;  scope ID: [id number]    LAPAROSCOPIC CHOLECYSTECTOMY  2018    no gallstones    TONSILLECTOMY  06/2021    TONSILLECTOMY AND ADENOIDECTOMY N/A 07/09/2021    Procedure: TONSILLECTOMY;  Surgeon: Ran Lemon MD;  Location:  [facility];  Service: ENT;  Laterality: N/A;    UMBILICAL HERNIA REPAIR  2018    w/ cholecystectomy    WISDOM TOOTH EXTRACTION       Outpatient Medications Marked as Taking for the 7/7/25 encounter (Office Visit) with Nina Cox PA   Medication Sig Dispense Refill    albuterol (PROVENTIL) (2.5 MG/3ML) 0.083% nebulizer solution Take 2.5 mg by nebulization Every 6 (Six) Hours As Needed for Wheezing. 48 mL 0    albuterol sulfate  (90 Base) MCG/ACT inhaler Inhale 2 puffs Every 4 (Four) Hours As Needed for Wheezing. 18 g 2    BIOTIN PO Take  by mouth Daily. 1 daily      busPIRone (BUSPAR) 10 MG tablet  Take 1 tablet by mouth 3 (Three) Times a Day. 90 tablet 2    cetirizine (zyrTEC) 10 MG tablet Take 1 tablet by mouth Daily.      fluticasone (Flonase) 50 MCG/ACT nasal spray Administer 2 sprays into the nostril(s) as directed by provider Daily. 16 g 5    Fluticasone-Salmeterol (ADVAIR/WIXELA) 100-50 MCG/ACT DISKUS INHALE 1 PUFF BY MOUTH 2 TIMES A DAY 60 each 0    meclizine (ANTIVERT) 12.5 MG tablet Take 1 tablet by mouth 3 (Three) Times a Day As Needed for Dizziness.      metFORMIN (GLUCOPHAGE) 500 MG tablet TAKE TWO TABLETS BY MOUTH EVERY MORNING AND TAKE ONE TABLET BY MOUTH EVERY EVENING WITH MEALS 270 tablet 1    montelukast (SINGULAIR) 10 MG tablet Take 1 tablet by mouth Every Night. 90 tablet 3    multivitamin with minerals (MULTIVITAL PO) Take 1 tablet by mouth Daily.      omeprazole (priLOSEC) 40 MG capsule TAKE 1 CAPSULE BY MOUTH DAILY 90 capsule 3    phentermine (ADIPEX-P) 37.5 MG tablet Take 1/2 tablet at 11am and 1/2 tablet at 3pm. 28 tablet 0    Stelara 90 MG/ML solution prefilled syringe Injection 1 injection every 12 weeks      topiramate (TOPAMAX) 25 MG tablet Take 2 tablets by mouth Daily.      ubrogepant (UBRELVY) 100 MG tablet Take 1 tablet by mouth As Needed.      vitamin D (ERGOCALCIFEROL) 1.25 MG (80468 UT) capsule capsule Take 1 capsule by mouth Every 7 (Seven) Days. 12 capsule 1     Current Facility-Administered Medications for the 7/7/25 encounter (Office Visit) with Nina Cox PA   Medication Dose Route Frequency Provider Last Rate Last Admin    cyanocobalamin injection 1,000 mcg  1,000 mcg Intramuscular Q28 Days Nina Cox PA   1,000 mcg at 06/09/25 1155       Allergies   Allergen Reactions    Iodine Hives, Rash, Shortness Of Breath and Swelling    Cimzia [Certolizumab Pegol] Hives, GI Intolerance and Headache    Elastic Bandages & [Zinc] Hives    Latex Hives, Itching, Swelling and Rash     Gloves     Tape Hives     PAPER TAPE    Dilantin [Phenytoin] Unknown - Low Severity  "    Skin crawling    Pollen Extract Rash    Tree Extract Other (See Comments)     CONGESTION       Social History     Socioeconomic History    Marital status:    Tobacco Use    Smoking status: Never     Passive exposure: Never    Smokeless tobacco: Never   Vaping Use    Vaping status: Never Used   Substance and Sexual Activity    Alcohol use: Not Currently     Comment: Rarely drink alcohol    Drug use: No    Sexual activity: Yes     Partners: Male     Birth control/protection: None       /70 (BP Location: Left arm, Patient Position: Sitting)   Pulse 60   Resp 16   Ht 167.6 cm (66\")   Wt 107 kg (235 lb)   SpO2 99%   BMI 37.93 kg/m²     Physical Exam  Constitutional:       Appearance: She is well-developed.   Cardiovascular:      Rate and Rhythm: Normal rate.   Pulmonary:      Effort: Pulmonary effort is normal.   Musculoskeletal:         General: Normal range of motion.   Neurological:      Mental Status: She is alert.   Psychiatric:         Thought Content: Thought content normal.         Judgment: Judgment normal.           Assessment:  18 months s/p LSG 11/6/23 w/ Dr. Sandy          ICD-10-CM ICD-9-CM   1. Obesity, Class II, BMI 35-39.9  E66.812 278.00   2. Encounter for weight loss counseling  Z71.3 V65.3         Class 2 Severe Obesity (BMI >=35 and <=39.9). Obesity-related health conditions include the following: see above. Obesity is unchanged. BMI is is above average; BMI management plan is completed. We discussed see plan.      Plan:  I have instructed the patient to continue with pursuit of medical weight loss as a part of this program. Patient does meet criteria for use of anorectics at this time as BMI >30 , body fat percentage >30%, and is not at treatment goal.     The current plan for this month includes:   - Continue current exercise efforts  - Continue nutrition focus  - Continue Topamax + Metformin + Phentermine.  - Add probiotic + fiber.       The patient was instructed to " follow up in 4-6 wks, sooner if needed.      VINNIE Marcelino        I spent 30 minutes caring for Eliz on this date of service. This time includes time spent by me in the following activities: preparing for the visit, reviewing tests, performing a medically appropriate examination and/or evaluation, counseling and educating the patient/family/caregiver, documenting information in the medical record, and care coordination

## 2025-07-11 DIAGNOSIS — J45.21 MILD INTERMITTENT ASTHMA WITH ACUTE EXACERBATION: ICD-10-CM

## 2025-07-11 RX ORDER — MONTELUKAST SODIUM 10 MG/1
10 TABLET ORAL NIGHTLY
Qty: 90 TABLET | Refills: 0 | Status: SHIPPED | OUTPATIENT
Start: 2025-07-11

## 2025-07-13 DIAGNOSIS — F43.23 ADJUSTMENT DISORDER WITH MIXED ANXIETY AND DEPRESSED MOOD: ICD-10-CM

## 2025-07-14 RX ORDER — BUSPIRONE HYDROCHLORIDE 10 MG/1
10 TABLET ORAL 3 TIMES DAILY
Qty: 90 TABLET | Refills: 2 | OUTPATIENT
Start: 2025-07-14

## 2025-07-29 ENCOUNTER — TELEMEDICINE (OUTPATIENT)
Dept: BEHAVIORAL HEALTH | Facility: CLINIC | Age: 35
End: 2025-07-29
Payer: COMMERCIAL

## 2025-07-29 DIAGNOSIS — F32.2 CURRENT SEVERE EPISODE OF MAJOR DEPRESSIVE DISORDER WITHOUT PSYCHOTIC FEATURES, UNSPECIFIED WHETHER RECURRENT: ICD-10-CM

## 2025-07-29 DIAGNOSIS — F43.21 GRIEF: ICD-10-CM

## 2025-07-29 DIAGNOSIS — F41.1 GENERALIZED ANXIETY DISORDER: Primary | ICD-10-CM

## 2025-07-29 NOTE — PROGRESS NOTES
[facility] [place]                 Follow Up Adult      Follow Up Adult Note     Date:2025   Patient Name: Eliz Eisenberg  : 1990   MRN: [id number]   Time IN: 11:56 am    Time OUT: 12:45 pm     Referring Provider: Noni Lopez MD    Chief Complaint:      ICD-10-CM ICD-9-CM   1. Generalized anxiety disorder  F41.1 300.02   2. Current severe episode of major depressive disorder without psychotic features, unspecified whether recurrent  F32.2 296.23   3. Grief  F43.21 309.0        History of Present Illness:   Eliz Eisenberg is a 35 y.o. female who is being seen today for follow up counseling for ABIODUN, MDD and grief.    Subjective               Patient's Support Network Includes: extended family    Functional Status: Mild impairment       Current Outpatient Medications:     albuterol (PROVENTIL) (2.5 MG/3ML) 0.083% nebulizer solution, Take 2.5 mg by nebulization Every 6 (Six) Hours As Needed for Wheezing., Disp: 48 mL, Rfl: 0    albuterol sulfate  (90 Base) MCG/ACT inhaler, Inhale 2 puffs Every 4 (Four) Hours As Needed for Wheezing., Disp: 18 g, Rfl: 2    BIOTIN PO, Take  by mouth Daily. 1 daily, Disp: , Rfl:     busPIRone (BUSPAR) 10 MG tablet, Take 1 tablet by mouth 3 (Three) Times a Day., Disp: 90 tablet, Rfl: 2    cetirizine (zyrTEC) 10 MG tablet, Take 1 tablet by mouth Daily., Disp: , Rfl:     EPINEPHrine (EPIPEN) 0.3 MG/0.3ML solution auto-injector injection, As Needed. (Patient not taking: Reported on 2025), Disp: , Rfl:     fluticasone (Flonase) 50 MCG/ACT nasal spray, Administer 2 sprays into the nostril(s) as directed by provider Daily., Disp: 16 g, Rfl: 5    Fluticasone-Salmeterol (ADVAIR/WIXELA) 100-50 MCG/ACT DISKUS, INHALE 1 PUFF BY MOUTH 2 TIMES A DAY, Disp: 60 each, Rfl: 0    meclizine (ANTIVERT) 12.5 MG tablet, Take 1 tablet by mouth 3 (Three) Times a Day As Needed for Dizziness., Disp: , Rfl:     metFORMIN (GLUCOPHAGE)  500 MG tablet, TAKE TWO TABLETS BY MOUTH EVERY MORNING AND TAKE ONE TABLET BY MOUTH EVERY EVENING WITH MEALS, Disp: 270 tablet, Rfl: 1    montelukast (SINGULAIR) 10 MG tablet, TAKE ONE TABLET BY MOUTH ONCE NIGHTLY, Disp: 90 tablet, Rfl: 0    multivitamin with minerals (MULTIVITAL PO), Take 1 tablet by mouth Daily., Disp: , Rfl:     omeprazole (priLOSEC) 40 MG capsule, TAKE 1 CAPSULE BY MOUTH DAILY, Disp: 90 capsule, Rfl: 3    phentermine (ADIPEX-P) 37.5 MG tablet, Take 1/2 tablet at 11am and 1/2 tablet at 3pm., Disp: 28 tablet, Rfl: 0    Stelara 90 MG/ML solution prefilled syringe Injection, 1 injection every 12 weeks, Disp: , Rfl:     topiramate (TOPAMAX) 25 MG tablet, Take 2 tablets by mouth Daily., Disp: , Rfl:     ubrogepant (UBRELVY) 100 MG tablet, Take 1 tablet by mouth As Needed., Disp: , Rfl:     vitamin D (ERGOCALCIFEROL) 1.25 MG (86921 UT) capsule capsule, Take 1 capsule by mouth Every 7 (Seven) Days., Disp: 12 capsule, Rfl: 1    Current Facility-Administered Medications:     cyanocobalamin injection 1,000 mcg, 1,000 mcg, Intramuscular, Q28 Days, Nina Cox PA, 1,000 mcg at 06/09/25 1155    Allergies   Allergen Reactions    Iodine Hives, Rash, Shortness Of Breath and Swelling    Cimzia [Certolizumab Pegol] Hives, GI Intolerance and Headache    Elastic Bandages & [Zinc] Hives    Latex Hives, Itching, Swelling and Rash     Gloves     Tape Hives     PAPER TAPE    Dilantin [Phenytoin] Unknown - Low Severity     Skin crawling    Pollen Extract Rash    Tree Extract Other (See Comments)     CONGESTION       Objective     Physical Exam:  Vital Signs: There were no vitals filed for this visit.  There is no height or weight on file to calculate BMI.     Mental Status Exam:   Hygiene:   good  Cooperation:  Cooperative  Eye Contact:  Good  Psychomotor Behavior:  Appropriate  Affect:  Full range  Mood: normal  Speech:  Normal  Thought Process:  Goal directed  Thought Content:  Normal  Suicidal:   None  Homicidal:  None  Hallucinations:  None  Delusion:  None  Memory:  Intact  Orientation:  Person, Place, Time, and Situation  Reliability:  good  Insight:  Good  Judgement:  Good  Impulse Control:  Good  Physical/Medical Issues:  See problem list     Assessment / Plan      Diagnosis:  Diagnoses and all orders for this visit:    1. Generalized anxiety disorder (Primary)    2. Current severe episode of major depressive disorder without psychotic features, unspecified whether recurrent    3. Grief    Patient presented for follow-up with clinician via telehealth.  Patient and clinician identified and addressed patient cognitive distortions related to anxiety.  Patient and clinician identified patient's grandfather's decline and mental faculties as main trigger.  Patient described a pattern of behavior which demonstrated a marked decline in cognitive function displayed by their grandfather.  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  Patient and clinician reviewed patient coping mechanisms.  Patient and clinician collaborated to discuss coordination of care between patient and siblings.  Patient was responsive to interventions.  Clinician utilized active listening and reflective response of empathy and support.    Progress toward goal: Not at goal    Prognosis: Good with ongoing treatment    PLAN:  Patient and clinician will continue to utilize a cognitive behavioral intervention which identifies and addresses patient cognitive distortions related to anxiety, depression and grief.  Follow-ups will occur every 21 days and will last for 45 to 60 minutes in duration.    Safety: No acute safety concerns  Risk Assessment: Risk of self-harm acutely is low. Risk of self-harm chronically is also low, but could be further elevated in the event of treatment noncompliance and/or  AODA.    Treatment Plan/Goals: Continue supportive psychotherapy efforts and medications as indicated. Treatment and medication options discussed during today's visit. Patient ackowledged and verbally consented to continue with current treatment plan and was educated on the importance of compliance with treatment and follow-up appointments. Patient seems reasonably able to adhere to treatment plan.      Assisted Patient in processing above session content; acknowledged and normalized patient’s thoughts, feelings, and concerns.  Rationalized patient thought process regarding anxiety, depression and grief.      Allowed Patient to freely discuss issues  without interruption or judgement with unconditional positive regard, active listening skills, and empathy. Therapist provided a safe, confidential environment to facilitate the development of a positive therapeutic relationship and encouraged open, honest communication. Assisted Patient in identifying risk factors which would indicate the need for higher level of care including thoughts to harm self or others and/or self-harming behavior and encouraged Patient to contact this office, call 911, or present to the nearest emergency room should any of these events occur. Discussed crisis intervention services and means to access. Patient adamantly and convincingly denies current suicidal or homicidal ideation or perceptual disturbance. Assisted Patient in processing session content; acknowledged and normalized Patient’s thoughts, feelings, and concerns by utilizing a person-centered approach in efforts to build appropriate rapport and a positive therapeutic relationship with open and honest communication. .     Part of this note may be an electronic transcription/translation of spoken language to printed text using the Dragon Dictation System.       Follow Up:   Return in about 3 weeks (around 8/19/2025) for Next scheduled follow up.    Tunde Mason LCSW

## 2025-07-30 ENCOUNTER — TELEMEDICINE (OUTPATIENT)
Dept: BEHAVIORAL HEALTH | Facility: CLINIC | Age: 35
End: 2025-07-30
Payer: COMMERCIAL

## 2025-07-30 DIAGNOSIS — F43.23 ADJUSTMENT DISORDER WITH MIXED ANXIETY AND DEPRESSED MOOD: ICD-10-CM

## 2025-07-30 RX ORDER — BUSPIRONE HYDROCHLORIDE 10 MG/1
10 TABLET ORAL 3 TIMES DAILY
Qty: 180 TABLET | Refills: 0 | Status: SHIPPED | OUTPATIENT
Start: 2025-07-30

## 2025-07-30 RX ORDER — HYDROXYZINE HYDROCHLORIDE 25 MG/1
25 TABLET, FILM COATED ORAL NIGHTLY PRN
Qty: 90 TABLET | Refills: 0 | Status: SHIPPED | OUTPATIENT
Start: 2025-07-30

## 2025-07-30 NOTE — PROGRESS NOTES
Video Visit      Patient Name: Eliz Eisenberg  : 1990   MRN: [id number]     Referring Provider: Noni Lopez MD    Chief Complaint:      ICD-10-CM ICD-9-CM   1. Adjustment disorder with mixed anxiety and depressed mood  F43.23 309.28        This provider is located at the [facility] [place] (through [facility]), [address] [place] [address] using a secure [employer] Video Visit through [employer]. Patient is being seen remotely via telehealth video visit in [place], and stated they are in a secure environment for this session. The patient's condition being diagnosed/treated is appropriate for telemedicine. The provider identified herself as well as her credentials. The patient, and/or patients guardian, consent to be seen remotely, and when consent is given they understand that the consent allows for patient identifiable information to be sent to a third party as needed. They may refuse to be seen remotely at any time. The electronic data is encrypted and password protected, and the patient and/or guardian has been advised of the potential risks to privacy not withstanding such measures.    The patient has chosen to receive care today through a telehealth video visit. Do you consent to use a video/audio connection for your medical care today? Yes    Mode of Visit: Video  Location of patient: -HOME-  Location of provider: +[facility]  You have chosen to receive care through a telehealth visit.  The patient has signed the video visit consent form.  The visit included audio and video interaction. No technical issues occurred during this visit.      History of Present Illness:   Eliz Eisenberg is a 35 y.o. female who is being seen by a video visit today for follow up and medication management.   History of Present Illness  The patient presents via virtual visit for anxiety and insomnia.    She has been under the care of [facility] for therapy. She has been  prescribed BuSpar, which she took three times daily until her supply was exhausted six days ago. Towards the end of her medication, she noticed a decrease in its effectiveness, which she attributes to an increase in stress over the past few months. Since running out of BuSpar, she has experienced headaches, heightened emotions, and increased anxiety, to the extent that her  noticed these changes. She reports no suicidal or homicidal ideation, nor any hallucinations. Her appetite remains unchanged, and she maintains adequate hydration. She believes that resuming BuSpar 10 mg three times daily would be beneficial.    Over the past two weeks, she has lost interest or pleasure in activities on very few days, felt down, depressed, or hopeless most days, had trouble falling or staying asleep or sleeping too much every day, felt tired or had little energy every day, rarely had poor appetite or overeating, occasionally felt bad about herself or that she let herself or her family down, sometimes had trouble concentrating on things like reading or watching TV, had no thoughts of being better off dead or of hurting herself in some way, occasionally moved so slowly that other people could have noticed or like the opposite being really fidgety or restless, found it somewhat difficult to take care of herself, take care of the house or get along with others, felt nervous, anxious, or on edge somewhat difficult, unable to stop or control worrying most days, worried too much about different things most days, had trouble relaxing most days, rarely felt so restless that she cannot sit still, became easily annoyed or irritable most days, and felt afraid as if something awful might happen most days. She has previously taken hydroxyzine for anxiety, which induced sleepiness.    She reports increased anxiety and emotionality since running out of BuSpar six days ago. Headaches have been present, but no nausea. Sleep has been poor,  averaging four to five hours per night due to difficulty quieting her mind. Previous strategies to clear her mind before sleep have not been effective recently.         Subjective      Review of Systems:   Review of Systems   Constitutional:  Negative for appetite change and unexpected weight change.   Eyes:  Negative for visual disturbance.   Cardiovascular:  Negative for chest pain.   Musculoskeletal:  Positive for arthralgias. Negative for gait problem.        Diagnosed with rheumatoid arthritis   Skin:  Negative for rash and wound.        History of psoriasis   Neurological:  Negative for dizziness, tremors, seizures, weakness and light-headedness.   Psychiatric/Behavioral:  Positive for sleep disturbance. Negative for agitation, behavioral problems, confusion, decreased concentration, hallucinations, self-injury and suicidal ideas. The patient is nervous/anxious. The patient is not hyperactive.      Sleep pattern: 4-5 hours at night, not well rested  Appetite:no changes     PHQ-9 Depression Screening  Little interest or pleasure in doing things? Several days   Feeling down, depressed, or hopeless? Almost all   PHQ-2 Total Score 4   Trouble falling or staying asleep, or sleeping too much? Almost all   Feeling tired or having little energy? Almost all   Poor appetite or overeating? Not at all   Feeling bad about yourself - or that you are a failure or have let yourself or your family down? Several days   Trouble concentrating on things, such as reading the newspaper or watching television? Several days   Moving or speaking so slowly that other people could have noticed? Or the opposite - being so fidgety or restless that you have been moving around a lot more than usual? Several days   Thoughts that you would be better off dead, or of hurting yourself in some way? Not at all   PHQ-9 Total Score 13   If you checked off any problems, how difficult have these problems made it for you to do your work, take care of  things at home, or get along with other people? Somewhat difficult       ABIODUN-7 Anxiety Screening  Over the last two weeks, how often have you been bothered by the following problems?  Feeling nervous, anxious or on edge: Several days  Not being able to stop or control worrying: Nearly every day  Worrying too much about different things: Nearly every day  Trouble Relaxing: Nearly every day  Being so restless that it is hard to sit still: Not at all  Becoming easily annoyed or irritable: Nearly every day  Feeling afraid as if something awful might happen: Nearly every day  ABIODUN 7 Total Score: 16  If you checked any problems, how difficult have these problems made it for you to do your work, take care of things at home, or get along with other people: Somewhat difficult    RISK ASSESSMENT:  Patient denies any thoughts of suicide or intent today. Patient denies any suicidal or homicidal ideation today. Patient denies any high risk factors today.     Medications:     Current Outpatient Medications:     busPIRone (BUSPAR) 10 MG tablet, Take 1 tablet by mouth 3 (Three) Times a Day., Disp: 180 tablet, Rfl: 0    albuterol (PROVENTIL) (2.5 MG/3ML) 0.083% nebulizer solution, Take 2.5 mg by nebulization Every 6 (Six) Hours As Needed for Wheezing., Disp: 48 mL, Rfl: 0    albuterol sulfate  (90 Base) MCG/ACT inhaler, Inhale 2 puffs Every 4 (Four) Hours As Needed for Wheezing., Disp: 18 g, Rfl: 2    BIOTIN PO, Take  by mouth Daily. 1 daily, Disp: , Rfl:     cetirizine (zyrTEC) 10 MG tablet, Take 1 tablet by mouth Daily., Disp: , Rfl:     EPINEPHrine (EPIPEN) 0.3 MG/0.3ML solution auto-injector injection, As Needed. (Patient not taking: Reported on 6/9/2025), Disp: , Rfl:     fluticasone (Flonase) 50 MCG/ACT nasal spray, Administer 2 sprays into the nostril(s) as directed by provider Daily., Disp: 16 g, Rfl: 5    Fluticasone-Salmeterol (ADVAIR/WIXELA) 100-50 MCG/ACT DISKUS, INHALE 1 PUFF BY MOUTH 2 TIMES A DAY, Disp: 60 each,  Rfl: 0    hydrOXYzine (ATARAX) 25 MG tablet, Take 1 tablet by mouth At Night As Needed (insomnia)., Disp: 90 tablet, Rfl: 0    meclizine (ANTIVERT) 12.5 MG tablet, Take 1 tablet by mouth 3 (Three) Times a Day As Needed for Dizziness., Disp: , Rfl:     metFORMIN (GLUCOPHAGE) 500 MG tablet, TAKE TWO TABLETS BY MOUTH EVERY MORNING AND TAKE ONE TABLET BY MOUTH EVERY EVENING WITH MEALS, Disp: 270 tablet, Rfl: 1    montelukast (SINGULAIR) 10 MG tablet, TAKE ONE TABLET BY MOUTH ONCE NIGHTLY, Disp: 90 tablet, Rfl: 0    multivitamin with minerals (MULTIVITAL PO), Take 1 tablet by mouth Daily., Disp: , Rfl:     omeprazole (priLOSEC) 40 MG capsule, TAKE 1 CAPSULE BY MOUTH DAILY, Disp: 90 capsule, Rfl: 3    phentermine (ADIPEX-P) 37.5 MG tablet, Take 1/2 tablet at 11am and 1/2 tablet at 3pm., Disp: 28 tablet, Rfl: 0    Stelara 90 MG/ML solution prefilled syringe Injection, 1 injection every 12 weeks, Disp: , Rfl:     topiramate (TOPAMAX) 25 MG tablet, Take 2 tablets by mouth Daily., Disp: , Rfl:     ubrogepant (UBRELVY) 100 MG tablet, Take 1 tablet by mouth As Needed., Disp: , Rfl:     vitamin D (ERGOCALCIFEROL) 1.25 MG (13164 UT) capsule capsule, Take 1 capsule by mouth Every 7 (Seven) Days., Disp: 12 capsule, Rfl: 1    Current Facility-Administered Medications:     cyanocobalamin injection 1,000 mcg, 1,000 mcg, Intramuscular, Q28 Days, Nina Cox PA, 1,000 mcg at 06/09/25 1155    Medication Considerations:  MORGAN reviewed and appropriate.      Allergies:   Allergies   Allergen Reactions    Iodine Hives, Rash, Shortness Of Breath and Swelling    Cimzia [Certolizumab Pegol] Hives, GI Intolerance and Headache    Elastic Bandages & [Zinc] Hives    Latex Hives, Itching, Swelling and Rash     Gloves     Tape Hives     PAPER TAPE    Dilantin [Phenytoin] Unknown - Low Severity     Skin crawling    Pollen Extract Rash    Tree Extract Other (See Comments)     CONGESTION            Objective     Physical Exam:  Vital Signs:  There were no vitals filed for this visit.  There is no height or weight on file to calculate BMI.   Due to telehealth    Mental Status Exam:   Hygiene:   good  Cooperation:  Cooperative  Eye Contact:  Good  Psychomotor Behavior:  Appropriate  Affect:  Appropriate  Mood: normal  Speech:  Normal  Thought Process:  Goal directed and Linear  Thought Content:  Normal  Suicidal:  None  Homicidal:  None  Hallucinations:  None  Delusion:  None  Memory:  Intact  Orientation:  Person, Place, Time, and Situation  Reliability:  good  Insight:  Good  Judgement:  Good  Impulse Control:  Good  Physical/Medical Issues:  see problem list     Assessment / Plan      Visit Diagnosis/Orders Placed This Visit:  Diagnoses and all orders for this visit:    1. Adjustment disorder with mixed anxiety and depressed mood  -     busPIRone (BUSPAR) 10 MG tablet; Take 1 tablet by mouth 3 (Three) Times a Day.  Dispense: 180 tablet; Refill: 0  -     hydrOXYzine (ATARAX) 25 MG tablet; Take 1 tablet by mouth At Night As Needed (insomnia).  Dispense: 90 tablet; Refill: 0             Functional Status: Mild impairment     Prognosis: Good with Ongoing Treatment     Impression/Formulation:  Patient appeared alert and oriented.  Patient is voluntarily requesting to continue outpatient psychiatric treatment at [facility].  Patient is receptive to assistance with maintaining a stable lifestyle.  Patient presents with history of     ICD-10-CM ICD-9-CM   1. Adjustment disorder with mixed anxiety and depressed mood  F43.23 309.28   . Reviewed patient's previous provider notes. Reviewed most recent labs. Patient meets DSM V diagnostic criteria for diagnoses. Diagnoses may be updated as more information becomes available.     Assessment & Plan    The session focused on the patient's increased anxiety and emotional distress following the discontinuation of BuSpar. The patient reported that BuSpar was effective in managing her symptoms when  taken regularly. The discussion also included her difficulty sleeping, with only 4-5 hours of sleep per night, and her inability to quiet her mind. The patient mentioned increased emotionality and anxiety, which her  also noticed. The patient denied any suicidal ideation, thoughts of harming others, or hallucinations. Appetite remained stable.    The patient is experiencing heightened anxiety and emotional distress since running out of BuSpar 6 days ago. She reported that BuSpar was effective in managing her symptoms, but her stress levels have increased significantly in the past few months related to situational events. The patient also reported difficulty sleeping, with only 4-5 hours of sleep per night, and increased headaches. Despite these challenges, she denied any suicidal ideation, thoughts of harming others, or hallucinations. The patient's appetite remains stable, and she is drinking plenty of water.      - Reinstatement of BuSpar with a gradual increase in dosage.  - Prescription of hydroxyzine 25 mg for nightly use as needed for insomnia.  - Depression and anxiety screenings were conducted to assess the severity of symptoms.    Plan:  - Restart BuSpar with the following instructions:    - Start with one tablet daily for a few days.    - Increase to two tablets daily.    - Return to the original dosage of three tablets daily.  - Prescribe hydroxyzine 25 mg for nightly use as needed for insomnia.  - Monitor the effectiveness of hydroxyzine and use it regularly if necessary.  - Conduct follow-up appointment in 3 months.    Follow-up:  - Follow-up appointment scheduled for 10/29/2025 at 8:00 AM via telehealth.           Patient will continue supportive psychotherapy efforts and medications as indicated. Clinic will obtain release of information for current treatment team for continuity of care as needed. Patient will contact this office, call 911 or present to the nearest emergency room should  suicidal or homicidal ideations occur. Discussed medication options and treatment plan of prescribed medication(s) as well as the risks, benefits, and potential side effects. Patient ackowledged and verbally consented to continue with current treatment plan and was educated on the importance of compliance with treatment and follow-up appointments.     Patient instructions:  Instructed will take 4-6 weeks for full therapeutic effect. Medication risks and side effects discussed with patient including risk for worsening mood, changes in behavior, thoughts of suicide or homicide, induction of chayo, serotonin syndrome, rare hyponatremia, rare SIADH, withdrawal symptoms if abrupt withdrawal, sexual dysfunction.   If any thoughts of SI or HI, worsening mood or changes in behavior, call 911 or crisis line [phone], or go to nearest ER at once.  Pt.verbalizes understanding and consents to treatment with this medication.     Follow Up:   Return in about 3 months (around 10/30/2025) for Med Check, Video visit.    Patient or patient representative verbalized consent for the use of Ambient Listening during the visit with  COURTNEY Moreland for chart documentation. 7/30/2025  08:43 EDT        COURTNEY Moreland, [facility]

## (undated) DEVICE — SYR LUERLOK 50ML

## (undated) DEVICE — APL DUPLOSPRAYER MIS 40CM

## (undated) DEVICE — THE BITE BLOCK MAXI, LATEX FREE STRAP IS USED TO PROTECT THE ENDOSCOPE INSERTION TUBE FROM BEING BITTEN BY THE PATIENT.

## (undated) DEVICE — COVER,MAYO STAND,STERILE: Brand: MEDLINE

## (undated) DEVICE — Device: Brand: DEFENDO AIR/WATER/SUCTION AND BIOPSY VALVE

## (undated) DEVICE — BLANKT WARM UPPR/BDY ARM/OUT 57X196CM

## (undated) DEVICE — TROCAR: Brand: KII FIOS FIRST ENTRY

## (undated) DEVICE — UNDRPD COMFRT GLD DRYPAD 36X57IN

## (undated) DEVICE — SHT AIR TRANSFR COMFRT GLIDE LAT 40X80IN

## (undated) DEVICE — 3M™ STERI-DRAPE™ INCISE DRAPE 1050 (60CM X 45CM): Brand: STERI-DRAPE™

## (undated) DEVICE — BLADELESS OBTURATOR, LONG: Brand: WECK VISTA

## (undated) DEVICE — GOWN,NON-REINFORCED,SIRUS,SET IN SLV,XL: Brand: MEDLINE

## (undated) DEVICE — HYBRID CO2 TUBING/CAP SET FOR OLYMPUS® SCOPES & CO2 SOURCE: Brand: ERBE

## (undated) DEVICE — TROC BLADLES AIRSEAL/OPTI THRD 8X120MM 1P/U

## (undated) DEVICE — ADHS SKIN PREMIERPRO EXOFIN TOPICAL HI/VISC .5ML

## (undated) DEVICE — PK BARIATRIC 10

## (undated) DEVICE — TONSIL SPONGES: Brand: DEROYAL

## (undated) DEVICE — SEALANT WND FIBRIN TISSEEL PREFIL/SYR/PRIMAFZ 4ML

## (undated) DEVICE — ENT MINOR: Brand: MEDLINE INDUSTRIES, INC.

## (undated) DEVICE — APPL CHLORAPREP TINTED 26ML TEAL

## (undated) DEVICE — SOLIDIFIER LIQ PREMISORB 1500CC

## (undated) DEVICE — SOL IRR H2O BTL 1000ML STRL

## (undated) DEVICE — INTRO ACCSR BLNT TP

## (undated) DEVICE — ARM DRAPE

## (undated) DEVICE — REDUCER: Brand: ENDOWRIST

## (undated) DEVICE — LUBE JELLY FOIL PACK 1.4 OZ: Brand: MEDLINE INDUSTRIES, INC.

## (undated) DEVICE — ST BLD COL SAFETYLOK LL 23GA 3/4IN 12IN

## (undated) DEVICE — COLUMN DRAPE

## (undated) DEVICE — SEAL

## (undated) DEVICE — DEV CONTRL TISS STRATAFIX SPIRAL PDS PLS CT1 2-0 45CM
Type: IMPLANTABLE DEVICE | Site: ABDOMEN | Status: NON-FUNCTIONAL
Removed: 2023-11-06

## (undated) DEVICE — SINGLE-USE BIOPSY FORCEPS: Brand: RADIAL JAW 4

## (undated) DEVICE — FIRST STEP BEDSIDE ADD WATER KIT - RESEALABLE STAND-UP POUCH, ENDOSCOPIC CLEANING PAD - 1 POUCH: Brand: FIRST STEP BEDSIDE ADD WATER KIT - RESEALABLE STAND-UP POUCH, ENDOSCOPIC CLEANIN

## (undated) DEVICE — VESSEL SEALER EXTEND: Brand: ENDOWRIST

## (undated) DEVICE — LAPAROVUE VISIBILITY SYSTEM LAPAROSCOPIC SOLUTIONS: Brand: LAPAROVUE

## (undated) DEVICE — ST TBG AIRSEAL BIF FLTR W/ACT/CHARCOAL/FLTR

## (undated) DEVICE — KT ORCA ORCAPOD DISP STRL

## (undated) DEVICE — ADAPT CLN LUM OLYMP AIR/H20

## (undated) DEVICE — CATHETER,URETHRAL,REDRUBBER,STRL,12FR: Brand: MEDLINE INDUSTRIES, INC.

## (undated) DEVICE — PLASMABLADE PS300-004 SUCT COAG BLA 16PK: Brand: PLASMABLADE™

## (undated) DEVICE — GLV SURG SENSICARE W/ALOE PF LF 6.5 STRL

## (undated) DEVICE — GLV SURG SENSICARE PI MIC PF SZ6.5 LF STRL

## (undated) DEVICE — SAFELINER SUCTION CANISTER 1000CC: Brand: DEROYAL

## (undated) DEVICE — GLV SURG DERMASSURE GRN LF PF 7.0

## (undated) DEVICE — Device

## (undated) DEVICE — PLASMABLADE PS300-002 TNA TIP RD: Brand: PLASMABLADE™

## (undated) DEVICE — TUBING, SUCTION, 1/4" X 10', STRAIGHT: Brand: MEDLINE

## (undated) DEVICE — SUT GUT CHRM 4/0 RB1 27IN U203H

## (undated) DEVICE — STAPLER 60: Brand: SUREFORM

## (undated) DEVICE — CANNULA SEAL